# Patient Record
Sex: FEMALE | Race: WHITE | Employment: OTHER | URBAN - METROPOLITAN AREA
[De-identification: names, ages, dates, MRNs, and addresses within clinical notes are randomized per-mention and may not be internally consistent; named-entity substitution may affect disease eponyms.]

---

## 2017-01-10 ENCOUNTER — ANTICOAGULATION THERAPY VISIT (OUTPATIENT)
Dept: ANTICOAGULATION | Facility: CLINIC | Age: 77
End: 2017-01-10
Payer: MEDICARE

## 2017-01-10 DIAGNOSIS — Z79.01 LONG-TERM (CURRENT) USE OF ANTICOAGULANTS: Primary | ICD-10-CM

## 2017-01-10 LAB — INR POINT OF CARE: 1.4 (ref 0.86–1.14)

## 2017-01-10 PROCEDURE — 36416 COLLJ CAPILLARY BLOOD SPEC: CPT

## 2017-01-10 PROCEDURE — 99207 ZZC NO CHARGE NURSE ONLY: CPT

## 2017-01-10 PROCEDURE — 85610 PROTHROMBIN TIME: CPT | Mod: QW

## 2017-01-10 NOTE — MR AVS SNAPSHOT
Cielo Aguillon   1/10/2017 2:45 PM   Anticoagulation Therapy Visit    Description:  76 year old female   Provider[facility]  [facility]   Department:  [facility]           INR as of 1/10/2017     Selected INR 1.4! (1/10/2017)      Anticoagulation Summary as of 1/10/2017     INR goal 2.0-3.0   Selected INR 1.4! (1/10/2017)   Full instructions 1/10: 10 mg; 1/11: 10 mg; 1/12: 10 mg; Otherwise 7.5 mg on Tue, Thu, Sat; 5 mg all other days   Next INR check 1/23/2017    Indications   Long-term (current) use of anticoagulants [Z79.01] [Z79.01]  Atrial fibrillation (H) (Resolved) [I48.91]         Your next Anticoagulation Clinic appointment(s)     Jan 23, 2017  2:30 PM   [facility] Visit with [facility]   [facility] ([facility]    [address] [facility] [address]  [facility] [address] [phone]              Contact Numbers     [facility] Phone Numbers:  Anticoagulation Clinic Appointments : [phone]  Anticoagulation Nurse: [phone]         January 2017 Details    Sun Mon Tue Wed Thu Fri Sat     1               2               3               4               5               6               7                 8               9               10      10 mg   See details      11      10 mg         12      10 mg         13      5 mg         14      7.5 mg           15      5 mg         16      5 mg         17      7.5 mg         18      5 mg         19      7.5 mg         20      5 mg         21      7.5 mg           22      5 mg         23            24               25               26               27               28                 29               30               31                    Date Details   01/10 This INR check       Date of next INR:  1/23/2017         How to take your warfarin dose     To take:  5 mg Take 1 of the 5 mg tablets.    To take:  7.5 mg Take 1.5 of the 5 mg tablets.    To take:  10 mg Take 2 of the 5 mg tablets.

## 2017-01-10 NOTE — PROGRESS NOTES
ANTICOAGULATION FOLLOW-UP CLINIC VISIT    Patient Name:  Cielo Aguillon  Date:  1/10/2017  Contact Type:  Face to Face    SUBJECTIVE:     Patient Findings     Positives Intentional hold of therapy (Pt had been holding for eye surgery on 1/3/17)           OBJECTIVE    INR PROTIME   Date Value Ref Range Status   01/10/2017 1.4* 0.86 - 1.14 Final       ASSESSMENT / PLAN  INR assessment SUB    Recheck INR In: 2 WEEKS    INR Location Clinic      Anticoagulation Summary as of 1/10/2017     INR goal 2.0-3.0   Selected INR 1.4! (1/10/2017)   Maintenance plan 7.5 mg (5 mg x 1.5) on Tue, Thu, Sat; 5 mg (5 mg x 1) all other days   Full instructions 1/10: 10 mg; 1/11: 10 mg; 1/12: 10 mg; Otherwise 7.5 mg on Tue, Thu, Sat; 5 mg all other days   Weekly total 42.5 mg   Plan last modified Lia Lomas RN (5/19/2016)   Next INR check 1/23/2017   Priority INR   Target end date     Indications   Long-term (current) use of anticoagulants [Z79.01] [Z79.01]  Atrial fibrillation (H) (Resolved) [I48.91]         Anticoagulation Episode Summary     INR check location     Preferred lab     Send INR reminders to [facility]    Comments       Anticoagulation Care Providers     Provider Role Specialty Phone number    Simin Lopez MD [facility] Internal Medicine [phone]            See the Encounter Report to view Anticoagulation Flowsheet and Dosing Calendar (Go to Encounters tab in chart review, and find the Anticoagulation Therapy Visit)    Dosage adjustment made based on physician directed care plan.    Cindi Preston RN

## 2017-01-11 DIAGNOSIS — E78.5 HYPERLIPIDEMIA LDL GOAL <70: Primary | ICD-10-CM

## 2017-01-12 NOTE — TELEPHONE ENCOUNTER
Lipitor - PHARMACY IS REQUESTING 90 DAY REFILLS     Last Written Prescription Date: 12/19/16  Last Fill Quantity: 30, # refills: 11  Last Office Visit with [facility] or [facility] prescribing provider: 12/09/16       CHOL      133   2/19/2016  HDL       47   2/19/2016  LDL       54   2/19/2016  TRIG      158   2/19/2016  CHOLHDLRATIO      2.6   1/2/2015    Labs showing if normal/abnormal  Lab Results   Component Value Date    CHOL 133 02/19/2016    TRIG 158* 02/19/2016    HDL 47* 02/19/2016    LDL 54 02/19/2016    VLDL 29 01/02/2015    CHOLHDLRATIO 2.6 01/02/2015

## 2017-01-17 ENCOUNTER — TELEPHONE (OUTPATIENT)
Dept: INTERNAL MEDICINE | Facility: CLINIC | Age: 77
End: 2017-01-17

## 2017-01-17 RX ORDER — ATORVASTATIN CALCIUM 20 MG/1
20 TABLET, FILM COATED ORAL DAILY
Qty: 90 TABLET | Refills: 3 | Status: SHIPPED | OUTPATIENT
Start: 2017-01-17 | End: 2017-07-10

## 2017-01-18 NOTE — TELEPHONE ENCOUNTER
Pt left message that she wanted to speak to someone  It could have been about her refill for Atorvastatin.    Please call her  Michael

## 2017-01-27 ENCOUNTER — ANTICOAGULATION THERAPY VISIT (OUTPATIENT)
Dept: ANTICOAGULATION | Facility: CLINIC | Age: 77
End: 2017-01-27
Payer: MEDICARE

## 2017-01-27 DIAGNOSIS — Z79.01 LONG-TERM (CURRENT) USE OF ANTICOAGULANTS: Primary | ICD-10-CM

## 2017-01-27 LAB — INR POINT OF CARE: 3.4 (ref 0.86–1.14)

## 2017-01-27 PROCEDURE — 99207 ZZC NO CHARGE NURSE ONLY: CPT

## 2017-01-27 PROCEDURE — 85610 PROTHROMBIN TIME: CPT | Mod: QW

## 2017-01-27 PROCEDURE — 36416 COLLJ CAPILLARY BLOOD SPEC: CPT

## 2017-01-27 NOTE — MR AVS SNAPSHOT
Cielo Aguillon   1/27/2017 1:00 PM   Anticoagulation Therapy Visit    Description:  76 year old female   Provider[facility]  [facility]   Department:  [facility]           INR as of 1/27/2017     Selected INR 3.4! (1/27/2017)      Anticoagulation Summary as of 1/27/2017     INR goal 2.0-3.0   Selected INR 3.4! (1/27/2017)   Full instructions 1/27: Hold; 1/28: 5 mg; 2/4: 5 mg; Otherwise 7.5 mg on Tue, Thu, Sat; 5 mg all other days   Next INR check 2/6/2017    Indications   Long-term (current) use of anticoagulants [Z79.01] [Z79.01]  Atrial fibrillation (H) (Resolved) [I48.91]         Your next Anticoagulation Clinic appointment(s)     Feb 06, 2017  1:15 PM   [facility] with [facility]   [facility]    [address] [facility] [address]  [facility] [address] [phone]              Contact Numbers     [facility] Phone Numbers:  Anticoagulation Clinic Appointments : [phone]  Anticoagulation Nurse: [phone]         January 2017 Details    Sun Mon Tue Wed Thu Fri Sat     1               2               3               4               5               6               7                 8               9               10               11               12               13               14                 15               16               17               18               19               20               21                 22               23               24               25               26               27      Hold   See details      28      5 mg           29      5 mg         30      5 mg         31      7.5 mg              Date Details   01/27 This INR check               How to take your warfarin dose     To take:  5 mg Take 1 of the 5 mg tablets.    To take:  7.5 mg Take 1.5 of the 5 mg tablets.    Hold Do not take your warfarin dose. See the Details table to the right for additional instructions.                 February 2017 Details    Sun Mon Tue Wed Thu Fri Sat        1      5 mg         2      7.5 mg         3      5 mg         4      5 mg           5      5 mg         6            7               8               9               10               11                 12               13               14               15               16               17               18                 19               20               21               22               23               24               25                 26               27               28                    Date Details   No additional details    Date of next INR:  2/6/2017         How to take your warfarin dose     To take:  5 mg Take 1 of the 5 mg tablets.    To take:  7.5 mg Take 1.5 of the 5 mg tablets.

## 2017-01-27 NOTE — PROGRESS NOTES
ANTICOAGULATION FOLLOW-UP CLINIC VISIT    Patient Name:  Cielo Aguillon  Date:  1/27/2017  Contact Type:  Face to Face    SUBJECTIVE:     Patient Findings     Positives Other Complaints (bleeding on left eye recently had cataract surgery.  Also mattery.  Pt will  start eye drops today as directed by eye surgeon)           OBJECTIVE    INR PROTIME   Date Value Ref Range Status   01/27/2017 3.4* 0.86 - 1.14 Final       ASSESSMENT / PLAN  INR assessment SUPRA    Recheck INR In: 10 DAYS    INR Location Clinic      Anticoagulation Summary as of 1/27/2017     INR goal 2.0-3.0   Selected INR 3.4! (1/27/2017)   Maintenance plan 7.5 mg (5 mg x 1.5) on Tue, Thu, Sat; 5 mg (5 mg x 1) all other days   Full instructions 1/27: Hold; 1/28: 5 mg; 2/4: 5 mg; Otherwise 7.5 mg on Tue, Thu, Sat; 5 mg all other days   Weekly total 42.5 mg   Plan last modified Lia Lomas RN (5/19/2016)   Next INR check 2/6/2017   Priority INR   Target end date     Indications   Long-term (current) use of anticoagulants [Z79.01] [Z79.01]  Atrial fibrillation (H) (Resolved) [I48.91]         Anticoagulation Episode Summary     INR check location     Preferred lab     Send INR reminders to [facility]    Comments       Anticoagulation Care Providers     Provider Role Specialty Phone number    Simin Lopez MD Responsible Internal Medicine [phone]            See the Encounter Report to view Anticoagulation Flowsheet and Dosing Calendar (Go to Encounters tab in chart review, and find the Anticoagulation Therapy Visit)    Dosage adjustment made based on physician directed care plan.    Mckenna Robison RN

## 2017-02-03 DIAGNOSIS — I25.10 ASCVD (ARTERIOSCLEROTIC CARDIOVASCULAR DISEASE): ICD-10-CM

## 2017-02-03 DIAGNOSIS — I10 ESSENTIAL HYPERTENSION, BENIGN: ICD-10-CM

## 2017-02-03 DIAGNOSIS — F33.1 MAJOR DEPRESSIVE DISORDER, RECURRENT EPISODE, MODERATE (H): Primary | ICD-10-CM

## 2017-02-03 DIAGNOSIS — F41.1 GAD (GENERALIZED ANXIETY DISORDER): ICD-10-CM

## 2017-02-03 DIAGNOSIS — R94.6 THYROID FUNCTION TEST ABNORMAL: ICD-10-CM

## 2017-02-03 NOTE — TELEPHONE ENCOUNTER
EFFEXOR-XR    Last Written Prescription Date: 10/28/16  Last Fill Quantity: 30, # refills: 3  Last [facility] Visit with [facility] or  Health prescribing provider: 12/09/16        BP Readings from Last 3 Encounters:   12/09/16 104/80   08/15/16 136/74   07/06/16 120/72     Pulse: (for Fetzima)  CREATININE   Date Value Ref Range Status   02/24/2016 1.08 0.70 - 1.30 mg/dL Final   ]    Last PHQ-9 score on record=   PHQ-9 SCORE 9/27/2016   Total Score -   Total Score 4         Labs showing if normal/abnormal  Data Unavailable  Lab Results   Component Value Date    AST 51* 11/18/2015    ALT 38 02/19/2016      Lab Results   Component Value Date    CHOL 133 02/19/2016    TRIG 158* 02/19/2016    HDL 47* 02/19/2016    LDL 54 02/19/2016    VLDL 29 01/02/2015    CHOLHDLRATIO 2.6 01/02/2015     LEVOTHYROXINE     Last Written Prescription Date: 08/16/16  Last Quantity: 90, # refills: 1  Last Office Visit with [facility] or [facility] prescribing provider: 12/09/16        TSH   Date Value Ref Range Status   10/11/2016 3.40 0.40 - 4.00 mU/L Final   METOPROLOL      Last Written Prescription Date: 08/18/16  Last Fill Quantity: 180, # refills: 1  Last Office Visit with [facility] or [facility] prescribing provider: 12/09/16       POTASSIUM   Date Value Ref Range Status   02/24/2016 4.3 3.5 - 5.1 mmol/L Final     CREATININE   Date Value Ref Range Status   02/24/2016 1.08 0.70 - 1.30 mg/dL Final     BP Readings from Last 3 Encounters:   12/09/16 104/80   08/15/16 136/74   07/06/16 120/72

## 2017-02-06 ENCOUNTER — ANTICOAGULATION THERAPY VISIT (OUTPATIENT)
Dept: ANTICOAGULATION | Facility: CLINIC | Age: 77
End: 2017-02-06
Payer: MEDICARE

## 2017-02-06 DIAGNOSIS — Z79.01 LONG TERM (CURRENT) USE OF ANTICOAGULANTS: ICD-10-CM

## 2017-02-06 DIAGNOSIS — Z79.01 LONG-TERM (CURRENT) USE OF ANTICOAGULANTS: Primary | ICD-10-CM

## 2017-02-06 DIAGNOSIS — I48.20 CHRONIC ATRIAL FIBRILLATION (H): Primary | ICD-10-CM

## 2017-02-06 LAB — INR POINT OF CARE: 2.4 (ref 0.86–1.14)

## 2017-02-06 PROCEDURE — 99207 ZZC NO CHARGE NURSE ONLY: CPT

## 2017-02-06 PROCEDURE — 36416 COLLJ CAPILLARY BLOOD SPEC: CPT

## 2017-02-06 PROCEDURE — 85610 PROTHROMBIN TIME: CPT | Mod: QW

## 2017-02-06 RX ORDER — WARFARIN SODIUM 5 MG/1
TABLET ORAL
Qty: 108 TABLET | Refills: 1 | Status: SHIPPED | OUTPATIENT
Start: 2017-02-06 | End: 2017-08-04

## 2017-02-06 NOTE — MR AVS SNAPSHOT
Cielo Aguillon   2/6/2017 1:15 PM   Anticoagulation Therapy Visit    Description:  76 year old female   Provider[facility]  [facility]   Department:  [facility]           INR as of 2/6/2017     Selected INR 2.4 (2/6/2017)      Anticoagulation Summary as of 2/6/2017     INR goal 2.0-3.0   Selected INR 2.4 (2/6/2017)   Full instructions 7.5 mg on Tue, Thu, Sat; 5 mg all other days   Next INR check 2/27/2017    Indications   Long-term (current) use of anticoagulants [Z79.01] [Z79.01]  Atrial fibrillation (H) (Resolved) [I48.91]         Your next Anticoagulation Clinic appointment(s)     Feb 27, 2017  1:00 PM   [facility]ticoagulation [facility] with [facility]   [facility]    [address] [facility] [address]  [facility] [address] [phone]              Contact Numbers     [facility] Phone Numbers:  Anticoagulation Clinic Appointments : [phone]  Anticoagulation Nurse: [phone]         February 2017 Details    Sun Mon Tue Wed Thu Fri Sat        1               2               3               4                 5               6      5 mg   See details      7      7.5 mg         8      5 mg         9      7.5 mg         10      5 mg         11      7.5 mg           12      5 mg         13      5 mg         14      7.5 mg         15      5 mg         16      7.5 mg         17      5 mg         18      7.5 mg           19      5 mg         20      5 mg         21      7.5 mg         22      5 mg         23      7.5 mg         24      5 mg         25      7.5 mg           26      5 mg         27            28                    Date Details   02/06 This INR check       Date of next INR:  2/27/2017         How to take your warfarin dose     To take:  5 mg Take 1 of the 5 mg tablets.    To take:  7.5 mg Take 1.5 of the 5 mg tablets.

## 2017-02-06 NOTE — TELEPHONE ENCOUNTER
Warfarin. Prescription approved per [facility] Refill [facility].       Last Written Prescription Date: 11/1/16  Last Fill Qty: 108, # refills: 1    Last Office Visit with [facility] or [facility] prescribing provider: 12/9/16       Lab Results   Component Value Date    INR 3.4* 01/27/2017    INR 1.4* 01/10/2017    INR 3.0* 12/09/2016    INR 2.3* 11/22/2016    INR 2.5* 11/08/2016

## 2017-02-06 NOTE — PROGRESS NOTES
ANTICOAGULATION FOLLOW-UP CLINIC VISIT    Patient Name:  Cielo Aguillon  Date:  2/6/2017  Contact Type:  Face to Face    SUBJECTIVE:     Patient Findings     Positives Antibiotic use or infection (Pt is taking doxycycline for URI, has 5 days left, )           OBJECTIVE    INR PROTIME   Date Value Ref Range Status   02/06/2017 2.4* 0.86 - 1.14 Final       ASSESSMENT / PLAN  INR assessment THER    Recheck INR In: 3 WEEKS    INR Location Clinic      Anticoagulation Summary as of 2/6/2017     INR goal 2.0-3.0   Selected INR 2.4 (2/6/2017)   Maintenance plan 7.5 mg (5 mg x 1.5) on Tue, Thu, Sat; 5 mg (5 mg x 1) all other days   Full instructions 7.5 mg on Tue, Thu, Sat; 5 mg all other days   Weekly total 42.5 mg   No change documented Cindi Preston, RN   Plan last modified Lia Lomas RN (5/19/2016)   Next INR check 2/27/2017   Priority INR   Target end date     Indications   Long-term (current) use of anticoagulants [Z79.01] [Z79.01]  Atrial fibrillation (H) (Resolved) [I48.91]         Anticoagulation Episode Summary     INR check location     Preferred lab     Send INR reminders to [facility]    Comments       Anticoagulation Care Providers     Provider Role Specialty Phone number    Simin Lopez MD [facility] Internal Medicine [phone]            See the Encounter Report to view Anticoagulation Flowsheet and Dosing Calendar (Go to Encounters tab in chart review, and find the Anticoagulation Therapy Visit)    Dosage adjustment made based on physician directed care plan.    Cindi Preston, RN

## 2017-02-07 RX ORDER — VENLAFAXINE HYDROCHLORIDE 150 MG/1
150 CAPSULE, EXTENDED RELEASE ORAL DAILY
Qty: 30 CAPSULE | Refills: 1 | Status: SHIPPED | OUTPATIENT
Start: 2017-02-07 | End: 2017-05-03

## 2017-02-07 RX ORDER — METOPROLOL TARTRATE 100 MG
100 TABLET ORAL 2 TIMES DAILY
Qty: 180 TABLET | Refills: 1 | Status: SHIPPED | OUTPATIENT
Start: 2017-02-07 | End: 2017-08-01

## 2017-02-07 RX ORDER — LEVOTHYROXINE SODIUM 25 UG/1
25 TABLET ORAL DAILY
Qty: 90 TABLET | Refills: 2 | Status: SHIPPED | OUTPATIENT
Start: 2017-02-07 | End: 2017-07-10 | Stop reason: DRUGHIGH

## 2017-02-07 NOTE — TELEPHONE ENCOUNTER
Will be due for Creatinine this month.   Prescription approved per [facility] Refill Protocol.

## 2017-02-16 ENCOUNTER — OFFICE VISIT (OUTPATIENT)
Dept: INTERNAL MEDICINE | Facility: CLINIC | Age: 77
End: 2017-02-16
Payer: MEDICARE

## 2017-02-16 VITALS
HEART RATE: 82 BPM | WEIGHT: 173 LBS | HEIGHT: 65 IN | OXYGEN SATURATION: 96 % | SYSTOLIC BLOOD PRESSURE: 108 MMHG | BODY MASS INDEX: 28.82 KG/M2 | DIASTOLIC BLOOD PRESSURE: 82 MMHG

## 2017-02-16 DIAGNOSIS — R09.81 NASAL CONGESTION: ICD-10-CM

## 2017-02-16 DIAGNOSIS — I10 ESSENTIAL HYPERTENSION, BENIGN: ICD-10-CM

## 2017-02-16 DIAGNOSIS — M54.50 CHRONIC LEFT-SIDED LOW BACK PAIN WITHOUT SCIATICA: Primary | ICD-10-CM

## 2017-02-16 DIAGNOSIS — R53.83 FATIGUE, UNSPECIFIED TYPE: ICD-10-CM

## 2017-02-16 DIAGNOSIS — G89.29 CHRONIC LEFT-SIDED LOW BACK PAIN WITHOUT SCIATICA: Primary | ICD-10-CM

## 2017-02-16 DIAGNOSIS — R04.0 EPISTAXIS: ICD-10-CM

## 2017-02-16 DIAGNOSIS — M47.816 FACET ARTHROPATHY, LUMBAR: ICD-10-CM

## 2017-02-16 DIAGNOSIS — E03.4 HYPOTHYROIDISM DUE TO ACQUIRED ATROPHY OF THYROID: ICD-10-CM

## 2017-02-16 DIAGNOSIS — Z79.01 LONG-TERM (CURRENT) USE OF ANTICOAGULANTS: ICD-10-CM

## 2017-02-16 LAB
CREAT UR-MCNC: 202 MG/DL
HGB BLD-MCNC: 13.7 G/DL (ref 11.7–15.7)
MICROALBUMIN UR-MCNC: 69 MG/L
MICROALBUMIN/CREAT UR: 34.06 MG/G CR (ref 0–25)

## 2017-02-16 PROCEDURE — 82043 UR ALBUMIN QUANTITATIVE: CPT | Performed by: INTERNAL MEDICINE

## 2017-02-16 PROCEDURE — 99215 OFFICE O/P EST HI 40 MIN: CPT | Performed by: INTERNAL MEDICINE

## 2017-02-16 PROCEDURE — 85018 HEMOGLOBIN: CPT | Performed by: INTERNAL MEDICINE

## 2017-02-16 PROCEDURE — 84443 ASSAY THYROID STIM HORMONE: CPT | Performed by: INTERNAL MEDICINE

## 2017-02-16 PROCEDURE — 84439 ASSAY OF FREE THYROXINE: CPT | Performed by: INTERNAL MEDICINE

## 2017-02-16 PROCEDURE — 36415 COLL VENOUS BLD VENIPUNCTURE: CPT | Performed by: INTERNAL MEDICINE

## 2017-02-16 NOTE — MR AVS SNAPSHOT
After Visit Summary   2/16/2017    Cielo Aguillon    MRN: [id number]           Patient Information     Date Of Birth          1940        Visit Information        Provider Department      2/16/2017 2:20 PM Simin Lopez MD [facility]        Today's Diagnoses     Hypothyroidism due to acquired atrophy of thyroid    -  1    Essential hypertension, benign        Long-term (current) use of anticoagulants [Z79.01]        Epistaxis          Care Instructions    Use plain Mucinex and nasacort.         Follow-ups after your visit        Additional Services     OTOLARYNGOLOGY REFERRAL       Your provider has referred you to: N: [facility] [phone]   [email or web address]    Please be aware that coverage of these services is subject to the terms and limitations of your health insurance plan.  Call member services at your health plan with any benefit or coverage questions.      Please bring the following with you to your appointment:    (1) Any X-Rays, CTs or MRIs which have been performed.  Contact the facility where they were done to arrange for  prior to your scheduled appointment.   (2) List of current medications  (3) This referral request   (4) Any documents/labs given to you for this referral                  Your next 10 appointments already scheduled     Feb 27, 2017  1:00 PM CST   Anticoagulation Visit with [facility]   [facility]    [address] [facility] [address]  [facility] [address] [phone]              Who to contact     If you have questions or need follow up information about today's clinic visit or your schedule please contact [facility] directly at [phone].  Normal or non-critical lab and imaging results will be communicated to you by MyChart, letter or phone within 4 business days after  "the clinic has received the results. If you do not hear from us within 7 days, please contact the clinic through [employer] or phone. If you have a critical or abnormal lab result, we will notify you by phone as soon as possible.  Submit refill requests through [employer] or call your pharmacy and they will forward the refill request to us. Please allow 3 business days for your refill to be completed.          Additional Information About Your Visit        [employer] Information     [employer] lets you send messages to your doctor, view your test results, renew your prescriptions, schedule appointments and more. To sign up, go to [email or web address] . Click on \"Log in\" on the left side of the screen, which will take you to the Welcome page. Then click on \"Sign up Now\" on the right side of the page.     You will be asked to enter the access code listed below, as well as some personal information. Please follow the directions to create your username and password.     Your access code is: [email or web address]  Expires: 3/9/2017  5:04 PM     Your access code will  in 90 days. If you need help or a new code, please call your [place] clinic or [phone].        Care EveryWhere ID     This is your Care EveryWhere ID. This could be used by other organizations to access your [place] medical records  [id number]        Your Vitals Were     Pulse Height Pulse Oximetry BMI (Body Mass Index)          82 5' 5\" (1.651 m) 96% 28.79 kg/m2         Blood Pressure from Last 3 Encounters:   17 108/82   16 104/80   08/15/16 136/74    Weight from Last 3 Encounters:   17 173 lb (78.5 kg)   16 167 lb 12.8 oz (76.1 kg)   16 167 lb (75.8 kg)              We Performed the Following     EKG 12-lead complete w/read - Clinics     Hemoglobin     Microalbumin quantitative random urine     OTOLARYNGOLOGY REFERRAL     TSH with free T4 reflex        Primary Care Provider Office Phone # Fax #    Simin Lopez [phone] " [phone]       [facility] [address]  [facility] [address]        Thank you!     Thank you for choosing [facility]  for your care. Our goal is always to provide you with excellent care. Hearing back from our patients is one way we can continue to improve our services. Please take a few minutes to complete the written survey that you may receive in the mail after your visit with us. Thank you!             Your Updated Medication List - Protect others around you: Learn how to safely use, store and throw away your medicines at www.disposemymeds.org.          This list is accurate as of: 2/16/17  3:03 PM.  Always use your most recent med list.                   Brand Name Dispense Instructions for use    amLODIPine 5 MG tablet    NORVASC    90 tablet    Take 1 tablet (5 mg) by mouth daily       atorvastatin 20 MG tablet    LIPITOR    90 tablet    Take 1 tablet (20 mg) by mouth daily       diazepam 5 MG tablet    VALIUM    40 tablet    1/2 to 1 po q 8 hours prn       levothyroxine 25 MCG tablet    SYNTHROID/LEVOTHROID    90 tablet    Take 1 tablet (25 mcg) by mouth daily       metoprolol 100 MG tablet    LOPRESSOR    180 tablet    Take 1 tablet (100 mg) by mouth 2 times daily       order for DME     1 Device    Equipment being ordered: CPAP mask, tubing and supplies. Fax #[phone]. Attn: Joseph       traMADol 50 MG tablet    ULTRAM    50 tablet    Take 1 tablet (50 mg) by mouth every 8 hours as needed for moderate pain       venlafaxine 150 MG 24 hr capsule    EFFEXOR-XR    30 capsule    Take 1 capsule (150 mg) by mouth daily       VITAMIN D (CHOLECALCIFEROL) PO      Take 1,000 Units by mouth daily       warfarin 5 MG tablet    COUMADIN    108 tablet    Take 1 tablet (5 mg) daily, except take 1 and 1/2 tablet (7.5 mg) on Tuesday, Thursday and Saturday  or as instructed.       zolpidem 5 MG tablet    AMBIEN    30 tablet    Take 1 tablet (5 mg) by mouth nightly as needed for  sleep

## 2017-02-16 NOTE — PROGRESS NOTES
SUBJECTIVE:                                                    Cielo Aguillon is a 76 year old female who presents to clinic today for the following health issues:    1. Back pain: She has had chronic back pain for 7 years, previously managed at [facility]. She has facet arthropathy, degenerative disc disease, spinal stenosis. She's had PT, a number of injections with Dr. Dominguez with facet injections, has had rhizotomies without significant improvement. She does not want to have surgery. She continues to have constant pain.     2. Nasal symptoms: She Had onset of symptoms about a month ago It did not start with an acute viral syndrome but with sinus pressure, nasal congestion, postnasal drainage. She was seen at an urgent care on 1/31/17 and put on Augmentin. She developed a rash to that medication and was changed to doxycycline and  treated for 10 days. She had improvement in her symptoms but they have not resolved.  She continues to have significant nasal congestion and some post nasal drainage. She does not have sinus pain, no fever, no cough.    3. She complains of recurrent nose bleeds of the left nostril. They usually resolve with pinching, but had to have cauterization at the ED one time.     4. Fatigue: she has chronic fatigue, tired during the day, lack of energy. She is not using her CPAP though. It has been hard to wear, takes off during the night.     5. Her blood pressure was elevated this morning. At home it was 119/112. She does not have any symptoms with this.     Patient Active Problem List   Diagnosis     Essential hypertension, benign     Peptic ulcer     Chronic rhinitis     Other ventral hernia without mention of obstruction or gangrene     Lichenification and lichen simplex chronicus     Fatty liver     Advanced directives, counseling/discussion     Hyperlipidemia LDL goal <70     Back pain     Coronary artery disease     Cystocele, midline     Rectocele     BETHANY (obstructive sleep  apnea)     Long-term (current) use of anticoagulants [Z79.01]     MARY (generalized anxiety disorder)     Major depressive disorder, recurrent episode, moderate (H)     Controlled substance agreement signed     Chronic atrial fibrillation (H)     Hypothyroidism due to acquired atrophy of thyroid     Current Outpatient Prescriptions   Medication Sig Dispense Refill     venlafaxine (EFFEXOR-XR) 150 MG 24 hr capsule Take 1 capsule (150 mg) by mouth daily 30 capsule 1     metoprolol (LOPRESSOR) 100 MG tablet Take 1 tablet (100 mg) by mouth 2 times daily 180 tablet 1     levothyroxine (SYNTHROID/LEVOTHROID) 25 MCG tablet Take 1 tablet (25 mcg) by mouth daily 90 tablet 2     warfarin (COUMADIN) 5 MG tablet Take 1 tablet (5 mg) daily, except take 1 and 1/2 tablet (7.5 mg) on Tuesday, Thursday and Saturday  or as instructed. 108 tablet 1     atorvastatin (LIPITOR) 20 MG tablet Take 1 tablet (20 mg) by mouth daily 90 tablet 3     amLODIPine (NORVASC) 5 MG tablet Take 1 tablet (5 mg) by mouth daily 90 tablet 1     traMADol (ULTRAM) 50 MG tablet Take 1 tablet (50 mg) by mouth every 8 hours as needed for moderate pain 50 tablet 2     VITAMIN D, CHOLECALCIFEROL, PO Take 1,000 Units by mouth daily        diazepam (VALIUM) 5 MG tablet 1/2 to 1 po q 8 hours prn (Patient not taking: Reported on 2/16/2017) 40 tablet 0     zolpidem (AMBIEN) 5 MG tablet Take 1 tablet (5 mg) by mouth nightly as needed for sleep (Patient not taking: Reported on 2/16/2017) 30 tablet 5     order for DME Equipment being ordered: CPAP mask, tubing and supplies. Fax #[phone]. Attn: Joseph (Patient not taking: Reported on 2/16/2017) 1 Device 0      Social History   Substance Use Topics     Smoking status: Never Smoker     Smokeless tobacco: Never Used     Alcohol use 0.0 oz/week     0 Standard drinks or equivalent per week      Comment: occ - wine          ROS:  No fever, chills, ear pain, sore throat, cough, dyspnea, new numbness, tingling, weakness, loss  "of control of bowel or bladder.     OBJECTIVE:                                                    /82  Pulse 82  Ht 5' 5\" (1.651 m)  Wt 173 lb (78.5 kg)  SpO2 96%  BMI 28.79 kg/m2  Body mass index is 28.79 kg/(m^2).    Nasal mucosa with moderate edema, some superficial abrasion left septum. No purulent mucus  No sinus pain,   Pharynx clear.   No nodes  Lungs: clear  CV: normal S1, S2 without murmur, S3 or S4.        ASSESSMENT/PLAN:                                                            1. Chronic left-sided low back pain without sciatica, facet arthropathy  Discussed tylenol schedule, can try otc lidoderm patches to the back, refer pain management center to assess for options other than chronic narcotics  - [facility] REFERRAL    2. Fatigue, unspecified type  Advised likely mostly due to her BETHANY but pain may also be a big factor. Try to wear CPAP more, check labs    3. Nasal congestion  Advised not likely infection still, start mucinex    4. Epistaxis  Recommend afrin to left nostril for 3 days, then ocean. If not resolved, see ENT  - OTOLARYNGOLOGY REFERRAL    5. Essential hypertension, benign  Reassured BP today is good, likely was a false reading  - Microalbumin quantitative random urine    6. Hypothyroidism due to acquired atrophy of thyroid  recheck  - TSH with free T4 reflex  - T4 free    7. Long-term (current) use of anticoagulants [Z79.01]  - Hemoglobin        Simin Lopez MD  [facility]      50 minutes spent with the patient, >50% of time spent counseling about back pain, fatgue management.  "

## 2017-02-16 NOTE — NURSING NOTE
"Chief Complaint   Patient presents with     Other     Multiple concers, nose bleed, bop increase, fatigue       Initial /82  Pulse 82  Ht 5' 5\" (1.651 m)  Wt 173 lb (78.5 kg)  SpO2 96%  BMI 28.79 kg/m2 Estimated body mass index is 28.79 kg/(m^2) as calculated from the following:    Height as of this encounter: 5' 5\" (1.651 m).    Weight as of this encounter: 173 lb (78.5 kg).  Medication Reconciliation: complete      Jd Stiles, HALIMA    \  "

## 2017-02-17 LAB
T4 FREE SERPL-MCNC: 0.94 NG/DL (ref 0.76–1.46)
TSH SERPL DL<=0.005 MIU/L-ACNC: 4.54 MU/L (ref 0.4–4)

## 2017-02-17 NOTE — PROGRESS NOTES
Order(s) created erroneously. Erroneous order ID: [id number]   Order canceled by: BREN MONK   Order cancel date/time: 02/17/2017 2:32 PM

## 2017-02-21 ENCOUNTER — TELEPHONE (OUTPATIENT)
Dept: INTERNAL MEDICINE | Facility: CLINIC | Age: 77
End: 2017-02-21

## 2017-02-21 NOTE — TELEPHONE ENCOUNTER
Patient called because there was a comment on her after visit summary that an EKG was done and she states it was not done and wanted to make certain her insurance company was ot billed for it.  Advise patient I do not show an EKG being done wither in her chart.    Patient states she got the referral for the ENT , but not for Pain Management as discussed.  Gave patient the telephone number for [facility] in [place].  Please enter referral and mail copy to patient.

## 2017-02-22 NOTE — TELEPHONE ENCOUNTER
Left voicemail for patient to schedule new evaluation.     Cherise PEARCE    [facility] [place]

## 2017-02-25 PROBLEM — M47.816 FACET ARTHROPATHY, LUMBAR: Status: ACTIVE | Noted: 2017-02-25

## 2017-02-27 ENCOUNTER — TELEPHONE (OUTPATIENT)
Dept: ANTICOAGULATION | Facility: CLINIC | Age: 77
End: 2017-02-27

## 2017-02-27 ENCOUNTER — ANTICOAGULATION THERAPY VISIT (OUTPATIENT)
Dept: ANTICOAGULATION | Facility: CLINIC | Age: 77
End: 2017-02-27
Payer: MEDICARE

## 2017-02-27 DIAGNOSIS — E03.9 ACQUIRED HYPOTHYROIDISM: Primary | ICD-10-CM

## 2017-02-27 DIAGNOSIS — Z79.01 LONG-TERM (CURRENT) USE OF ANTICOAGULANTS: ICD-10-CM

## 2017-02-27 LAB — INR POINT OF CARE: 2.4 (ref 0.86–1.14)

## 2017-02-27 PROCEDURE — 36416 COLLJ CAPILLARY BLOOD SPEC: CPT

## 2017-02-27 PROCEDURE — 99207 ZZC NO CHARGE NURSE ONLY: CPT

## 2017-02-27 PROCEDURE — 85610 PROTHROMBIN TIME: CPT | Mod: QW

## 2017-02-27 RX ORDER — LEVOTHYROXINE SODIUM 50 UG/1
50 TABLET ORAL DAILY
Qty: 90 TABLET | Refills: 3 | Status: SHIPPED | OUTPATIENT
Start: 2017-02-27 | End: 2018-01-26

## 2017-02-27 NOTE — MR AVS SNAPSHOT
Cielo Aguillon   2/27/2017 1:00 PM   Anticoagulation Therapy Visit    Description:  76 year old female   Provider:  [facility]   Department:  [facility]           INR as of 2/27/2017     Today's INR 2.4      Anticoagulation Summary as of 2/27/2017     INR goal 2.0-3.0   Today's INR 2.4   Full instructions 7.5 mg on Tue, Thu, Sat; 5 mg all other days   Next INR check 3/27/2017    Indications   Long-term (current) use of anticoagulants [Z79.01] [Z79.01]  Atrial fibrillation (H) (Resolved) [I48.91]         Your next Anticoagulation Clinic appointment(s)     Mar 27, 2017  1:30 PM CDT   [facility]    [address] [facility] [address]  [facility] [address] [phone]              Contact Numbers     [facility] Phone Numbers:  Anticoagulation Clinic Appointments : [phone]  Anticoagulation Nurse: [phone]         February 2017 Details    Sun Mon Tue Wed Thu Fri Sat        1               2               3               4                 5               6               7               8               9               10               11                 12               13               14               15               16               17               18                 19               20               21               22               23               24               25                 26               27      5 mg   See details      28      7.5 mg              Date Details   02/27 This INR check               How to take your warfarin dose     To take:  5 mg Take 1 of the 5 mg tablets.    To take:  7.5 mg Take 1.5 of the 5 mg tablets.           March 2017 Details    Sun Mon Tue Wed Thu Fri Sat        1      5 mg         2      7.5 mg         3      5 mg         4      7.5 mg           5      5 mg         6      5 mg         7      7.5 mg         8      5 mg         9      7.5  mg         10      5 mg         11      7.5 mg           12      5 mg         13      5 mg         14      7.5 mg         15      5 mg         16      7.5 mg         17      5 mg         18      7.5 mg           19      5 mg         20      5 mg         21      7.5 mg         22      5 mg         23      7.5 mg         24      5 mg         25      7.5 mg           26      5 mg         27            28               29               30               31                 Date Details   No additional details    Date of next INR:  3/27/2017         How to take your warfarin dose     To take:  5 mg Take 1 of the 5 mg tablets.    To take:  7.5 mg Take 1.5 of the 5 mg tablets.

## 2017-02-27 NOTE — PROGRESS NOTES
ANTICOAGULATION FOLLOW-UP CLINIC VISIT    Patient Name:  Cielo Aguillon  Date:  2/27/2017  Contact Type:  Face to Face    SUBJECTIVE:     Patient Findings     Positives No Problem Findings           OBJECTIVE    INR Protime   Date Value Ref Range Status   02/27/2017 2.4 (A) 0.86 - 1.14 Final       ASSESSMENT / PLAN  INR assessment THER    Recheck INR In: 4 WEEKS    INR Location Clinic      Anticoagulation Summary as of 2/27/2017     INR goal 2.0-3.0   Today's INR 2.4   Maintenance plan 7.5 mg (5 mg x 1.5) on Tue, Thu, Sat; 5 mg (5 mg x 1) all other days   Full instructions 7.5 mg on Tue, Thu, Sat; 5 mg all other days   Weekly total 42.5 mg   No change documented Cindi Preston, RN   Plan last modified Lia Lomas RN (5/19/2016)   Next INR check 3/27/2017   Priority INR   Target end date     Indications   Long-term (current) use of anticoagulants [Z79.01] [Z79.01]  Atrial fibrillation (H) (Resolved) [I48.91]         Anticoagulation Episode Summary     INR check location     Preferred lab     Send INR reminders to [facility]    Comments       Anticoagulation Care Providers     Provider Role Specialty Phone number    Simin Lopez MD [facility] Internal Medicine [phone]            See the Encounter Report to view Anticoagulation Flowsheet and Dosing Calendar (Go to Encounters tab in chart review, and find the Anticoagulation Therapy Visit)        Cindi Preston, RN

## 2017-02-28 NOTE — TELEPHONE ENCOUNTER
Advise her thyroid test shows she is a little undertreated. It is not clear this is enough to cause symptoms but it may improve. She should increase her dose to 50 mcg daily. I have done a prescription. She should have her level rechecked in 6-8 weeks. The hemoglobin is normal, the urine protein is improved.

## 2017-03-03 ENCOUNTER — TELEPHONE (OUTPATIENT)
Dept: PALLIATIVE MEDICINE | Facility: CLINIC | Age: 77
End: 2017-03-03

## 2017-03-03 NOTE — TELEPHONE ENCOUNTER
[facility] Center Referral      1. Confirmed address with patient? Yes  2. Confirmed phone number with patient? Yes  3. Confirmed referring provider? Yes  4. Is the PCP the same as the referring provider? Yes  5. Has the patient been to any previous pain clinics? Yes [facility]  (If yes, send SHAZIA with welcome letter)  6. Which insurance are we to bill for this appointment?  [employer]    7. Informed pt of cancellation (48 hour) policy? Yes    REGARDING OPIOID MEDICATIONS: We will always address appropriateness of opioid pain medications, but we generally will not automatically take on a prescribing role. When we do take on prescribing of opioids for chronic pain, it is in collaboration with the referring physician for an intermediate period of time (months), with an expectation that the primary physician or provider will assume the prescribing role if medications are effective at stable doses with demonstrated compliance. Therefore, please do not assume that your prescribing responsibilities end on the day of pain clinic consultation.  7. Informed pt of prescribing policy? Yes      8. Referring Provider: Simin Lopez

## 2017-03-08 ENCOUNTER — OFFICE VISIT (OUTPATIENT)
Dept: PALLIATIVE MEDICINE | Facility: CLINIC | Age: 77
End: 2017-03-08
Payer: MEDICARE

## 2017-03-08 VITALS
DIASTOLIC BLOOD PRESSURE: 76 MMHG | SYSTOLIC BLOOD PRESSURE: 120 MMHG | OXYGEN SATURATION: 95 % | WEIGHT: 173 LBS | BODY MASS INDEX: 28.79 KG/M2 | HEART RATE: 78 BPM

## 2017-03-08 DIAGNOSIS — F33.1 MAJOR DEPRESSIVE DISORDER, RECURRENT EPISODE, MODERATE (H): ICD-10-CM

## 2017-03-08 DIAGNOSIS — R26.9 ABNORMAL GAIT: ICD-10-CM

## 2017-03-08 DIAGNOSIS — G89.29 CHRONIC RIGHT-SIDED LOW BACK PAIN WITHOUT SCIATICA: Primary | ICD-10-CM

## 2017-03-08 DIAGNOSIS — G47.33 OSA (OBSTRUCTIVE SLEEP APNEA): ICD-10-CM

## 2017-03-08 DIAGNOSIS — M54.50 CHRONIC RIGHT-SIDED LOW BACK PAIN WITHOUT SCIATICA: Primary | ICD-10-CM

## 2017-03-08 DIAGNOSIS — F41.1 GAD (GENERALIZED ANXIETY DISORDER): ICD-10-CM

## 2017-03-08 DIAGNOSIS — M47.816 FACET ARTHROPATHY, LUMBAR: ICD-10-CM

## 2017-03-08 PROCEDURE — 99205 OFFICE O/P NEW HI 60 MIN: CPT | Performed by: NURSE PRACTITIONER

## 2017-03-08 ASSESSMENT — PAIN SCALES - GENERAL: PAINLEVEL: MODERATE PAIN (5)

## 2017-03-08 NOTE — PROGRESS NOTES
[facility]    INITIAL EVALUATION    REASON FOR PAIN CONSULTATION:  Cielo Aguillon is a 76 year old female I am seeing in consultation at the request of  Simin DAVISON  for evaluation and recommendations for her chronic low back pain and chronic opioid use.    TODAY'S DATE: 03/08/17    REFERRING PROVIDER:  Simin Lopez MD    PRIMARY CARE PROVIDER:Simin Lopez MD        MEDICAL DECISION MAKING: Cielo Aguillon is a 76 year old female who present with right sided low back pain likely facetogenic. She would benefit from chronic pain therapies particularly medication adjustments, Physical Therapy and possibly injection therapy.   As her pain is not radicular facet joint steriod  Injections or Transforaminal  Epidural steroid injection would be preferred.   Her social situation and mood, seems of long standing, but is a limiting treatment factor in regards to opioid therapy as well her pain rehabilitation potential.  Her older age and presence of BETHANY  should also be considered in her risk profile for for opioid analgesia.      ASSESSMENT:  (M54.5,  G89.29) Chronic right-sided low back pain without sciatica  (primary encounter diagnosis)    (M12.88) Facet arthropathy, lumbar (H) , Multi level     (G47.33) BETHANY (obstructive sleep apnea)  Comment:  Limiting treatment factor with opioid analgesia     (F41.1) MARY (generalized anxiety disorder)    (F33.1) Major depressive disorder, recurrent episode, moderate (H)    (R26.9) Abnormal gait      TREATMENT RECOMMENDATIONS/PLAN:   Medications:  Provider reluctantece to start Gabapentin or Lyrica due to sedation and increasing the likelihood of fall.  Increase Effexor 225 mg, clear with primary care. In basket sent to confirm dose change.   Lidocaine 4 % patch this an over the counter   Tylenol 1000 mg every 8 hrs   Recommend increasing Tramadol 50 mg to 3 x per day as needed for pain.  In basket sent with this recommendtion to primary care who is currently  "prescribing.    Other:   icy hot or aleve TENS unit     Referrals:  Physical Therapy for chronic pain   Consider chiropractor later   Encouraged patient to consider a referral to mental health provider for counseling.     Future appointments:  Follow up with Ro Cuello CNP  4--6 weeks    Imaging:   Consider Updating imaging of low back, will need to be off Warfarin for injections.        PAIN HISTORY: Cielo Aguillon is 76 year old female who reports 3 year history of focal right  low back pain after a fall from a step months ago. The patient falls frequently with last fall about 3 weeks ago.  The patient states she is unsteady on her feet,has associated dizziness light headedness.  She has a past history of obstructive sleep apnea, shortness of breath, Atrial Fibrillation, chronic anticoagulation, depression with anxiety and insomnia. The patient reports increase depressive symptoms as the anniversary of her first husbands, and sons suicides are approaching.  Each suicide was on separate occassions.  She also reports that she is worried about another son currently also is showing warning signs of suicide.  She also live with her second  who is she states is an alcoholic.  He is \"snarky\" when drinking to her. She feels trap and would consider leaving, but all her money is tied up with her spouse.    Cielo reports a gradual onset of left low back pain since this fall. She was seen by Cali Mullen  about 2 years ago.The patient had lumbar epidural and  RFA through [facility] which helped some.   The patient s uncertain if her falls are associated with her increased in low back pain. She has urinary frequency, but no urgency or incontinence. She denies bowel problems, numbness or tingling in her perimeum or legs or leg weakness.  The patient is here at the request of her primary care provider to explore alternative options in managing her pain.        Pain Description: Aching and Sharp  Pain ranges in " "intensity from 3 to 7, and averages 6 on the zero to ten numerical rating scale  Quality of the pain is worse in am  Aggravating factors include  Sitting or standing  too long,.  Relieving factors include  heat, stretching, lumbar support    CURRENT PAIN MEDICATIONS: tramadol 50 mg 1-2 per day, Tylenol  MEDICATION SIDE EFFECTS: none    SCREENING TOOLS:  IMPACT OF PAIN: Oswestry Functional Disability Scale for back pain moderate impact.  Pain Disability Questionnaire 00/150 points  moderate impact on function  MENTAL HEALTH :    Significant for depression/ anxiety: Yes   DO YOU FEEL SAFE: She feels safe in her home and not suicidal.     CHEMICAL HEALTH: Significant for abuse No                                 PAST MEDICATION TRAILS:  (TR= THERAPUTIC RESULT, NH =NOT HELPFUL, SE=SIDE EFFECTS, AR=ALLERGIC REACTION)  OPIOIDS: on tramadol   ANALGESIC: Tylenol,  NSAID'S:  Unable due to anticoagulant  MUSCLE RELAXER'S:  ANTIMIGRAINE:  ANTIDEPRESSANTS:  HYPNOTICS:   ANTICONVULSANTS\"  TOPICALS:     PAST TREATMENT TRIALS:   HELPFUL  NOT HELPFUL   WORSEN  PAIN CLINIC YES  ( [facility]) for injections   PHYSICAL THERAPY Yes  Not in a long time   TENS:No  EXERCISE: No  COUNSELING: Yes  RELAXATION THERAPY No  ACUPUNCTURE: No  CHIROPRACTOR: No  SPINE INJECTIONS: YES   X APURVA, RFA 2015  SPINAL CORDS STIM: No  SPINAL CORD PUMP: No  OPIOIDS: Yes  OTHER: N/A    DIAGNOSTIC TESTS:  CT Cervical Spine 2/16/15 ( S I )  Multi level Degenerative Disc Disease mild- moderate, greatest at C6-7  Mild to moderate stenosis  C 4-5, C 5-6 and C6-7. End plate changes and osteophyte at C 4-5.    CT Thoracic 2/16/15 (SI)  Scoliosis convex to right  Old compression fracture and Large Schmorls node at L1 mild compression deformity     MRI Lumbar 2005 (SI)  Multi level facet disease greatest at L4-5,posterio-lateral disc protrusion.    X-rays  2-3 view  6/25/12:hypertrophic degenerative changes through out the lumbar spine   ALLERGIES:   Allergies   Allergen " Reactions     Ace Inhibitors      Cough (Zestril)     Aspirin      hx bleeding ulcers     Hydrochlorothiazide [Hctz] Rash     Ibuprofen      hx bleeding ulcers     Losartan Rash     Penicillins Rash       Current Outpatient Prescriptions   Medication     levothyroxine (SYNTHROID/LEVOTHROID) 50 MCG tablet     venlafaxine (EFFEXOR-XR) 150 MG 24 hr capsule     metoprolol (LOPRESSOR) 100 MG tablet     warfarin (COUMADIN) 5 MG tablet     atorvastatin (LIPITOR) 20 MG tablet     amLODIPine (NORVASC) 5 MG tablet     diazepam (VALIUM) 5 MG tablet     zolpidem (AMBIEN) 5 MG tablet     traMADol (ULTRAM) 50 MG tablet     VITAMIN D, CHOLECALCIFEROL, PO     levothyroxine (SYNTHROID/LEVOTHROID) 25 MCG tablet     order for DME     No current facility-administered medications for this visit.          PAST MEDICAL AND PSYCHIATRIC HISTORY:  Past Medical History   Diagnosis Date     Anxiety      related to travel     Arthritis      ASCVD (arteriosclerotic cardiovascular disease) 2/12     60-70% stenoses of OM2, D1, medical management     Atrial fibrillation (H)      Atrial flutter (H)      Coronary artery disease      2/2012- 50% mid Cfx, 60-70% prox OM2, 50-70% D1     Essential hypertension, benign      Fatty liver 5/10     mild fibrosis on biopsy     Hypertriglyceridemia      Major depression      BETHANY (obstructive sleep apnea)      Other ventral hernia without mention of obstruction or gangrene      Peptic ulcer, unspecified site, unspecified as acute or chronic, without mention of hemorrhage, perforation, or obstruction 1983     Shortness of breath      Thyroid disease      Tubular adenoma 6/08       PAST SURGICAL HISTORY:  Past Surgical History   Procedure Laterality Date     C nonspecific procedure       Appendectomy     C nonspecific procedure       Tonsillectomy     C nonspecific procedure       Tubal ligation     C nonspecific procedure  11/01     Shave bone spurs from left foot     Coronary angiography adult order  2/13/2012      50% mid Cfx, 60-70% prox OM2, 50-70% D1     Heart cath right and left heart cath  04/06/2015     Mid LAD 50-60%, 1st Diagonal 70%, 1st OM 30%, 2nd OM 40-50%, review report for right heart cath results.      Appendectomy       Gyn surgery       Colonoscopy  9/1/2016     Dr. Camejo [facility]     Esophagoscopy, gastroscopy, duodenoscopy (egd), combined  9/1/2016     Dr. Camejo [facility]     Colonoscopy N/A 9/1/2016     Procedure: COMBINED COLONOSCOPY, SINGLE OR MULTIPLE BIOPSY/POLYPECTOMY BY BIOPSY;  Surgeon: Pillo Camejo MD;  Location:  GI     Esophagoscopy, gastroscopy, duodenoscopy (egd), combined N/A 9/1/2016     Procedure: COMBINED ESOPHAGOSCOPY, GASTROSCOPY, DUODENOSCOPY (EGD), BIOPSY SINGLE OR MULTIPLE;  Surgeon: Pillo Camejo MD;  Location:  GI       FAMILY HISTORY:   Family History   Problem Relation Age of Onset     Respiratory Father      emphysema--secon. to smoking     Alzheimer Disease Mother      Congenital Anomalies Mother      OSTEOPOROSIS Mother      DIABETES Maternal Aunt      DIABETES Other      cousins     DIABETES Other      cousin on mothers side of family     Depression Son      Depression Son      Colon Cancer No family hx of        HEALTH & LIFESTYLE PRACTICES:       Smoking:    No  AMOUNT  none      Caffeine:    No   AMOUNT        Alcohol:   Yes  AMOUNT mild socially       Illicit drugs:   No      Gum chewing:  No      Jaw clenching:  No      Nail biting:   No      Restorative: No, snoring: Yes BETHANY, SOB      SOCIAL HISTORY:    Social History     Social History     Marital status:      Spouse name:  Lokesh      Number of children: 6     Years of education: 12     Occupational History      [employer]     Social History Main Topics     Smoking status: Never Smoker     Smokeless tobacco: Never Used     Alcohol use 0.0 oz/week     0 Standard drinks or equivalent per week      Comment: occ - wine     Drug use: No     Sexual activity: Yes     Partners: Male     Birth control/  protection: Surgical      Comment: tubal     Other Topics Concern      Service No     Blood Transfusions Yes     1983     Caffeine Concern No     none     Occupational Exposure no          Hobby Hazards No     Sleep Concern Yes     Stress Concern Yes     Weight Concern No     Special Diet Yes     pt on warfarin     Back Care No     Exercise No     some exercise at night, walking      Bike Helmet No     Seat Belt Yes     Self-Exams No     Social History Narrative   Was a pharmacy Tech for 34 years    History of physical abuse No, emotional abuse Yes,  sexual abuse No    REVIEW OF SYSTEMS: Please refer to pages 8 of the patient's health questionnaire which I reviewed in detail with  this patient.  This review covered 13 bodily systems. The pertinate positives are included in this report.     PHYSICAL EXAMINATION: Well nourished of normohabitus,  in NAD.  VITAL SIGNS:  Blood pressure 120/76, pulse 78, weight 78.5 kg (173 lb), SpO2 95 %, not currently breastfeeding. Body mass index is 28.79 kg/(m^2).  CARDIOVASCULAR: No edema, pulses intact throughout.  RESPIRATORY: Normal chest rise, normal respiratory effort, NAD  BEHAVIORAL/OBSERVATIONS: Eye contact good. Initiates conversation with ease. Appears non anxious, well groomed, pleasant and cooperative.  Affect is  Depressed, dysthymic. .   Pain behaviors: Yes, cautious,slow movement  GAIT: Slow to rise, narrow  stance, reduced arm swing and turning radius, short, shuffling. Velocity slow.  HEENT: Normocephalic,atraumatic, without tenderness to palpation   ABDOMEN:  Soft non tender  MUSCULOSKELETAL EXAM: Moves all extremities equally. Has normal bulk and tone, point tenderness .  Range of motion :    Cervical extension@ 60 degrees, flexion@ 50 degrees , lateral left @ 45 degrees, right @45 degrees.  Hip flexion @ 45 degrees, abduction: 50 degrees, adduction:@ 10 degrees, referred rotational pain No degrees. Reproducible right low back pain near the paraspinal with  right more than left hip flexion.  Lumbar flexion @ 70 degrees, lumbar extension@ 15 degrees, SLR are @ 70 degrees,  facet loading: Yes    NEURO EXAM:    Higher Cortical function: Alert and oriented. Speech clear fluent and appropriate.  Cranial Nerve Function :  Pupils are equal and reactive to light and accomodation at  3 mm, EOMI 's are intact without nystagmus. There is no blurred vision or diplopia. Sternocleidomastoid muscles are equal in strength.    Motor: strength is  5/5 in all muscle group 5-/5 right leg greater proximally. There are no drifts in the upper or lower extremities. Interossei strength is equal bilaterally.   Fine motor coordination is intact bilaterally.  Heel and toe walking is intact.  DTR:  +2 through out the biceps, triceps brachioradialis, patella and achilles. More brisk right patella.   Tinel's sign is negative.   Coordination and Balance: Romberg deferred.  Tandem walking deferred.   Sensory: intact to light touch.   SKIN/VASCULAR/AUTONOMIC EXAM:   color and turgor normal without localized discoloration or temperature changes.     OTHER:   OPIOID RISK ASSESSMENT:   DIRE Score for ongoing opioid management is calculated as follows:    Diagnosis = 2    Intractability = 2    Risk: Psych = 2  Chem Hlth = 3  Reliability = 2  Social = 2    Efficacy = 2    Total DIRE Score = 15 (14 or higher predicts good candidate for ongoing opioid management; 13 or lower predicts poor candidate for opioid management)   Marcelo Sales 2006,The Journal of  Pain.,The DIRE Score: Predicting Outcomes of Opioid Prescribing for  Chronic Pain Vol.7,No.9, pp 671-301.  [place] Prescription Monitoring Report: reviewed and consistent with self report with no concerns for abuse or misuse. Yes  OPIOID TOLERANCE: low Morphine  EQ 4-8 mg per day  OPIOID COMPLIANCE:   URINE DRUG SCREEN: NA  OPIOID AGREEMENT: NA  Opioid therapy will be prescribed by Simin Lopez MD    TIME SPENT: 60  total minutes including 25 minutes  history and physical exam, and 35 minutes counseling her about her diagnosis and treatment options for the above identified medical problems.     Thank you for referring this patient to the [facility] and allowing participatation in this aspect of their care    Signed: SKYE Devi., SARAHI MCCLAIN.  [facility]      .

## 2017-03-08 NOTE — NURSING NOTE
"Chief Complaint   Patient presents with     Pain     new patient       Initial /76  Pulse 78  Wt 78.5 kg (173 lb)  SpO2 95%  BMI 28.79 kg/m2 Estimated body mass index is 28.79 kg/(m^2) as calculated from the following:    Height as of 2/16/17: 1.651 m (5' 5\").    Weight as of this encounter: 78.5 kg (173 lb).  Medication Reconciliation: michael Arroyo [facility]        "

## 2017-03-08 NOTE — MR AVS SNAPSHOT
After Visit Summary   3/8/2017    Cielo Aguillon    MRN: [id number]           Patient Information     Date Of Birth          1940        Visit Information        Provider Department      3/8/2017 2:00 PM Ro Cuello APRN CNP [facility]        Today's Diagnoses     Chronic right-sided low back pain without sciatica    -  1    Facet arthropathy, lumbar (H)        BETHANY (obstructive sleep apnea)        MARY (generalized anxiety disorder)        Major depressive disorder, recurrent episode, moderate (H)        Abnormal gait          Care Instructions    I would not start Gabapentin or Lyrica due to sedation and increasing the likelihood of fall in the setting of unsteady gait.  Increase Effexor 225 mg, clear with primary care   Lidocaine 4 % patch this an over the counter   Tylenol 1000 mg every 8 hrs   Increase Tramadol 50 mg to 3 x per day as needed for pain   Can try icy hot or aleve TENS unit   Physical Therapy for chronic pain   Consider chiropractor later   encouraged referral to mental health provider for counseling   Follow up with Ro Cuello CNP  4--6 weeks   Consider Updating imaging of low back will need to be off Warfarin for injections    ----------------------------------------------------------------  Nurse Triage line:  [phone]   Call this number with any questions or concerns. You may leave a detailed message anytime. Calls are typically returned Monday through Friday between 8 AM and 4:30 PM. We usually get back to you within 2 business days depending on the issue/request.       Medication refills:    For non-narcotic medications, call your pharmacy directly to request a refill. The pharmacy will contact the [facility] for authorization. Please allow 3-4 days for these refills to be processed.     For narcotic refills, call the nurse triage line or send a [employer] message. Please contact us 7-10 days before your refill  is due. The message MUST include the name of the specific medication(s) requested and how you would like to receive the prescription(s). The options are as follows:    Pain Clinic staff can mail the prescription to your pharmacy. Please tell us the name of the pharmacy.    You may pick the prescription up at the Pain Clinic (tell us the location) or during a clinic visit with your pain provider    Pain Clinic staff can deliver the prescription to the [facility] pharmacy in the clinic building. Please tell us the location.      Scheduling number: [phone].  Call this number to schedule or change appointments.    We believe regular attendance is key to your success in our program.    Any time you are unable to keep your appointment we ask that you call us at least 24 hours in advance to let us know. This will allow us to offer the appointment time to another patient.             Follow-ups after your visit        Additional Services     PAIN PT EVAL AND TREAT                 Your next 10 appointments already scheduled     Mar 27, 2017  1:30 PM CDT   [facility]tico[facility] with [facility]   [facility]    [address] [facility] [address]  [facility] [address] [phone]              Who to contact     If you have questions or need follow up information about today's clinic visit or your schedule please contact [facility] directly at [phone].  Normal or non-critical lab and imaging results will be communicated to you by MyChart, letter or phone within 4 business days after the clinic has received the results. If you do not hear from us within 7 days, please contact the clinic through MyChart or phone. If you have a critical or abnormal lab result, we will notify you by phone as soon as possible.  Submit refill requests through [employer] or call your pharmacy and they will forward the refill request to us. Please allow 3 business days for  "your refill to be completed.          Additional Information About Your Visit        [employer] Information     To[employer] lets you send messages to your doctor, view your test results, renew your prescriptions, schedule appointments and more. To sign up, go to [email or web address] . Click on \"Log in\" on the left side of the screen, which will take you to the Welcome page. Then click on \"Sign up Now\" on the right side of the page.     You will be asked to enter the access code listed below, as well as some personal information. Please follow the directions to create your username and password.     Your access code is: [email or web address]  Expires: 3/9/2017  5:04 PM     Your access code will  in 90 days. If you need help or a new code, please call your [facility] clinic or [phone].        Care EveryWhere ID     This is your Care EveryWhere ID. This could be used by other organizations to access your [facility] medical records  [id number]        Your Vitals Were     Pulse Pulse Oximetry BMI (Body Mass Index)             78 95% 28.79 kg/m2          Blood Pressure from Last 3 Encounters:   17 120/76   17 108/82   16 104/80    Weight from Last 3 Encounters:   17 78.5 kg (173 lb)   17 78.5 kg (173 lb)   16 76.1 kg (167 lb 12.8 oz)              We Performed the Following     PAIN PT EVAL AND TREAT        Primary Care Provider Office Phone # Fax #    Simin Lopez [phone]       [facility] [address] [facility] [address]  [facility] [address]        Thank you!     Thank you for choosing [facility]  for your care. Our goal is always to provide you with excellent care. Hearing back from our patients is one way we can continue to improve our services. Please take a few minutes to complete the written survey that you may receive in the mail after your visit with us. Thank you!             Your Updated Medication List - Protect others around you: Learn how " to safely use, store and throw away your medicines at www.disposemymeds.org.          This list is accurate as of: 3/8/17  3:26 PM.  Always use your most recent med list.                   Brand Name Dispense Instructions for use    amLODIPine 5 MG tablet    NORVASC    90 tablet    Take 1 tablet (5 mg) by mouth daily       atorvastatin 20 MG tablet    LIPITOR    90 tablet    Take 1 tablet (20 mg) by mouth daily       diazepam 5 MG tablet    VALIUM    40 tablet    1/2 to 1 po q 8 hours prn       * levothyroxine 25 MCG tablet    SYNTHROID/LEVOTHROID    90 tablet    Take 1 tablet (25 mcg) by mouth daily       * levothyroxine 50 MCG tablet    SYNTHROID/LEVOTHROID    90 tablet    Take 1 tablet (50 mcg) by mouth daily       metoprolol 100 MG tablet    LOPRESSOR    180 tablet    Take 1 tablet (100 mg) by mouth 2 times daily       order for DME     1 Device    Equipment being ordered: CPAP mask, tubing and supplies. Fax #[phone]. Attn: Joseph       traMADol 50 MG tablet    ULTRAM    50 tablet    Take 1 tablet (50 mg) by mouth every 8 hours as needed for moderate pain       venlafaxine 150 MG 24 hr capsule    EFFEXOR-XR    30 capsule    Take 1 capsule (150 mg) by mouth daily       VITAMIN D (CHOLECALCIFEROL) PO      Take 1,000 Units by mouth daily       warfarin 5 MG tablet    COUMADIN    108 tablet    Take 1 tablet (5 mg) daily, except take 1 and 1/2 tablet (7.5 mg) on Tuesday, Thursday and Saturday  or as instructed.       zolpidem 5 MG tablet    AMBIEN    30 tablet    Take 1 tablet (5 mg) by mouth nightly as needed for sleep       * Notice:  This list has 2 medication(s) that are the same as other medications prescribed for you. Read the directions carefully, and ask your doctor or other care provider to review them with you.

## 2017-03-08 NOTE — PATIENT INSTRUCTIONS
I would not start Gabapentin or Lyrica due to sedation and increasing the likelihood of fall in the setting of unsteady gait.  Increase Effexor 225 mg, clear with primary care   Lidocaine 4 % patch this an over the counter   Tylenol 1000 mg every 8 hrs   Increase Tramadol 50 mg to 3 x per day as needed for pain   Can try icy hot or aleve TENS unit   Physical Therapy for chronic pain   Consider chiropractor later   encouraged referral to mental health provider for counseling   Follow up with Ro Cuello CNP  4--6 weeks   Consider Updating imaging of low back will need to be off Warfarin for injections    ----------------------------------------------------------------  Nurse Triage line:  [phone]   Call this number with any questions or concerns. You may leave a detailed message anytime. Calls are typically returned Monday through Friday between 8 AM and 4:30 PM. We usually get back to you within 2 business days depending on the issue/request.       Medication refills:    For non-narcotic medications, call your pharmacy directly to request a refill. The pharmacy will contact the [facility] for authorization. Please allow 3-4 days for these refills to be processed.     For narcotic refills, call the nurse triage line or send a Black coin message. Please contact us 7-10 days before your refill is due. The message MUST include the name of the specific medication(s) requested and how you would like to receive the prescription(s). The options are as follows:    Pain Clinic staff can mail the prescription to your pharmacy. Please tell us the name of the pharmacy.    You may pick the prescription up at the Pain Clinic (tell us the location) or during a clinic visit with your pain provider    Pain Clinic staff can deliver the prescription to the [facility] in the clinic building. Please tell us the location.      Scheduling number: [phone].  Call this number to schedule or change  appointments.    We believe regular attendance is key to your success in our program.    Any time you are unable to keep your appointment we ask that you call us at least 24 hours in advance to let us know. This will allow us to offer the appointment time to another patient.

## 2017-03-08 NOTE — Clinical Note
Thank you for referring this patient to the [facility] and allowing participatation in this aspect of their care.  Please review my treatment recommendations.  If you have questions or concerns do not hesitate to contact me. Ro Cuello, CNP

## 2017-03-17 ASSESSMENT — PATIENT HEALTH QUESTIONNAIRE - PHQ9: SUM OF ALL RESPONSES TO PHQ QUESTIONS 1-9: 7

## 2017-03-27 ENCOUNTER — ANTICOAGULATION THERAPY VISIT (OUTPATIENT)
Dept: ANTICOAGULATION | Facility: CLINIC | Age: 77
End: 2017-03-27
Payer: MEDICARE

## 2017-03-27 DIAGNOSIS — Z79.01 LONG-TERM (CURRENT) USE OF ANTICOAGULANTS: ICD-10-CM

## 2017-03-27 LAB — INR PPP: 3.4

## 2017-03-27 PROCEDURE — 99207 ZZC NO CHARGE NURSE ONLY: CPT

## 2017-03-27 NOTE — PROGRESS NOTES
ANTICOAGULATION FOLLOW-UP CLINIC VISIT    Patient Name:  Cielo Aguillon  Date:  3/27/2017  Contact Type:  Face to Face    SUBJECTIVE:     Patient Findings     Positives No Problem Findings    Comments Increase thyroid medication            OBJECTIVE    INR   Date Value Ref Range Status   03/27/2017 3.4  Final       ASSESSMENT / PLAN  INR assessment SUPRA    Recheck INR In: 8 DAYS    INR Location Clinic      Anticoagulation Summary as of 3/27/2017     INR goal 2.0-3.0   Today's INR 3.4!   Maintenance plan 7.5 mg (5 mg x 1.5) on Tue, Thu, Sat; 5 mg (5 mg x 1) all other days   Full instructions 3/28: 5 mg; 4/4: 5 mg; Otherwise 7.5 mg on Tue, Thu, Sat; 5 mg all other days   Weekly total 42.5 mg   Plan last modified Lia Lomas, RN (5/19/2016)   Next INR check 4/4/2017   Priority INR   Target end date     Indications   Long-term (current) use of anticoagulants [Z79.01] [Z79.01]  Atrial fibrillation (H) (Resolved) [I48.91]         Anticoagulation Episode Summary     INR check location     Preferred lab     Send INR reminders to [facility]    Comments       Anticoagulation Care Providers     Provider Role Specialty Phone number    Simin Lopez MD Responsible Internal Medicine [phone]            See the Encounter Report to view Anticoagulation Flowsheet and Dosing Calendar (Go to Encounters tab in chart review, and find the Anticoagulation Therapy Visit)    Dosage adjustment made based on physician directed care plan.    HILARIA Galindo CNP

## 2017-03-27 NOTE — MR AVS SNAPSHOT
"              After Visit Summary   3/27/2017    Cielo Aguillon    MRN: [id number]           Patient Information     Date Of Birth          1940        Visit Information        Provider Department      3/27/2017 1:30 PM [facility]        Today's Diagnoses     Long-term (current) use of anticoagulants           Follow-ups after your visit        Your next 10 appointments already scheduled     Apr 04, 2017  2:30 PM CDT   Anticoagulation Visit with [facility]   [facility] ([facility]    [address]  [facility] [address] [phone]              Who to contact     If you have questions or need follow up information about today's clinic visit or your schedule please contact [facility] directly at [phone].  Normal or non-critical lab and imaging results will be communicated to you by MyChart, letter or phone within 4 business days after the clinic has received the results. If you do not hear from us within 7 days, please contact the clinic through MyChart or phone. If you have a critical or abnormal lab result, we will notify you by phone as soon as possible.  Submit refill requests through [employer] or call your pharmacy and they will forward the refill request to us. Please allow 3 business days for your refill to be completed.          Additional Information About Your Visit        MyChart Information     [employer] lets you send messages to your doctor, view your test results, renew your prescriptions, schedule appointments and more. To sign up, go to [email or web address] . Click on \"Log in\" on the left side of the screen, which will take you to the Welcome page. Then click on \"Sign up Now\" on the right side of the page.     You will be asked to enter the access code listed below, as well as some personal information. Please follow the directions to create your username and " password.     Your access code is: [email or web address]  Expires: 2017  1:37 PM     Your access code will  in 90 days. If you need help or a new code, please call your [facility] or [phone].        Care EveryWhere ID     This is your Care EveryWhere ID. This could be used by other organizations to access your [facility] medical records  [id number]         Blood Pressure from Last 3 Encounters:   17 120/76   17 108/82   16 104/80    Weight from Last 3 Encounters:   17 173 lb (78.5 kg)   17 173 lb (78.5 kg)   16 167 lb 12.8 oz (76.1 kg)              We Performed the Following     INR        Primary Care Provider Office Phone # Fax #    Simin Lopez [phone]       [facility] [address]  [facility] [address]        Thank you!     Thank you for choosing [facility]  for your care. Our goal is always to provide you with excellent care. Hearing back from our patients is one way we can continue to improve our services. Please take a few minutes to complete the written survey that you may receive in the mail after your visit with us. Thank you!             Your Updated Medication List - Protect others around you: Learn how to safely use, store and throw away your medicines at www.disposemymeds.org.          This list is accurate as of: 3/27/17  1:37 PM.  Always use your most recent med list.                   Brand Name Dispense Instructions for use    amLODIPine 5 MG tablet    NORVASC    90 tablet    Take 1 tablet (5 mg) by mouth daily       atorvastatin 20 MG tablet    LIPITOR    90 tablet    Take 1 tablet (20 mg) by mouth daily       diazepam 5 MG tablet    VALIUM    40 tablet    1/2 to 1 po q 8 hours prn       * levothyroxine 25 MCG tablet    SYNTHROID/LEVOTHROID    90 tablet    Take 1 tablet (25 mcg) by mouth daily       * levothyroxine 50 MCG tablet    SYNTHROID/LEVOTHROID    90 tablet    Take 1 tablet (50 mcg)  by mouth daily       metoprolol 100 MG tablet    LOPRESSOR    180 tablet    Take 1 tablet (100 mg) by mouth 2 times daily       order for DME     1 Device    Equipment being ordered: CPAP mask, tubing and supplies. Fax #[phone]. Attn: Joseph       traMADol 50 MG tablet    ULTRAM    50 tablet    Take 1 tablet (50 mg) by mouth every 8 hours as needed for moderate pain       venlafaxine 150 MG 24 hr capsule    EFFEXOR-XR    30 capsule    Take 1 capsule (150 mg) by mouth daily       VITAMIN D (CHOLECALCIFEROL) PO      Take 1,000 Units by mouth daily       warfarin 5 MG tablet    COUMADIN    108 tablet    Take 1 tablet (5 mg) daily, except take 1 and 1/2 tablet (7.5 mg) on Tuesday, Thursday and Saturday  or as instructed.       zolpidem 5 MG tablet    AMBIEN    30 tablet    Take 1 tablet (5 mg) by mouth nightly as needed for sleep       * Notice:  This list has 2 medication(s) that are the same as other medications prescribed for you. Read the directions carefully, and ask your doctor or other care provider to review them with you.

## 2017-04-04 ENCOUNTER — ANTICOAGULATION THERAPY VISIT (OUTPATIENT)
Dept: ANTICOAGULATION | Facility: CLINIC | Age: 77
End: 2017-04-04
Payer: MEDICARE

## 2017-04-04 ENCOUNTER — OFFICE VISIT (OUTPATIENT)
Dept: PODIATRY | Facility: CLINIC | Age: 77
End: 2017-04-04
Payer: MEDICARE

## 2017-04-04 VITALS — BODY MASS INDEX: 28.82 KG/M2 | HEART RATE: 80 BPM | WEIGHT: 173 LBS | HEIGHT: 65 IN

## 2017-04-04 DIAGNOSIS — Z79.01 LONG-TERM (CURRENT) USE OF ANTICOAGULANTS: ICD-10-CM

## 2017-04-04 DIAGNOSIS — L60.0 INGROWING NAIL: Primary | ICD-10-CM

## 2017-04-04 LAB — INR POINT OF CARE: 2.5 (ref 0.86–1.14)

## 2017-04-04 PROCEDURE — 36416 COLLJ CAPILLARY BLOOD SPEC: CPT

## 2017-04-04 PROCEDURE — 85610 PROTHROMBIN TIME: CPT | Mod: QW

## 2017-04-04 PROCEDURE — 11750 EXCISION NAIL&NAIL MATRIX: CPT | Mod: TA | Performed by: PODIATRIST

## 2017-04-04 PROCEDURE — 99207 ZZC NO CHARGE NURSE ONLY: CPT

## 2017-04-04 PROCEDURE — 99203 OFFICE O/P NEW LOW 30 MIN: CPT | Mod: 25 | Performed by: PODIATRIST

## 2017-04-04 RX ORDER — SILVER SULFADIAZINE 10 MG/G
CREAM TOPICAL
Qty: 85 G | Refills: 1 | Status: SHIPPED | OUTPATIENT
Start: 2017-04-04 | End: 2017-07-10

## 2017-04-04 NOTE — PROGRESS NOTES
"Foot & Ankle Surgery  April 4, 2017    CC: multiple bilateral toe issues    I was asked to see Cielo Aguillon regarding the chief complaint by:  Dr. GERARD Lopez    HPI:  Pt is a 77 year old female who presents with above complaint.  Bilateral total permanent hallux nail avulsions, developed small recurrent portion at the medial L hallux. Can be tender.  She also has pain at the distal R hallux, where she has callusing and thinned fat pad.  She has bilateral bunions, and notices the toes get pushed together and can be painful.      ROS:   Pos for CC.  The patient denies current nausea, vomiting, chills, fevers, belly pain, calf pain, chest pain or SOB.  Complete remainder of ROS is otherwise neg.    VITALS:    Vitals:    04/04/17 1535   Pulse: 80   Weight: 78.5 kg (173 lb)   Height: 1.651 m (5' 5\")       PMH:    Past Medical History:   Diagnosis Date     Anxiety     related to travel     Arthritis      ASCVD (arteriosclerotic cardiovascular disease) 2/12    60-70% stenoses of OM2, D1, medical management     Atrial fibrillation (H)      Atrial flutter (H)      Coronary artery disease     2/2012- 50% mid Cfx, 60-70% prox OM2, 50-70% D1     Essential hypertension, benign      Fatty liver 5/10    mild fibrosis on biopsy     Hypertriglyceridemia      Major depression      BETHANY (obstructive sleep apnea)      Other ventral hernia without mention of obstruction or gangrene      Peptic ulcer, unspecified site, unspecified as acute or chronic, without mention of hemorrhage, perforation, or obstruction 1983     Shortness of breath      Thyroid disease      Tubular adenoma 6/08       SXHX:    Past Surgical History:   Procedure Laterality Date     APPENDECTOMY       C NONSPECIFIC PROCEDURE      Appendectomy     C NONSPECIFIC PROCEDURE      Tonsillectomy     C NONSPECIFIC PROCEDURE      Tubal ligation     C NONSPECIFIC PROCEDURE  11/01    Shave bone spurs from left foot     COLONOSCOPY  9/1/2016    Dr. Camejo [facility]     COLONOSCOPY " N/A 9/1/2016    Procedure: COMBINED COLONOSCOPY, SINGLE OR MULTIPLE BIOPSY/POLYPECTOMY BY BIOPSY;  Surgeon: Pillo Camejo MD;  Location:  GI     CORONARY ANGIOGRAPHY ADULT ORDER  2/13/2012    50% mid Cfx, 60-70% prox OM2, 50-70% D1     ESOPHAGOSCOPY, GASTROSCOPY, DUODENOSCOPY (EGD), COMBINED  9/1/2016    Dr. Camejo [facility]     ESOPHAGOSCOPY, GASTROSCOPY, DUODENOSCOPY (EGD), COMBINED N/A 9/1/2016    Procedure: COMBINED ESOPHAGOSCOPY, GASTROSCOPY, DUODENOSCOPY (EGD), BIOPSY SINGLE OR MULTIPLE;  Surgeon: Pillo Camejo MD;  Location:  GI     GYN SURGERY       HEART CATH RIGHT AND LEFT HEART CATH  04/06/2015    Mid LAD 50-60%, 1st Diagonal 70%, 1st OM 30%, 2nd OM 40-50%, review report for right heart cath results.         MEDS:    Current Outpatient Prescriptions   Medication     silver sulfADIAZINE (SILVADENE) 1 % cream     levothyroxine (SYNTHROID/LEVOTHROID) 50 MCG tablet     venlafaxine (EFFEXOR-XR) 150 MG 24 hr capsule     metoprolol (LOPRESSOR) 100 MG tablet     levothyroxine (SYNTHROID/LEVOTHROID) 25 MCG tablet     warfarin (COUMADIN) 5 MG tablet     atorvastatin (LIPITOR) 20 MG tablet     amLODIPine (NORVASC) 5 MG tablet     diazepam (VALIUM) 5 MG tablet     zolpidem (AMBIEN) 5 MG tablet     order for DME     traMADol (ULTRAM) 50 MG tablet     VITAMIN D, CHOLECALCIFEROL, PO     No current facility-administered medications for this visit.        ALL:     Allergies   Allergen Reactions     Ace Inhibitors      Cough (Zestril)     Aspirin      hx bleeding ulcers     Hydrochlorothiazide [Hctz] Rash     Ibuprofen      hx bleeding ulcers     Losartan Rash     Penicillins Rash       FMH:    Family History   Problem Relation Age of Onset     Respiratory Father      emphysema--secon. to smoking     Alzheimer Disease Mother      Congenital Anomalies Mother      OSTEOPOROSIS Mother      DIABETES Maternal Aunt      DIABETES Other      cousins     DIABETES Other      cousin on mothers side of family     Depression  Son      Depression Son      Colon Cancer No family hx of        SocHx:    Social History     Social History     Marital status:      Spouse name: N/A     Number of children: 6     Years of education: N/A     Occupational History      [employer]     Social History Main Topics     Smoking status: Never Smoker     Smokeless tobacco: Never Used     Alcohol use 0.0 oz/week     0 Standard drinks or equivalent per week      Comment: occ - wine     Drug use: No     Sexual activity: Yes     Partners: Male     Birth control/ protection: Surgical      Comment: tubal     Other Topics Concern      Service No     Blood Transfusions Yes     1983     Caffeine Concern No     none     Occupational 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     working in a pharmacy     Hobby Hazards No     Sleep Concern Yes     Stress Concern Yes     Weight Concern No     Special Diet Yes     pt on warfarin     Back Care No     Exercise No     some exercise at night, walking      Bike Helmet No     Seat Belt Yes     Self-Exams No     Social History Narrative           EXAMINATION:  Gen:   No apparent distress  Neuro:   A&Ox3, no deficits  Psych:    Answering questions appropriately for age and situation with normal affect  Head:    NCAT  Eye:    Visual scanning without deficit  Ear:    Response to auditory stimuli wnl  Lung:    Non-labored breathing on RA noted  Abd:    NTND per patient report  Lymph:    Neg for pitting/non-pitting edema BLE  Vasc:    Pulses palpable, CFT minimally delayed  Neuro:    Light touch sensation intact to all sensory nerve distributions without paresthesias  Derm:    Medial 1/4 of the L hallux nail plate is present, tender to palpation without SOI.  Distal R hallux shows thinned skin/fat pad with slight callusing, although no wound is present..  Mild irritation between L 1st and 2nd toes.  No wounds noted, however  MSK:    Bilateral bunions, mild hammering of lesser toes.  Toes are compressed against each other, with slight  superficial skin irritation noted, alicia between hallux and 2nd toe L foot.  Calf:    Neg for redness, swelling or tenderness    Assessment:  77 year old female with recurrent L hallux nail; callusing/thinning fat pad distal R hallux; bunion/hammertoes with compression of toes      Plan:  Discussed etiologies and options  1.  Recurrent L hallux nail  -Regarding the nail, procedure options were discussed.  They elected to go with Partial permanent avulsion.  See procedure note for details.  Risks that were discussed include but are not limited to infection, wound healing complications, nerve irritation, recurrence of the ingrown nail and the need for further procedures.  Follow up 2 weeks for re-evaluation or sooner with acute issues.  Antibiotic:  None needed    After discussing the procedure, as well as risks, complications and post-procedure instructions, informed consent was obtained.    Anesthesia:  3 cc's of  1% lidocaine plain    Procedure:  After adequate prep, and with anesthesia achieved, a tourni-cot was applied to the involved toe(and removed after bandage application) and  attention was directed to the medial border of the L hallux where the nail plate was freed from surrounding soft tissue.  The offending border was  using an English Anvil and then removed in total.  The base of the wound was explored and showed no necrotic tissue, purulence or debris.  Phenol was then applied to the base of the wound for 30s x 3, and sufficient isopropyl alcohol was used to irrigate the wound.  A clean dressing was applied loosely to prevent vascular insult.  The patient tolerated the procedure well without complications.    Post-procedural instructions were dispensed and discussed with the patient.  All questions were answered.         2.  Thinned fat pad/callusing distal R hallux  -acommodative shoe gear  -toe pad/sleeve handout dispensed  -callus handout dispensed    3.  Bunion/hammertoes  -accommodative shoe  gear  -toe pad/spacer handout dispensed    Follow up:  2 weeks      Patient's medical history was reviewed today    Body mass index is 28.79 kg/(m^2).  Weight management plan: Patient was referred to their PCP to discuss a diet and exercise plan.        Shaheed Mercado DPM   Podiatric Foot & Ankle Surgeon  [facility]  [phone]

## 2017-04-04 NOTE — NURSING NOTE
"Chief Complaint   Patient presents with     Ingrown Toenail     BL hallux nails L>R, perm removal 8+ years ago, growing back and can cause pain       Initial Pulse 80  Ht 1.651 m (5' 5\")  Wt 78.5 kg (173 lb)  BMI 28.79 kg/m2 Estimated body mass index is 28.79 kg/(m^2) as calculated from the following:    Height as of this encounter: 1.651 m (5' 5\").    Weight as of this encounter: 78.5 kg (173 lb).  Medication Reconciliation: complete  "

## 2017-04-04 NOTE — MR AVS SNAPSHOT
Cielo Aguillon   4/4/2017 2:30 PM   Anticoagulation Therapy Visit    Description:  77 year old female   Provider:  [facility]   Department:  [facility] Anti Coagulation           INR as of 4/4/2017     Today's INR 2.5      Anticoagulation Summary as of 4/4/2017     INR goal 2.0-3.0   Today's INR 2.5   Full instructions 4/4: 5 mg; 4/11: 5 mg; Otherwise 7.5 mg on Tue, Thu, Sat; 5 mg all other days   Next INR check 4/18/2017    Indications   Long-term (current) use of anticoagulants [Z79.01] [Z79.01]  Atrial fibrillation (H) (Resolved) [I48.91]         Your next Anticoagulation Clinic appointment(s)     Apr 18, 2017  2:00 PM CDT   Anticoagulation Visit with [facility]   [facility]    [address] [facility] [address]  [facility] [address] [phone]              Contact Numbers     [facility] Phone Numbers:  Anticoagulation Clinic Appointments : [phone]  Anticoagulation Nurse: [phone]         April 2017 Details    Sun Mon Tue Wed Thu Fri Sat           1                 2               3               4      5 mg   See details      5      5 mg         6      7.5 mg         7      5 mg         8      7.5 mg           9      5 mg         10      5 mg         11      5 mg         12      5 mg         13      7.5 mg         14      5 mg         15      7.5 mg           16      5 mg         17      5 mg         18            19               20               21               22                 23               24               25               26               27               28               29                 30                      Date Details   04/04 This INR check       Date of next INR:  4/18/2017         How to take your warfarin dose     To take:  5 mg Take 1 of the 5 mg tablets.    To take:  7.5 mg Take 1.5 of the 5 mg tablets.

## 2017-04-04 NOTE — Clinical Note
Good morning  I saw Cielo on Tuesday for multiple issues, including recurrent L hallux nail growth, painful callusing distal R hallux, and bunion/hammertoe deformities.  She underwent a total temporary nail avulsion, and we reviewed conservative treatment options for remaining issues.  She'll follow up in 2 weeks for a recheck.  Thanks    Shaheed

## 2017-04-04 NOTE — PROGRESS NOTES
ANTICOAGULATION FOLLOW-UP CLINIC VISIT    Patient Name:  Cielo Aguillon  Date:  4/4/2017  Contact Type:  Face to Face    SUBJECTIVE:     Patient Findings     Positives No Problem Findings (Increase in Levothyroxine end of February )           OBJECTIVE    INR Protime   Date Value Ref Range Status   04/04/2017 2.5 (A) 0.86 - 1.14 Final       ASSESSMENT / PLAN  INR assessment THER    Recheck INR In: 2 WEEKS    INR Location Clinic      Anticoagulation Summary as of 4/4/2017     INR goal 2.0-3.0   Today's INR 2.5   Maintenance plan 7.5 mg (5 mg x 1.5) on Tue, Thu, Sat; 5 mg (5 mg x 1) all other days   Full instructions 4/4: 5 mg; 4/11: 5 mg; Otherwise 7.5 mg on Tue, Thu, Sat; 5 mg all other days   Weekly total 42.5 mg   Plan last modified Lia Lomas RN (5/19/2016)   Next INR check 4/18/2017   Priority INR   Target end date     Indications   Long-term (current) use of anticoagulants [Z79.01] [Z79.01]  Atrial fibrillation (H) (Resolved) [I48.91]         Anticoagulation Episode Summary     INR check location     Preferred lab     Send INR reminders to [facility]    Comments       Anticoagulation Care Providers     Provider Role Specialty Phone number    Simin Lopez MD Responsible Internal Medicine [phone]            See the Encounter Report to view Anticoagulation Flowsheet and Dosing Calendar (Go to Encounters tab in chart review, and find the Anticoagulation Therapy Visit)    Dosage adjustment made based on physician directed care plan.    Lai Lomas RN

## 2017-04-04 NOTE — PATIENT INSTRUCTIONS
DR. MALIN'S CLINIC LOCATIONS:       Monday Tuesday   [facility]   [address] [address] [address], [address]   [place] [address] [place] [address]   [phone]       Wednesday:  Surgery Day    Surgery Scheduling Line - [phone]       Thursday Morning Thursday Afternoon   [facility]   [address]   [place] [address] [place] [address]   [phone]       Friday Morning To Schedule an Appointment    [facility] Call: [phone] [address] [place] [address] [phone] PLEASE FAX ALL FORMS TO: [phone]     Follow up: 2 weeks or as needed    INGROWN TOENAIL PROCEDURE INSTRUCTIONS  - After the procedure, go home and elevate the involved foot for the remainder of the day/evening as able.  This is to minimize swelling, control pain, and limit post-procedural complications.  The pre-procedural injection may cause your toe to be numb anywhere from1-2 hours.    - You can take Tylenol, Ibuprofen, Advil, etc as needed for pain if tolerated.  Follow label instructions      - If you have been given a prescription for antibiotics, take them as instructed and complete the prescription.    - Keep dressing intact until the following morning.  At that point, you may remove the bandage (you may need to soak it in warm soapy water as the bandage will likely adhere to your skin).  Soaking in warm soapy water for 5-10 minutes will also facilitate healing.  Wash the toe thoroughly, dry the toe thoroughly.  Apply antibiotic wound ointment to base of wound and cover with gauze and Coban dressing (not too tightly) until it stops draining.  This may take a few days to weeks, but at that point, you may continue with antibiotic ointment and a band-aid, or you may stop applying a dressing all together.  Dressing changes should  be done twice daily if you had the permanent/chemical procedure done.    - You may do activities to tolerance the following day.  Find a shoe that is comfortable and minimizes the amount of rubbing on your toe, as this may increase pain, swelling, etc.    - Monitor for signs of infection.  With this procedure, it is common to have mild surrounding redness and drainage.  If the redness involves the entire great toe or if you notice red streaks on top of your foot, or if you experience any nausea, vomiting, chills, fevers > 101 degrees, call clinic for a quick appointment.    CALLUS / CORN / IPK    When there is excessive friction or pressure on the skin, the body responds by making the skin thicker to protect the deeper structures from becoming exposed. While this works well to protect the deeper structures, the thickened skin can cause increased pressure and pain.    Callus: flat, diffuse thickened areas are simple calluses and they are usually caused by friction. Often these are the result of rubbing on a shoe or from going barefoot.    Corns: calluses with a central core on or between the toes are called corns. These result from prominent joints on toes rubbing together. Theses are a symptom of bone malalignment or illfitting shoes and will always recur unless the underlying bones are addressed surgically.    IPK: calluses with a central core on the ball of the foot are usually IPKs (intractable plantar keratosis). These are caused by excessive pressure from the metatarsals, the bones that make up the ball of the foot. Often one of these bones is too long or too prominent. Again, these will always recur unless the underlying bone issue is addressed. There is no cure for these. They will either go away by themselves, recur, or more could develop.    Home Treatment  - File: Trim them down with a pumice stone or callus file a couple times a week to remove callus tissue that builds up. An electric callus removing  device. Amope Pedi Perfect Electronic Pedicure Foot File and Callus Remover can be a good option.   - Moisturize: Lotion can be applied to soften the callus. A lactic acid or urea based cream such as Carmol, Kersal or Vanicream or thicker cream with shea butter are good options.   - Foot Gear: Good supportive shoes and minimizing barefoot walking can slow down callus formation and can decrease pain levels. Gel inserts can also provide padding to the bottom of the foot to prevent pain and slow recurrence. Toe spacers, toe covers, can custom orthotic inserts can be beneficial as well.  - Surgery: If there is a surgical pathology noted, such as a prominent bone, often this needs to be addressed surgically to minimize recurrence. However, sometimes the lesion simply migrates to another spot after surgery, so it is not a guaranteed cure.     If you cannot treat them yourself at home, call the home foot care nurses below:  [facility]  [phone] [facility]   [phone] [facility]   [phone]           TOE COVERS/CAPS        TOE SPACERS        Body Mass Index (BMI)    Many things can cause foot and ankle problems. Foot structure, activity level, foot mechanics and injuries are common causes of pain.    One very important issue that often goes unmentioned, is body weight.  Extra weight can cause increased stress on muscles, ligaments, bones and tendons. Sometimes just a few extra pounds is all it takes to put one over her/his threshold. Without reducing that stress, it can be difficult to alleviate pain.      Some people are uncomfortable addressing this issue, but we feel it is important for you to think about it. As Foot & Ankle specialists, our job is addressing the lower extremity problem and possible causes.     Regarding extra body weight, we encourage patients to discuss diet and weight management plans with their primary care doctors. It is this team approach that gives you the best opportunity for pain  relief and getting you back on your feet.

## 2017-04-04 NOTE — MR AVS SNAPSHOT
After Visit Summary   4/4/2017    Cielo Aguillon    MRN: [id number]           Patient Information     Date Of Birth          1940        Visit Information        Provider Department      4/4/2017 3:15 PM Shaheed Mercado DPM [facility]        Today's Diagnoses     Ingrowing nail    -  1      Care Instructions      DR. MERCADO'S CLINIC LOCATIONS:       Monday Tuesday   [facility]   [address] [address] [address], [address]   [place] [address] [place] [address]   [phone]       Wednesday:  Surgery Day    Surgery Scheduling Line - [phone]       Thursday Morning Thursday Afternoon   [facility]   [address]   [place] [address] [place], [place] [address] [phone]       Friday Morning To Schedule an Appointment    [facility] Call: [phone] [address] [place] [address]  [phone] PLEASE FAX ALL FORMS TO: [phone]     Follow up: 2 weeks or as needed    INGROWN TOENAIL PROCEDURE INSTRUCTIONS  - After the procedure, go home and elevate the involved foot for the remainder of the day/evening as able.  This is to minimize swelling, control pain, and limit post-procedural complications.  The pre-procedural injection may cause your toe to be numb anywhere from1-2 hours.    - You can take Tylenol, Ibuprofen, Advil, etc as needed for pain if tolerated.  Follow label instructions      - If you have been given a prescription for antibiotics, take them as instructed and complete the prescription.    - Keep dressing intact until the following morning.  At that point, you may remove the bandage (you may need to soak it in warm soapy water as the bandage will likely adhere to your skin).  Soaking in warm soapy water for 5-10 minutes will also facilitate healing.  Wash the toe  thoroughly, dry the toe thoroughly.  Apply antibiotic wound ointment to base of wound and cover with gauze and Coban dressing (not too tightly) until it stops draining.  This may take a few days to weeks, but at that point, you may continue with antibiotic ointment and a band-aid, or you may stop applying a dressing all together.  Dressing changes should be done twice daily if you had the permanent/chemical procedure done.    - You may do activities to tolerance the following day.  Find a shoe that is comfortable and minimizes the amount of rubbing on your toe, as this may increase pain, swelling, etc.    - Monitor for signs of infection.  With this procedure, it is common to have mild surrounding redness and drainage.  If the redness involves the entire great toe or if you notice red streaks on top of your foot, or if you experience any nausea, vomiting, chills, fevers > 101 degrees, call clinic for a quick appointment.    CALLUS / CORN / IPK    When there is excessive friction or pressure on the skin, the body responds by making the skin thicker to protect the deeper structures from becoming exposed. While this works well to protect the deeper structures, the thickened skin can cause increased pressure and pain.    Callus: flat, diffuse thickened areas are simple calluses and they are usually caused by friction. Often these are the result of rubbing on a shoe or from going barefoot.    Corns: calluses with a central core on or between the toes are called corns. These result from prominent joints on toes rubbing together. Theses are a symptom of bone malalignment or illfitting shoes and will always recur unless the underlying bones are addressed surgically.    IPK: calluses with a central core on the ball of the foot are usually IPKs (intractable plantar keratosis). These are caused by excessive pressure from the metatarsals, the bones that make up the ball of the foot. Often one of these bones is too long or too  prominent. Again, these will always recur unless the underlying bone issue is addressed. There is no cure for these. They will either go away by themselves, recur, or more could develop.    Home Treatment  - File: Trim them down with a pumice stone or callus file a couple times a week to remove callus tissue that builds up. An electric callus removing device. Amope Pedi Perfect Electronic Pedicure Foot File and Callus Remover can be a good option.   - Moisturize: Lotion can be applied to soften the callus. A lactic acid or urea based cream such as Carmol, Kersal or Vanicream or thicker cream with shea butter are good options.   - Foot Gear: Good supportive shoes and minimizing barefoot walking can slow down callus formation and can decrease pain levels. Gel inserts can also provide padding to the bottom of the foot to prevent pain and slow recurrence. Toe spacers, toe covers, can custom orthotic inserts can be beneficial as well.  - Surgery: If there is a surgical pathology noted, such as a prominent bone, often this needs to be addressed surgically to minimize recurrence. However, sometimes the lesion simply migrates to another spot after surgery, so it is not a guaranteed cure.     If you cannot treat them yourself at home, call the home foot care nurses below:  [facility]  [phone] [facility]   [phone] [facility]   [phone]           TOE COVERS/CAPS        TOE SPACERS        Body Mass Index (BMI)    Many things can cause foot and ankle problems. Foot structure, activity level, foot mechanics and injuries are common causes of pain.    One very important issue that often goes unmentioned, is body weight.  Extra weight can cause increased stress on muscles, ligaments, bones and tendons. Sometimes just a few extra pounds is all it takes to put one over her/his threshold. Without reducing that stress, it can be difficult to alleviate pain.      Some people are uncomfortable addressing this issue, but we  "feel it is important for you to think about it. As Foot & Ankle specialists, our job is addressing the lower extremity problem and possible causes.     Regarding extra body weight, we encourage patients to discuss diet and weight management plans with their primary care doctors. It is this team approach that gives you the best opportunity for pain relief and getting you back on your feet.                  Follow-ups after your visit        Your next 10 appointments already scheduled     Apr 18, 2017  2:00 PM CDT   Anticoagulation Visit with [facility]   [facility]    [address]  [facility] [id number] [phone]              Who to contact     If you have questions or need follow up information about today's clinic visit or your schedule please contact [facility] directly at [phone].  Normal or non-critical lab and imaging results will be communicated to you by [employer], letter or phone within 4 business days after the clinic has received the results. If you do not hear from us within 7 days, please contact the clinic through [employer] or phone. If you have a critical or abnormal lab result, we will notify you by phone as soon as possible.  Submit refill requests through AboutMyStar or call your pharmacy and they will forward the refill request to us. Please allow 3 business days for your refill to be completed.          Additional Information About Your Visit        [employer] Information     About[employer]Star lets you send messages to your doctor, view your test results, renew your prescriptions, schedule appointments and more. To sign up, go to [email or web address] . Click on \"Log in\" on the left side of the screen, which will take you to the Welcome page. Then click on \"Sign up Now\" on the right side of the page.     You will be asked to enter the access code listed below, as well as some personal information. Please follow " the directions to create your username and password.     Your access code is: [email or web address]  Expires: 2017  1:37 PM     Your access code will  in 90 days. If you need help or a new code, please call your [facility] clinic or [phone].        Care EveryWhere ID     This is your Care EveryWhere ID. This could be used by other organizations to access your [facility] medical records  [id number]         Blood Pressure from Last 3 Encounters:   17 120/76   17 108/82   16 104/80    Weight from Last 3 Encounters:   17 78.5 kg (173 lb)   17 78.5 kg (173 lb)   16 76.1 kg (167 lb 12.8 oz)              Today, you had the following     No orders found for display         Today's Medication Changes          These changes are accurate as of: 17  3:35 PM.  If you have any questions, ask your nurse or doctor.               Start taking these medicines.        Dose/Directions    silver sulfADIAZINE 1 % cream   Commonly known as:  SILVADENE   Used for:  Ingrowing nail   Started by:  Shaheed Mercado DPM        Apply to procedure site twice daily until healed.   Quantity:  85 g   Refills:  1            Where to get your medicines      These medications were sent to [id number] - [place], [place] - [address]  [address], [place] [address]     Phone:  [phone]     silver sulfADIAZINE 1 % cream                Primary Care Provider Office Phone # Fax #    Simin Lopez [phone]       [facility] [address]  [facility] [address]        Thank you!     Thank you for choosing [facility]  for your care. Our goal is always to provide you with excellent care. Hearing back from our patients is one way we can continue to improve our services. Please take a few minutes to complete the written survey that you may receive in the mail after your visit with us. Thank you!             Your Updated Medication  List - Protect others around you: Learn how to safely use, store and throw away your medicines at www.disposemymeds.org.          This list is accurate as of: 4/4/17  3:35 PM.  Always use your most recent med list.                   Brand Name Dispense Instructions for use    amLODIPine 5 MG tablet    NORVASC    90 tablet    Take 1 tablet (5 mg) by mouth daily       atorvastatin 20 MG tablet    LIPITOR    90 tablet    Take 1 tablet (20 mg) by mouth daily       diazepam 5 MG tablet    VALIUM    40 tablet    1/2 to 1 po q 8 hours prn       * levothyroxine 25 MCG tablet    SYNTHROID/LEVOTHROID    90 tablet    Take 1 tablet (25 mcg) by mouth daily       * levothyroxine 50 MCG tablet    SYNTHROID/LEVOTHROID    90 tablet    Take 1 tablet (50 mcg) by mouth daily       metoprolol 100 MG tablet    LOPRESSOR    180 tablet    Take 1 tablet (100 mg) by mouth 2 times daily       order for DME     1 Device    Equipment being ordered: CPAP mask, tubing and supplies. Fax #[phone]. Attn: Joseph       silver sulfADIAZINE 1 % cream    SILVADENE    85 g    Apply to procedure site twice daily until healed.       traMADol 50 MG tablet    ULTRAM    50 tablet    Take 1 tablet (50 mg) by mouth every 8 hours as needed for moderate pain       venlafaxine 150 MG 24 hr capsule    EFFEXOR-XR    30 capsule    Take 1 capsule (150 mg) by mouth daily       VITAMIN D (CHOLECALCIFEROL) PO      Take 1,000 Units by mouth daily       warfarin 5 MG tablet    COUMADIN    108 tablet    Take 1 tablet (5 mg) daily, except take 1 and 1/2 tablet (7.5 mg) on Tuesday, Thursday and Saturday  or as instructed.       zolpidem 5 MG tablet    AMBIEN    30 tablet    Take 1 tablet (5 mg) by mouth nightly as needed for sleep       * Notice:  This list has 2 medication(s) that are the same as other medications prescribed for you. Read the directions carefully, and ask your doctor or other care provider to review them with you.

## 2017-04-18 ENCOUNTER — ANTICOAGULATION THERAPY VISIT (OUTPATIENT)
Dept: ANTICOAGULATION | Facility: CLINIC | Age: 77
End: 2017-04-18
Payer: MEDICARE

## 2017-04-18 DIAGNOSIS — Z79.01 LONG-TERM (CURRENT) USE OF ANTICOAGULANTS: ICD-10-CM

## 2017-04-18 LAB — INR POINT OF CARE: 2.7 (ref 0.86–1.14)

## 2017-04-18 PROCEDURE — 99207 ZZC NO CHARGE NURSE ONLY: CPT

## 2017-04-18 PROCEDURE — 36416 COLLJ CAPILLARY BLOOD SPEC: CPT

## 2017-04-18 PROCEDURE — 85610 PROTHROMBIN TIME: CPT | Mod: QW

## 2017-04-18 NOTE — MR AVS SNAPSHOT
Cielo Aguillon   4/18/2017 2:00 PM   Anticoagulation Therapy Visit    Description:  77 year old female   Provider:  [facility]   Department:  [facility] Anti Coagulation           INR as of 4/18/2017     Today's INR 2.7      Anticoagulation Summary as of 4/18/2017     INR goal 2.0-3.0   Today's INR 2.7   Full instructions 4/18: 5 mg; 4/25: 5 mg; 5/2: 5 mg; Otherwise 7.5 mg on Tue, Thu, Sat; 5 mg all other days   Next INR check 5/9/2017    Indications   Long-term (current) use of anticoagulants [Z79.01] [Z79.01]  Atrial fibrillation (H) (Resolved) [I48.91]         Your next Anticoagulation Clinic appointment(s)     May 09, 2017  1:15 PM CDT   Anticoagulation Visit with [facility]   [facility]    [address] [address]  [facility] [phone]              Contact Numbers     [facility] Phone Numbers:  Anticoagulation Clinic Appointments : [phone]  Anticoagulation Nurse: [phone]         April 2017 Details    Sun Mon Tue Wed Thu Fri Sat           1                 2               3               4               5               6               7               8                 9               10               11               12               13               14               15                 16               17               18      5 mg   See details      19      5 mg         20      7.5 mg         21      5 mg         22      7.5 mg           23      5 mg         24      5 mg         25      5 mg         26      5 mg         27      7.5 mg         28      5 mg         29      7.5 mg           30      5 mg                Date Details   04/18 This INR check               How to take your warfarin dose     To take:  5 mg Take 1 of the 5 mg tablets.    To take:  7.5 mg Take 1.5 of the 5 mg tablets.           May 2017 Details    Sun Mon Tue Wed Thu Fri Sat      1      5 mg         2      5 mg         3      5 mg          4      7.5 mg         5      5 mg         6      7.5 mg           7      5 mg         8      5 mg         9            10               11               12               13                 14               15               16               17               18               19               20                 21               22               23               24               25               26               27                 28               29               30               31                   Date Details   No additional details    Date of next INR:  5/9/2017         How to take your warfarin dose     To take:  5 mg Take 1 of the 5 mg tablets.    To take:  7.5 mg Take 1.5 of the 5 mg tablets.

## 2017-04-18 NOTE — PROGRESS NOTES
ANTICOAGULATION FOLLOW-UP CLINIC VISIT    Patient Name:  Cielo Aguillon  Date:  4/18/2017  Contact Type:  Face to Face    SUBJECTIVE:     Patient Findings     Positives No Problem Findings           OBJECTIVE    INR Protime   Date Value Ref Range Status   04/18/2017 2.7 (A) 0.86 - 1.14 Final       ASSESSMENT / PLAN  INR assessment THER    Recheck INR In: 3 WEEKS    INR Location Clinic      Anticoagulation Summary as of 4/18/2017     INR goal 2.0-3.0   Today's INR 2.7   Maintenance plan 7.5 mg (5 mg x 1.5) on Tue, Thu, Sat; 5 mg (5 mg x 1) all other days   Full instructions 4/18: 5 mg; 4/25: 5 mg; 5/2: 5 mg; Otherwise 7.5 mg on Tue, Thu, Sat; 5 mg all other days   Weekly total 42.5 mg   Plan last modified Lia Lomas RN (5/19/2016)   Next INR check 5/9/2017   Priority INR   Target end date     Indications   Long-term (current) use of anticoagulants [Z79.01] [Z79.01]  Atrial fibrillation (H) (Resolved) [I48.91]         Anticoagulation Episode Summary     INR check location     Preferred lab     Send INR reminders to [facility]    Comments       Anticoagulation Care Providers     Provider Role Specialty Phone number    Simin Lopez MD Responsible Internal Medicine [phone]            See the Encounter Report to view Anticoagulation Flowsheet and Dosing Calendar (Go to Encounters tab in chart review, and find the Anticoagulation Therapy Visit)    Dosage adjustment made based on physician directed care plan.    Cindi Preston RN

## 2017-04-19 DIAGNOSIS — G89.29 CHRONIC LEFT-SIDED LOW BACK PAIN WITH LEFT-SIDED SCIATICA: ICD-10-CM

## 2017-04-19 DIAGNOSIS — F41.1 GAD (GENERALIZED ANXIETY DISORDER): ICD-10-CM

## 2017-04-19 DIAGNOSIS — M54.42 CHRONIC LEFT-SIDED LOW BACK PAIN WITH LEFT-SIDED SCIATICA: ICD-10-CM

## 2017-04-19 RX ORDER — TRAMADOL HYDROCHLORIDE 50 MG/1
50 TABLET ORAL EVERY 8 HOURS PRN
Qty: 50 TABLET | Refills: 0 | Status: SHIPPED | OUTPATIENT
Start: 2017-04-19 | End: 2017-07-06

## 2017-04-19 RX ORDER — DIAZEPAM 5 MG
TABLET ORAL
Qty: 40 TABLET | Refills: 0 | Status: SHIPPED | OUTPATIENT
Start: 2017-04-19 | End: 2017-07-06

## 2017-04-19 NOTE — TELEPHONE ENCOUNTER
Tramadol      Last Written Prescription Date:  07/15/16  Last Fill Quantity: 50,   # refills: 2  Last Office Visit with [facility], [facility] or [facility] prescribing provider: 02/16/17  Future Office visit:       Routing refill request to provider for review/approval because:  Drug not on the [facility], [facility] or [facility] refill protocol or controlled substance    Valium      Last Written Prescription Date:  11/18/16  Last Fill Quantity: 40,   # refills: 0  Last Office Visit with [facility], [facility] or [facility] prescribing provider: 02/16/17  Future Office visit:       Routing refill request to provider for review/approval because:  Drug not on the [facility], [facility] or [facility] refill protocol or controlled substance

## 2017-05-03 DIAGNOSIS — F41.1 GAD (GENERALIZED ANXIETY DISORDER): ICD-10-CM

## 2017-05-03 DIAGNOSIS — I10 ESSENTIAL HYPERTENSION, BENIGN: ICD-10-CM

## 2017-05-03 DIAGNOSIS — F33.1 MAJOR DEPRESSIVE DISORDER, RECURRENT EPISODE, MODERATE (H): ICD-10-CM

## 2017-05-03 RX ORDER — AMLODIPINE BESYLATE 5 MG/1
5 TABLET ORAL DAILY
Qty: 90 TABLET | Refills: 1 | Status: SHIPPED | OUTPATIENT
Start: 2017-05-03 | End: 2017-10-31

## 2017-05-03 RX ORDER — VENLAFAXINE HYDROCHLORIDE 150 MG/1
150 CAPSULE, EXTENDED RELEASE ORAL DAILY
Qty: 30 CAPSULE | Refills: 2 | Status: SHIPPED | OUTPATIENT
Start: 2017-05-03 | End: 2017-11-14

## 2017-05-03 NOTE — TELEPHONE ENCOUNTER
EFFEXOR-XR    Last Written Prescription Date: 02/07/17  Last Fill Quantity: 30, # refills: 1  Last Office Visit with [facility], [facility] or  Health prescribing provider: 02/16/17        BP Readings from Last 3 Encounters:   03/08/17 120/76   02/16/17 108/82   12/09/16 104/80     Pulse: (for [place])  Creatinine   Date Value Ref Range Status   02/24/2016 1.08 0.70 - 1.30 mg/dL Final   ]    Last PHQ-9 score on record=   PHQ-9 SCORE 3/16/2017   Total Score -   Total Score 7         Labs showing if normal/abnormal  Data Unavailable  Lab Results   Component Value Date    AST 51 (H) 11/18/2015    ALT 38 02/19/2016      Lab Results   Component Value Date    CHOL 133 02/19/2016    TRIG 158 (H) 02/19/2016    HDL 47 (L) 02/19/2016    LDL 54 02/19/2016    VLDL 29 01/02/2015    CHOLHDLRATIO 2.6 01/02/2015     AMLODIPINE      Last Written Prescription Date: 11/26/16  Last Fill Quantity: 90, # refills: 1    Last Office Visit with [facility], [facility] or [facility] prescribing provider:  02/16/17   Future Office Visit:        BP Readings from Last 3 Encounters:   03/08/17 120/76   02/16/17 108/82   12/09/16 104/80

## 2017-05-04 DIAGNOSIS — E78.5 HYPERLIPIDEMIA LDL GOAL <70: ICD-10-CM

## 2017-05-09 ENCOUNTER — ANTICOAGULATION THERAPY VISIT (OUTPATIENT)
Dept: ANTICOAGULATION | Facility: CLINIC | Age: 77
End: 2017-05-09
Payer: MEDICARE

## 2017-05-09 DIAGNOSIS — Z79.01 LONG-TERM (CURRENT) USE OF ANTICOAGULANTS: ICD-10-CM

## 2017-05-09 LAB — INR POINT OF CARE: 2.8 (ref 0.86–1.14)

## 2017-05-09 PROCEDURE — 99207 ZZC NO CHARGE NURSE ONLY: CPT

## 2017-05-09 PROCEDURE — 85610 PROTHROMBIN TIME: CPT | Mod: QW

## 2017-05-09 PROCEDURE — 36416 COLLJ CAPILLARY BLOOD SPEC: CPT

## 2017-05-09 NOTE — PROGRESS NOTES
ANTICOAGULATION FOLLOW-UP CLINIC VISIT    Patient Name:  Cielo Aguillon  Date:  5/9/2017  Contact Type:  Face to Face    SUBJECTIVE:     Patient Findings     Positives No Problem Findings           OBJECTIVE    INR Protime   Date Value Ref Range Status   05/09/2017 2.8 (A) 0.86 - 1.14 Final       ASSESSMENT / PLAN  INR assessment THER    Recheck INR In: 4 WEEKS    INR Location Clinic      Anticoagulation Summary as of 5/9/2017     INR goal 2.0-3.0   Today's INR 2.8   Maintenance plan 7.5 mg (5 mg x 1.5) on Tue, Sat; 5 mg (5 mg x 1) all other days   Full instructions 7.5 mg on Tue, Sat; 5 mg all other days   Weekly total 40 mg   Plan last modified Cindi Preston RN (5/9/2017)   Next INR check 6/5/2017   Priority INR   Target end date     Indications   Long-term (current) use of anticoagulants [Z79.01] [Z79.01]  Atrial fibrillation (H) (Resolved) [I48.91]         Anticoagulation Episode Summary     INR check location     Preferred lab     Send INR reminders to [facility]    Comments       Anticoagulation Care Providers     Provider Role Specialty Phone number    Simin Lopez MD Responsible Internal Medicine [phone]            See the Encounter Report to view Anticoagulation Flowsheet and Dosing Calendar (Go to Encounters tab in chart review, and find the Anticoagulation Therapy Visit)    Dosage adjustment made based on physician directed care plan.    Cindi Preston, RN

## 2017-05-09 NOTE — MR AVS SNAPSHOT
Cielo Aguillon   5/9/2017 1:15 PM   Anticoagulation Therapy Visit    Description:  77 year old female   Provider[facility]  [facility]   Department:  [facility]           INR as of 5/9/2017     Today's INR 2.8      Anticoagulation Summary as of 5/9/2017     INR goal 2.0-3.0   Today's INR 2.8   Full instructions 7.5 mg on Tue, Sat; 5 mg all other days   Next INR check 6/5/2017    Indications   Long-term (current) use of anticoagulants [Z79.01] [Z79.01]  Atrial fibrillation (H) (Resolved) [I48.91]         Your next Anticoagulation Clinic appointment(s)     Jun 05, 2017  9:30 AM CDT   [facility] Visit with [facility]   [facility]    [address] [facility] [address]  [facility] [address] [phone]              Contact Numbers     [facility] Phone Numbers:  Anticoagulation Clinic Appointments : [phone]  Anticoagulation Nurse: [phone]         May 2017 Details    Sun Mon Tue Wed Thu Fri Sat      1               2               3               4               5               6                 7               8               9      7.5 mg   See details      10      5 mg         11      5 mg         12      5 mg         13      7.5 mg           14      5 mg         15      5 mg         16      7.5 mg         17      5 mg         18      5 mg         19      5 mg         20      7.5 mg           21      5 mg         22      5 mg         23      7.5 mg         24      5 mg         25      5 mg         26      5 mg         27      7.5 mg           28      5 mg         29      5 mg         30      7.5 mg         31      5 mg             Date Details   05/09 This INR check               How to take your warfarin dose     To take:  5 mg Take 1 of the 5 mg tablets.    To take:  7.5 mg Take 1.5 of the 5 mg tablets.           June 2017 Details    Sun Mon Tue Wed Thu Fri Sat         1      5 mg         2      5 mg         3       7.5 mg           4      5 mg         5            6               7               8               9               10                 11               12               13               14               15               16               17                 18               19               20               21               22               23               24                 25               26               27               28               29               30                 Date Details   No additional details    Date of next INR:  6/5/2017         How to take your warfarin dose     To take:  5 mg Take 1 of the 5 mg tablets.    To take:  7.5 mg Take 1.5 of the 5 mg tablets.

## 2017-06-05 ENCOUNTER — ANTICOAGULATION THERAPY VISIT (OUTPATIENT)
Dept: ANTICOAGULATION | Facility: CLINIC | Age: 77
End: 2017-06-05
Payer: MEDICARE

## 2017-06-05 DIAGNOSIS — Z79.01 LONG-TERM (CURRENT) USE OF ANTICOAGULANTS: ICD-10-CM

## 2017-06-05 LAB — INR POINT OF CARE: 3.9 (ref 0.86–1.14)

## 2017-06-05 PROCEDURE — 99207 ZZC NO CHARGE NURSE ONLY: CPT

## 2017-06-05 PROCEDURE — 85610 PROTHROMBIN TIME: CPT | Mod: QW

## 2017-06-05 PROCEDURE — 36416 COLLJ CAPILLARY BLOOD SPEC: CPT

## 2017-06-05 NOTE — MR AVS SNAPSHOT
Cielo Aguillon   6/5/2017 9:30 AM   Anticoagulation Therapy Visit    Description:  77 year old female   Provider[facility]  [facility]   Department:  [facility]           INR as of 6/5/2017     Today's INR 3.9!      Anticoagulation Summary as of 6/5/2017     INR goal 2.0-3.0   Today's INR 3.9!   Full instructions 6/5: Hold; Otherwise 7.5 mg on Tue, Sat; 5 mg all other days   Next INR check 6/19/2017    Indications   Long-term (current) use of anticoagulants [Z79.01] [Z79.01]  Atrial fibrillation (H) (Resolved) [I48.91]         Your next Anticoagulation Clinic appointment(s)     Jun 19, 2017  1:15 PM CDT   [facility] Visit with [facility]   [facility] ([facility])    [address] [facility] [address]  [facility] [id number] [phone]              Contact Numbers     [facility] Phone Numbers:  Anticoagulation Clinic Appointments : [phone]  Anticoagulation Nurse: [phone]         June 2017 Details    Sun Mon Tue Wed Thu Fri Sat         1               2               3                 4               5      Hold   See details      6      7.5 mg         7      5 mg         8      5 mg         9      5 mg         10      7.5 mg           11      5 mg         12      5 mg         13      7.5 mg         14      5 mg         15      5 mg         16      5 mg         17      7.5 mg           18      5 mg         19            20               21               22               23               24                 25               26               27               28               29               30                 Date Details   06/05 This INR check       Date of next INR:  6/19/2017         How to take your warfarin dose     To take:  5 mg Take 1 of the 5 mg tablets.    To take:  7.5 mg Take 1.5 of the 5 mg tablets.    Hold Do not take your warfarin dose. See the Details table to the right for additional instructions.

## 2017-06-05 NOTE — PROGRESS NOTES
ANTICOAGULATION FOLLOW-UP CLINIC VISIT    Patient Name:  Cielo Aguillon  Date:  6/5/2017  Contact Type:  Face to Face    SUBJECTIVE:     Patient Findings     Positives Change in diet/appetite (Pt reports fewer greens than usual), Antibiotic use or infection (Pt feels like she has a sinus infection, has drainage and cough, )           OBJECTIVE    INR Protime   Date Value Ref Range Status   06/05/2017 3.9 (A) 0.86 - 1.14 Final       ASSESSMENT / PLAN  INR assessment SUPRA    Recheck INR In: 2 WEEKS    INR Location Clinic      Anticoagulation Summary as of 6/5/2017     INR goal 2.0-3.0   Today's INR 3.9!   Maintenance plan 7.5 mg (5 mg x 1.5) on Tue, Sat; 5 mg (5 mg x 1) all other days   Full instructions 6/5: Hold; Otherwise 7.5 mg on Tue, Sat; 5 mg all other days   Weekly total 40 mg   Plan last modified Cindi Preston RN (5/9/2017)   Next INR check 6/19/2017   Priority INR   Target end date     Indications   Long-term (current) use of anticoagulants [Z79.01] [Z79.01]  Atrial fibrillation (H) (Resolved) [I48.91]         Anticoagulation Episode Summary     INR check location     Preferred lab     Send INR reminders to [facility]    Comments       Anticoagulation Care Providers     Provider Role Specialty Phone number    Simin Lopez MD [facility] Internal Medicine [phone]            See the Encounter Report to view Anticoagulation Flowsheet and Dosing Calendar (Go to Encounters tab in chart review, and find the Anticoagulation Therapy Visit)    Dosage adjustment made based on physician directed care plan.    Cindi Preston, RN

## 2017-06-19 ENCOUNTER — ANTICOAGULATION THERAPY VISIT (OUTPATIENT)
Dept: ANTICOAGULATION | Facility: CLINIC | Age: 77
End: 2017-06-19
Payer: MEDICARE

## 2017-06-19 DIAGNOSIS — Z79.01 LONG-TERM (CURRENT) USE OF ANTICOAGULANTS: ICD-10-CM

## 2017-06-19 LAB — INR POINT OF CARE: 2.4 (ref 0.86–1.14)

## 2017-06-19 PROCEDURE — 36416 COLLJ CAPILLARY BLOOD SPEC: CPT

## 2017-06-19 PROCEDURE — 99207 ZZC NO CHARGE NURSE ONLY: CPT

## 2017-06-19 PROCEDURE — 85610 PROTHROMBIN TIME: CPT | Mod: QW

## 2017-06-19 NOTE — MR AVS SNAPSHOT
Cielo Aguillon   6/19/2017 1:15 PM   Anticoagulation Therapy Visit    Description:  77 year old female   Provider[facility]   Department:  [facility]           INR as of 6/19/2017     Today's INR 2.4      Anticoagulation Summary as of 6/19/2017     INR goal 2.0-3.0   Today's INR 2.4   Full instructions 7.5 mg on Tue, Sat; 5 mg all other days   Next INR check 7/10/2017    Indications   Long-term (current) use of anticoagulants [Z79.01] [Z79.01]  Atrial fibrillation (H) (Resolved) [I48.91]         Your next Anticoagulation Clinic appointment(s)     Jul 10, 2017  2:30 PM CDT   [facility]ticoagulation Visit with [facility]   [facility] ([facility])    [address] [facility] [address]  [facility] [address] [phone]              Contact Numbers     [facility] Phone Numbers:  Anticoagulation Clinic Appointments : [phone]  Anticoagulation Nurse: [phone]         June 2017 Details    Sun Mon Tue Wed Thu Fri Sat         1               2               3                 4               5               6               7               8               9               10                 11               12               13               14               15               16               17                 18               19      5 mg   See details      20      7.5 mg         21      5 mg         22      5 mg         23      5 mg         24      7.5 mg           25      5 mg         26      5 mg         27      7.5 mg         28      5 mg         29      5 mg         30      5 mg           Date Details   06/19 This INR check               How to take your warfarin dose     To take:  5 mg Take 1 of the 5 mg tablets.    To take:  7.5 mg Take 1.5 of the 5 mg tablets.           July 2017 Details    Sun Mon Tue Wed Thu Fri Sat           1      7.5 mg           2      5 mg         3      5 mg         4      7.5 mg         5      5 mg         6       5 mg         7      5 mg         8      7.5 mg           9      5 mg         10            11               12               13               14               15                 16               17               18               19               20               21               22                 23               24               25               26               27               28               29                 30               31                     Date Details   No additional details    Date of next INR:  7/10/2017         How to take your warfarin dose     To take:  5 mg Take 1 of the 5 mg tablets.    To take:  7.5 mg Take 1.5 of the 5 mg tablets.

## 2017-06-19 NOTE — PROGRESS NOTES
ANTICOAGULATION FOLLOW-UP CLINIC VISIT    Patient Name:  Cielo Aguillon  Date:  6/19/2017  Contact Type:  Face to Face    SUBJECTIVE:     Patient Findings     Positives No Problem Findings           OBJECTIVE    INR Protime   Date Value Ref Range Status   06/19/2017 2.4 (A) 0.86 - 1.14 Final       ASSESSMENT / PLAN  INR assessment THER    Recheck INR In: 3 WEEKS    INR Location Clinic      Anticoagulation Summary as of 6/19/2017     INR goal 2.0-3.0   Today's INR 2.4   Maintenance plan 7.5 mg (5 mg x 1.5) on Tue, Sat; 5 mg (5 mg x 1) all other days   Full instructions 7.5 mg on Tue, Sat; 5 mg all other days   Weekly total 40 mg   No change documented Cindi Preston RN   Plan last modified Cindi Preston RN (5/9/2017)   Next INR check 7/10/2017   Priority INR   Target end date     Indications   Long-term (current) use of anticoagulants [Z79.01] [Z79.01]  Atrial fibrillation (H) (Resolved) [I48.91]         Anticoagulation Episode Summary     INR check location     Preferred lab     Send INR reminders to [facility]    Comments       Anticoagulation Care Providers     Provider Role Specialty Phone number    Simin Lopez MD Responsible Internal Medicine [phone]            See the Encounter Report to view Anticoagulation Flowsheet and Dosing Calendar (Go to Encounters tab in chart review, and find the Anticoagulation Therapy Visit)    Dosage adjustment made based on physician directed care plan.    Cindi Preston RN

## 2017-07-06 DIAGNOSIS — G89.29 CHRONIC LEFT-SIDED LOW BACK PAIN WITH LEFT-SIDED SCIATICA: ICD-10-CM

## 2017-07-06 DIAGNOSIS — M54.42 CHRONIC LEFT-SIDED LOW BACK PAIN WITH LEFT-SIDED SCIATICA: ICD-10-CM

## 2017-07-06 DIAGNOSIS — F41.1 GAD (GENERALIZED ANXIETY DISORDER): ICD-10-CM

## 2017-07-06 RX ORDER — TRAMADOL HYDROCHLORIDE 50 MG/1
50 TABLET ORAL EVERY 8 HOURS PRN
Qty: 50 TABLET | Refills: 0 | Status: SHIPPED | OUTPATIENT
Start: 2017-07-06 | End: 2017-11-14

## 2017-07-06 RX ORDER — DIAZEPAM 5 MG
TABLET ORAL
Qty: 40 TABLET | Refills: 0 | Status: SHIPPED | OUTPATIENT
Start: 2017-07-06 | End: 2017-12-06

## 2017-07-06 NOTE — TELEPHONE ENCOUNTER
TRAMADOL      Last Written Prescription Date:  04/19/17  Last Fill Quantity: 50,   # refills: 0  Last Office Visit with [facility] or [facility] prescribing provider: 02/16/17  Future Office visit:    Next 5 appointments (look out 90 days)     Jul 10, 2017  3:50 PM CDT   Return Visit with HILARIA Lazo CNP   [facility] ([facility])    [address]  [place] [facility] [address] [phone]                   Routing refill request to provider for review/approval because:  Drug not on the [facility], [facility] or [facility] refill protocol or controlled substance      VALIUM      Last Written Prescription Date:  04/19/17  Last Fill Quantity: 40,   # refills: 0  Last Office Visit with [facility], [facility] or [facility] prescribing provider: 02/16/17  Future Office visit:    Next 5 appointments (look out 90 days)     Jul 10, 2017  3:50 PM CDT   Return Visit with HILARIA Lazo CNP   [facility] ([facility])    [address]  [place] [facility] [address] [phone]                   Routing refill request to provider for review/approval because:  Drug not on the [facility], [facility] or [facility] refill protocol or controlled substance

## 2017-07-10 ENCOUNTER — ANTICOAGULATION THERAPY VISIT (OUTPATIENT)
Dept: ANTICOAGULATION | Facility: CLINIC | Age: 77
End: 2017-07-10
Payer: MEDICARE

## 2017-07-10 ENCOUNTER — HOSPITAL ENCOUNTER (OUTPATIENT)
Dept: CARDIOLOGY | Facility: CLINIC | Age: 77
Discharge: HOME | End: 2017-07-10
Attending: INTERNAL MEDICINE | Admitting: INTERNAL MEDICINE
Payer: MEDICARE

## 2017-07-10 ENCOUNTER — OFFICE VISIT (OUTPATIENT)
Dept: CARDIOLOGY | Facility: CLINIC | Age: 77
End: 2017-07-10
Attending: INTERNAL MEDICINE
Payer: MEDICARE

## 2017-07-10 VITALS
HEIGHT: 65 IN | BODY MASS INDEX: 29.16 KG/M2 | WEIGHT: 175 LBS | HEART RATE: 66 BPM | DIASTOLIC BLOOD PRESSURE: 70 MMHG | SYSTOLIC BLOOD PRESSURE: 118 MMHG

## 2017-07-10 DIAGNOSIS — G47.33 OSA (OBSTRUCTIVE SLEEP APNEA): ICD-10-CM

## 2017-07-10 DIAGNOSIS — Z79.01 LONG-TERM (CURRENT) USE OF ANTICOAGULANTS: ICD-10-CM

## 2017-07-10 DIAGNOSIS — I48.19 PERSISTENT ATRIAL FIBRILLATION (H): ICD-10-CM

## 2017-07-10 DIAGNOSIS — I25.10 CORONARY ARTERY DISEASE INVOLVING NATIVE CORONARY ARTERY OF NATIVE HEART WITHOUT ANGINA PECTORIS: Primary | ICD-10-CM

## 2017-07-10 DIAGNOSIS — E78.5 HYPERLIPIDEMIA LDL GOAL <70: ICD-10-CM

## 2017-07-10 DIAGNOSIS — R06.09 DOE (DYSPNEA ON EXERTION): ICD-10-CM

## 2017-07-10 LAB — INR POINT OF CARE: 1.9 (ref 0.86–1.14)

## 2017-07-10 PROCEDURE — 25500064 ZZH RX 255 OP 636: Performed by: INTERNAL MEDICINE

## 2017-07-10 PROCEDURE — 99207 ZZC NO CHARGE NURSE ONLY: CPT

## 2017-07-10 PROCEDURE — 40000264 ECHO COMPLETE WITH OPTISON

## 2017-07-10 PROCEDURE — 99214 OFFICE O/P EST MOD 30 MIN: CPT | Performed by: NURSE PRACTITIONER

## 2017-07-10 PROCEDURE — 36416 COLLJ CAPILLARY BLOOD SPEC: CPT

## 2017-07-10 PROCEDURE — 93306 TTE W/DOPPLER COMPLETE: CPT | Mod: 26 | Performed by: INTERNAL MEDICINE

## 2017-07-10 PROCEDURE — 85610 PROTHROMBIN TIME: CPT | Mod: QW

## 2017-07-10 RX ADMIN — HUMAN ALBUMIN MICROSPHERES AND PERFLUTREN 3 ML: 10; .22 INJECTION, SOLUTION INTRAVENOUS at 14:00

## 2017-07-10 NOTE — LETTER
7/10/2017    Simin Lopez MD  [facility]   [address]  [facility] [address]    RE: Cielo TY Pal       Dear Colleague,    I had the pleasure of seeing Cielo Aguillon in the [facility].    HPI and Plan:   See dictation  4:15 PM    Mrs. Aguillon is a pleasant 77-year-old patient who follows normally with Dr. is close. She has a history of permanent atrial fibrillation, nonobstructive coronary artery disease, left ventricular diastolic dysfunction, and mild to moderate pulmonary hypertension. Has a history of obstructive sleep apnea currently not being treated. She has chronic dyspnea which has been going on for quite some time. She's had symptoms even prior to her development of permanent atrial fibrillation. She's been seen by cardiology as well as by pulmonology. It's been documented that she is even been seen by neurology. Ultimately it's been felt that her symptoms are multifactorial in nature. There's been previous documentation indicating it could been felt it could partly be related to her heart currently her lungs partly deconditioning and perhaps even some anxiety. At one point she was placed on a trial of furosemide. While it did increase her diuresis it did not change her breathing at all.    Mrs. Aguillon was last seen by Dr. is close last year. She returns to clinic today in follow-up. She had an echocardiogram done earlier today which showed an ejection fraction of 55-60%. There was moderate concentric left ventricular hypertrophy. The right ventricle was normal in structure, function and size. There was moderate severe biatrial enlargement. There is mild mitral regurgitation. Since 2014 there was no significant change other than the fact that she is in atrial fibrillation.    It should be noted that in 2015 she underwent a right and left heart cath. She was found have 50-60% mid left anterior descending artery stenosis. The first diagonal branch  was tiny and had a 70% stenosis. The first obtuse marginal had a 30% stenosis. The second obtuse marginal had a 40-50% stenosis. Right coronary artery was a tiny nondominant vessel without focal stenosis. Ejection fraction was 60-65% with left ventricular end-diastolic pressure of 17. Pulmonary artery pressure was 46/21 with a mean of 32. Wedge pressure was 21 mmHg. RV pressure of 52 with an end-diastolic of 10. Right atrial mean pressure of 10. Tiago cardiac output of 3 L/m with an index of 1.62 L/min/m .    In talking with Mrs. Aguillon today she tells me she continues to have the same dyspnea that she's had previously. This occurs typically with exertion or when she is trying to do some shopping. She does not complain of any chest pain symptoms. She has occasional positional lightheadedness. Denies falls or syncope or near-syncope. She denies palpitations denies orthopnea or paroxysmal nocturnal dyspnea. She has obstructive sleep apnea currently not being treated with CPAP machine. She needs new parts which apparently will not be prescribed to her until she repeats sleep study. She's not been willing to undergo this. It's been quite some time since she last saw Dr. Bermeo.    In regards to her history of atrial fibrillation she has heart rate controlled and anticoagulated. In regards to her coronary disease she had been on statin therapy but today tells me that previously her  saw Dr. Ponce in our clinic who suggested he discontinue his statin for 2 weeks given history of myalgia. She tells me she also has lower extremity myalgias and is wondering if she can take a two-week statin holiday. From what I can see the last her cholesterol was checked within 2016 at which time her LDL was 54.    On exam this is a well-developed well-nourished female who appears her stated age she is cooperative and appropriate. Vital signs are stable. Neurologically she is intact.  HEENT: Normocephalic atraumatic without tenderness  or involuntary movements. Face appears symmetric. Visual fields are full. EOMs intact. Conjunctiva clear. Oral mucosa pink and moist.  Neck: Supple trachea appears midline. No JVD no carotid bruit.  Cardiac exam: S1-S2 irregular irregular heart tones are distant.  Lungs: Chest expansion appears symmetric. Breath sounds are clear to auscultation bilaterally. No wheezing or accessory muscle use.  Abdomen: Soft nontender bowel sounds present.  Extremities: Warm and dry without edema cyanosis or clubbing    Impression and plan  #1 permanent atrial fibrillation  Heart rate controlled and anticoagulated on warfarin. As she continue her current medication management.    #2 chronic dyspnea on exertion.  Exact etiology is not entirely clear. She is euvolemic on today's exam. Echocardiography identifies normal LV systolic function. She may have some diastolic dysfunction present but again on exam she is euvolemic. Historically furosemide has not helped her symptoms. No additional recommendations at this time.    #3 obstructive sleep apnea  Untreated. I suggest to her that she follow through with a repeat sleep study so that she can get her machine adjusted for regular use. I did talk to her about the potential sequela of untreated obstructive sleep apnea in regards to pulmonary hypertension, right-sided heart failure and atrial fibrillation. She tells me that she'll think about it.    #4 myalgia  Whether or not this is truly statin related I am unsure. Her  apparently was told he could take a statin holiday when he last saw Dr. Ponce and now the patient inquires about this for herself. At this time I told her to take 2 weeks off of her atorvastatin and then call our office and let us know. If there is no difference in her symptoms then I would recommend she resume her atorvastatin. If the myalgias seem to be better we can talk about an alternative statin therapy.    #5 nonobstructive coronary disease  No complaints of  angina. Again were going to try a statin holiday. Ultimately I would however recommend statin therapy long-term. She is on beta blocker. She is not on aspirin likely due to the fact that she is on warfarin.    I asked Ms. Aguillon to get in touch with us in a couple weeks to let us know how she is doing off of her statin. Beyond that she'll follow-up again with Dr. Bateman in a year. She is aware she has questions or concerns she can contact the office. Thank you for allowing me to participate in the care of this patient.    No orders of the defined types were placed in this encounter.      No orders of the defined types were placed in this encounter.      Medications Discontinued During This Encounter   Medication Reason     levothyroxine (SYNTHROID/LEVOTHROID) 25 MCG tablet Dose adjustment     order for DME Stopped by Patient     silver sulfADIAZINE (SILVADENE) 1 % cream Therapy completed     zolpidem (AMBIEN) 5 MG tablet Stopped by Patient     atorvastatin (LIPITOR) 20 MG tablet          Encounter Diagnoses   Name Primary?     Persistent atrial fibrillation (H)      Hyperlipidemia LDL goal <70        CURRENT MEDICATIONS:  Current Outpatient Prescriptions   Medication Sig Dispense Refill     diazepam (VALIUM) 5 MG tablet 1/2 to 1 po q 8 hours prn 40 tablet 0     traMADol (ULTRAM) 50 MG tablet Take 1 tablet (50 mg) by mouth every 8 hours as needed for moderate pain 50 tablet 0     venlafaxine (EFFEXOR-XR) 150 MG 24 hr capsule Take 1 capsule (150 mg) by mouth daily 30 capsule 2     amLODIPine (NORVASC) 5 MG tablet Take 1 tablet (5 mg) by mouth daily 90 tablet 1     levothyroxine (SYNTHROID/LEVOTHROID) 50 MCG tablet Take 1 tablet (50 mcg) by mouth daily 90 tablet 3     metoprolol (LOPRESSOR) 100 MG tablet Take 1 tablet (100 mg) by mouth 2 times daily 180 tablet 1     warfarin (COUMADIN) 5 MG tablet Take 1 tablet (5 mg) daily, except take 1 and 1/2 tablet (7.5 mg) on Tuesday, Thursday and Saturday  or as instructed. 108  tablet 1     VITAMIN D, CHOLECALCIFEROL, PO Take 1,000 Units by mouth daily        [DISCONTINUED] levothyroxine (SYNTHROID/LEVOTHROID) 25 MCG tablet Take 1 tablet (25 mcg) by mouth daily 90 tablet 2       ALLERGIES     Allergies   Allergen Reactions     Ace Inhibitors      Cough (Zestril)     Aspirin      hx bleeding ulcers     Hydrochlorothiazide [Hctz] Rash     Ibuprofen      hx bleeding ulcers     Amoxicillin Rash     Losartan Rash     Penicillins Rash       PAST MEDICAL HISTORY:  Past Medical History:   Diagnosis Date     Anxiety     related to travel     Arthritis      ASCVD (arteriosclerotic cardiovascular disease) 2/12    60-70% stenoses of OM2, D1, medical management     Atrial fibrillation (H)      Atrial flutter (H)      Coronary artery disease     2/2012- 50% mid Cfx, 60-70% prox OM2, 50-70% D1     Essential hypertension, benign      Fatty liver 5/10    mild fibrosis on biopsy     Hypertriglyceridemia      Major depression      BETHANY (obstructive sleep apnea)      Other ventral hernia without mention of obstruction or gangrene      Peptic ulcer, unspecified site, unspecified as acute or chronic, without mention of hemorrhage, perforation, or obstruction 1983     Shortness of breath      Thyroid disease      Tubular adenoma 6/08       PAST SURGICAL HISTORY:  Past Surgical History:   Procedure Laterality Date     APPENDECTOMY       C NONSPECIFIC PROCEDURE      Appendectomy     C NONSPECIFIC PROCEDURE      Tonsillectomy     C NONSPECIFIC PROCEDURE      Tubal ligation     C NONSPECIFIC PROCEDURE  11/01    Shave bone spurs from left foot     COLONOSCOPY  9/1/2016    Dr. Camejo [facility]     COLONOSCOPY N/A 9/1/2016    Procedure: COMBINED COLONOSCOPY, SINGLE OR MULTIPLE BIOPSY/POLYPECTOMY BY BIOPSY;  Surgeon: Pillo Camejo MD;  Location:  GI     CORONARY ANGIOGRAPHY ADULT ORDER  2/13/2012    50% mid Cfx, 60-70% prox OM2, 50-70% D1     ESOPHAGOSCOPY, GASTROSCOPY, DUODENOSCOPY (EGD), COMBINED  9/1/2016      [facility]     ESOPHAGOSCOPY, GASTROSCOPY, DUODENOSCOPY (EGD), COMBINED N/A 9/1/2016    Procedure: COMBINED ESOPHAGOSCOPY, GASTROSCOPY, DUODENOSCOPY (EGD), BIOPSY SINGLE OR MULTIPLE;  Surgeon: Pillo Camejo MD;  Location:  GI     GYN SURGERY       HEART CATH RIGHT AND LEFT HEART CATH  04/06/2015    Mid LAD 50-60%, 1st Diagonal 70%, 1st OM 30%, 2nd OM 40-50%, review report for right heart cath results.      FAMILY HISTORY:  Family History   Problem Relation Age of Onset     Respiratory Father      emphysema--secon. to smoking     Alzheimer Disease Mother      Congenital Anomalies Mother      OSTEOPOROSIS Mother      DIABETES Maternal Aunt      DIABETES Other      cousins     DIABETES Other      cousin on mothers side of family     Depression Son      Depression Son      Colon Cancer No family hx of      SOCIAL HISTORY:  Social History     Social History     Marital status:      Spouse name: N/A     Number of children: 6     Years of education: N/A     Occupational History      [employer]     Social History Main Topics     Smoking status: Never Smoker     Smokeless tobacco: Never Used     Alcohol use 0.0 oz/week     0 Standard drinks or equivalent per week      Comment: occ - wine     Drug use: No     Sexual activity: Yes     Partners: Male     Birth control/ protection: Surgical      Comment: tubal     Other Topics Concern      Service No     Blood Transfusions Yes     1983     Caffeine Concern No     none     Occupational 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     working in a pharmacy     Hobby Hazards No     Sleep Concern Yes     Stress Concern Yes     Weight Concern No     Special Diet Yes     pt on warfarin     Back Care No     Exercise No     some exercise at night, walking      Bike Helmet No     Seat Belt Yes     Self-Exams No     Social History Narrative       Review of Systems:  Skin:  Positive for bruising bruising on  left leg    Eyes:  Positive for glasses lump under right eye ,  hx of cataract surgery  "1/17 and 2/17   ENT:  Positive for hearing loss;sinus trouble;hoarseness hearing aids in both ears ,  frequent sinus issues   Respiratory:    sleep apnea;cough;dyspnea on exertion some productive coughing in am's    Cardiovascular:    Positive for;edema;fatigue;lightheadedness;dizziness;heaviness;chest pain;palpitations chest heaviness/discomfort -  occas palpitations   Gastroenterology: Positive for stomach or duodenal ulcer;poor appetite hx of ulcers  Genitourinary:  Negative      Musculoskeletal:  Positive for back pain;muscular weakness;nocturnal cramping    Neurologic:  Positive for headaches    Psychiatric:  Positive for sleep disturbances;anxiety occas sleep issues ,   Heme/Lymph/Imm:  Positive for allergies RX allergies , seasonal allergies   Endocrine:  Positive for thyroid disorder      Physical Exam:  Vitals: /70 (BP Location: Left arm, Patient Position: Sitting, Cuff Size: Adult Regular)  Pulse 66  Ht 1.651 m (5' 5\")  Wt 79.4 kg (175 lb)  BMI 29.12 kg/m2    Constitutional:  cooperative, alert and oriented, well developed, well nourished, in no acute distress        Skin:  warm and dry to the touch, no apparent skin lesions or masses noted        Head:  normocephalic, no masses or lesions        Eyes:  pupils equal and round, conjunctivae and lids unremarkable, sclera white, no xanthalasma, EOMS intact, no nystagmus        ENT:  no pallor or cyanosis, dentition good        Neck:  carotid pulses are full and equal bilaterally;JVP normal;no carotid bruit        Chest:  clear to auscultation          Cardiac: normal S1 and S2;no S3 or S4 irregularly irregular rhythm distant heart sounds no presence of murmur            Abdomen:  abdomen soft;BS normoactive        Vascular: not assessed this visit                                        Extremities and Back:  no deformities, clubbing, cyanosis, erythema observed;no edema              Neurological:  affect appropriate, oriented to time, person and " place   walks with a walker    ABILIO Bateman MD   [facility]  [address]  [place] [address]    Thank you for allowing me to participate in the care of your patient.    Sincerely,     HILARIA Lazo [facility]

## 2017-07-10 NOTE — MR AVS SNAPSHOT
"              After Visit Summary   7/10/2017    Cielo Aguillon    MRN: [id number]           Patient Information     Date Of Birth          1940        Visit Information        Provider Department      7/10/2017 3:50 PM Lucía Lilly APRN CNP [facility] [place]        Today's Diagnoses     Coronary artery disease involving native coronary artery of native heart without angina pectoris    -  1    Persistent atrial fibrillation (H)        Hyperlipidemia LDL goal <70        GREEN (dyspnea on exertion)        BETHANY (obstructive sleep apnea)           Follow-ups after your visit        Additional Services     Follow-Up with Cardiologist                 Your next 10 appointments already scheduled     Jul 31, 2017  1:30 PM CDT   [facility]    [address]  [facility] [id number] [phone]            Jul 31, 2017  1:50 PM CDT   MA SCREENING BILATERAL W/ TIARA with [id number]   [facility] ([facility])    [address]  [facility] [address] [phone]           Do not use any powder, lotion or deodorant under your arms or on your breast. If you do, we will ask you to remove it before your exam.  Wear comfortable, two-piece clothing.  If you have any allergies, tell your care team.  Bring any previous mammograms from other facilities or have them mailed to the breast center.  Three-dimensional (3D) mammograms are available at [address] locations in [facility] and [place]. Benefits of 3D mammograms include: - Improved rate of cancer detection - Decreases your chance of having to go back for more tests, which means fewer: - \"False-positive\" results (This means that there is an abnormal area but it isn't cancer.) - Invasive testing procedures, such as a biopsy or surgery - Can provide clearer " "images of the breast if you have dense breast tissue. 3D mammography is an optional exam that anyone can have with a 2D mammogram. It doesn't replace or take the place of a 2D mammogram. 2D mammograms remain an effective screening test for all women.  Not all insurance companies cover the cost of a 3D mammogram. Check with your insurance.              Future tests that were ordered for you today     Open Future Orders        Priority Expected Expires Ordered    Follow-Up with Cardiologist Routine 7/10/2018 11/22/2018 7/10/2017    MA Screen Bilateral w/Fidel Routine  7/10/2018 7/10/2017    ECHO COMPLETE WITH OPTISON Routine 7/6/2017 8/10/2017 7/6/2016            Who to contact     If you have questions or need follow up information about today's clinic visit or your schedule please contact [facility] [place] directly at [phone].  Normal or non-critical lab and imaging results will be communicated to you by Shweebhart, letter or phone within 4 business days after the clinic has received the results. If you do not hear from us within 7 days, please contact the clinic through Shweebhart or phone. If you have a critical or abnormal lab result, we will notify you by phone as soon as possible.  Submit refill requests through OurHistree or call your pharmacy and they will forward the refill request to us. Please allow 3 business days for your refill to be completed.          Additional Information About Your Visit        ShweebharTaggs Information     OurHistree lets you send messages to your doctor, view your test results, renew your prescriptions, schedule appointments and more. To sign up, go to [email or web address] . Click on \"Log in\" on the left side of the screen, which will take you to the Welcome page. Then click on \"Sign up Now\" on the right side of the page.     You will be asked to enter the access code listed below, as well as some personal information. Please follow the directions to " "create your username and password.     Your access code is: [email or web address]  Expires: 10/8/2017  4:30 PM     Your access code will  in 90 days. If you need help or a new code, please call your [facility] or [phone].        Care EveryWhere ID     This is your Care EveryWhere ID. This could be used by other organizations to access your [place] medical records  [id number]        Your Vitals Were     Pulse Height BMI (Body Mass Index)             66 1.651 m (5' 5\") 29.12 kg/m2          Blood Pressure from Last 3 Encounters:   07/10/17 118/70   17 120/76   17 108/82    Weight from Last 3 Encounters:   07/10/17 79.4 kg (175 lb)   17 78.5 kg (173 lb)   17 78.5 kg (173 lb)              We Performed the Following     Follow-Up with Cardiac Advanced Practice Provider          Today's Medication Changes          These changes are accurate as of: 7/10/17  4:30 PM.  If you have any questions, ask your nurse or doctor.               These medicines have changed or have updated prescriptions.        Dose/Directions    levothyroxine 50 MCG tablet   Commonly known as:  SYNTHROID/LEVOTHROID   This may have changed:  Another medication with the same name was removed. Continue taking this medication, and follow the directions you see here.   Used for:  Acquired hypothyroidism   Changed by:  Simin Lopez MD        Dose:  50 mcg   Take 1 tablet (50 mcg) by mouth daily   Quantity:  90 tablet   Refills:  3         Stop taking these medicines if you haven't already. Please contact your care team if you have questions.     atorvastatin 20 MG tablet   Commonly known as:  LIPITOR   Stopped by:  Lucía Lilly APRN CNP                    Primary Care Provider Office Phone # Fax #    Simin Lopez [phone]       [facility] [address]  [facility] [address]        Equal Access to Services     ANNELISE CHAVEZ AH: javed Cobb, " louis reina ellieyaneth yee ah. So [facility] [phone].    ATENCIÓN: Si alexis erazo, tiene a duran disposición servicios gratuitos de asistencia lingüística. Gurwinder al [phone].    We comply with applicable federal civil rights laws and [place] laws. We do not discriminate on the basis of race, color, national origin, age, disability sex, sexual orientation or gender identity.            Thank you!     Thank you for choosing [facility] [place]  for your care. Our goal is always to provide you with excellent care. Hearing back from our patients is one way we can continue to improve our services. Please take a few minutes to complete the written survey that you may receive in the mail after your visit with us. Thank you!             Your Updated Medication List - Protect others around you: Learn how to safely use, store and throw away your medicines at www.disposemymeds.org.          This list is accurate as of: 7/10/17  4:30 PM.  Always use your most recent med list.                   Brand Name Dispense Instructions for use Diagnosis    amLODIPine 5 MG tablet    NORVASC    90 tablet    Take 1 tablet (5 mg) by mouth daily    Essential hypertension, benign       diazepam 5 MG tablet    VALIUM    40 tablet    1/2 to 1 po q 8 hours prn    MARY (generalized anxiety disorder)       levothyroxine 50 MCG tablet    SYNTHROID/LEVOTHROID    90 tablet    Take 1 tablet (50 mcg) by mouth daily    Acquired hypothyroidism       metoprolol 100 MG tablet    LOPRESSOR    180 tablet    Take 1 tablet (100 mg) by mouth 2 times daily    Essential hypertension, benign, ASCVD (arteriosclerotic cardiovascular disease)       traMADol 50 MG tablet    ULTRAM    50 tablet    Take 1 tablet (50 mg) by mouth every 8 hours as needed for moderate pain    Chronic left-sided low back pain with left-sided sciatica       venlafaxine 150 MG 24 hr capsule    EFFEXOR-XR    30 capsule     Take 1 capsule (150 mg) by mouth daily    Major depressive disorder, recurrent episode, moderate (H), MARY (generalized anxiety disorder)       VITAMIN D (CHOLECALCIFEROL) PO      Take 1,000 Units by mouth daily        warfarin 5 MG tablet    COUMADIN    108 tablet    Take 1 tablet (5 mg) daily, except take 1 and 1/2 tablet (7.5 mg) on Tuesday, Thursday and Saturday  or as instructed.    Chronic atrial fibrillation (H), Long term (current) use of anticoagulants

## 2017-07-10 NOTE — PROGRESS NOTES
HPI and Plan:   See dictation  4:15 PM    Mrs. Aguillon is a pleasant 77-year-old patient who follows normally with Dr. is close. She has a history of permanent atrial fibrillation, nonobstructive coronary artery disease, left ventricular diastolic dysfunction, and mild to moderate pulmonary hypertension. Has a history of obstructive sleep apnea currently not being treated. She has chronic dyspnea which has been going on for quite some time. She's had symptoms even prior to her development of permanent atrial fibrillation. She's been seen by cardiology as well as by pulmonology. It's been documented that she is even been seen by neurology. Ultimately it's been felt that her symptoms are multifactorial in nature. There's been previous documentation indicating it could been felt it could partly be related to her heart currently her lungs partly deconditioning and perhaps even some anxiety. At one point she was placed on a trial of furosemide. While it did increase her diuresis it did not change her breathing at all.    Mrs. Aguillon was last seen by Dr. is close last year. She returns to clinic today in follow-up. She had an echocardiogram done earlier today which showed an ejection fraction of 55-60%. There was moderate concentric left ventricular hypertrophy. The right ventricle was normal in structure, function and size. There was moderate severe biatrial enlargement. There is mild mitral regurgitation. Since 2014 there was no significant change other than the fact that she is in atrial fibrillation.    It should be noted that in 2015 she underwent a right and left heart cath. She was found have 50-60% mid left anterior descending artery stenosis. The first diagonal branch was tiny and had a 70% stenosis. The first obtuse marginal had a 30% stenosis. The second obtuse marginal had a 40-50% stenosis. Right coronary artery was a tiny nondominant vessel without focal stenosis. Ejection fraction was 60-65% with left  ventricular end-diastolic pressure of 17. Pulmonary artery pressure was 46/21 with a mean of 32. Wedge pressure was 21 mmHg. RV pressure of 52 with an end-diastolic of 10. Right atrial mean pressure of 10. Tiago cardiac output of 3 L/m with an index of 1.62 L/min/m .    In talking with Mrs. Aguillon today she tells me she continues to have the same dyspnea that she's had previously. This occurs typically with exertion or when she is trying to do some shopping. She does not complain of any chest pain symptoms. She has occasional positional lightheadedness. Denies falls or syncope or near-syncope. She denies palpitations denies orthopnea or paroxysmal nocturnal dyspnea. She has obstructive sleep apnea currently not being treated with CPAP machine. She needs new parts which apparently will not be prescribed to her until she repeats sleep study. She's not been willing to undergo this. It's been quite some time since she last saw Dr. Bermeo.    In regards to her history of atrial fibrillation she has heart rate controlled and anticoagulated. In regards to her coronary disease she had been on statin therapy but today tells me that previously her  saw Dr. Ponce in our clinic who suggested he discontinue his statin for 2 weeks given history of myalgia. She tells me she also has lower extremity myalgias and is wondering if she can take a two-week statin holiday. From what I can see the last her cholesterol was checked within 2016 at which time her LDL was 54.    On exam this is a well-developed well-nourished female who appears her stated age she is cooperative and appropriate. Vital signs are stable. Neurologically she is intact.  HEENT: Normocephalic atraumatic without tenderness or involuntary movements. Face appears symmetric. Visual fields are full. EOMs intact. Conjunctiva clear. Oral mucosa pink and moist.  Neck: Supple trachea appears midline. No JVD no carotid bruit.  Cardiac exam: S1-S2 irregular irregular  heart tones are distant.  Lungs: Chest expansion appears symmetric. Breath sounds are clear to auscultation bilaterally. No wheezing or accessory muscle use.  Abdomen: Soft nontender bowel sounds present.  Extremities: Warm and dry without edema cyanosis or clubbing    Impression and plan  #1 permanent atrial fibrillation  Heart rate controlled and anticoagulated on warfarin. As she continue her current medication management.    #2 chronic dyspnea on exertion.  Exact etiology is not entirely clear. She is euvolemic on today's exam. Echocardiography identifies normal LV systolic function. She may have some diastolic dysfunction present but again on exam she is euvolemic. Historically furosemide has not helped her symptoms. No additional recommendations at this time.    #3 obstructive sleep apnea  Untreated. I suggest to her that she follow through with a repeat sleep study so that she can get her machine adjusted for regular use. I did talk to her about the potential sequela of untreated obstructive sleep apnea in regards to pulmonary hypertension, right-sided heart failure and atrial fibrillation. She tells me that she'll think about it.    #4 myalgia  Whether or not this is truly statin related I am unsure. Her  apparently was told he could take a statin holiday when he last saw Dr. Ponce and now the patient inquires about this for herself. At this time I told her to take 2 weeks off of her atorvastatin and then call our office and let us know. If there is no difference in her symptoms then I would recommend she resume her atorvastatin. If the myalgias seem to be better we can talk about an alternative statin therapy.    #5 nonobstructive coronary disease  No complaints of angina. Again were going to try a statin holiday. Ultimately I would however recommend statin therapy long-term. She is on beta blocker. She is not on aspirin likely due to the fact that she is on warfarin.    I asked MsMiroslava Pal to get in  touch with us in a couple weeks to let us know how she is doing off of her statin. Beyond that she'll follow-up again with Dr. Bateman in a year. She is aware she has questions or concerns she can contact the office. Thank you for allowing me to participate in the care of this patient.    No orders of the defined types were placed in this encounter.      No orders of the defined types were placed in this encounter.      Medications Discontinued During This Encounter   Medication Reason     levothyroxine (SYNTHROID/LEVOTHROID) 25 MCG tablet Dose adjustment     order for DME Stopped by Patient     silver sulfADIAZINE (SILVADENE) 1 % cream Therapy completed     zolpidem (AMBIEN) 5 MG tablet Stopped by Patient     atorvastatin (LIPITOR) 20 MG tablet          Encounter Diagnoses   Name Primary?     Persistent atrial fibrillation (H)      Hyperlipidemia LDL goal <70        CURRENT MEDICATIONS:  Current Outpatient Prescriptions   Medication Sig Dispense Refill     diazepam (VALIUM) 5 MG tablet 1/2 to 1 po q 8 hours prn 40 tablet 0     traMADol (ULTRAM) 50 MG tablet Take 1 tablet (50 mg) by mouth every 8 hours as needed for moderate pain 50 tablet 0     venlafaxine (EFFEXOR-XR) 150 MG 24 hr capsule Take 1 capsule (150 mg) by mouth daily 30 capsule 2     amLODIPine (NORVASC) 5 MG tablet Take 1 tablet (5 mg) by mouth daily 90 tablet 1     levothyroxine (SYNTHROID/LEVOTHROID) 50 MCG tablet Take 1 tablet (50 mcg) by mouth daily 90 tablet 3     metoprolol (LOPRESSOR) 100 MG tablet Take 1 tablet (100 mg) by mouth 2 times daily 180 tablet 1     warfarin (COUMADIN) 5 MG tablet Take 1 tablet (5 mg) daily, except take 1 and 1/2 tablet (7.5 mg) on Tuesday, Thursday and Saturday  or as instructed. 108 tablet 1     VITAMIN D, CHOLECALCIFEROL, PO Take 1,000 Units by mouth daily        [DISCONTINUED] levothyroxine (SYNTHROID/LEVOTHROID) 25 MCG tablet Take 1 tablet (25 mcg) by mouth daily 90 tablet 2       ALLERGIES     Allergies   Allergen  Reactions     Ace Inhibitors      Cough (Zestril)     Aspirin      hx bleeding ulcers     Hydrochlorothiazide [Hctz] Rash     Ibuprofen      hx bleeding ulcers     Amoxicillin Rash     Losartan Rash     Penicillins Rash       PAST MEDICAL HISTORY:  Past Medical History:   Diagnosis Date     Anxiety     related to travel     Arthritis      ASCVD (arteriosclerotic cardiovascular disease) 2/12    60-70% stenoses of OM2, D1, medical management     Atrial fibrillation (H)      Atrial flutter (H)      Coronary artery disease     2/2012- 50% mid Cfx, 60-70% prox OM2, 50-70% D1     Essential hypertension, benign      Fatty liver 5/10    mild fibrosis on biopsy     Hypertriglyceridemia      Major depression      BETHANY (obstructive sleep apnea)      Other ventral hernia without mention of obstruction or gangrene      Peptic ulcer, unspecified site, unspecified as acute or chronic, without mention of hemorrhage, perforation, or obstruction 1983     Shortness of breath      Thyroid disease      Tubular adenoma 6/08       PAST SURGICAL HISTORY:  Past Surgical History:   Procedure Laterality Date     APPENDECTOMY       C NONSPECIFIC PROCEDURE      Appendectomy     C NONSPECIFIC PROCEDURE      Tonsillectomy     C NONSPECIFIC PROCEDURE      Tubal ligation     C NONSPECIFIC PROCEDURE  11/01    Shave bone spurs from left foot     COLONOSCOPY  9/1/2016    Dr. Camejo [facility]     COLONOSCOPY N/A 9/1/2016    Procedure: COMBINED COLONOSCOPY, SINGLE OR MULTIPLE BIOPSY/POLYPECTOMY BY BIOPSY;  Surgeon: Pillo Camejo MD;  Location:  GI     CORONARY ANGIOGRAPHY ADULT ORDER  2/13/2012    50% mid Cfx, 60-70% prox OM2, 50-70% D1     ESOPHAGOSCOPY, GASTROSCOPY, DUODENOSCOPY (EGD), COMBINED  9/1/2016    Dr. Camejo [facility]     ESOPHAGOSCOPY, GASTROSCOPY, DUODENOSCOPY (EGD), COMBINED N/A 9/1/2016    Procedure: COMBINED ESOPHAGOSCOPY, GASTROSCOPY, DUODENOSCOPY (EGD), BIOPSY SINGLE OR MULTIPLE;  Surgeon: Pillo Camejo MD;  Location:  GI     GYN  SURGERY       HEART CATH RIGHT AND LEFT HEART CATH  04/06/2015    Mid LAD 50-60%, 1st Diagonal 70%, 1st OM 30%, 2nd OM 40-50%, review report for right heart cath results.        FAMILY HISTORY:  Family History   Problem Relation Age of Onset     Respiratory Father      emphysema--secon. to smoking     Alzheimer Disease Mother      Congenital Anomalies Mother      OSTEOPOROSIS Mother      DIABETES Maternal Aunt      DIABETES Other      cousins     DIABETES Other      cousin on mothers side of family     Depression Son      Depression Son      Colon Cancer No family hx of        SOCIAL HISTORY:  Social History     Social History     Marital status:      Spouse name: N/A     Number of children: 6     Years of education: N/A     Occupational History      [employer]     Social History Main Topics     Smoking status: Never Smoker     Smokeless tobacco: Never Used     Alcohol use 0.0 oz/week     0 Standard drinks or equivalent per week      Comment: occ - wine     Drug use: No     Sexual activity: Yes     Partners: Male     Birth control/ protection: Surgical      Comment: tubal     Other Topics Concern      Service No     Blood Transfusions Yes     1983     Caffeine Concern No     none     Occupational 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     working in a pharmacy     Hobby Hazards No     Sleep Concern Yes     Stress Concern Yes     Weight Concern No     Special Diet Yes     pt on warfarin     Back Care No     Exercise No     some exercise at night, walking      Bike Helmet No     Seat Belt Yes     Self-Exams No     Social History Narrative       Review of Systems:  Skin:  Positive for bruising bruising on  left leg    Eyes:  Positive for glasses lump under right eye ,  hx of cataract surgery 1/17 and 2/17   ENT:  Positive for hearing loss;sinus trouble;hoarseness hearing aids in both ears ,  frequent sinus issues   Respiratory:    sleep apnea;cough;dyspnea on exertion some productive coughing in am's    Cardiovascular:     "Positive for;edema;fatigue;lightheadedness;dizziness;heaviness;chest pain;palpitations chest heaviness/discomfort -  occas palpitations   Gastroenterology: Positive for stomach or duodenal ulcer;poor appetite hx of ulcers  Genitourinary:  Negative      Musculoskeletal:  Positive for back pain;muscular weakness;nocturnal cramping    Neurologic:  Positive for headaches    Psychiatric:  Positive for sleep disturbances;anxiety occas sleep issues ,   Heme/Lymph/Imm:  Positive for allergies RX allergies , seasonal allergies   Endocrine:  Positive for thyroid disorder      Physical Exam:  Vitals: /70 (BP Location: Left arm, Patient Position: Sitting, Cuff Size: Adult Regular)  Pulse 66  Ht 1.651 m (5' 5\")  Wt 79.4 kg (175 lb)  BMI 29.12 kg/m2    Constitutional:  cooperative, alert and oriented, well developed, well nourished, in no acute distress        Skin:  warm and dry to the touch, no apparent skin lesions or masses noted        Head:  normocephalic, no masses or lesions        Eyes:  pupils equal and round, conjunctivae and lids unremarkable, sclera white, no xanthalasma, EOMS intact, no nystagmus        ENT:  no pallor or cyanosis, dentition good        Neck:  carotid pulses are full and equal bilaterally;JVP normal;no carotid bruit        Chest:  clear to auscultation          Cardiac: normal S1 and S2;no S3 or S4 irregularly irregular rhythm distant heart sounds no presence of murmur            Abdomen:  abdomen soft;BS normoactive        Vascular: not assessed this visit                                        Extremities and Back:  no deformities, clubbing, cyanosis, erythema observed;no edema              Neurological:  affect appropriate, oriented to time, person and place   walks with a walker          ABILIO Bateman MD   [facility]  [address]  [place] [id number]              "

## 2017-07-10 NOTE — MR AVS SNAPSHOT
Cielo Aguillon   7/10/2017 2:30 PM   Anticoagulation Therapy Visit    Description:  77 year old female   Provider:  [facility]   Department:  [facility]           INR as of 7/10/2017     Today's INR 1.9!      Anticoagulation Summary as of 7/10/2017     INR goal 2.0-3.0   Today's INR 1.9!   Full instructions 7.5 mg on Tue, Sat; 5 mg all other days   Next INR check 7/31/2017    Indications   Long-term (current) use of anticoagulants [Z79.01] [Z79.01]  Atrial fibrillation (H) (Resolved) [I48.91]         Your next Anticoagulation Clinic appointment(s)     Jul 31, 2017  1:30 PM CDT   [facility] with [facility]   [facility]    [address] [facility] [address]  [facility] [address] [phone]              Contact Numbers     [facility] Phone Numbers:  Anticoagulation Clinic Appointments : [phone]  Anticoagulation Nurse: [phone]         July 2017 Details    Sun Mon Tue Wed Thu Fri Sat           1                 2               3               4               5               6               7               8                 9               10      5 mg   See details      11      7.5 mg         12      5 mg         13      5 mg         14      5 mg         15      7.5 mg           16      5 mg         17      5 mg         18      7.5 mg         19      5 mg         20      5 mg         21      5 mg         22      7.5 mg           23      5 mg         24      5 mg         25      7.5 mg         26      5 mg         27      5 mg         28      5 mg         29      7.5 mg           30      5 mg         31                  Date Details   07/10 This INR check       Date of next INR:  7/31/2017         How to take your warfarin dose     To take:  5 mg Take 1 of the 5 mg tablets.    To take:  7.5 mg Take 1.5 of the 5 mg tablets.

## 2017-07-10 NOTE — PROGRESS NOTES
ANTICOAGULATION FOLLOW-UP CLINIC VISIT    Patient Name:  Cielo Aguillon  Date:  7/10/2017  Contact Type:  Face to Face    SUBJECTIVE:     Patient Findings     Positives No Problem Findings           OBJECTIVE    INR Protime   Date Value Ref Range Status   07/10/2017 1.9 (A) 0.86 - 1.14 Final       ASSESSMENT / PLAN  INR assessment THER    Recheck INR In: 3 WEEKS    INR Location Clinic      Anticoagulation Summary as of 7/10/2017     INR goal 2.0-3.0   Today's INR 1.9!   Maintenance plan 7.5 mg (5 mg x 1.5) on Tue, Sat; 5 mg (5 mg x 1) all other days   Full instructions 7.5 mg on Tue, Sat; 5 mg all other days   Weekly total 40 mg   No change documented Cindi Preston RN   Plan last modified Cindi Preston RN (5/9/2017)   Next INR check 7/31/2017   Priority INR   Target end date     Indications   Long-term (current) use of anticoagulants [Z79.01] [Z79.01]  Atrial fibrillation (H) (Resolved) [I48.91]         Anticoagulation Episode Summary     INR check location     Preferred lab     Send INR reminders to [facility]    Comments       Anticoagulation Care Providers     Provider Role Specialty Phone number    Simin Lopez MD Responsible Internal Medicine [phone]            See the Encounter Report to view Anticoagulation Flowsheet and Dosing Calendar (Go to Encounters tab in chart review, and find the Anticoagulation Therapy Visit)    Dosage adjustment made based on physician directed care plan.    Cindi Preston RN

## 2017-07-18 DIAGNOSIS — M54.42 CHRONIC LEFT-SIDED LOW BACK PAIN WITH LEFT-SIDED SCIATICA: ICD-10-CM

## 2017-07-18 DIAGNOSIS — F41.1 GAD (GENERALIZED ANXIETY DISORDER): ICD-10-CM

## 2017-07-18 DIAGNOSIS — G89.29 CHRONIC LEFT-SIDED LOW BACK PAIN WITH LEFT-SIDED SCIATICA: ICD-10-CM

## 2017-07-18 RX ORDER — TRAMADOL HYDROCHLORIDE 50 MG/1
50 TABLET ORAL EVERY 8 HOURS PRN
Qty: 50 TABLET | Refills: 0 | Status: CANCELLED | OUTPATIENT
Start: 2017-07-18

## 2017-07-18 RX ORDER — DIAZEPAM 5 MG
TABLET ORAL
Qty: 40 TABLET | Refills: 0 | Status: CANCELLED | OUTPATIENT
Start: 2017-07-18

## 2017-07-18 NOTE — TELEPHONE ENCOUNTER
Diazepam      Last Written Prescription Date:  07/06/17  Last Fill Quantity: 40,   # refills: 0  Last Office Visit with [facility], [facility] or [facility] prescribing provider: 02/16/07  Future Office visit:       Routing refill request to provider for review/approval because:  Drug not on the [facility], [facility] or [facility] refill protocol or controlled substance      Tramadol      Last Written Prescription Date:  07/06/17  Last Fill Quantity: 50,   # refills: 0  Last Office Visit with [facility], [facility] or [facility] prescribing provider: 02/16/17  Future Office visit:       Routing refill request to provider for review/approval because:  Drug not on the [facility], [facility] or [facility] refill protocol or controlled substance

## 2017-07-20 NOTE — TELEPHONE ENCOUNTER
These medications are not due until 8/5. Why is she ordering them early? She should call 3 days before due.

## 2017-07-31 ENCOUNTER — HOSPITAL ENCOUNTER (OUTPATIENT)
Dept: MAMMOGRAPHY | Facility: CLINIC | Age: 77
Discharge: HOME | End: 2017-07-31
Attending: INTERNAL MEDICINE | Admitting: INTERNAL MEDICINE
Payer: MEDICARE

## 2017-07-31 ENCOUNTER — ANTICOAGULATION THERAPY VISIT (OUTPATIENT)
Dept: ANTICOAGULATION | Facility: CLINIC | Age: 77
End: 2017-07-31
Payer: MEDICARE

## 2017-07-31 DIAGNOSIS — Z12.31 VISIT FOR SCREENING MAMMOGRAM: ICD-10-CM

## 2017-07-31 DIAGNOSIS — Z79.01 LONG-TERM (CURRENT) USE OF ANTICOAGULANTS: ICD-10-CM

## 2017-07-31 LAB — INR POINT OF CARE: 2.7 (ref 0.86–1.14)

## 2017-07-31 PROCEDURE — 36416 COLLJ CAPILLARY BLOOD SPEC: CPT

## 2017-07-31 PROCEDURE — G0202 SCR MAMMO BI INCL CAD: HCPCS

## 2017-07-31 PROCEDURE — 99207 ZZC NO CHARGE NURSE ONLY: CPT

## 2017-07-31 PROCEDURE — 85610 PROTHROMBIN TIME: CPT | Mod: QW

## 2017-07-31 NOTE — PROGRESS NOTES
ANTICOAGULATION FOLLOW-UP CLINIC VISIT    Patient Name:  Cielo Aguillon  Date:  7/31/2017  Contact Type:  Face to Face    SUBJECTIVE:     Patient Findings     Positives No Problem Findings           OBJECTIVE    INR Protime   Date Value Ref Range Status   07/31/2017 2.7 (A) 0.86 - 1.14 Final       ASSESSMENT / PLAN  INR assessment THER    Recheck INR In: 4 WEEKS    INR Location Clinic      Anticoagulation Summary as of 7/31/2017     INR goal 2.0-3.0   Today's INR 2.7   Maintenance plan 7.5 mg (5 mg x 1.5) on Tue, Sat; 5 mg (5 mg x 1) all other days   Full instructions 7.5 mg on Tue, Sat; 5 mg all other days   Weekly total 40 mg   No change documented Cindi Preston RN   Plan last modified Cindi Preston RN (5/9/2017)   Next INR check 8/28/2017   Priority INR   Target end date     Indications   Long-term (current) use of anticoagulants [Z79.01] [Z79.01]  Atrial fibrillation (H) (Resolved) [I48.91]         Anticoagulation Episode Summary     INR check location     Preferred lab     Send INR reminders to [facility]    Comments       Anticoagulation Care Providers     Provider Role Specialty Phone number    Simin Lopez MD Responsible Internal Medicine [phone]            See the Encounter Report to view Anticoagulation Flowsheet and Dosing Calendar (Go to Encounters tab in chart review, and find the Anticoagulation Therapy Visit)    Dosage adjustment made based on physician directed care plan.    Cindi Preston RN

## 2017-07-31 NOTE — MR AVS SNAPSHOT
Cielo Aguillon   7/31/2017 1:30 PM   Anticoagulation Therapy Visit    Description:  77 year old female   Provider[facility]  [facility]   Department:  [facility]           INR as of 7/31/2017     Today's INR 2.7      Anticoagulation Summary as of 7/31/2017     INR goal 2.0-3.0   Today's INR 2.7   Full instructions 7.5 mg on Tue, Sat; 5 mg all other days   Next INR check 8/28/2017    Indications   Long-term (current) use of anticoagulants [Z79.01] [Z79.01]  Atrial fibrillation (H) (Resolved) [I48.91]         Your next Anticoagulation Clinic appointment(s)     Aug 28, 2017  1:00 PM CDT   [facility] with [facility]   [facility]    [address] [facility] [address]  [facility] [address] [phone]              Contact Numbers     [facility] Phone Numbers:  Anticoagulation Clinic Appointments : [phone]  Anticoagulation Nurse: [phone]         July 2017 Details    Sun Mon Tue Wed Thu Fri Sat           1                 2               3               4               5               6               7               8                 9               10               11               12               13               14               15                 16               17               18               19               20               21               22                 23               24               25               26               27               28               29                 30               31      5 mg   See details            Date Details   07/31 This INR check               How to take your warfarin dose     To take:  5 mg Take 1 of the 5 mg tablets.           August 2017 Details    Sun Mon Tue Wed Thu Fri Sat       1      7.5 mg         2      5 mg         3      5 mg         4      5 mg         5      7.5 mg           6      5 mg         7      5 mg         8      7.5 mg         9      5 mg          10      5 mg         11      5 mg         12      7.5 mg           13      5 mg         14      5 mg         15      7.5 mg         16      5 mg         17      5 mg         18      5 mg         19      7.5 mg           20      5 mg         21      5 mg         22      7.5 mg         23      5 mg         24      5 mg         25      5 mg         26      7.5 mg           27      5 mg         28            29               30               31                  Date Details   No additional details    Date of next INR:  8/28/2017         How to take your warfarin dose     To take:  5 mg Take 1 of the 5 mg tablets.    To take:  7.5 mg Take 1.5 of the 5 mg tablets.

## 2017-08-01 DIAGNOSIS — I10 ESSENTIAL HYPERTENSION, BENIGN: ICD-10-CM

## 2017-08-01 DIAGNOSIS — I25.10 ASCVD (ARTERIOSCLEROTIC CARDIOVASCULAR DISEASE): ICD-10-CM

## 2017-08-01 NOTE — TELEPHONE ENCOUNTER
Metoprolol      Last Written Prescription Date: 2/7/17  Last Fill Quantity: 180, # refills: 1    Last Office Visit with [facility], [facility] or [facility] prescribing provider:  2/16/17   Future Office Visit: none      BP Readings from Last 3 Encounters:   07/10/17 118/70   03/08/17 120/76   02/16/17 108/82

## 2017-08-02 RX ORDER — METOPROLOL TARTRATE 100 MG
100 TABLET ORAL 2 TIMES DAILY
Qty: 180 TABLET | Refills: 0 | Status: SHIPPED | OUTPATIENT
Start: 2017-08-02 | End: 2017-10-31

## 2017-08-04 DIAGNOSIS — I48.20 CHRONIC ATRIAL FIBRILLATION (H): ICD-10-CM

## 2017-08-04 DIAGNOSIS — Z79.01 LONG TERM (CURRENT) USE OF ANTICOAGULANTS: ICD-10-CM

## 2017-08-04 RX ORDER — WARFARIN SODIUM 5 MG/1
TABLET ORAL
Qty: 108 TABLET | Refills: 1 | Status: SHIPPED | OUTPATIENT
Start: 2017-08-04 | End: 2018-01-26

## 2017-08-04 NOTE — TELEPHONE ENCOUNTER
WARFARIN    Last Written Prescription Date: 02/06/17  Last Fill Qty: 108, # refills: 1  Last Office Visit with [facility], P or [facility] prescribing provider: 07/31/17       Date and Result of Last PT/INR:   Lab Results   Component Value Date    INR 2.7 07/31/2017    INR 1.9 07/10/2017    INR 3.4 03/27/2017    INR 1.08 09/01/2016              Lab Results   Component Value Date    INR 2.7 (A) 07/31/2017    INR 1.9 (A) 07/10/2017    INR 2.4 (A) 06/19/2017    INR 3.9 (A) 06/05/2017    INR 2.8 (A) 05/09/2017

## 2017-08-28 ENCOUNTER — ANTICOAGULATION THERAPY VISIT (OUTPATIENT)
Dept: ANTICOAGULATION | Facility: CLINIC | Age: 77
End: 2017-08-28
Payer: MEDICARE

## 2017-08-28 ENCOUNTER — TELEPHONE (OUTPATIENT)
Dept: INTERNAL MEDICINE | Facility: CLINIC | Age: 77
End: 2017-08-28

## 2017-08-28 DIAGNOSIS — Z79.01 LONG-TERM (CURRENT) USE OF ANTICOAGULANTS: ICD-10-CM

## 2017-08-28 LAB — INR POINT OF CARE: 3.8 (ref 0.86–1.14)

## 2017-08-28 PROCEDURE — 85610 PROTHROMBIN TIME: CPT | Mod: QW

## 2017-08-28 PROCEDURE — 99207 ZZC NO CHARGE NURSE ONLY: CPT

## 2017-08-28 PROCEDURE — 36416 COLLJ CAPILLARY BLOOD SPEC: CPT

## 2017-08-28 NOTE — MR AVS SNAPSHOT
Cielo Aguillon   8/28/2017 1:00 PM   Anticoagulation Therapy Visit    Description:  77 year old female   Provider[facility]  [facility]   Department:  [facility]           INR as of 8/28/2017     Today's INR 3.8!      Anticoagulation Summary as of 8/28/2017     INR goal 2.0-3.0   Today's INR 3.8!   Full instructions 8/28: 2.5 mg; Otherwise 7.5 mg on Tue, Sat; 5 mg all other days   Next INR check 9/8/2017    Indications   Long-term (current) use of anticoagulants [Z79.01] [Z79.01]  Atrial fibrillation (H) (Resolved) [I48.91]         Your next Anticoagulation Clinic appointment(s)     Sep 08, 2017  1:15 PM CDT   [facility]agulation Visit with [facility]   [facility] ([facility])    [address] [facility] [address]  [facility] [address] [phone]              Contact Numbers     [facility] Phone Numbers:  Anticoagulation Clinic Appointments : [phone]  Anticoagulation Nurse: [phone]         August 2017 Details    Sun Mon Tue Wed Thu Fri Sat       1               2               3               4               5                 6               7               8               9               10               11               12                 13               14               15               16               17               18               19                 20               21               22               23               24               25               26                 27               28      2.5 mg   See details      29      7.5 mg         30      5 mg         31      5 mg            Date Details   08/28 This INR check               How to take your warfarin dose     To take:  2.5 mg Take 0.5 of a 5 mg tablet.    To take:  5 mg Take 1 of the 5 mg tablets.    To take:  7.5 mg Take 1.5 of the 5 mg tablets.           September 2017 Details    Sun Mon Tue Wed Thu Fri Sat          1      5 mg         2      7.5 mg           3       5 mg         4      5 mg         5      7.5 mg         6      5 mg         7      5 mg         8            9                 10               11               12               13               14               15               16                 17               18               19               20               21               22               23                 24               25               26               27               28               29               30                Date Details   No additional details    Date of next INR:  9/8/2017         How to take your warfarin dose     To take:  5 mg Take 1 of the 5 mg tablets.    To take:  7.5 mg Take 1.5 of the 5 mg tablets.

## 2017-08-28 NOTE — TELEPHONE ENCOUNTER
Fax received from [facility] for review and signature.  Put in Dr. Lopez's in basket.

## 2017-08-28 NOTE — PROGRESS NOTES
ANTICOAGULATION FOLLOW-UP CLINIC VISIT    Patient Name:  Cielo Aguillon  Date:  8/28/2017  Contact Type:  Face to Face    SUBJECTIVE:     Patient Findings     Positives Change in medications (Pt reports restarting Lipitor. Also finished Doxycycline. ), Other complaints (Pt reports under a lot of stress with family, Brother and sister in law having illnesses and she and  were driving them to dr brannon. )           OBJECTIVE    INR Protime   Date Value Ref Range Status   08/28/2017 3.8 (A) 0.86 - 1.14 Final       ASSESSMENT / PLAN  INR assessment SUPRA    Recheck INR In: 10 DAYS    INR Location Clinic      Anticoagulation Summary as of 8/28/2017     INR goal 2.0-3.0   Today's INR 3.8!   Maintenance plan 7.5 mg (5 mg x 1.5) on Tue, Sat; 5 mg (5 mg x 1) all other days   Full instructions 8/28: 2.5 mg; Otherwise 7.5 mg on Tue, Sat; 5 mg all other days   Weekly total 40 mg   Plan last modified Cindi Preston RN (5/9/2017)   Next INR check 9/8/2017   Priority INR   Target end date     Indications   Long-term (current) use of anticoagulants [Z79.01] [Z79.01]  Atrial fibrillation (H) (Resolved) [I48.91]         Anticoagulation Episode Summary     INR check location     Preferred lab     Send INR reminders to [facility]    Comments       Anticoagulation Care Providers     Provider Role Specialty Phone number    Simin Lopez MD Responsible Internal Medicine [phone]            See the Encounter Report to view Anticoagulation Flowsheet and Dosing Calendar (Go to Encounters tab in chart review, and find the Anticoagulation Therapy Visit)    Dosage adjustment made based on physician directed care plan.    Lia Lomas RN

## 2017-08-30 ENCOUNTER — OFFICE VISIT (OUTPATIENT)
Dept: INTERNAL MEDICINE | Facility: CLINIC | Age: 77
End: 2017-08-30
Payer: MEDICARE

## 2017-08-30 VITALS
DIASTOLIC BLOOD PRESSURE: 80 MMHG | SYSTOLIC BLOOD PRESSURE: 124 MMHG | OXYGEN SATURATION: 95 % | HEART RATE: 59 BPM | WEIGHT: 171 LBS | BODY MASS INDEX: 28.49 KG/M2 | TEMPERATURE: 97.9 F | HEIGHT: 65 IN

## 2017-08-30 DIAGNOSIS — I10 ESSENTIAL HYPERTENSION, BENIGN: ICD-10-CM

## 2017-08-30 DIAGNOSIS — Z79.899 CONTROLLED SUBSTANCE AGREEMENT SIGNED: ICD-10-CM

## 2017-08-30 DIAGNOSIS — E78.5 HYPERLIPIDEMIA LDL GOAL <100: ICD-10-CM

## 2017-08-30 DIAGNOSIS — E03.4 HYPOTHYROIDISM DUE TO ACQUIRED ATROPHY OF THYROID: ICD-10-CM

## 2017-08-30 DIAGNOSIS — F33.1 MAJOR DEPRESSIVE DISORDER, RECURRENT EPISODE, MODERATE (H): Primary | ICD-10-CM

## 2017-08-30 DIAGNOSIS — F41.1 GAD (GENERALIZED ANXIETY DISORDER): ICD-10-CM

## 2017-08-30 LAB
BASOPHILS # BLD AUTO: 0 10E9/L (ref 0–0.2)
BASOPHILS NFR BLD AUTO: 0.1 %
DIFFERENTIAL METHOD BLD: ABNORMAL
EOSINOPHIL # BLD AUTO: 0.1 10E9/L (ref 0–0.7)
EOSINOPHIL NFR BLD AUTO: 1.1 %
ERYTHROCYTE [DISTWIDTH] IN BLOOD BY AUTOMATED COUNT: 13.3 % (ref 10–15)
HCT VFR BLD AUTO: 41.3 % (ref 35–47)
HGB BLD-MCNC: 13.8 G/DL (ref 11.7–15.7)
LYMPHOCYTES # BLD AUTO: 1.7 10E9/L (ref 0.8–5.3)
LYMPHOCYTES NFR BLD AUTO: 22.6 %
MCH RBC QN AUTO: 34.4 PG (ref 26.5–33)
MCHC RBC AUTO-ENTMCNC: 33.4 G/DL (ref 31.5–36.5)
MCV RBC AUTO: 103 FL (ref 78–100)
MONOCYTES # BLD AUTO: 0.7 10E9/L (ref 0–1.3)
MONOCYTES NFR BLD AUTO: 8.8 %
NEUTROPHILS # BLD AUTO: 5 10E9/L (ref 1.6–8.3)
NEUTROPHILS NFR BLD AUTO: 67.4 %
PLATELET # BLD AUTO: 161 10E9/L (ref 150–450)
RBC # BLD AUTO: 4.01 10E12/L (ref 3.8–5.2)
WBC # BLD AUTO: 7.4 10E9/L (ref 4–11)

## 2017-08-30 PROCEDURE — 36415 COLL VENOUS BLD VENIPUNCTURE: CPT | Performed by: INTERNAL MEDICINE

## 2017-08-30 PROCEDURE — 84439 ASSAY OF FREE THYROXINE: CPT | Performed by: INTERNAL MEDICINE

## 2017-08-30 PROCEDURE — 82043 UR ALBUMIN QUANTITATIVE: CPT | Performed by: INTERNAL MEDICINE

## 2017-08-30 PROCEDURE — 80050 GENERAL HEALTH PANEL: CPT | Performed by: INTERNAL MEDICINE

## 2017-08-30 PROCEDURE — 99214 OFFICE O/P EST MOD 30 MIN: CPT | Performed by: INTERNAL MEDICINE

## 2017-08-30 RX ORDER — VENLAFAXINE HYDROCHLORIDE 37.5 MG/1
CAPSULE, EXTENDED RELEASE ORAL
Qty: 30 CAPSULE | Refills: 0 | Status: SHIPPED | OUTPATIENT
Start: 2017-08-30 | End: 2017-11-14

## 2017-08-30 RX ORDER — CITALOPRAM HYDROBROMIDE 20 MG/1
TABLET ORAL
Qty: 30 TABLET | Refills: 0 | Status: SHIPPED | OUTPATIENT
Start: 2017-08-30 | End: 2017-10-10

## 2017-08-30 ASSESSMENT — PATIENT HEALTH QUESTIONNAIRE - PHQ9
5. POOR APPETITE OR OVEREATING: MORE THAN HALF THE DAYS
SUM OF ALL RESPONSES TO PHQ QUESTIONS 1-9: 12

## 2017-08-30 ASSESSMENT — ANXIETY QUESTIONNAIRES
5. BEING SO RESTLESS THAT IT IS HARD TO SIT STILL: NOT AT ALL
2. NOT BEING ABLE TO STOP OR CONTROL WORRYING: MORE THAN HALF THE DAYS
IF YOU CHECKED OFF ANY PROBLEMS ON THIS QUESTIONNAIRE, HOW DIFFICULT HAVE THESE PROBLEMS MADE IT FOR YOU TO DO YOUR WORK, TAKE CARE OF THINGS AT HOME, OR GET ALONG WITH OTHER PEOPLE: SOMEWHAT DIFFICULT
7. FEELING AFRAID AS IF SOMETHING AWFUL MIGHT HAPPEN: MORE THAN HALF THE DAYS
GAD7 TOTAL SCORE: 10
6. BECOMING EASILY ANNOYED OR IRRITABLE: SEVERAL DAYS
3. WORRYING TOO MUCH ABOUT DIFFERENT THINGS: NOT AT ALL
1. FEELING NERVOUS, ANXIOUS, OR ON EDGE: NEARLY EVERY DAY

## 2017-08-30 NOTE — NURSING NOTE
"Chief Complaint   Patient presents with     Chronic Disease Management     htn, cad, thyroid, depression-would like to stop effexor due to stomach aches, anxiety,lipid-restarted lipitor about 1 month ago-having muscle aches again       Initial /80 (BP Location: Left arm, Cuff Size: Adult Large)  Pulse 59  Temp 97.9  F (36.6  C) (Oral)  Ht 5' 5\" (1.651 m)  Wt 171 lb (77.6 kg)  SpO2 95%  Breastfeeding? No  BMI 28.46 kg/m2 Estimated body mass index is 28.46 kg/(m^2) as calculated from the following:    Height as of this encounter: 5' 5\" (1.651 m).    Weight as of this encounter: 171 lb (77.6 kg).  Medication Reconciliation: complete   Airam Horner CMA      "

## 2017-08-30 NOTE — MR AVS SNAPSHOT
After Visit Summary   8/30/2017    Cielo Aguillon    MRN: [id number]           Patient Information     Date Of Birth          1940        Visit Information        Provider Department      8/30/2017 11:40 AM Gladys Corbin MD [facility]        Today's Diagnoses     Major depressive disorder, recurrent episode, moderate (H)    -  1    MARY (generalized anxiety disorder)        Controlled substance agreement signed        Essential hypertension, benign        Hypothyroidism due to acquired atrophy of thyroid        Hyperlipidemia LDL goal <100          Care Instructions    Decrease effexor- sent to pharmacy for taper    Start celexa after weaning off of effexor          Follow-ups after your visit        Your next 10 appointments already scheduled     Sep 08, 2017  1:15 PM CDT   Anticoagulation Visit with [facility]   [facility] ([facility])    [address] [facility] [address]  [facility] [address] [phone]              Who to contact     If you have questions or need follow up information about today's clinic visit or your schedule please contact [facility] directly at [phone].  Normal or non-critical lab and imaging results will be communicated to you by [employer], letter or phone within 4 business days after the clinic has received the results. If you do not hear from us within 7 days, please contact the clinic through [employer] or phone. If you have a critical or abnormal lab result, we will notify you by phone as soon as possible.  Submit refill requests through [employer] or call your pharmacy and they will forward the refill request to us. Please allow 3 business days for your refill to be completed.          Additional Information About Your Visit        [employer] Information     [employer] lets you send messages to your doctor, view your test results, renew your prescriptions, schedule appointments and  "more. To sign up, go to [email or web address] . Click on \"Log in\" on the left side of the screen, which will take you to the Welcome page. Then click on \"Sign up Now\" on the right side of the page.     You will be asked to enter the access code listed below, as well as some personal information. Please follow the directions to create your username and password.     Your access code is: [email or web address]  Expires: 10/8/2017  4:30 PM     Your access code will  in 90 days. If you need help or a new code, please call your [facility] clinic or [phone].        Care EveryWhere ID     This is your Care EveryWhere ID. This could be used by other organizations to access your [facility] medical records  [id number]        Your Vitals Were     Pulse Temperature Height Pulse Oximetry Breastfeeding? BMI (Body Mass Index)    59 97.9  F (36.6  C) (Oral) 5' 5\" (1.651 m) 95% No 28.46 kg/m2       Blood Pressure from Last 3 Encounters:   17 124/80   07/10/17 118/70   17 120/76    Weight from Last 3 Encounters:   17 171 lb (77.6 kg)   07/10/17 175 lb (79.4 kg)   17 173 lb (78.5 kg)              We Performed the Following     Albumin Random Urine Quantitative with Creat Ratio     CBC with platelets differential     Comprehensive metabolic panel     Lipid panel reflex to direct LDL     TSH with free T4 reflex          Today's Medication Changes          These changes are accurate as of: 17 12:25 PM.  If you have any questions, ask your nurse or doctor.               Start taking these medicines.        Dose/Directions    citalopram 20 MG tablet   Commonly known as:  celeXA   Used for:  MARY (generalized anxiety disorder), Major depressive disorder, recurrent episode, moderate (H)   Started by:  Gladys Corbin MD        Take 1/2 tablet (10 mg) for 1-2 weeks, then increase to 1 tablet orally daily   Quantity:  30 tablet   Refills:  0         These medicines have changed or have updated prescriptions.  "       Dose/Directions    * venlafaxine 150 MG 24 hr capsule   Commonly known as:  EFFEXOR-XR   This may have changed:  Another medication with the same name was added. Make sure you understand how and when to take each.   Used for:  Major depressive disorder, recurrent episode, moderate (H), MARY (generalized anxiety disorder)   Changed by:  Simin Lopez MD        Dose:  150 mg   Take 1 capsule (150 mg) by mouth daily   Quantity:  30 capsule   Refills:  2       * venlafaxine 37.5 MG 24 hr capsule   Commonly known as:  EFFEXOR-XR   This may have changed:  You were already taking a medication with the same name, and this prescription was added. Make sure you understand how and when to take each.   Used for:  MARY (generalized anxiety disorder), Major depressive disorder, recurrent episode, moderate (H)   Changed by:  Gladys Corbin MD        Take 2 tablets for 1 week, then take 1 tablet for 1 week. Then start celexa.   Quantity:  30 capsule   Refills:  0       * Notice:  This list has 2 medication(s) that are the same as other medications prescribed for you. Read the directions carefully, and ask your doctor or other care provider to review them with you.         Where to get your medicines      These medications were sent to [id number] - [place], [place] - [address]  [address], [place] [address]     Phone:  [phone]     citalopram 20 MG tablet    venlafaxine 37.5 MG 24 hr capsule                Primary Care Provider Office Phone # Fax #    Simin Lopez [phone]       [address] [address]        Equal Access to Services     [facility]: Hadii aad ku hadasho Soomaali, waaxda luqadaha, qaybta kaalmada adeegyagricel, yaneth herndon . So [facility] [phone].    ATENCIÓN: Si habla español, tiene a duran disposición servicios gratuitos de asistencia lingüística. Llame al [phone].    We comply with applicable federal  civil rights laws and [place] laws. We do not discriminate on the basis of race, color, national origin, age, disability sex, sexual orientation or gender identity.            Thank you!     Thank you for choosing [facility]  for your care. Our goal is always to provide you with excellent care. Hearing back from our patients is one way we can continue to improve our services. Please take a few minutes to complete the written survey that you may receive in the mail after your visit with us. Thank you!             Your Updated Medication List - Protect others around you: Learn how to safely use, store and throw away your medicines at www.disposemymeds.org.          This list is accurate as of: 8/30/17 12:25 PM.  Always use your most recent med list.                   Brand Name Dispense Instructions for use Diagnosis    amLODIPine 5 MG tablet    NORVASC    90 tablet    Take 1 tablet (5 mg) by mouth daily    Essential hypertension, benign       citalopram 20 MG tablet    celeXA    30 tablet    Take 1/2 tablet (10 mg) for 1-2 weeks, then increase to 1 tablet orally daily    MARY (generalized anxiety disorder), Major depressive disorder, recurrent episode, moderate (H)       diazepam 5 MG tablet    VALIUM    40 tablet    1/2 to 1 po q 8 hours prn    MARY (generalized anxiety disorder)       levothyroxine 50 MCG tablet    SYNTHROID/LEVOTHROID    90 tablet    Take 1 tablet (50 mcg) by mouth daily    Acquired hypothyroidism       metoprolol 100 MG tablet    LOPRESSOR    180 tablet    Take 1 tablet (100 mg) by mouth 2 times daily    Essential hypertension, benign, ASCVD (arteriosclerotic cardiovascular disease)       traMADol 50 MG tablet    ULTRAM    50 tablet    Take 1 tablet (50 mg) by mouth every 8 hours as needed for moderate pain    Chronic left-sided low back pain with left-sided sciatica       * venlafaxine 150 MG 24 hr capsule    EFFEXOR-XR    30 capsule    Take 1 capsule (150 mg) by mouth daily     Major depressive disorder, recurrent episode, moderate (H), MARY (generalized anxiety disorder)       * venlafaxine 37.5 MG 24 hr capsule    EFFEXOR-XR    30 capsule    Take 2 tablets for 1 week, then take 1 tablet for 1 week. Then start celexa.    MARY (generalized anxiety disorder), Major depressive disorder, recurrent episode, moderate (H)       VITAMIN D (CHOLECALCIFEROL) PO      Take 1,000 Units by mouth daily        warfarin 5 MG tablet    COUMADIN    108 tablet    Take 1 tablet (5 mg) daily, except take 1 and 1/2 tablet (7.5 mg) on Tuesday, and Saturday  or as instructed.    Chronic atrial fibrillation (H), Long term (current) use of anticoagulants       * Notice:  This list has 2 medication(s) that are the same as other medications prescribed for you. Read the directions carefully, and ask your doctor or other care provider to review them with you.

## 2017-08-30 NOTE — LETTER
[facility]  [address], [address]  [place] [address]  [phone]        September 18, 2017    Cielo Aguillon  [address] [place] [id number]        Dear Ms. Cielo Aguillon:      Enclosed are the results of the recent labs.     The labs are all within acceptable  limits.   Please call the clinic at [phone] if you have any questions.     Sincerely,    Gladys Corbin M.D.

## 2017-08-30 NOTE — PROGRESS NOTES
SUBJECTIVE:   Cielo Aguillon is a 77 year old female who presents to clinic today for the following health issues:      Hyperlipidemia Follow-Up      Rate your low fat/cholesterol diet?: fair    Taking statin?  Yes, possible muscle aches from statin    Other lipid medications/supplements?:  None    3 weeks of being on the cholesterol medication    Hypertension Follow-up      Outpatient blood pressures: not recently checking    Low Salt Diet: low salt    Vascular Disease Follow-up:  Coronary Artery Disease (CAD)      Chest pain or pressure, left side neck or arm pain: Yes heavy chested feeling randomly    Shortness of breath/increased sweats/nausea with exertion: Yes some days    Pain in calves walking 1-2 blocks: No, but yes at night time/in bed    Worsened or new symptoms since last visit: No    Nitroglycerin use: no    Daily aspirin use: No    Depression and Anxiety Follow-Up    Status since last visit: Worsened     Other associated symptoms:None    Complicating factors:     Significant life event: No     Current substance abuse: Alcohol occasionally    She had death of son in 2006 and one in 1993 suicide.   She has 4 sons left.     PHQ-9 SCORE 8/16/2016 9/27/2016 3/16/2017   Total Score - - -   Total Score 10 4 7     MARY-7 SCORE 2/3/2012 6/25/2012 3/31/2016   Total Score 11 1 -   Total Score - - 10       PHQ-9  English  PHQ-9   Any Language  GAD7  Hypothyroidism Follow-up      Since last visit, patient describes the following symptoms: weight loss of 4 lbs        Amount of exercise or physical activity: walks as tolerated    Problems taking medications regularly: No      Medication side effects: muscle aches       Diet: regular (no restrictions) and follows coumadin style diet      Pain.  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 She is going to her chiropractor. Tramadol used if she is sitting too long on the travel.  She travels twice per year.     Xanax.  She has a son in [place], and [place]       Problem list and histories reviewed & adjusted, as indicated.      [facility]  [facility]       Reviewed and updated as needed this visit by Provider         ROS:  C: NEGATIVE for fever, chills, change in weight  R: NEGATIVE for significant cough or SOB  CV: NEGATIVE for chest pain, palpitations or peripheral edema  Psych: depression    OBJECTIVE:     /80 (BP Location: Left arm, Cuff Size: Adult Large)  Pulse 59  Temp 97.9  F (36.6  C) (Oral)  Ht 5' 5\" (1.651 m)  Wt 171 lb (77.6 kg)  SpO2 95%  Breastfeeding? No  BMI 28.46 kg/m2  Body mass index is 28.46 kg/(m^2).  GENERAL: healthy, alert and no distress  RESP: lungs clear to auscultation - no rales, rhonchi or wheezes  CV: regular rate and rhythm, normal S1 S2, no S3 or S4, no murmur, click or rub, no peripheral edema and peripheral pulses strong  Psych: depressed affect    ASSESSMENT/PLAN:       (F33.1) Major depressive disorder, recurrent episode, moderate (H)  (primary encounter diagnosis)  Comment:   Plan: citalopram (CELEXA) 20 MG tablet, venlafaxine         (EFFEXOR-XR) 37.5 MG 24 hr capsule, T4 free        Start celexa after weaning off of effexor    (F41.1) MARY (generalized anxiety disorder)  Comment:   Plan: citalopram (CELEXA) 20 MG tablet, venlafaxine         (EFFEXOR-XR) 37.5 MG 24 hr capsule            (Z79.899) Controlled substance agreement signed  Comment:   Plan:     (I10) Essential hypertension, benign  Comment: at goal  Plan: CBC with platelets differential, Albumin Random        Urine Quantitative with Creat Ratio            (E03.4) Hypothyroidism due to acquired atrophy of thyroid  Comment:   Plan: TSH with free T4 reflex            (E78.5) Hyperlipidemia LDL goal <100  Comment:   Plan: Comprehensive metabolic panel, CANCELED: Lipid         panel reflex to direct LDL              Gladys Corbin MD  [facility]    "

## 2017-08-31 LAB
ALBUMIN SERPL-MCNC: 3.6 G/DL (ref 3.4–5)
ALP SERPL-CCNC: 68 U/L (ref 40–150)
ALT SERPL W P-5'-P-CCNC: 32 U/L (ref 0–50)
ANION GAP SERPL CALCULATED.3IONS-SCNC: 8 MMOL/L (ref 3–14)
AST SERPL W P-5'-P-CCNC: 44 U/L (ref 0–45)
BILIRUB SERPL-MCNC: 0.6 MG/DL (ref 0.2–1.3)
BUN SERPL-MCNC: 20 MG/DL (ref 7–30)
CALCIUM SERPL-MCNC: 9.3 MG/DL (ref 8.5–10.1)
CHLORIDE SERPL-SCNC: 106 MMOL/L (ref 94–109)
CO2 SERPL-SCNC: 26 MMOL/L (ref 20–32)
CREAT SERPL-MCNC: 1.03 MG/DL (ref 0.52–1.04)
CREAT UR-MCNC: 204 MG/DL
GFR SERPL CREATININE-BSD FRML MDRD: 52 ML/MIN/1.7M2
GLUCOSE SERPL-MCNC: 96 MG/DL (ref 70–99)
MICROALBUMIN UR-MCNC: 142 MG/L
MICROALBUMIN/CREAT UR: 69.61 MG/G CR (ref 0–25)
POTASSIUM SERPL-SCNC: 4.3 MMOL/L (ref 3.4–5.3)
PROT SERPL-MCNC: 7.6 G/DL (ref 6.8–8.8)
SODIUM SERPL-SCNC: 140 MMOL/L (ref 133–144)
T4 FREE SERPL-MCNC: 1.1 NG/DL (ref 0.76–1.46)
TSH SERPL DL<=0.005 MIU/L-ACNC: 4.42 MU/L (ref 0.4–4)

## 2017-08-31 ASSESSMENT — ANXIETY QUESTIONNAIRES: GAD7 TOTAL SCORE: 10

## 2017-09-06 ENCOUNTER — TELEPHONE (OUTPATIENT)
Dept: CARDIOLOGY | Facility: CLINIC | Age: 77
End: 2017-09-06

## 2017-09-06 ENCOUNTER — CARE COORDINATION (OUTPATIENT)
Dept: CARDIOLOGY | Facility: CLINIC | Age: 77
End: 2017-09-06

## 2017-09-06 NOTE — TELEPHONE ENCOUNTER
Fax received from pt's [facility], requesting coumadin hold orders and or ABX prophylaxis in presence of ISRRAEL Dacosta, for scheduled dental work. Writer called pt and she denies TIA/CVA, PE/DVT, AHV, Diabetes. CHADs VASc score is 5 for age, gender, HTN, CAD. Per our Periprocedural Management of OAC in AF Patients protocol, pt can hold Coumadin x 3 days, no bridging is required, restart the day after procedure. Fax resent to [facility]. VIMAL Gibbs RN.

## 2017-09-06 NOTE — PROGRESS NOTES
I received a fax from [facility]. Patient will be undergoing a procedure, that will cause bleeding and has a high potential for bacteremia. Patient has Afib. We do not manage patient's INR's, this form is usually fill out by the managing provider.  Patient has no history for cardiac valve replacements, no history of endocarditis, and no history of congential heart disease. She does not need antibiotics from a cardiac standpoint. I believe patient's cardiac status is stable. I will ask Dr. Bateman if he has any recommendations regarding holding or if this should be addressed by PMD since they monitoring patient's INRs.      Ruth STOREY  [facility]

## 2017-09-07 NOTE — PROGRESS NOTES
Dr. Bateman is out of the office until next week. I called [facility] ([phone]). I reviewed recommendations with Jovita. Jovita stated she did receive a signed form from our [place] office yesterday.        Ruth STOREY  [facility]

## 2017-09-07 NOTE — PROGRESS NOTES
Reviewed recommendations.   We will complete the dental form.   Form will be faxed to [facility].     TGarbers RN  [facility]

## 2017-09-18 ENCOUNTER — TRANSFERRED RECORDS (OUTPATIENT)
Dept: HEALTH INFORMATION MANAGEMENT | Facility: CLINIC | Age: 77
End: 2017-09-18

## 2017-09-18 ENCOUNTER — ANTICOAGULATION THERAPY VISIT (OUTPATIENT)
Dept: ANTICOAGULATION | Facility: CLINIC | Age: 77
End: 2017-09-18
Payer: MEDICARE

## 2017-09-18 DIAGNOSIS — Z79.01 LONG-TERM (CURRENT) USE OF ANTICOAGULANTS: ICD-10-CM

## 2017-09-18 LAB — INR POINT OF CARE: 2.1 (ref 0.86–1.14)

## 2017-09-18 PROCEDURE — 36416 COLLJ CAPILLARY BLOOD SPEC: CPT

## 2017-09-18 PROCEDURE — 99207 ZZC NO CHARGE NURSE ONLY: CPT

## 2017-09-18 PROCEDURE — 85610 PROTHROMBIN TIME: CPT | Mod: QW

## 2017-09-18 NOTE — MR AVS SNAPSHOT
Cielo Aguillon   9/18/2017 3:15 PM   Anticoagulation Therapy Visit    Description:  77 year old female   Provider[facility]   Department:  [facility]           INR as of 9/18/2017     Today's INR 2.1      Anticoagulation Summary as of 9/18/2017     INR goal 2.0-3.0   Today's INR 2.1   Full instructions 7.5 mg on Tue, Sat; 5 mg all other days   Next INR check 10/2/2017    Indications   Long-term (current) use of anticoagulants [Z79.01] [Z79.01]  Atrial fibrillation (H) (Resolved) [I48.91]         Your next Anticoagulation Clinic appointment(s)     Sep 18, 2017  3:15 PM CDT   [facility] with [facility]   [facility]    [address] [facility] [address]  [facility] [id number] [phone]            Oct 02, 2017  1:15 PM CDT   Anticoagulation Visit with [facility]   [facility] ([facility])    [address] [facility] [address]  [facility] [address] [phone]              Contact Numbers     [facility] Phone Numbers:  Anticoagulation Clinic Appointments : [phone]  Anticoagulation Nurse: [phone]         September 2017 Details    Sun Mon Tue Wed Thu Fri Sat          1               2                 3               4               5               6               7               8               9                 10               11               12               13               14               15               16                 17               18      5 mg   See details      19      7.5 mg         20      5 mg         21      5 mg         22      5 mg         23      7.5 mg           24      5 mg         25      5 mg         26      7.5 mg         27      5 mg         28      5 mg         29      5 mg         30      7.5 mg          Date Details   09/18 This INR check               How to take your warfarin dose     To take:  5 mg Take 1 of the 5 mg  tablets.    To take:  7.5 mg Take 1.5 of the 5 mg tablets.           October 2017 Details    Sun Mon Tue Wed Thu Fri Sat     1      5 mg         2            3               4               5               6               7                 8               9               10               11               12               13               14                 15               16               17               18               19               20               21                 22               23               24               25               26               27               28                 29               30               31                    Date Details   No additional details    Date of next INR:  10/2/2017         How to take your warfarin dose     To take:  5 mg Take 1 of the 5 mg tablets.

## 2017-09-18 NOTE — PROGRESS NOTES
ANTICOAGULATION FOLLOW-UP CLINIC VISIT    Patient Name:  Cielo Aguillon  Date:  9/18/2017  Contact Type:  Face to Face    SUBJECTIVE:     Patient Findings     Positives Change in medications (Pt changing from Effexor to Celexa. ), No Problem Findings           OBJECTIVE    INR Protime   Date Value Ref Range Status   09/18/2017 2.1 (A) 0.86 - 1.14 Final       ASSESSMENT / PLAN  INR assessment THER    Recheck INR In: 2 WEEKS    INR Location Clinic      Anticoagulation Summary as of 9/18/2017     INR goal 2.0-3.0   Today's INR 2.1   Maintenance plan 7.5 mg (5 mg x 1.5) on Tue, Sat; 5 mg (5 mg x 1) all other days   Full instructions 7.5 mg on Tue, Sat; 5 mg all other days   Weekly total 40 mg   No change documented Cindi Preston RN   Plan last modified Cindi Preston RN (5/9/2017)   Next INR check 10/2/2017   Priority INR   Target end date     Indications   Long-term (current) use of anticoagulants [Z79.01] [Z79.01]  Atrial fibrillation (H) (Resolved) [I48.91]         Anticoagulation Episode Summary     INR check location     Preferred lab     Send INR reminders to [facility]    Comments       Anticoagulation Care Providers     Provider Role Specialty Phone number    Simin Lopez MD Responsible Internal Medicine [phone]            See the Encounter Report to view Anticoagulation Flowsheet and Dosing Calendar (Go to Encounters tab in chart review, and find the Anticoagulation Therapy Visit)    Dosage adjustment made based on physician directed care plan.    Cindi Preston RN

## 2017-09-25 ENCOUNTER — TRANSFERRED RECORDS (OUTPATIENT)
Dept: HEALTH INFORMATION MANAGEMENT | Facility: CLINIC | Age: 77
End: 2017-09-25

## 2017-09-28 ENCOUNTER — TELEPHONE (OUTPATIENT)
Dept: ANTICOAGULATION | Facility: CLINIC | Age: 77
End: 2017-09-28

## 2017-09-28 NOTE — TELEPHONE ENCOUNTER
Dorothy with [facility] called stating pt will hae 2 teeth extracted on 10/12/17 and will need INR tested the day before or the morning of the extraction. Dorothy also stated pt was given Cephalexin for 10 days. Called pt, appt's scheduled. Pt states will begin abx tomorrow. Advised pt to take 5 mg QD until appt on 10/9/17, pt agrees with plan.  Cindi Preston RN

## 2017-09-29 ENCOUNTER — TRANSFERRED RECORDS (OUTPATIENT)
Dept: HEALTH INFORMATION MANAGEMENT | Facility: CLINIC | Age: 77
End: 2017-09-29

## 2017-10-09 ENCOUNTER — TELEPHONE (OUTPATIENT)
Dept: ANTICOAGULATION | Facility: CLINIC | Age: 77
End: 2017-10-09

## 2017-10-09 ENCOUNTER — ANTICOAGULATION THERAPY VISIT (OUTPATIENT)
Dept: ANTICOAGULATION | Facility: CLINIC | Age: 77
End: 2017-10-09
Payer: MEDICARE

## 2017-10-09 DIAGNOSIS — Z79.01 LONG-TERM (CURRENT) USE OF ANTICOAGULANTS: ICD-10-CM

## 2017-10-09 DIAGNOSIS — I48.20 CHRONIC ATRIAL FIBRILLATION (H): Primary | ICD-10-CM

## 2017-10-09 LAB — INR POINT OF CARE: 1.3 (ref 0.86–1.14)

## 2017-10-09 PROCEDURE — 99207 ZZC NO CHARGE NURSE ONLY: CPT

## 2017-10-09 PROCEDURE — 85610 PROTHROMBIN TIME: CPT | Mod: QW

## 2017-10-09 PROCEDURE — 36416 COLLJ CAPILLARY BLOOD SPEC: CPT

## 2017-10-09 NOTE — PROGRESS NOTES
ANTICOAGULATION FOLLOW-UP CLINIC VISIT    Patient Name:  Cielo Aguillon  Date:  10/9/2017  Contact Type:  Face to Face    SUBJECTIVE:     Patient Findings     Positives Change in medications (Pt is now taking 20 mg Celexa), Antibiotic use or infection (Pt was given cepalexin by dentist, comepleted 2 days ago, this med can increase INR.), Intentional hold of therapy (Pt will begin holding warfarin today, will have 2 teeth extracted 10/12/17), Med error (Pt reports taking 5 mg QD)           OBJECTIVE    INR Protime   Date Value Ref Range Status   10/09/2017 1.3 (A) 0.86 - 1.14 Final       ASSESSMENT / PLAN  INR assessment SUB    Recheck INR In: 2 WEEKS    INR Location Clinic      Anticoagulation Summary as of 10/9/2017     INR goal 2.0-3.0   Today's INR 1.3!   Maintenance plan 7.5 mg (5 mg x 1.5) on Tue, Sat; 5 mg (5 mg x 1) all other days   Full instructions 10/9: Hold; 10/10: Hold; 10/11: Hold; Otherwise 7.5 mg on Tue, Sat; 5 mg all other days   Weekly total 40 mg   Plan last modified Cindi Preston, RN (5/9/2017)   Next INR check    Priority INR   Target end date     Indications   Long-term (current) use of anticoagulants [Z79.01] [Z79.01]  Atrial fibrillation (H) (Resolved) [I48.91]         Anticoagulation Episode Summary     INR check location     Preferred lab     Send INR reminders to [facility]    Comments       Anticoagulation Care Providers     Provider Role Specialty Phone number    Simin Lopez MD [facility] Internal Medicine [phone]            See the Encounter Report to view Anticoagulation Flowsheet and Dosing Calendar (Go to Encounters tab in chart review, and find the Anticoagulation Therapy Visit)    Dosage adjustment made based on physician directed care plan.    Cindi Preston, RN

## 2017-10-09 NOTE — TELEPHONE ENCOUNTER
For insurance purposes, an annual INR referral is required. Please sign the order and route back to the INR Clinic. Cindi Preston RN

## 2017-10-09 NOTE — MR AVS SNAPSHOT
Cielo Aguillon   10/9/2017 1:00 PM   Anticoagulation Therapy Visit    Description:  77 year old female   Provider[facility]  [facility]   Department:  [facility]           INR as of 10/9/2017     Today's INR 1.3!      Anticoagulation Summary as of 10/9/2017     INR goal 2.0-3.0   Today's INR 1.3!   Full instructions 10/9: Hold; 10/10: Hold; 10/11: Hold; Otherwise 7.5 mg on Tue, Sat; 5 mg all other days   Next INR check     Indications   Long-term (current) use of anticoagulants [Z79.01] [Z79.01]  Atrial fibrillation (H) (Resolved) [I48.91]         Your next Anticoagulation Clinic appointment(s)     Oct 12, 2017  4:15 PM CDT   [facility] Visit with [facility]   [facility] ([facility])    [address] [facility] [address]  [facility] [id number] [phone]            Oct 23, 2017  2:45 PM CDT   Anticoagulation Visit with [facility]   [facility] ([facility])    [address] [address]  [facility] [address] [phone]              Contact Numbers     [facility] Phone Numbers:  Anticoagulation Clinic Appointments : [phone]  Anticoagulation Nurse: [phone]         October 2017 Details    Sun Mon Tue Wed Thu Fri Sat     1               2               3               4               5               6               7                 8               9      Hold   See details      10      Hold         11      Hold         12      5 mg         13      5 mg         14      7.5 mg           15      5 mg         16      5 mg         17      7.5 mg         18      5 mg         19      5 mg         20      5 mg         21      7.5 mg           22      5 mg         23      5 mg         24      7.5 mg         25      5 mg         26      5 mg         27      5 mg         28      7.5 mg           29      5 mg         30      5 mg         31      7.5 mg              Date Details   10/09  This INR check      Date of next INR: No date specified         How to take your warfarin dose     To take:  5 mg Take 1 of the 5 mg tablets.    To take:  7.5 mg Take 1.5 of the 5 mg tablets.    Hold Do not take your warfarin dose. See the Details table to the right for additional instructions.

## 2017-10-10 DIAGNOSIS — F41.1 GAD (GENERALIZED ANXIETY DISORDER): ICD-10-CM

## 2017-10-10 DIAGNOSIS — F33.1 MAJOR DEPRESSIVE DISORDER, RECURRENT EPISODE, MODERATE (H): ICD-10-CM

## 2017-10-10 RX ORDER — CITALOPRAM HYDROBROMIDE 20 MG/1
20 TABLET ORAL DAILY
Qty: 30 TABLET | Refills: 0 | Status: SHIPPED | OUTPATIENT
Start: 2017-10-10 | End: 2017-10-31

## 2017-10-10 NOTE — TELEPHONE ENCOUNTER
Citalopram     Last Written Prescription Date: 08/30/17  Last Fill Quantity: 30, # refills: 0  Last Office Visit with G primary care provider:  08/30/17        Last PHQ-9 score on record=   PHQ-9 SCORE 8/30/2017   Total Score -   Total Score 12

## 2017-10-10 NOTE — TELEPHONE ENCOUNTER
Dr Lopez-when did you want to see back?        Per 8/30 dict-  Decrease effexor- sent to pharmacy for taper     Start celexa after weaning off of effexor

## 2017-10-19 NOTE — TELEPHONE ENCOUNTER
Pt informed of PCP's message below.  She wishes to con't seeing Dr. Lopez.  Will call back to schedule appt.

## 2017-10-23 ENCOUNTER — ANTICOAGULATION THERAPY VISIT (OUTPATIENT)
Dept: ANTICOAGULATION | Facility: CLINIC | Age: 77
End: 2017-10-23
Payer: MEDICARE

## 2017-10-23 DIAGNOSIS — Z79.01 LONG-TERM (CURRENT) USE OF ANTICOAGULANTS: ICD-10-CM

## 2017-10-23 LAB — INR POINT OF CARE: 3.1 (ref 0.86–1.14)

## 2017-10-23 PROCEDURE — 99207 ZZC NO CHARGE NURSE ONLY: CPT

## 2017-10-23 PROCEDURE — 36416 COLLJ CAPILLARY BLOOD SPEC: CPT

## 2017-10-23 PROCEDURE — 85610 PROTHROMBIN TIME: CPT | Mod: QW

## 2017-10-23 NOTE — PROGRESS NOTES
ANTICOAGULATION FOLLOW-UP CLINIC VISIT    Patient Name:  Cielo Aguillon  Date:  10/23/2017  Contact Type:  Face to Face    SUBJECTIVE:     Patient Findings     Positives Change in diet/appetite (Pt reports diet back to usual after have teeth extracted. ), No Problem Findings           OBJECTIVE    INR Protime   Date Value Ref Range Status   10/23/2017 3.1 (A) 0.86 - 1.14 Final       ASSESSMENT / PLAN  INR assessment THER    Recheck INR In: 3 WEEKS    INR Location Clinic      Anticoagulation Summary as of 10/23/2017     INR goal 2.0-3.0   Today's INR 3.1!   Maintenance plan 7.5 mg (5 mg x 1.5) on Tue, Sat; 5 mg (5 mg x 1) all other days   Full instructions 7.5 mg on Tue, Sat; 5 mg all other days   Weekly total 40 mg   No change documented Cindi Preston RN   Plan last modified Cindi Preston RN (5/9/2017)   Next INR check 11/13/2017   Priority INR   Target end date     Indications   Long-term (current) use of anticoagulants [Z79.01] [Z79.01]  Atrial fibrillation (H) (Resolved) [I48.91]         Anticoagulation Episode Summary     INR check location     Preferred lab     Send INR reminders to [facility]    Comments       Anticoagulation Care Providers     Provider Role Specialty Phone number    Simin Lopez MD Responsible Internal Medicine [phone]            See the Encounter Report to view Anticoagulation Flowsheet and Dosing Calendar (Go to Encounters tab in chart review, and find the Anticoagulation Therapy Visit)    Dosage adjustment made based on physician directed care plan.    Cindi Preston RN

## 2017-10-23 NOTE — MR AVS SNAPSHOT
Cielo Aguillon   10/23/2017 2:45 PM   Anticoagulation Therapy Visit    Description:  77 year old female   Provider[facility]   Department:  [facility]           INR as of 10/23/2017     Today's INR 3.1!      Anticoagulation Summary as of 10/23/2017     INR goal 2.0-3.0   Today's INR 3.1!   Full instructions 7.5 mg on Tue, Sat; 5 mg all other days   Next INR check 11/13/2017    Indications   Long-term (current) use of anticoagulants [Z79.01] [Z79.01]  Atrial fibrillation (H) (Resolved) [I48.91]         Your next Anticoagulation Clinic appointment(s)     Nov 13, 2017  1:30 PM CST   [facility]ticoagulation Visit with [facility]   [facility] ([facility])    [address] [facility] [address]  [facility] [address] [phone]              Contact Numbers     [facility] Phone Numbers:  Anticoagulation Clinic Appointments : [phone]  Anticoagulation Nurse: [phone]         October 2017 Details    Sun Mon Tue Wed Thu Fri Sat     1               2               3               4               5               6               7                 8               9               10               11               12               13               14                 15               16               17               18               19               20               21                 22               23      5 mg   See details      24      7.5 mg         25      5 mg         26      5 mg         27      5 mg         28      7.5 mg           29      5 mg         30      5 mg         31      7.5 mg              Date Details   10/23 This INR check               How to take your warfarin dose     To take:  5 mg Take 1 of the 5 mg tablets.    To take:  7.5 mg Take 1.5 of the 5 mg tablets.           November 2017 Details    Sun Mon Tue Wed Thu Fri Sat        1      5 mg         2      5 mg         3      5 mg         4      7.5 mg           5      5 mg          6      5 mg         7      7.5 mg         8      5 mg         9      5 mg         10      5 mg         11      7.5 mg           12      5 mg         13            14               15               16               17               18                 19               20               21               22               23               24               25                 26               27               28               29               30                  Date Details   No additional details    Date of next INR:  11/13/2017         How to take your warfarin dose     To take:  5 mg Take 1 of the 5 mg tablets.    To take:  7.5 mg Take 1.5 of the 5 mg tablets.

## 2017-10-31 DIAGNOSIS — F41.1 GAD (GENERALIZED ANXIETY DISORDER): ICD-10-CM

## 2017-10-31 DIAGNOSIS — I25.10 ASCVD (ARTERIOSCLEROTIC CARDIOVASCULAR DISEASE): ICD-10-CM

## 2017-10-31 DIAGNOSIS — I10 ESSENTIAL HYPERTENSION, BENIGN: ICD-10-CM

## 2017-10-31 DIAGNOSIS — F33.1 MAJOR DEPRESSIVE DISORDER, RECURRENT EPISODE, MODERATE (H): ICD-10-CM

## 2017-10-31 RX ORDER — CITALOPRAM HYDROBROMIDE 20 MG/1
10 TABLET ORAL DAILY
Qty: 15 TABLET | Refills: 0 | Status: SHIPPED | OUTPATIENT
Start: 2017-10-31 | End: 2017-11-19 | Stop reason: DRUGHIGH

## 2017-10-31 RX ORDER — CITALOPRAM HYDROBROMIDE 20 MG/1
20 TABLET ORAL DAILY
Qty: 30 TABLET | Refills: 0 | Status: CANCELLED | OUTPATIENT
Start: 2017-10-31

## 2017-10-31 RX ORDER — AMLODIPINE BESYLATE 5 MG/1
5 TABLET ORAL DAILY
Qty: 90 TABLET | Refills: 1 | Status: ON HOLD | OUTPATIENT
Start: 2017-10-31 | End: 2018-03-16

## 2017-10-31 RX ORDER — METOPROLOL TARTRATE 100 MG
100 TABLET ORAL 2 TIMES DAILY
Qty: 180 TABLET | Refills: 0 | Status: SHIPPED | OUTPATIENT
Start: 2017-10-31 | End: 2018-01-26

## 2017-10-31 NOTE — TELEPHONE ENCOUNTER
Call back from pt. Celexa rx previously had the following instructions:    Take 1/2 tablet (10 mg) for 1-2 weeks, then increase to 1 tablet orally daily    Pt states she tried increasing to 1 tablet daily but was more comfortable taking a half tablet daily. States she felt more shaky and nervous on a whole tablet. States she was also having headaches on a full tablet, but is unsure if that was related. Pt started taking a half tablet daily again and has remained on that dose.    Requested Prescriptions   Pending Prescriptions Disp Refills     amLODIPine (NORVASC) 5 MG tablet 90 tablet 1     Sig: Take 1 tablet (5 mg) by mouth daily    Calcium Channel Blockers Protocol  Passed    10/31/2017  2:34 PM       Passed - Blood pressure under 140/90    BP Readings from Last 3 Encounters:   08/30/17 124/80   07/10/17 118/70   03/08/17 120/76                Passed - Recent or future visit with authorizing provider    Patient had office visit in the last year or has a visit in the next 30 days with authorizing provider.  See chart review.              Passed - Patient is age 18 or older       Passed - No active pregnancy on record       Passed - Normal serum creatinine on file in past 12 months    Recent Labs   Lab Test  08/30/17   1239   CR  1.03            Passed - No positive pregnancy test in past 12 months        metoprolol (LOPRESSOR) 100 MG tablet 180 tablet 0     Sig: Take 1 tablet (100 mg) by mouth 2 times daily    Beta-Blockers Protocol Passed    10/31/2017  2:34 PM       Passed - Blood pressure under 140/90    BP Readings from Last 3 Encounters:   08/30/17 124/80   07/10/17 118/70   03/08/17 120/76                Passed - Patient is age 6 or older       Passed - Recent or future visit with authorizing provider's specialty    Patient had office visit in the last year or has a visit in the next 30 days with authorizing provider.  See chart review.               citalopram (CELEXA) 20 MG tablet 30 tablet 0     Sig: Take  0.5 tablets (10 mg) by mouth daily    There is no refill protocol information for this order        Amlodipine and Metoprolol - Prescription approved per [facility] Refill Protocol.    Celexa - Routing refill request to provider for review/approval because:  Dose change

## 2017-10-31 NOTE — TELEPHONE ENCOUNTER
Called pt-left message       Verify Citalopram dose, and remind pt she needs appt with Jessica

## 2017-10-31 NOTE — TELEPHONE ENCOUNTER
She needs to schedule follow up with me soon. She was told last month needed appt but has not scheduled yet.

## 2017-11-14 ENCOUNTER — OFFICE VISIT (OUTPATIENT)
Dept: SLEEP MEDICINE | Facility: CLINIC | Age: 77
End: 2017-11-14
Payer: MEDICARE

## 2017-11-14 ENCOUNTER — ANTICOAGULATION THERAPY VISIT (OUTPATIENT)
Dept: ANTICOAGULATION | Facility: CLINIC | Age: 77
End: 2017-11-14
Payer: MEDICARE

## 2017-11-14 ENCOUNTER — OFFICE VISIT (OUTPATIENT)
Dept: INTERNAL MEDICINE | Facility: CLINIC | Age: 77
End: 2017-11-14
Payer: MEDICARE

## 2017-11-14 VITALS
BODY MASS INDEX: 29.34 KG/M2 | WEIGHT: 176.1 LBS | HEIGHT: 65 IN | HEART RATE: 60 BPM | SYSTOLIC BLOOD PRESSURE: 122 MMHG | RESPIRATION RATE: 16 BRPM | TEMPERATURE: 96.8 F | DIASTOLIC BLOOD PRESSURE: 78 MMHG | OXYGEN SATURATION: 93 %

## 2017-11-14 VITALS
OXYGEN SATURATION: 95 % | RESPIRATION RATE: 12 BRPM | DIASTOLIC BLOOD PRESSURE: 82 MMHG | HEART RATE: 58 BPM | BODY MASS INDEX: 29.09 KG/M2 | WEIGHT: 174.6 LBS | SYSTOLIC BLOOD PRESSURE: 135 MMHG | HEIGHT: 65 IN

## 2017-11-14 DIAGNOSIS — G89.29 CHRONIC LEFT-SIDED LOW BACK PAIN WITH LEFT-SIDED SCIATICA: ICD-10-CM

## 2017-11-14 DIAGNOSIS — I48.20 CHRONIC ATRIAL FIBRILLATION (H): ICD-10-CM

## 2017-11-14 DIAGNOSIS — G47.33 OSA (OBSTRUCTIVE SLEEP APNEA): ICD-10-CM

## 2017-11-14 DIAGNOSIS — I25.10 CORONARY ARTERY DISEASE INVOLVING NATIVE CORONARY ARTERY OF NATIVE HEART WITHOUT ANGINA PECTORIS: ICD-10-CM

## 2017-11-14 DIAGNOSIS — I10 ESSENTIAL HYPERTENSION, BENIGN: Primary | ICD-10-CM

## 2017-11-14 DIAGNOSIS — F33.1 MAJOR DEPRESSIVE DISORDER, RECURRENT EPISODE, MODERATE (H): ICD-10-CM

## 2017-11-14 DIAGNOSIS — I48.21 PERMANENT ATRIAL FIBRILLATION (H): Primary | ICD-10-CM

## 2017-11-14 DIAGNOSIS — M54.42 CHRONIC LEFT-SIDED LOW BACK PAIN WITH LEFT-SIDED SCIATICA: ICD-10-CM

## 2017-11-14 DIAGNOSIS — F41.1 GAD (GENERALIZED ANXIETY DISORDER): ICD-10-CM

## 2017-11-14 DIAGNOSIS — I27.20 PULMONARY HTN (H): ICD-10-CM

## 2017-11-14 DIAGNOSIS — I25.10 CORONARY ARTERY DISEASE, ANGINA PRESENCE UNSPECIFIED, UNSPECIFIED VESSEL OR LESION TYPE, UNSPECIFIED WHETHER NATIVE OR TRANSPLANTED HEART: ICD-10-CM

## 2017-11-14 DIAGNOSIS — Z79.01 LONG-TERM (CURRENT) USE OF ANTICOAGULANTS: ICD-10-CM

## 2017-11-14 DIAGNOSIS — E03.9 ACQUIRED HYPOTHYROIDISM: ICD-10-CM

## 2017-11-14 DIAGNOSIS — E78.5 HYPERLIPIDEMIA LDL GOAL <70: ICD-10-CM

## 2017-11-14 LAB — INR POINT OF CARE: 3.2 (ref 0.86–1.14)

## 2017-11-14 PROCEDURE — 84443 ASSAY THYROID STIM HORMONE: CPT | Performed by: INTERNAL MEDICINE

## 2017-11-14 PROCEDURE — 36416 COLLJ CAPILLARY BLOOD SPEC: CPT

## 2017-11-14 PROCEDURE — 85610 PROTHROMBIN TIME: CPT | Mod: QW

## 2017-11-14 PROCEDURE — 99205 OFFICE O/P NEW HI 60 MIN: CPT | Performed by: FAMILY MEDICINE

## 2017-11-14 PROCEDURE — 99207 ZZC NO CHARGE NURSE ONLY: CPT

## 2017-11-14 PROCEDURE — 99214 OFFICE O/P EST MOD 30 MIN: CPT | Performed by: INTERNAL MEDICINE

## 2017-11-14 RX ORDER — ROSUVASTATIN CALCIUM 10 MG/1
10 TABLET, COATED ORAL DAILY
Qty: 30 TABLET | Refills: 3 | Status: CANCELLED | OUTPATIENT
Start: 2017-11-14

## 2017-11-14 RX ORDER — CITALOPRAM HYDROBROMIDE 10 MG/1
10 TABLET ORAL DAILY
Qty: 90 TABLET | Refills: 3 | Status: CANCELLED | OUTPATIENT
Start: 2017-11-14

## 2017-11-14 RX ORDER — TRAMADOL HYDROCHLORIDE 50 MG/1
50 TABLET ORAL EVERY 8 HOURS PRN
Qty: 50 TABLET | Refills: 0 | Status: SHIPPED | OUTPATIENT
Start: 2017-11-14 | End: 2018-02-26

## 2017-11-14 NOTE — PATIENT INSTRUCTIONS
1. CPAP-  WHAT DOES IT DO AND HOW CAN I LEARN TO WEAR IT?                               BEFORE I START, CAN I WATCH A MOVIE TO GET A PLAN ON HOW TO USE CPAP?  https://www.youZauber.com/watch?t=n0D56tc067O      Continuous positive airway pressure, or CPAP, is the most effective treatment for obstructive sleep apnea. It works by blowing room air, through a mask, to hold your throat open. A decision to use CPAP is a major step forward in the pursuit of a healthier life. The successful use of CPAP will help you breathe easier, sleep better and live healthier. You can choose CPAP equipment from any durable medical equipment provider that meets your needs.  Using CPAP can be a positive experience if you keep these [place] points in mind:  1. Commitment  CPAP is not a quick fix for your problem. It involves a long-term commitment to improve your sleep and your health.    2. Communication  Stay in close communication with both your sleep doctor and your CPAP supplier. Ask lots of questions and seek help when you need it.    3. Consistency  Use CPAP all night, every night and for every nap. You will receive the maximum health benefits from CPAP when you use it every time that you sleep. This will also make it easier for your body to adjust to the treatment.    4. Correction  The first machine and mask that you try may not be the best ones for you. Work with your sleep doctor and your CPAP supplier to make corrections to your equipment selection. Ask about trying a different type of machine or mask if you have ongoing problems. Make sure that your mask is a good fit and learn to use your equipment properly.    5. Challenge  Tell a family member or close friend to ask you each morning if you used your CPAP the previous night. Have someone to challenge you to give it your best effort.    6. Connection   Your adjustment to CPAP will be easier if you are able to connect with others who use the same treatment. Ask your sleep doctor if  "there is a support group in your area for people who have sleep apnea, or look for one on the Internet.  7. Comfort   Increase your level of comfort by using a saline spray, decongestant or heated humidifier if CPAP irritates your nose, mouth or throat. Use your unit's \"ramp\" setting to slowly get used to the air pressure level. There may be soft pads you can buy that will fit over your mask straps. Look on www.CPAP.com for accessories that can help make CPAP use more comfortable.  8. Cleaning   Clean your mask, tubing and headgear on a regular basis. Put this time in your schedule so that you don't forget to do it. Check and replace the filters for your CPAP unit and humidifier.    9. Completion   Although you are never finished with CPAP therapy, you should reward yourself by celebrating the completion of your first month of treatment. Expect this first month to be your hardest period of adjustment. It will involve some trial and error as you find the machine, mask and pressure settings that are right for you.    10. Continuation  After your first month of treatment, continue to make a daily commitment to use your CPAP all night, every night and for every nap.    CPAP-Tips to starting with success:  Begin using your CPAP for short periods of time during the day while you watch TV or read.    Use CPAP every night and for every nap. Using it less often reduces the health benefits and makes it harder for your body to get used to it.    Make small adjustments to your mask, tubing, straps and headgear until you get the right fit. Tightening the mask may actually worsen the leak.  If it leaves significant marks on your face or irritates the bridge of your nose, it may not be the best mask for you.  Speak with the person who supplied the mask and consider trying other masks. Insurances will allow you to try different masks during the first month of starting CPAP.  Insurance also covers a new mask, hose and filter about " every 6 months.    Use a saline nasal spray to ease mild nasal congestion. Neti-Pot or saline nasal rinses may also help. Nasal gel sprays can help reduce nasal dryness.  Biotene mouthwash can be helpful to protect your teeth if you experience frequent dry mouth.  Dry mouth may be a sign of air escaping out of your mouth or out of the mask in the case of a full face mask.  Speak with your provider if you expect that is the case.     Take a nasal decongestant to relieve more severe nasal or sinus congestion.  Do not use Afrin (oxymetazoline) nasal spray more than 3 days in a row.  Speak with your sleep doctor if your nasal congestion is chronic.    Use a heated humidifier that fits your CPAP model to enhance your breathing comfort. Adjust the heat setting up if you get a dry nose or throat, down if you get condensation in the hose or mask.  Position the CPAP lower than you so that any condensation in the hose drains back into the machine rather than towards the mask.    Try a system that uses nasal pillows if traditional masks give you problems.    Clean your mask, tubing and headgear once a week. Make sure the equipment dries fully.    Regularly check and replace the filters for your CPAP unit and humidifier.    Work closely with your sleep provider and your CPAP supplier to make sure that you have the machine, mask and air pressure setting that works best for you. It is better to stop using it and call your provider to solve problems than to lay awake all night frustrated with the device.    Daytime naps are not advised, but use the PAP device if taking naps. Many insurances require that we prove you are using the PAP device at least 4 hours on at least 70% of nights over a 30 day period. We have 90 days to meet those criteria.    You can get new supplies (mask, hose and filter) for your device every 3-6 months (covered by insurance). You do not need to get supplies that often, but they are available if you would  like them. You may exchange the mask once within the first month if you feel the initial mask does not fit well. Please, contact your medical equipment provider for equipment issues.    Please let me know if you have any snoring, daytime sleepiness, or poor sleep quality. We will want to make sure your PAP device is adequately treating your condition.    There is a website called CPAP.com that has accessories that may make CPAP use easier. Please visit it at your convenience.    Please, schedule sleep study and follow up visit with the nurse (she will coming into the room and meet with you).     Thank you!    Dennys Johnson MD, MPH  Clinical Sleep and Occupational / Environmental Medicine      Thank you!    Dennys Johnson MD, MPH  Clinical Sleep and Occupational / Environmental Medicine

## 2017-11-14 NOTE — MR AVS SNAPSHOT
Cielo Aguillon   11/14/2017 1:30 PM   Anticoagulation Therapy Visit    Description:  77 year old female   Provider[facility]  [facility]   Department:  [facility]           INR as of 11/14/2017     Today's INR 3.2!      Anticoagulation Summary as of 11/14/2017     INR goal 2.0-3.0   Today's INR 3.2!   Full instructions 11/14: 5 mg; Otherwise 7.5 mg on Tue, Sat; 5 mg all other days   Next INR check 12/5/2017    Indications   Long-term (current) use of anticoagulants [Z79.01] [Z79.01]  Atrial fibrillation (H) (Resolved) [I48.91]         Your next Anticoagulation Clinic appointment(s)     Dec 05, 2017  1:30 PM CST   Anticoagulation Visit with [facility]   [facility] ([facility])    [address] [facility] [address]  [facility] [id number] [phone]              Contact Numbers     [facility] Phone Numbers:  Anticoagulation Clinic Appointments : [phone]  Anticoagulation Nurse: [phone]         November 2017 Details    Sun Mon Tue Wed Thu Fri Sat        1               2               3               4                 5               6               7               8               9               10               11                 12               13               14      5 mg   See details      15      5 mg         16      5 mg         17      5 mg         18      7.5 mg           19      5 mg         20      5 mg         21      7.5 mg         22      5 mg         23      5 mg         24      5 mg         25      7.5 mg           26      5 mg         27      5 mg         28      7.5 mg         29      5 mg         30      5 mg            Date Details   11/14 This INR check               How to take your warfarin dose     To take:  5 mg Take 1 of the 5 mg tablets.    To take:  7.5 mg Take 1.5 of the 5 mg tablets.           December 2017 Details    Sun Mon Tue Wed Thu Fri Sat          1      5 mg         2      7.5 mg           3      5  mg         4      5 mg         5            6               7               8               9                 10               11               12               13               14               15               16                 17               18               19               20               21               22               23                 24               25               26               27               28               29               30                 31                      Date Details   No additional details    Date of next INR:  12/5/2017         How to take your warfarin dose     To take:  5 mg Take 1 of the 5 mg tablets.    To take:  7.5 mg Take 1.5 of the 5 mg tablets.

## 2017-11-14 NOTE — PROGRESS NOTES
"Sleep [facility]  Outpatient Sleep Medicine Consultation  November 14, 2017    Name: Cielo Aguillon MRN# [id number]   Age: 77 year old YOB: 1940     Date of Consultation: November 14, 2017  Consultation is requested by: Simin Lopez MD  [facility] [address]  [place] [address]  Primary care provider: Simin Lopez    Patient is accompanied by: Patient presents with , Brayden, today.       Reason for Sleep Consult:     Cielo Aguillon is a 77 year old female patient that presents here for an initial evaluation due to \"needing to go back on CPAP machine.\"         Assessment and Plan:     Summary Sleep Diagnoses:    (1) Permanent Atrial Fibrillation  (2) Severe BETHANY  (3) Pulmonary Hypertension  (4) CAD    Summary Recommendations / Discussion:    This patient was diagnosed with severe BETHANY with an AHI of 39 events per hour. The patient was successfully treated with a CPAP at fixed pressure of 10 cmH2O. Today's download shows that the device was effective when used with a residual AHI of only 3.6 events per hour. At this point, no PSG needs to be performed. The patient's health is stable and recent echocardiogram showed preserved LVEF. As such, this patient can remained in CPAP treatment at fixed pressure of 10 cmH2O. PAP compliance was discussed, explained, and encouraged. Today, the nature and pathophysiology of BETHANY were discussed. The different treatment options for BETHANY were also reviewed and explained today. Lifestyle recommendations including healthy dietary and exercising habits were discussed. An overnight oximetry test with the PAP device in place will be obtained to assess for any possible hypoxemia. Pt will follow up with me after having completed 6 to 7 weeks of PAP therapy.    Coding:  (I48.2) Permanent atrial fibrillation (H)  (primary encounter diagnosis)  (G47.33) BETHANY (obstructive sleep apnea)  (I27.20) Pulmonary HTN  (I25.10) Coronary artery disease, angina presence " unspecified, unspecified vessel or lesion type, unspecified whether native or transplanted heart    Counseling included a comprehensive review of diagnostic and therapeutic strategies as well as risks of inadequate therapy.    Educational materials provided in instructions. The patient was instructed to avoid driving or operating any heavy machinery when experiencing drowsiness.    All questions and concerns were addressed today. Pt agrees and understands the assessment and plan.         History of Present Illness:   Cielo Aguillon is a 77 year old  RHD female pt with PMH of cystocele, rectocele, hypertension, peptic ulcer disease, chornic rhinitis, Lichenification and Lichen Simplex Chronicus, fatty liver, hyperlipidemia, chronic back pain, non-obstructive coronary artery disease, mild to moderate pulmonary hypertension, generalized anxiety disorder, major depressive disorder, chronic atrial fibrillation (daignosed 5 yrs ago), hypothyroidism, and previously diagnosed BETHANY presents for an initial evaluation today due to atrial fibrillation and BETHANY. This patient has been reporting GREEN for several years now. Pt was evaluated by cardiology, pulmonology, and even neurology. The dyspnea is due to a multifactorial nature.     This medically complex patient underwent a PSG sleep stud in 2015 at [facility]. The study showed severe BETHANY with an AHI of 54 events per hour with a RDI of 58 events per hour scored at 4% for Medicare criteria, the AHI was 39 events per hour). Most events were obstructive in nature. The PAP download shows that the patient was using a CPAP at fixed pressure of 10 cmH2O. This was effective at treating the condition with a residual AHI of 3.6 events per hour. No significant leakage noted.    Mrs Aguillon was initially treated with CPAP, but she is not currently treated for BETHANY. She explains to me that she needs new CPAP supplies in order to start therapy once again. The patient explains that she was  not able to use the PAP device because the mask needed to be replaced and she was not able to get new supplies without repeating the PSG sleep study. She was using a CPAP with a nasal mask. The  explains that with the PAP device the patient would not snore, have witnessed apneas, or gasping / choking for air. Without the PAP device, the patient reports snoring and witnessed apneas. Some non-restorative sleep and sleep inertia reported. Pt denies EDS, but admits having physical fatigue. No inadvertent naps reported. Pt is not currently driving due to vision problems.    PREVIOUS IN- LAB or HOME SLEEP STUDIES: The patient reports the study was conducted in [facility]   Date: 2015   TYPE: PSG   AHI: 58 events per hour   BMI: 28.7 kg/m2   Intervention: CPAP    SLEEP-WAKE SCHEDULE:     Cielo KARSON Aguillon      -Describes herself as a night person; prefers to go to sleep at 10:30 PM and wakes up at 9:00 AM.      -The patient takes no naps during the week; takes no inadvertent naps.      -ON WEEKDAYS, goes to sleep at 10:30 PM during the week; awakens 9:00 AM with an alarm; falls asleep in 10 minutes; denies difficulty falling asleep.      -ON WEEKENDS, goes to sleep at 10:30 PM and wakes up at 9:00 AM with an alarm; falls asleep in 10 minutes.        -Awakens 1-2 times a night for 5 minutes before falling back to sleep; awakens to go to the bathroom and external stimuli.      BEDTIME ACTIVITIES AND SHIFT WORK:    Cielo Aguillon     -Bedtime Activities and Other Sleeping Information: Pt lives . Pt sleeps alone on a twin size bed ( sleeps in a different room due to snoring). Pt sleeps on her sides. Pt does not use any electronics in bed and uses bedroom only for sleeping.      -Occupation: Retired (Pharmacy Tech)     SCALES        -SLEEPINESS: [place] sleepiness scale (ESS):  2 / 24    Drowsy driving / near accidents: Denies any near accidents    Consequences: Non-restorative sleep    SLEEP  COMPLAINTS:  Cardio-respiratory     -Snoring: Significant snoring reported    -Dyspnea: Pt admits having witnessed apneas   -Morning headaches or confusion: Denies any morning cephalgia   -Coexisting Lung disease: Pulmonary HTN     -Coexisting Heart disease: Atria fibrillation with non-obstructive CAD     -Does patient have a bed partner: Patient sleeps alone   -Has bed partner been sleeping separately because of snoring:  No            RLS Screen:    -When you try to relax in the evening or sleep at night, do you ever have unpleasant, restless feelings in your legs that can be relieved by walking or movement? None Reported     -Periodic limb movement: None Reported    Narcolepsy:     - Denies sudden urges of sleep attacks   - Denies cataplexy   - Denies sleep paralysis    - Admits hallucinations. Some hypnagogic hallucinations a few times a month consistent of little kids. This is not scary to her.     - Denies feeling refreshed after a nap.    Sleep Behaviors:   - Denies leg symptoms/movements   - Denies motor restlessness   - Denies night terrors   - Denies bruxism   - Denies automatic behaviors    Other Subjective Complaints:   - Denies anxiety or rumination    - Denies pain and discomfort at night   - Denies waking up with heart pounding or racing   - Denies GERD or aspiration         Parasomnia:    -NREM - Denies recurrent persistent confusional arousal, night eating, sleep walking or sleep terrors.      -REM - Denies dream enactment or injuries.     -Driving Accident or Near Accidents: Pt does not drive         Medications:     Current Outpatient Prescriptions   Medication Sig     amLODIPine (NORVASC) 5 MG tablet Take 1 tablet (5 mg) by mouth daily     metoprolol (LOPRESSOR) 100 MG tablet Take 1 tablet (100 mg) by mouth 2 times daily     citalopram (CELEXA) 20 MG tablet Take 0.5 tablets (10 mg) by mouth daily     venlafaxine (EFFEXOR-XR) 37.5 MG 24 hr capsule Take 2 tablets for 1 week, then take 1 tablet for  1 week. Then start celexa.     warfarin (COUMADIN) 5 MG tablet Take 1 tablet (5 mg) daily, except take 1 and 1/2 tablet (7.5 mg) on Tuesday, and Saturday  or as instructed.     diazepam (VALIUM) 5 MG tablet 1/2 to 1 po q 8 hours prn     traMADol (ULTRAM) 50 MG tablet Take 1 tablet (50 mg) by mouth every 8 hours as needed for moderate pain     venlafaxine (EFFEXOR-XR) 150 MG 24 hr capsule Take 1 capsule (150 mg) by mouth daily     levothyroxine (SYNTHROID/LEVOTHROID) 50 MCG tablet Take 1 tablet (50 mcg) by mouth daily     VITAMIN D, CHOLECALCIFEROL, PO Take 1,000 Units by mouth daily      No current facility-administered medications for this visit.       Allergies   Allergen Reactions     Ace Inhibitors      Cough (Zestril)     Aspirin      hx bleeding ulcers     Hydrochlorothiazide [Hctz] Rash     Ibuprofen      hx bleeding ulcers     Amoxicillin Rash     Losartan Rash     Penicillins Rash          Past Medical History:     Denies needing any 02 supplement at night.    Past Medical History:   Diagnosis Date     Anxiety     related to travel     Arthritis      ASCVD (arteriosclerotic cardiovascular disease) 2/12    60-70% stenoses of OM2, D1, medical management     Atrial fibrillation (H)      Atrial flutter (H)      Coronary artery disease     2/2012- 50% mid Cfx, 60-70% prox OM2, 50-70% D1     Essential hypertension, benign      Fatty liver 5/10    mild fibrosis on biopsy     Hypertriglyceridemia      Major depression      BETHANY (obstructive sleep apnea)      Other ventral hernia without mention of obstruction or gangrene      Peptic ulcer, unspecified site, unspecified as acute or chronic, without mention of hemorrhage, perforation, or obstruction 1983     Shortness of breath      Thyroid disease      Tubular adenoma 6/08             Past Surgical History:    Admits previous upper airway surgery.     Past Surgical History:   Procedure Laterality Date     APPENDECTOMY       C NONSPECIFIC PROCEDURE      Appendectomy      C NONSPECIFIC PROCEDURE      Tonsillectomy     C NONSPECIFIC PROCEDURE      Tubal ligation     C NONSPECIFIC PROCEDURE  11/01    Shave bone spurs from left foot     COLONOSCOPY  9/1/2016    Dr. Camejo [facility]     COLONOSCOPY N/A 9/1/2016    Procedure: COMBINED COLONOSCOPY, SINGLE OR MULTIPLE BIOPSY/POLYPECTOMY BY BIOPSY;  Surgeon: Pillo Camejo MD;  Location:  GI     CORONARY ANGIOGRAPHY ADULT ORDER  2/13/2012    50% mid Cfx, 60-70% prox OM2, 50-70% D1     ESOPHAGOSCOPY, GASTROSCOPY, DUODENOSCOPY (EGD), COMBINED  9/1/2016    Dr. Camejo [facility]     ESOPHAGOSCOPY, GASTROSCOPY, DUODENOSCOPY (EGD), COMBINED N/A 9/1/2016    Procedure: COMBINED ESOPHAGOSCOPY, GASTROSCOPY, DUODENOSCOPY (EGD), BIOPSY SINGLE OR MULTIPLE;  Surgeon: Pillo Camejo MD;  Location:  GI     GYN SURGERY       HEART CATH RIGHT AND LEFT HEART CATH  04/06/2015    Mid LAD 50-60%, 1st Diagonal 70%, 1st OM 30%, 2nd OM 40-50%, review report for right heart cath results.           Social History:     Social History   Substance Use Topics     Smoking status: Never Smoker     Smokeless tobacco: Never Used     Alcohol use 0.0 oz/week     0 Standard drinks or equivalent per week      Comment: occ - wine     Chemical History:     Tobacco: Never smoked     Uses no caffeine.   Supplements for wakefulness: Patient does not use any supplements to stay awake    EtOH: 1 to 2 drinks a week  Recreational Drugs: Patient denies using any recreational drugs     Psych Hx:   Current dangers to self or others: No. Pt denies any SI / HI, hallucinations, or delusions         Family History:     Family History   Problem Relation Age of Onset     Respiratory Father      emphysema--secon. to smoking     Alzheimer Disease Mother      Congenital Anomalies Mother      OSTEOPOROSIS Mother      DIABETES Maternal Aunt      DIABETES Other      cousins     DIABETES Other      cousin on mothers side of family     Depression Son      Depression Son      Colon Cancer No family  "hx of       Sleep Family Hx:       RLS - None reported   BETHANY - None reported  Insomnia - None reported  Parasomnia - None reported         Review of Systems:     A complete 10 point review of systems was negative other than HPI or as commented below:     CONSTITUTIONAL: NEGATIVE for weight gain/loss. Pt admits some chills.  EYES: NEGATIVE for changes in vision, blind spots, double vision.  ENT: NEGATIVE for ear pain. Pt admits having some sore throat, sinus pain, post-nasal drip, runny nose, and bloody nose.  CARDIAC: Positive for fast heartbeats or fluttering in chest, chest pain or pressure, breathlessness when lying flat, swollen legs / swollen feet.  NEUROLOGIC: Pt admits having headaches and leg weakness.  DERMATOLOGIC: NEGATIVE for rashes, new moles or change in mole(s)  PULMONARY: NEGATIVE coughing up blood, wheezing or whistling when breathing. Pt reports SOB at rest, SOB with activity, dry cough, and productive cough.     GASTROINTESTINAL: NEGATIVE for nausea or vomitting, loose or watery stools, fat or grease in stools, constipation, abdominal pain, bowel movements black in color or blood noted.  GENITOURINARY: NEGATIVE for pain during urination, blood in urine, urinating more frequently than usual, irregular menstrual periods.  MUSCULOSKELETAL: Positive for muscle pain, bone or joint pain, and swollen joints.  ENDOCRINE: NEGATIVE for increased thirst or urination, diabetes.  LYMPHATIC: NEGATIVE for swollen lymph nodes, lumps or bumps in the breasts or nipple discharge.         Physical Examination:   /82  Pulse 58  Resp 12  Ht 1.651 m (5' 5\")  Wt 79.2 kg (174 lb 9.6 oz)  SpO2 95%  BMI 29.05 kg/m2     VS: Reviewed and normal. 6 minute walk showed a deconditioned patient with SpO2 ranging at 95% to 96%.   General: Alert, oriented, not in distress. Dressed casually; Good eye contact; Comfortably sitting in a chair; in no apparent distress  HEENT: Normocephalic and atraumatic; NL TM x 2; pupils " are isocoric and equally responsive to the light. PERRLA. EOMI. Normal fundoscopic examination; Nasal turbinates are normal with a normal septal alignment;  Mallampati score: Grade IV; Tonsillar hypertrophy: 0  surgically removed; Pharynx with no erythema or exudates. Small and crowded oropharynx with low-lying soft palate.  NECK: Neck supple; symmetrical; no lymphadenopathy; no thyromegaly, bruit, JVD noted. Neck circumference of 15.75 inches (40 cm).  Lungs: Both hemithoraces are symmetrical, normal to palpation, no dullness to percussion, auscultation of lungs revealed normal breath sounds with no expirium prolongation, wheezing, rhonci and crackles.  CVS: Normal S1 and S2 heart sounds with no extra heart sounds. No murmur, rubs, or clicks. Normal peripheral pulses throughout with no obvious peripheral edema. Irregularly irregular rhythm noted.  Abdomen: Bowel sounds present. Abdomen is soft, non-tender, and non-distended. No organomegaly, ascitis, or obvious masses noted. Negative CVA tenderness.  Extremities/musculoskeletal: No deformity, cyanosis, or clubbing noted.  Neurology: Awake, alert, and oriented x 3. No obvious gross motor / sensorial deficits with normal strength in all extremities at 5/5 and normal sensation throughout. Cranial nerves are grossly intact with normal II to XII CN functions. Negative Romberg's test with normal gait. DTR are symmetric and normal at 2+/4.  Integumentary: No obvious skin rash.  Psychiatry: Mood and affect are appropriate. Euthymic with affect congruent with full range and intensity. No SI/HI with adequate insight and judgement.          Data: All pertinent previous laboratory data reviewed     Lab Results   Component Value Date    PH 7.34 (L) 04/06/2015    PO2 62 (L) 04/06/2015    PCO2 40 04/06/2015    HCO3 22 04/06/2015     Lab Results   Component Value Date    TSH 4.42 (H) 08/30/2017    TSH 4.54 (H) 02/16/2017     Lab Results   Component Value Date    GLC 96 08/30/2017      (H) 02/24/2016     Lab Results   Component Value Date    HGB 13.8 08/30/2017    HGB 13.7 02/16/2017     Lab Results   Component Value Date    BUN 20 08/30/2017    BUN 16 02/24/2016    CR 1.03 08/30/2017    CR 1.08 02/24/2016     Echocardiology:    -Most recent echocardiogram obtained on 07/10/2017 showed LV or normal size with moderate concentric left ventricular hypertrophy. LV systolic function was normal with LVEF estimated at 55 - 60%. The RV was normal in side, structure, and function. There was mod-severe biatrial enlargement noted. No obvious atrial shunt observed. Mild to moderate mitral annular calcification was noted. There was also mild mitral regurgitation present. Mild pulmonic valve regurgitation noted. The rhythm was atrial fibrillation during the study.    Chest x-ray: None Recent Studies Available    PFT: None Recent Studies Available    Laboratory Studies:   Component Value Flag Ref Range Units Status Collected Lab   Sodium 140  133 - 144 mmol/L Final 08/30/2017 12:39 PM 65   Potassium 4.3  3.4 - 5.3 mmol/L Final 08/30/2017 12:39 PM 65   Chloride 106  94 - 109 mmol/L Final 08/30/2017 12:39 PM 65   Carbon Dioxide 26  20 - 32 mmol/L Final 08/30/2017 12:39 PM 65   Anion Gap 8  3 - 14 mmol/L Final 08/30/2017 12:39 PM 65   Glucose 96  70 - 99 mg/dL Final 08/30/2017 12:39 PM 65   Urea Nitrogen 20  7 - 30 mg/dL Final 08/30/2017 12:39 PM 65   Creatinine 1.03  0.52 - 1.04 mg/dL Final 08/30/2017 12:39 PM 65   GFR Estimate 52 (L) >60 mL/min/1.7m2 Final 08/30/2017 12:39 PM 65   Comment:   Non  GFR Calc   GFR Estimate If Black 63  >60 mL/min/1.7m2 Final 08/30/2017 12:39 PM 65   Comment:   African American GFR Calc   Calcium 9.3  8.5 - 10.1 mg/dL Final 08/30/2017 12:39 PM 65   Bilirubin Total 0.6  0.2 - 1.3 mg/dL Final 08/30/2017 12:39 PM 65   Albumin 3.6  3.4 - 5.0 g/dL Final 08/30/2017 12:39 PM 65   Protein Total 7.6  6.8 - 8.8 g/dL Final 08/30/2017 12:39 PM 65   Alkaline Phosphatase  68  40 - 150 U/L Final 08/30/2017 12:39 PM 65   ALT 32  0 - 50 U/L Final 08/30/2017 12:39 PM 65   AST 44  0 - 45 U/L Final 08/30/2017 12:39 PM 65     Component Value Flag Ref Range Units Status Collected Lab   WBC 7.4  4.0 - 11.0 10e9/L Final 08/30/2017 12:39 PM 79   RBC Count 4.01  3.8 - 5.2 10e12/L Final 08/30/2017 12:39 PM 79   Hemoglobin 13.8  11.7 - 15.7 g/dL Final 08/30/2017 12:39 PM 79   Hematocrit 41.3  35.0 - 47.0 % Final 08/30/2017 12:39 PM 79    (H) 78 - 100 fl Final 08/30/2017 12:39 PM 79   MCH 34.4 (H) 26.5 - 33.0 pg Final 08/30/2017 12:39 PM 79   Comment:   Reviewed: OK with previous   MCHC 33.4  31.5 - 36.5 g/dL Final 08/30/2017 12:39 PM 79   RDW 13.3  10.0 - 15.0 % Final 08/30/2017 12:39 PM 79   Platelet Count 161  150 - 450 10e9/L Final 08/30/2017 12:39 PM 79   Diff Method     Final 08/30/2017 12:39 PM 79   Automated Method   % Neutrophils 67.4   % Final 08/30/2017 12:39 PM 79   % Lymphocytes 22.6   % Final 08/30/2017 12:39 PM 79   % Monocytes 8.8   % Final 08/30/2017 12:39 PM 79   % Eosinophils 1.1   % Final 08/30/2017 12:39 PM 79   % Basophils 0.1   % Final 08/30/2017 12:39 PM 79   Absolute Neutrophil 5.0  1.6 - 8.3 10e9/L Final 08/30/2017 12:39 PM 79   Absolute Lymphocytes 1.7  0.8 - 5.3 10e9/L Final 08/30/2017 12:39 PM 79   Absolute Monocytes 0.7  0.0 - 1.3 10e9/L Final 08/30/2017 12:39 PM 79   Absolute Eosinophils 0.1  0.0 - 0.7 10e9/L Final 08/30/2017 12:39 PM 79   Absolute Basophils 0.0  0.0 - 0.2 10e9/L Final 08/30/2017 12:39 PM 79     Component Value Flag Ref Range Units Status Collected Lab   TSH 4.42 (H) 0.40 - 4.00 mU/L Final 08/30/2017 12:39 PM 65     Component Value Flag Ref Range Units Status Collected Lab   T4 Free 1.10  0.76 - 1.46 ng/dL Final 08/30/2017 12:39 PM 65     Dennys Johnson MD, MPH  Clinical Sleep Medicine    Total time spent with patient: 80 min. Over >50% of the time was spent for face to face counseling, education, and evaluation.

## 2017-11-14 NOTE — PROGRESS NOTES
ANTICOAGULATION FOLLOW-UP CLINIC VISIT    Patient Name:  Cielo Aguillon  Date:  11/14/2017  Contact Type:  Face to Face    SUBJECTIVE:     Patient Findings     Positives Change in diet/appetite (Pt reports will resume usual diet of greens, has been eating more fruit. )           OBJECTIVE    INR Protime   Date Value Ref Range Status   11/14/2017 3.2 (A) 0.86 - 1.14 Final       ASSESSMENT / PLAN  INR assessment SUPRA    Recheck INR In: 3 WEEKS    INR Location Clinic      Anticoagulation Summary as of 11/14/2017     INR goal 2.0-3.0   Today's INR 3.2!   Maintenance plan 7.5 mg (5 mg x 1.5) on Tue, Sat; 5 mg (5 mg x 1) all other days   Full instructions 11/14: 5 mg; Otherwise 7.5 mg on Tue, Sat; 5 mg all other days   Weekly total 40 mg   Plan last modified Cindi Preston RN (5/9/2017)   Next INR check 12/5/2017   Priority INR   Target end date     Indications   Long-term (current) use of anticoagulants [Z79.01] [Z79.01]  Atrial fibrillation (H) (Resolved) [I48.91]         Anticoagulation Episode Summary     INR check location     Preferred lab     Send INR reminders to [facility]    Comments       Anticoagulation Care Providers     Provider Role Specialty Phone number    Simin Lopez MD Responsible Internal Medicine [phone]            See the Encounter Report to view Anticoagulation Flowsheet and Dosing Calendar (Go to Encounters tab in chart review, and find the Anticoagulation Therapy Visit)    Dosage adjustment made based on physician directed care plan.    Cindi Preston, RN

## 2017-11-14 NOTE — NURSING NOTE
"Chief Complaint   Patient presents with     Consult     wants new equipment, cannot breathe day or night, afib, daytime fatigue, snoring, has not used machine in about 2 years, cannot be comfortable, [facility]       Initial /82  Pulse 58  Resp 12  Ht 1.651 m (5' 5\")  Wt 79.2 kg (174 lb 9.6 oz)  SpO2 95%  BMI 29.05 kg/m2 Estimated body mass index is 29.05 kg/(m^2) as calculated from the following:    Height as of this encounter: 1.651 m (5' 5\").    Weight as of this encounter: 79.2 kg (174 lb 9.6 oz).  Medication Reconciliation: complete     ESS 2  Neck circumference 40 cm 15.75 inches  Kelly Crawford CNA, Sleep Clinic Specialist  "

## 2017-11-14 NOTE — NURSING NOTE
"Chief Complaint   Patient presents with     RECHECK     HTN and Hypothyroidism        Initial /78  Pulse 60  Temp 96.8  F (36  C) (Oral)  Resp 16  Ht 5' 5\" (1.651 m)  Wt 176 lb 1.6 oz (79.9 kg)  SpO2 93%  BMI 29.3 kg/m2 Estimated body mass index is 29.3 kg/(m^2) as calculated from the following:    Height as of this encounter: 5' 5\" (1.651 m).    Weight as of this encounter: 176 lb 1.6 oz (79.9 kg).  Medication Reconciliation: complete      Jd Stiles, HALIMA      "

## 2017-11-14 NOTE — PROGRESS NOTES
SUBJECTIVE:   Cielo Aguillon is a 77 year old female who presents to clinic today for the following health issues:    Hypertension Follow-up      Outpatient blood pressures are being checked at home. normal    Low Salt Diet: low salt    Depression and Anxiety Follow-Up    Status since last visit: worsened,     Other associated symptoms:    Complicating factors:     Significant life event: son has substance abuse issues       Current substance abuse: None    She had tried taking 20 mg of citalopram but could not tolerate it    PHQ-9 Score and MyChart F/U Questions 3/16/2017 8/30/2017 11/19/2017   Total Score 7 12 6   Q9: Suicide Ideation Not at all Not at all Not at all     MARY-7 SCORE 3/31/2016 8/30/2017 11/19/2017   Total Score - - -   Total Score 10 10 9       PHQ-9  English  PHQ-9   Any Language  GAD7  Suicide Assessment Five-step Evaluation and Treatment (SAFE-T)  Hypothyroidism Follow-up      Since last visit, patient describes the following symptoms: Weight stable, no hair loss, no skin changes, no constipation, no loose stools        Amount of exercise or physical activity: limited due to back- stretches    Problems taking medications regularly: No    Medication side effects: none    Diet: low salt    Other problems:   1. CAD: stable  2. AFib: stable  3. Chronic pain: stable on medication  4. Hyperlipidemia: she had statin stopped because of side effects of muscle pains, symptoms resolved.     Current Concerns:   none    Patient Active Problem List   Diagnosis     Essential hypertension, benign     Chronic rhinitis     Other ventral hernia without mention of obstruction or gangrene     Lichenification and lichen simplex chronicus     Fatty liver     Advanced directives, counseling/discussion     Hyperlipidemia LDL goal <70     Chronic right-sided back pain     Coronary artery disease     Cystocele, midline     Rectocele     BETHANY (obstructive sleep apnea)     Long-term (current) use of anticoagulants  "[Z79.01]     MARY (generalized anxiety disorder)     Major depressive disorder, recurrent episode, moderate (H)     Controlled substance agreement signed     Chronic atrial fibrillation (H)     Hypothyroidism due to acquired atrophy of thyroid     Facet arthropathy, lumbar     Acquired hypothyroidism       Current Outpatient Prescriptions   Medication Sig Dispense Refill     amLODIPine (NORVASC) 5 MG tablet Take 1 tablet (5 mg) by mouth daily 90 tablet 1     metoprolol (LOPRESSOR) 100 MG tablet Take 1 tablet (100 mg) by mouth 2 times daily 180 tablet 0     citalopram (CELEXA) 20 MG tablet Take 0.5 tablets (10 mg) by mouth daily 15 tablet 0     warfarin (COUMADIN) 5 MG tablet Take 1 tablet (5 mg) daily, except take 1 and 1/2 tablet (7.5 mg) on Tuesday, and Saturday  or as instructed. 108 tablet 1     diazepam (VALIUM) 5 MG tablet 1/2 to 1 po q 8 hours prn 40 tablet 0     traMADol (ULTRAM) 50 MG tablet Take 1 tablet (50 mg) by mouth every 8 hours as needed for moderate pain 50 tablet 0     levothyroxine (SYNTHROID/LEVOTHROID) 50 MCG tablet Take 1 tablet (50 mcg) by mouth daily 90 tablet 3     VITAMIN D, CHOLECALCIFEROL, PO Take 1,000 Units by mouth daily            Social History   Substance Use Topics     Smoking status: Never Smoker     Smokeless tobacco: Never Used     Alcohol use 0.0 oz/week     0 Standard drinks or equivalent per week      Comment: occ - wine        Reviewed and updated as needed this visit by clinical staff     Reviewed and updated as needed this visit by Provider         ROS:  No fever, chills, no dyspnea on exertion, no chest pain, palpitations, PND, orthopnea, edema, syncope, headache, abdominal pain     OBJECTIVE:     /78  Pulse 60  Temp 96.8  F (36  C) (Oral)  Resp 16  Ht 5' 5\" (1.651 m)  Wt 176 lb 1.6 oz (79.9 kg)  SpO2 93%  BMI 29.3 kg/m2  Body mass index is 29.3 kg/(m^2).    Lungs: clear  CV: normal S1, S2 without murmur, S3 or S4.   Abdomen: Bowel sounds normal, soft, " nontender. No hepatosplenomegaly. No masses.   No edema  Pulses full, no carotid bruits      PHQ-9 SCORE 3/16/2017 8/30/2017 11/19/2017   Total Score - - -   Total Score 7 12 6      MARY-7 SCORE 3/31/2016 8/30/2017 11/19/2017   Total Score - - -   Total Score 10 10 9     ASSESSMENT:     1. Essential hypertension, benign  Well controlled, continue med    2. Major depressive disorder, recurrent episode, moderate (H)  Some increased symptoms, she does not think she wants to change her dose at this time.   - citalopram (CELEXA) 10 MG tablet; Take 1 tablet (10 mg) by mouth daily  Dispense: 90 tablet; Refill: 3    3. Chronic atrial fibrillation (H)  Stable, continue meds    4. Coronary artery disease involving native coronary artery of native heart without angina pectoris  stable    5. Hyperlipidemia LDL goal <70  Recommend trial crestor    6. Chronic left-sided low back pain with left-sided sciatica  Stable, continue med  - traMADol (ULTRAM) 50 MG tablet; Take 1 tablet (50 mg) by mouth every 8 hours as needed for moderate pain  Dispense: 50 tablet; Refill: 0    7. MARY (generalized anxiety disorder)  Continue med  - citalopram (CELEXA) 10 MG tablet; Take 1 tablet (10 mg) by mouth daily  Dispense: 90 tablet; Refill: 3    8. Acquired hypothyroidism  Recheck labs  - TSH with free T4 reflex        Simin Lopez MD  [facility]

## 2017-11-14 NOTE — MR AVS SNAPSHOT
After Visit Summary   11/14/2017    Cielo Aguillon    MRN: [id number]           Patient Information     Date Of Birth          1940        Visit Information        Provider Department      11/14/2017 11:00 AM Dennys Johnson MD [facility]        Today's Diagnoses     Permanent atrial fibrillation (H)    -  1    BETHANY (obstructive sleep apnea)        Pulmonary HTN        Coronary artery disease, angina presence unspecified, unspecified vessel or lesion type, unspecified whether native or transplanted heart          Care Instructions    1. CPAP-  WHAT DOES IT DO AND HOW CAN I LEARN TO WEAR IT?                               BEFORE I START, CAN I WATCH A MOVIE TO GET A PLAN ON HOW TO USE CPAP?  https://www.PhotoBox.com/watch?g=y2G05tf436F      Continuous positive airway pressure, or CPAP, is the most effective treatment for obstructive sleep apnea. It works by blowing room air, through a mask, to hold your throat open. A decision to use CPAP is a major step forward in the pursuit of a healthier life. The successful use of CPAP will help you breathe easier, sleep better and live healthier. You can choose CPAP equipment from any durable medical equipment provider that meets your needs.  Using CPAP can be a positive experience if you keep these [place] points in mind:  1. Commitment  CPAP is not a quick fix for your problem. It involves a long-term commitment to improve your sleep and your health.    2. Communication  Stay in close communication with both your sleep doctor and your CPAP supplier. Ask lots of questions and seek help when you need it.    3. Consistency  Use CPAP all night, every night and for every nap. You will receive the maximum health benefits from CPAP when you use it every time that you sleep. This will also make it easier for your body to adjust to the treatment.    4. Correction  The first machine and mask that you try may not be the best ones for you. Work  "with your sleep doctor and your CPAP supplier to make corrections to your equipment selection. Ask about trying a different type of machine or mask if you have ongoing problems. Make sure that your mask is a good fit and learn to use your equipment properly.    5. Challenge  Tell a family member or close friend to ask you each morning if you used your CPAP the previous night. Have someone to challenge you to give it your best effort.    6. Connection   Your adjustment to CPAP will be easier if you are able to connect with others who use the same treatment. Ask your sleep doctor if there is a support group in your area for people who have sleep apnea, or look for one on the Internet.  7. Comfort   Increase your level of comfort by using a saline spray, decongestant or heated humidifier if CPAP irritates your nose, mouth or throat. Use your unit's \"ramp\" setting to slowly get used to the air pressure level. There may be soft pads you can buy that will fit over your mask straps. Look on www.CPAP.com for accessories that can help make CPAP use more comfortable.  8. Cleaning   Clean your mask, tubing and headgear on a regular basis. Put this time in your schedule so that you don't forget to do it. Check and replace the filters for your CPAP unit and humidifier.    9. Completion   Although you are never finished with CPAP therapy, you should reward yourself by celebrating the completion of your first month of treatment. Expect this first month to be your hardest period of adjustment. It will involve some trial and error as you find the machine, mask and pressure settings that are right for you.    10. Continuation  After your first month of treatment, continue to make a daily commitment to use your CPAP all night, every night and for every nap.    CPAP-Tips to starting with success:  Begin using your CPAP for short periods of time during the day while you watch TV or read.    Use CPAP every night and for every nap. Using " it less often reduces the health benefits and makes it harder for your body to get used to it.    Make small adjustments to your mask, tubing, straps and headgear until you get the right fit. Tightening the mask may actually worsen the leak.  If it leaves significant marks on your face or irritates the bridge of your nose, it may not be the best mask for you.  Speak with the person who supplied the mask and consider trying other masks. Insurances will allow you to try different masks during the first month of starting CPAP.  Insurance also covers a new mask, hose and filter about every 6 months.    Use a saline nasal spray to ease mild nasal congestion. Neti-Pot or saline nasal rinses may also help. Nasal gel sprays can help reduce nasal dryness.  Biotene mouthwash can be helpful to protect your teeth if you experience frequent dry mouth.  Dry mouth may be a sign of air escaping out of your mouth or out of the mask in the case of a full face mask.  Speak with your provider if you expect that is the case.     Take a nasal decongestant to relieve more severe nasal or sinus congestion.  Do not use Afrin (oxymetazoline) nasal spray more than 3 days in a row.  Speak with your sleep doctor if your nasal congestion is chronic.    Use a heated humidifier that fits your CPAP model to enhance your breathing comfort. Adjust the heat setting up if you get a dry nose or throat, down if you get condensation in the hose or mask.  Position the CPAP lower than you so that any condensation in the hose drains back into the machine rather than towards the mask.    Try a system that uses nasal pillows if traditional masks give you problems.    Clean your mask, tubing and headgear once a week. Make sure the equipment dries fully.    Regularly check and replace the filters for your CPAP unit and humidifier.    Work closely with your sleep provider and your CPAP supplier to make sure that you have the machine, mask and air pressure setting  that works best for you. It is better to stop using it and call your provider to solve problems than to lay awake all night frustrated with the device.    Daytime naps are not advised, but use the PAP device if taking naps. Many insurances require that we prove you are using the PAP device at least 4 hours on at least 70% of nights over a 30 day period. We have 90 days to meet those criteria.    You can get new supplies (mask, hose and filter) for your device every 3-6 months (covered by insurance). You do not need to get supplies that often, but they are available if you would like them. You may exchange the mask once within the first month if you feel the initial mask does not fit well. Please, contact your medical equipment provider for equipment issues.    Please let me know if you have any snoring, daytime sleepiness, or poor sleep quality. We will want to make sure your PAP device is adequately treating your condition.    There is a website called CPAP.com that has accessories that may make CPAP use easier. Please visit it at your convenience.    Please, schedule sleep study and follow up visit with the nurse (she will coming into the room and meet with you).     Thank you!    Dennys Johnson MD, MPH  Clinical Sleep and Occupational / Environmental Medicine      Thank you!    Dennys Johnson MD, MPH  Clinical Sleep and Occupational / Environmental Medicine                Follow-ups after your visit        Your next 10 appointments already scheduled     Nov 14, 2017  1:30 PM CST   Anticoagulation Visit with [facility]   [facility] ([facility]    [address]  [facility] [id number] [phone]            Nov 14, 2017  2:00 PM CST   SHORT with Simin Lopez MD   [facility] ([facility])    [address]  [facility] [id number] [phone]              Who to contact     If you have questions or  "need follow up information about today's clinic visit or your schedule please contact [facility] directly at [phone].  Normal or non-critical lab and imaging results will be communicated to you by MyChart, letter or phone within 4 business days after the clinic has received the results. If you do not hear from us within 7 days, please contact the clinic through [employer] or phone. If you have a critical or abnormal lab result, we will notify you by phone as soon as possible.  Submit refill requests through [employer] or call your pharmacy and they will forward the refill request to us. Please allow 3 business days for your refill to be completed.          Additional Information About Your Visit        [employer] Information     [employer] lets you send messages to your doctor, view your test results, renew your prescriptions, schedule appointments and more. To sign up, go to [email or web address] . Click on \"Log in\" on the left side of the screen, which will take you to the Welcome page. Then click on \"Sign up Now\" on the right side of the page.     You will be asked to enter the access code listed below, as well as some personal information. Please follow the directions to create your username and password.     Your access code is: [email or web address]  Expires: 2018 12:07 PM     Your access code will  in 90 days. If you need help or a new code, please call your [place] clinic or [phone].        Care EveryWhere ID     This is your Care EveryWhere ID. This could be used by other organizations to access your [place] medical records  [id number]        Your Vitals Were     Pulse Respirations Height Pulse Oximetry BMI (Body Mass Index)       58 12 1.651 m (5' 5\") 95% 29.05 kg/m2        Blood Pressure from Last 3 Encounters:   17 135/82   17 124/80   07/10/17 118/70    Weight from Last 3 Encounters:   17 79.2 kg (174 lb 9.6 oz)   17 77.6 kg (171 lb)   07/10/17 79.4 " kg (175 lb)              We Performed the Following     Comprehensive DME        Primary Care Provider Office Phone # Fax #    Simin Lopez [phone]       [address] [place] [address]        Equal Access to Services     ANNELISE CHAVEZ : Hadii aad ku haddougieo Soomaali, waaxda luqadaha, qaybta kaalmada adeegyada, waxviviana idiin haylukaszn adeangelic zelaya yanick martin. So [facility] [phone].    ATENCIÓN: Si habla español, tiene a duran disposición servicios gratuitos de asistencia lingüística. Llame al [phone].    We comply with applicable federal civil rights laws and [place] laws. We do not discriminate on the basis of race, color, national origin, age, disability, sex, sexual orientation, or gender identity.            Thank you!     Thank you for choosing [facility]  for your care. Our goal is always to provide you with excellent care. Hearing back from our patients is one way we can continue to improve our services. Please take a few minutes to complete the written survey that you may receive in the mail after your visit with us. Thank you!             Your Updated Medication List - Protect others around you: Learn how to safely use, store and throw away your medicines at www.disposemymeds.org.          This list is accurate as of: 11/14/17 12:08 PM.  Always use your most recent med list.                   Brand Name Dispense Instructions for use Diagnosis    amLODIPine 5 MG tablet    NORVASC    90 tablet    Take 1 tablet (5 mg) by mouth daily    Essential hypertension, benign       citalopram 20 MG tablet    celeXA    15 tablet    Take 0.5 tablets (10 mg) by mouth daily    MARY (generalized anxiety disorder), Major depressive disorder, recurrent episode, moderate (H)       diazepam 5 MG tablet    VALIUM    40 tablet    1/2 to 1 po q 8 hours prn    MARY (generalized anxiety disorder)       levothyroxine 50 MCG tablet    SYNTHROID/LEVOTHROID    90 tablet    Take 1  tablet (50 mcg) by mouth daily    Acquired hypothyroidism       metoprolol 100 MG tablet    LOPRESSOR    180 tablet    Take 1 tablet (100 mg) by mouth 2 times daily    Essential hypertension, benign, ASCVD (arteriosclerotic cardiovascular disease)       traMADol 50 MG tablet    ULTRAM    50 tablet    Take 1 tablet (50 mg) by mouth every 8 hours as needed for moderate pain    Chronic left-sided low back pain with left-sided sciatica       * venlafaxine 150 MG 24 hr capsule    EFFEXOR-XR    30 capsule    Take 1 capsule (150 mg) by mouth daily    Major depressive disorder, recurrent episode, moderate (H), MARY (generalized anxiety disorder)       * venlafaxine 37.5 MG 24 hr capsule    EFFEXOR-XR    30 capsule    Take 2 tablets for 1 week, then take 1 tablet for 1 week. Then start celexa.    MARY (generalized anxiety disorder), Major depressive disorder, recurrent episode, moderate (H)       VITAMIN D (CHOLECALCIFEROL) PO      Take 1,000 Units by mouth daily        warfarin 5 MG tablet    COUMADIN    108 tablet    Take 1 tablet (5 mg) daily, except take 1 and 1/2 tablet (7.5 mg) on Tuesday, and Saturday  or as instructed.    Chronic atrial fibrillation (H), Long term (current) use of anticoagulants       * Notice:  This list has 2 medication(s) that are the same as other medications prescribed for you. Read the directions carefully, and ask your doctor or other care provider to review them with you.

## 2017-11-15 ENCOUNTER — TELEPHONE (OUTPATIENT)
Dept: INTERNAL MEDICINE | Facility: CLINIC | Age: 77
End: 2017-11-15

## 2017-11-15 DIAGNOSIS — I25.10 CORONARY ARTERY DISEASE INVOLVING NATIVE CORONARY ARTERY OF NATIVE HEART WITHOUT ANGINA PECTORIS: ICD-10-CM

## 2017-11-15 DIAGNOSIS — E78.5 HYPERLIPIDEMIA LDL GOAL <70: Primary | ICD-10-CM

## 2017-11-15 LAB — TSH SERPL DL<=0.005 MIU/L-ACNC: 3.32 MU/L (ref 0.4–4)

## 2017-11-15 NOTE — TELEPHONE ENCOUNTER
[facility] pharmacy calls. Pt told them that Dr Lopez was going to start pt on a new cholesterol medication, Crestor. The Atorvastatin was stopped by Cardiology because of Myalgias. Informed Pharmacy that Dr Lopez isn't done charting yet. They agree to wait until tomorrow.   She also states Cielo mentioned that there might be a dose change for Levothyroxine. Informed Pharmacist that pt had TSH done yesterday and that her TSH was wnl so this will remain the same. She agrees.     Please advise for the statin.   She is overdue for fasting labs.         Recent Labs   Lab Test  02/19/16   1146  01/02/15   1110  08/12/13   1139   CHOL  133  116  166   HDL  47*  44*  47*   LDL  54  43  69   TRIG  158*  147  247*   CHOLHDLRATIO   --   2.6  3.5

## 2017-11-16 RX ORDER — ROSUVASTATIN CALCIUM 10 MG/1
10 TABLET, COATED ORAL DAILY
Qty: 30 TABLET | Refills: 5 | Status: SHIPPED | OUTPATIENT
Start: 2017-11-16 | End: 2018-04-27

## 2017-11-19 RX ORDER — CITALOPRAM HYDROBROMIDE 10 MG/1
10 TABLET ORAL DAILY
Qty: 90 TABLET | Refills: 3 | Status: SHIPPED | OUTPATIENT
Start: 2017-11-19 | End: 2018-01-20

## 2017-11-19 ASSESSMENT — ANXIETY QUESTIONNAIRES
7. FEELING AFRAID AS IF SOMETHING AWFUL MIGHT HAPPEN: MORE THAN HALF THE DAYS
2. NOT BEING ABLE TO STOP OR CONTROL WORRYING: MORE THAN HALF THE DAYS
3. WORRYING TOO MUCH ABOUT DIFFERENT THINGS: NOT AT ALL
GAD7 TOTAL SCORE: 9
6. BECOMING EASILY ANNOYED OR IRRITABLE: MORE THAN HALF THE DAYS
5. BEING SO RESTLESS THAT IT IS HARD TO SIT STILL: NOT AT ALL
1. FEELING NERVOUS, ANXIOUS, OR ON EDGE: MORE THAN HALF THE DAYS

## 2017-11-19 ASSESSMENT — PATIENT HEALTH QUESTIONNAIRE - PHQ9
5. POOR APPETITE OR OVEREATING: SEVERAL DAYS
SUM OF ALL RESPONSES TO PHQ QUESTIONS 1-9: 6

## 2017-11-20 ASSESSMENT — ANXIETY QUESTIONNAIRES: GAD7 TOTAL SCORE: 9

## 2017-12-06 DIAGNOSIS — F41.1 GAD (GENERALIZED ANXIETY DISORDER): ICD-10-CM

## 2017-12-06 NOTE — TELEPHONE ENCOUNTER
Valium      Last Written Prescription Date:  07/06/17  Last Fill Quantity: 40,   # refills: 0  Last Office Visit: 11/14/17  Future Office visit:       Routing refill request to provider for review/approval because:  Drug not on the [facility], [facility] or [facility] refill protocol or controlled substance

## 2017-12-07 ENCOUNTER — ANTICOAGULATION THERAPY VISIT (OUTPATIENT)
Dept: ANTICOAGULATION | Facility: CLINIC | Age: 77
End: 2017-12-07
Payer: MEDICARE

## 2017-12-07 DIAGNOSIS — Z79.01 LONG-TERM (CURRENT) USE OF ANTICOAGULANTS: ICD-10-CM

## 2017-12-07 LAB — INR POINT OF CARE: 8 (ref 0.86–1.14)

## 2017-12-07 PROCEDURE — 99207 ZZC NO CHARGE NURSE ONLY: CPT

## 2017-12-07 PROCEDURE — 36416 COLLJ CAPILLARY BLOOD SPEC: CPT

## 2017-12-07 PROCEDURE — 85610 PROTHROMBIN TIME: CPT | Mod: QW

## 2017-12-07 RX ORDER — DIAZEPAM 5 MG
TABLET ORAL
Qty: 40 TABLET | Refills: 1 | Status: SHIPPED | OUTPATIENT
Start: 2017-12-07 | End: 2018-04-24

## 2017-12-07 NOTE — PROGRESS NOTES
ANTICOAGULATION FOLLOW-UP CLINIC VISIT    Patient Name:  Cielo Aguillon  Date:  12/7/2017  Contact Type:  Face to Face    SUBJECTIVE:     Patient Findings     Positives Change in diet/appetite (2 alcoholic drinks last night.), Nose bleeds (1 nose bleed recently.  This improved when she started a humidifier in her home.), OTC meds (stomach has not been feeling well and she has been taking Gax X for a couple weeks.)    Comments INR was checked twice and both were elevated.           OBJECTIVE    INR Protime   Date Value Ref Range Status   12/07/2017 8.0 (A) 0.86 - 1.14 Final       ASSESSMENT / PLAN  INR assessment SUPRA    Recheck INR In: 4 DAYS    INR Location Clinic      Anticoagulation Summary as of 12/7/2017     INR goal 2.0-3.0   Today's INR 8.0!   Maintenance plan 7.5 mg (5 mg x 1.5) on Tue, Sat; 5 mg (5 mg x 1) all other days   Full instructions 12/7: Hold; 12/8: Hold; 12/9: Hold; 12/10: 2.5 mg; Otherwise 7.5 mg on Tue, Sat; 5 mg all other days   Weekly total 40 mg   Plan last modified Cindi Preston RN (5/9/2017)   Next INR check 12/11/2017   Priority INR   Target end date     Indications   Long-term (current) use of anticoagulants [Z79.01] [Z79.01]  Atrial fibrillation (H) (Resolved) [I48.91]         Anticoagulation Episode Summary     INR check location     Preferred lab     Send INR reminders to [facility]    Comments       Anticoagulation Care Providers     Provider Role Specialty Phone number    Simin Lopez MD [facility] Internal Medicine [phone]            See the Encounter Report to view Anticoagulation Flowsheet and Dosing Calendar (Go to Encounters tab in chart review, and find the Anticoagulation Therapy Visit)    Dosage adjustment made based on physician directed care plan.    Mckenna Robison, RN

## 2017-12-07 NOTE — MR AVS SNAPSHOT
Cielo Aguillon   12/7/2017 1:45 PM   Anticoagulation Therapy Visit    Description:  77 year old female   Provider:  [facility]   Department:  [facility]           INR as of 12/7/2017     Today's INR 8.0!      Anticoagulation Summary as of 12/7/2017     INR goal 2.0-3.0   Today's INR 8.0!   Full instructions 12/7: Hold; 12/8: Hold; 12/9: Hold; 12/10: 2.5 mg; Otherwise 7.5 mg on Tue, Sat; 5 mg all other days   Next INR check 12/11/2017    Indications   Long-term (current) use of anticoagulants [Z79.01] [Z79.01]  Atrial fibrillation (H) (Resolved) [I48.91]         Your next Anticoagulation Clinic appointment(s)     Dec 11, 2017  2:15 PM CST   Anticoagulation Visit with [facility]   [facility] ([facility])    [address]  [facility] [id number] [phone]              Contact Numbers     [facility] Phone Numbers:  Anticoagulation Clinic Appointments : [phone]  Anticoagulation Nurse: [phone]         December 2017 Details    Sun Mon Tue Wed Thu Fri Sat          1               2                 3               4               5               6               7      Hold   See details      8      Hold         9      Hold           10      2.5 mg         11            12               13               14               15               16                 17               18               19               20               21               22               23                 24               25               26               27               28               29               30                 31                      Date Details   12/07 This INR check       Date of next INR:  12/11/2017         How to take your warfarin dose     To take:  2.5 mg Take 0.5 of a 5 mg tablet.    To take:  5 mg Take 1 of the 5 mg tablets.    Hold Do not take your warfarin dose. See the Details table to the right for additional  instructions.

## 2017-12-11 ENCOUNTER — ANTICOAGULATION THERAPY VISIT (OUTPATIENT)
Dept: ANTICOAGULATION | Facility: CLINIC | Age: 77
End: 2017-12-11
Payer: MEDICARE

## 2017-12-11 DIAGNOSIS — Z79.01 LONG-TERM (CURRENT) USE OF ANTICOAGULANTS: ICD-10-CM

## 2017-12-11 LAB — INR POINT OF CARE: 1.4 (ref 0.86–1.14)

## 2017-12-11 PROCEDURE — 85610 PROTHROMBIN TIME: CPT | Mod: QW

## 2017-12-11 PROCEDURE — 36416 COLLJ CAPILLARY BLOOD SPEC: CPT

## 2017-12-11 PROCEDURE — 99207 ZZC NO CHARGE NURSE ONLY: CPT

## 2017-12-11 NOTE — PROGRESS NOTES
ANTICOAGULATION FOLLOW-UP CLINIC VISIT    Patient Name:  Cielo Aguillon  Date:  12/11/2017  Contact Type:  Face to Face    SUBJECTIVE:     Patient Findings     Positives OTC meds (stopped gas X.), Intentional hold of therapy (Intentionally held for 3 days due to elevated INR)           OBJECTIVE    INR Protime   Date Value Ref Range Status   12/11/2017 1.4 (A) 0.86 - 1.14 Final       ASSESSMENT / PLAN  INR assessment SUB    Recheck INR In: 1 WEEK    INR Location Clinic      Anticoagulation Summary as of 12/11/2017     INR goal 2.0-3.0   Today's INR 1.4!   Maintenance plan 7.5 mg (5 mg x 1.5) on Tue, Sat; 5 mg (5 mg x 1) all other days   Full instructions 12/12: 5 mg; 12/16: 5 mg; Otherwise 7.5 mg on Tue, Sat; 5 mg all other days   Weekly total 40 mg   Plan last modified Cindi Preston RN (5/9/2017)   Next INR check 12/19/2017   Priority INR   Target end date     Indications   Long-term (current) use of anticoagulants [Z79.01] [Z79.01]  Atrial fibrillation (H) (Resolved) [I48.91]         Anticoagulation Episode Summary     INR check location     Preferred lab     Send INR reminders to [facility]    Comments       Anticoagulation Care Providers     Provider Role Specialty Phone number    Simin Lopez MD [facility] Internal Medicine [phone]            See the Encounter Report to view Anticoagulation Flowsheet and Dosing Calendar (Go to Encounters tab in chart review, and find the Anticoagulation Therapy Visit)    Dosage adjustment made based on physician directed care plan.    Mckenna Robison RN

## 2017-12-11 NOTE — MR AVS SNAPSHOT
Cielo Aguillon   12/11/2017 2:00 PM   Anticoagulation Therapy Visit    Description:  77 year old female   Provider[facility]  [facility]   Department:  [facility]           INR as of 12/11/2017     Today's INR 1.4!      Anticoagulation Summary as of 12/11/2017     INR goal 2.0-3.0   Today's INR 1.4!   Full instructions 12/12: 5 mg; 12/16: 5 mg; Otherwise 7.5 mg on Tue, Sat; 5 mg all other days   Next INR check 12/19/2017    Indications   Long-term (current) use of anticoagulants [Z79.01] [Z79.01]  Atrial fibrillation (H) (Resolved) [I48.91]         Your next Anticoagulation Clinic appointment(s)     Dec 19, 2017  3:15 PM CST   [facility] Visit with [facility]   [facility]    [address] [facility] [address]  [facility] [id number] [phone]              Contact Numbers     [facility] Phone Numbers:  Anticoagulation Clinic Appointments : [phone]  Anticoagulation Nurse: [phone]         December 2017 Details    Sun Mon Tue Wed Thu Fri Sat          1               2                 3               4               5               6               7               8               9                 10               11      5 mg   See details      12      5 mg         13      5 mg         14      5 mg         15      5 mg         16      5 mg           17      5 mg         18      5 mg         19            20               21               22               23                 24               25               26               27               28               29               30                 31                      Date Details   12/11 This INR check       Date of next INR:  12/19/2017         How to take your warfarin dose     To take:  5 mg Take 1 of the 5 mg tablets.    To take:  7.5 mg Take 1.5 of the 5 mg tablets.

## 2017-12-15 ENCOUNTER — OFFICE VISIT (OUTPATIENT)
Dept: SLEEP MEDICINE | Facility: CLINIC | Age: 77
End: 2017-12-15
Payer: MEDICARE

## 2017-12-15 DIAGNOSIS — I25.10 CORONARY ARTERY DISEASE, ANGINA PRESENCE UNSPECIFIED, UNSPECIFIED VESSEL OR LESION TYPE, UNSPECIFIED WHETHER NATIVE OR TRANSPLANTED HEART: ICD-10-CM

## 2017-12-15 DIAGNOSIS — I27.20 PULMONARY HTN (H): ICD-10-CM

## 2017-12-15 DIAGNOSIS — I48.21 PERMANENT ATRIAL FIBRILLATION (H): ICD-10-CM

## 2017-12-15 DIAGNOSIS — G47.33 OBSTRUCTIVE SLEEP APNEA SYNDROME: Primary | ICD-10-CM

## 2017-12-15 DIAGNOSIS — G47.33 OSA (OBSTRUCTIVE SLEEP APNEA): ICD-10-CM

## 2017-12-15 NOTE — PROGRESS NOTES
Patient picked up OXimeter and was instructed on use. They showed understanding by demonstrating it back.

## 2017-12-15 NOTE — MR AVS SNAPSHOT
"              After Visit Summary   12/15/2017    Cielo Aguillon    MRN: [id number]           Patient Information     Date Of Birth          1940        Visit Information        Provider Department      12/15/2017 1:30 PM [facility]        Today's Diagnoses     Obstructive sleep apnea syndrome    -  1       Follow-ups after your visit        Your next 10 appointments already scheduled     Dec 19, 2017  1:00 PM CST   Return Sleep Patient with Dennys Johnson MD   [facility] ([facility])    [address]  [facility] [address] [phone]            Dec 19, 2017  3:15 PM CST   [facility]tico[facility] Visit with [facility]   [facility]    [address] [facility] [address]  [facility] [address] [phone]              Who to contact     If you have questions or need follow up information about today's clinic visit or your schedule please contact [facility] directly at [phone].  Normal or non-critical lab and imaging results will be communicated to you by MyChart, letter or phone within 4 business days after the clinic has received the results. If you do not hear from us within 7 days, please contact the clinic through [employer] or phone. If you have a critical or abnormal lab result, we will notify you by phone as soon as possible.  Submit refill requests through [employer] or call your pharmacy and they will forward the refill request to us. Please allow 3 business days for your refill to be completed.          Additional Information About Your Visit        MyChart Information     [employer] lets you send messages to your doctor, view your test results, renew your prescriptions, schedule appointments and more. To sign up, go to [email or web address] . Click on \"Log in\" on the left side of the screen, which will take you to " "the Welcome page. Then click on \"Sign up Now\" on the right side of the page.     You will be asked to enter the access code listed below, as well as some personal information. Please follow the directions to create your username and password.     Your access code is: [email or web address]  Expires: 2018 12:07 PM     Your access code will  in 90 days. If you need help or a new code, please call your [facility] clinic or [phone].        Care EveryWhere ID     This is your Care EveryWhere ID. This could be used by other organizations to access your [facility] medical records  [id number]         Blood Pressure from Last 3 Encounters:   17 122/78   17 135/82   17 124/80    Weight from Last 3 Encounters:   17 79.9 kg (176 lb 1.6 oz)   17 79.2 kg (174 lb 9.6 oz)   17 77.6 kg (171 lb)              Today, you had the following     No orders found for display       Primary Care Provider Office Phone # Fax #    Simin Lopez [phone]       [address] [place] [id number]        Equal Access to Services     ANNELISE CHAVEZ : Hadii jamari ku hadasho Soparveenali, waaxda luqadaha, qaybta kaalmada adeegyada, yaneth martin. So [facility] [phone].    ATENCIÓN: Si habla español, tiene a duran disposición servicios gratuitos de asistencia lingüística. Llame al [phone].    We comply with applicable federal civil rights laws and [place] laws. We do not discriminate on the basis of race, color, national origin, age, disability, sex, sexual orientation, or gender identity.            Thank you!     Thank you for choosing [facility]  for your care. Our goal is always to provide you with excellent care. Hearing back from our patients is one way we can continue to improve our services. Please take a few minutes to complete the written survey that you may receive in the mail after your visit with us. Thank you!           "   Your Updated Medication List - Protect others around you: Learn how to safely use, store and throw away your medicines at www.disposemymeds.org.          This list is accurate as of: 12/15/17  4:44 PM.  Always use your most recent med list.                   Brand Name Dispense Instructions for use Diagnosis    amLODIPine 5 MG tablet    NORVASC    90 tablet    Take 1 tablet (5 mg) by mouth daily    Essential hypertension, benign       citalopram 10 MG tablet    celeXA    90 tablet    Take 1 tablet (10 mg) by mouth daily    Major depressive disorder, recurrent episode, moderate (H), MARY (generalized anxiety disorder)       diazepam 5 MG tablet    VALIUM    40 tablet    1/2 to 1 po q 8 hours prn    MARY (generalized anxiety disorder)       levothyroxine 50 MCG tablet    SYNTHROID/LEVOTHROID    90 tablet    Take 1 tablet (50 mcg) by mouth daily    Acquired hypothyroidism       metoprolol 100 MG tablet    LOPRESSOR    180 tablet    Take 1 tablet (100 mg) by mouth 2 times daily    Essential hypertension, benign, ASCVD (arteriosclerotic cardiovascular disease)       rosuvastatin 10 MG tablet    CRESTOR    30 tablet    Take 1 tablet (10 mg) by mouth daily    Hyperlipidemia LDL goal <70, Coronary artery disease involving native coronary artery of native heart without angina pectoris       traMADol 50 MG tablet    ULTRAM    50 tablet    Take 1 tablet (50 mg) by mouth every 8 hours as needed for moderate pain    Chronic left-sided low back pain with left-sided sciatica       VITAMIN D (CHOLECALCIFEROL) PO      Take 1,000 Units by mouth daily        warfarin 5 MG tablet    COUMADIN    108 tablet    Take 1 tablet (5 mg) daily, except take 1 and 1/2 tablet (7.5 mg) on Tuesday, and Saturday  or as instructed.    Chronic atrial fibrillation (H), Long term (current) use of anticoagulants

## 2017-12-19 ENCOUNTER — OFFICE VISIT (OUTPATIENT)
Dept: SLEEP MEDICINE | Facility: CLINIC | Age: 77
End: 2017-12-19
Payer: MEDICARE

## 2017-12-19 ENCOUNTER — ANTICOAGULATION THERAPY VISIT (OUTPATIENT)
Dept: ANTICOAGULATION | Facility: CLINIC | Age: 77
End: 2017-12-19
Payer: MEDICARE

## 2017-12-19 VITALS
HEART RATE: 60 BPM | HEIGHT: 65 IN | RESPIRATION RATE: 15 BRPM | SYSTOLIC BLOOD PRESSURE: 119 MMHG | BODY MASS INDEX: 28.99 KG/M2 | OXYGEN SATURATION: 94 % | DIASTOLIC BLOOD PRESSURE: 64 MMHG | WEIGHT: 174 LBS

## 2017-12-19 DIAGNOSIS — I48.20 CHRONIC ATRIAL FIBRILLATION (H): ICD-10-CM

## 2017-12-19 DIAGNOSIS — G47.33 OSA (OBSTRUCTIVE SLEEP APNEA): Primary | ICD-10-CM

## 2017-12-19 DIAGNOSIS — I25.10 CORONARY ARTERY DISEASE WITHOUT ANGINA PECTORIS, UNSPECIFIED VESSEL OR LESION TYPE, UNSPECIFIED WHETHER NATIVE OR TRANSPLANTED HEART: ICD-10-CM

## 2017-12-19 DIAGNOSIS — Z79.01 LONG-TERM (CURRENT) USE OF ANTICOAGULANTS: ICD-10-CM

## 2017-12-19 DIAGNOSIS — I48.21 PERMANENT ATRIAL FIBRILLATION (H): ICD-10-CM

## 2017-12-19 DIAGNOSIS — I27.20 PULMONARY HYPERTENSION (H): ICD-10-CM

## 2017-12-19 LAB — INR POINT OF CARE: 2 (ref 0.86–1.14)

## 2017-12-19 PROCEDURE — 36416 COLLJ CAPILLARY BLOOD SPEC: CPT

## 2017-12-19 PROCEDURE — 99215 OFFICE O/P EST HI 40 MIN: CPT | Performed by: FAMILY MEDICINE

## 2017-12-19 PROCEDURE — 85610 PROTHROMBIN TIME: CPT | Mod: QW

## 2017-12-19 PROCEDURE — 99207 ZZC NO CHARGE NURSE ONLY: CPT

## 2017-12-19 RX ORDER — ZOLPIDEM TARTRATE 5 MG/1
TABLET ORAL
Qty: 1 TABLET | Refills: 0 | Status: SHIPPED | OUTPATIENT
Start: 2017-12-19 | End: 2018-03-14

## 2017-12-19 NOTE — PROGRESS NOTES
ANTICOAGULATION FOLLOW-UP CLINIC VISIT    Patient Name:  Cielo Aguillon  Date:  12/19/2017  Contact Type:  Face to Face    SUBJECTIVE:     Patient Findings     Positives No Problem Findings           OBJECTIVE    INR Protime   Date Value Ref Range Status   12/19/2017 2.0 (A) 0.86 - 1.14 Final       ASSESSMENT / PLAN  INR assessment THER    Recheck INR In: 2 WEEKS    INR Location Clinic      Anticoagulation Summary as of 12/19/2017     INR goal 2.0-3.0   Today's INR 2.0   Maintenance plan 7.5 mg (5 mg x 1.5) on Tue, Sat; 5 mg (5 mg x 1) all other days   Full instructions 7.5 mg on Tue, Sat; 5 mg all other days   Weekly total 40 mg   Plan last modified Cindi Preston RN (5/9/2017)   Next INR check 1/4/2018   Priority INR   Target end date     Indications   Chronic atrial fibrillation (H) [I48.2]  Long-term (current) use of anticoagulants [Z79.01] [Z79.01]         Anticoagulation Episode Summary     INR check location     Preferred lab     Send INR reminders to [facility]    Comments       Anticoagulation Care Providers     Provider Role Specialty Phone number    Simin Lopez MD Responsible Internal Medicine [phone]            See the Encounter Report to view Anticoagulation Flowsheet and Dosing Calendar (Go to Encounters tab in chart review, and find the Anticoagulation Therapy Visit)    Dosage adjustment made based on physician directed care plan.    Cindi Preston, RN

## 2017-12-19 NOTE — NURSING NOTE
"Chief Complaint   Patient presents with     RECHECK     f/u pap and BEBO on pap results will return oximeter       Initial /64  Pulse 60  Resp 15  Ht 1.651 m (5' 5\")  Wt 78.9 kg (174 lb)  SpO2 94%  BMI 28.96 kg/m2 Estimated body mass index is 28.96 kg/(m^2) as calculated from the following:    Height as of this encounter: 1.651 m (5' 5\").    Weight as of this encounter: 78.9 kg (174 lb).  Medication Reconciliation: complete           Damaris Guerra LPN/HALIMA        "

## 2017-12-19 NOTE — MR AVS SNAPSHOT
Cielo Aguillon   12/19/2017 3:15 PM   Anticoagulation Therapy Visit    Description:  77 year old female   Provider[facility]   Department:  [facility]           INR as of 12/19/2017     Today's INR 2.0      Anticoagulation Summary as of 12/19/2017     INR goal 2.0-3.0   Today's INR 2.0   Full instructions 7.5 mg on Tue, Sat; 5 mg all other days   Next INR check 1/2/2018    Indications   Chronic atrial fibrillation (H) [I48.2]  Long-term (current) use of anticoagulants [Z79.01] [Z79.01]         Your next Anticoagulation Clinic appointment(s)     Dec 19, 2017  3:15 PM CST   [facility]   [facility]    [address] [facility] [address]  [facility] [address] [phone]            Jan 04, 2018  1:15 PM CST   Anticoagulation Visit with [facility]   [facility]    [address] [facility] [address]  [facility] [address] [phone]              Contact Numbers     [facility] Phone Numbers:  Anticoagulation Clinic Appointments : [phone]  Anticoagulation Nurse: [phone]         December 2017 Details    Sun Mon Tue Wed Thu Fri Sat          1               2                 3               4               5               6               7               8               9                 10               11               12               13               14               15               16                 17               18               19      7.5 mg   See details      20      5 mg         21      5 mg         22      5 mg         23      7.5 mg           24      5 mg         25      5 mg         26      7.5 mg         27      5 mg         28      5 mg         29      5 mg         30      7.5 mg           31      5 mg                Date Details   12/19 This INR check               How to take your warfarin dose     To take:  5 mg  Take 1 of the 5 mg tablets.    To take:  7.5 mg Take 1.5 of the 5 mg tablets.           January 2018 Details    Sun Mon Tue Wed Thu Fri Sat      1      5 mg         2            3               4               5               6                 7               8               9               10               11               12               13                 14               15               16               17               18               19               20                 21               22               23               24               25               26               27                 28               29               30               31                   Date Details   No additional details    Date of next INR:  1/2/2018         How to take your warfarin dose     To take:  5 mg Take 1 of the 5 mg tablets.    To take:  7.5 mg Take 1.5 of the 5 mg tablets.

## 2017-12-19 NOTE — PROGRESS NOTES
[facility]  Outpatient Sleep Medicine Followed Up Visit  December 19, 2017    Name: Cielo Aguillon MRN# [id number]   Age: 77 year old YOB: 1940     Date of Follow Up Visit: December 19, 2017  Consultation is requested by: Simin Lopez MD  [address]  [place] [address]  Primary care provider: Simin Lopez    Patient is accompanied by: Patient presents with , Brayden, today.       Reason for Sleep Consult:     Cielo Aguillon is a 77 year old female patient that presents here for a follow up evaluation after completing an overnight oximetry with PAP device in place.         Assessment and Plan:     Summary Sleep Diagnoses:    (1) Permanent Atrial Fibrillation  (2) Severe BETHANY  (3) Pulmonary Hypertension  (4) CAD    Summary Recommendations / Discussion:    This patient was diagnosed with severe BETHANY with an AHI of 39 events per hour. The patient was successfully treated with a CPAP at fixed pressure of 10 cmH2O. During the last visit, the PAP's download showed that the device was effective when used with a residual AHI of only 3.6 events per hour. Since the patient's health was stable and recent echocardiogram showed preserved LVEF, a new PSG was not needed. Instead, the patient had an overnight oximetry with the PAP in place. This showed prolonged sleep associated hypoxemia with 230.7 minutes below the SpO2 of 88%. As such, this patient will undergo an in-lab PSG titration study with TCM and ABG. Given the preserved LVEF at 55 to 60%, bilevel PAP therapy or ASV may be considered. If hypoxemia is noted without any hypoventilation or central events, oxygen supplementation as per protocol may be considered. PAP compliance was discussed, explained, and encouraged. Today, the nature and pathophysiology of BETHANY were discussed. The different treatment options for BETHANY were also reviewed and explained today again. Lifestyle recommendations including healthy dietary and  exercising habits were discussed. The patient was prescribed zolpidem 5 mg to be used during the night of the study only if needed (potential side effects discussed). Pt will follow up with me after completing the in-lab PSG titration sleep study.    The patient was advised to not fly or go to high altitude places (this was carefully discussed and explained).    Coding:  (I48.2) Permanent atrial fibrillation (H)  (primary encounter diagnosis)  (G47.33) BETHANY (obstructive sleep apnea)  (I27.20) Pulmonary HTN  (I25.10) Coronary artery disease, angina presence unspecified, unspecified vessel or lesion type, unspecified whether native or transplanted heart    Counseling included a comprehensive review of diagnostic and therapeutic strategies as well as risks of inadequate therapy.    Educational materials provided in instructions. The patient was instructed to avoid driving or operating any heavy machinery when experiencing drowsiness.    All questions and concerns were addressed today. Pt agrees and understands the assessment and plan.         History of Present Illness:   Cielo Aguillon is a 77 year old  RHD female pt with PMH of cystocele, rectocele, hypertension, peptic ulcer disease, chornic rhinitis, Lichenification and Lichen Simplex Chronicus, fatty liver, hyperlipidemia, chronic back pain, non-obstructive coronary artery disease, mild to moderate pulmonary hypertension, generalized anxiety disorder, major depressive disorder, chronic atrial fibrillation (daignosed 5 yrs ago), hypothyroidism, and previously diagnosed BETHANY presents for a follow up evaluation today after completing an overnight oximetry with PAP therapy in place due to atrial fibrillation and BETHANY. This patient has been reporting GREEN for several years now. Pt was evaluated by cardiology, pulmonology, and even neurology. The dyspnea is due to a multifactorial nature.     This medically complex patient underwent a PSG sleep stud in 2015 at  [facility]. The study showed severe BETHANY with an AHI of 54 events per hour with a RDI of 58 events per hour scored at 4% for Medicare criteria, the AHI was 39 events per hour). Most events were obstructive in nature. The PAP download during the initial visit showed that the patient was using a CPAP at fixed pressure of 10 cmH2O. This was effective at treating the condition with a residual AHI of 3.6 events per hour. No significant leakage noted.    During the initial visit, the patient explained to me that she needed new CPAP supplies. The patient explained that she was not able to use the PAP device because the mask needed to be replaced and she was not able to get new supplies without repeating the PSG sleep study. She was using a CPAP with a nasal mask. The  explained that with the PAP device the patient would not snore, have witnessed apneas, or gasping / choking for air. Without the PAP device, the patient reports snoring and witnessed apneas. Some non-restorative sleep and sleep inertia reported. Pt denied EDS, but admits having physical fatigue. No inadvertent naps reported. Pt is not currently driving due to vision problems.    After the last visit, the patient was continue on CPAP at fixed pressure of 10 cmH2O. An overnight oximetry test was performed with the PAP therapy in place. This showed prolonged sleep associated hypoxemia with 230.7 minutes spent under the SpO2 of 88%. The study also demonstrated a sawtooth pattern suggestive of poorly controlled BETHANY.    The patient explains that she just recently learned how to use the PAP correctly. She was placing the interface incorrectly before. Pt denies any aerophagia, bloating, CP, or coughing spells.    PREVIOUS IN- LAB or HOME SLEEP STUDIES: The patient reports the study was conducted in [facility]   Date: 2015   TYPE: PSG   AHI: 58 events per hour   BMI: 28.7 kg/m2   Intervention: CPAP    SLEEP-WAKE SCHEDULE:     Cielo Aguillon      -Describes herself as a  night person; prefers to go to sleep at 10:30 PM and wakes up at 9:00 AM.      -The patient takes no naps during the week; takes no inadvertent naps.      -ON WEEKDAYS, goes to sleep at 10:30 PM during the week; awakens 9:00 AM with an alarm; falls asleep in 10 minutes; denies difficulty falling asleep.      -ON WEEKENDS, goes to sleep at 10:30 PM and wakes up at 9:00 AM with an alarm; falls asleep in 10 minutes.        -Awakens 1-2 times a night for 5 minutes before falling back to sleep; awakens to go to the bathroom and external stimuli.      BEDTIME ACTIVITIES AND SHIFT WORK:    Cielo Aguillon     -Bedtime Activities and Other Sleeping Information: Pt lives . Pt sleeps alone on a twin size bed ( sleeps in a different room due to snoring). Pt sleeps on her sides. Pt does not use any electronics in bed and uses bedroom only for sleeping.      -Occupation: Retired (Pharmacy Tech)     SCALES        -SLEEPINESS: Carter sleepiness scale (ESS):  2 / 24 (initial visit)    Drowsy driving / near accidents: Denies any near accidents    Consequences: Non-restorative sleep    SLEEP COMPLAINTS:  Cardio-respiratory     -Snoring: Significant snoring reported    -Dyspnea: Pt admits having witnessed apneas   -Morning headaches or confusion: Denies any morning cephalgia   -Coexisting Lung disease: Pulmonary HTN     -Coexisting Heart disease: Atria fibrillation with non-obstructive CAD     -Does patient have a bed partner: Patient sleeps alone   -Has bed partner been sleeping separately because of snoring:  No            RLS Screen:    -When you try to relax in the evening or sleep at night, do you ever have unpleasant, restless feelings in your legs that can be relieved by walking or movement? None Reported     -Periodic limb movement: None Reported    Narcolepsy:     - Denies sudden urges of sleep attacks   - Denies cataplexy   - Denies sleep paralysis    - Admits hallucinations. Some hypnagogic hallucinations a  few times a month consistent of little kids. This is not scary to her.     - Denies feeling refreshed after a nap.    Sleep Behaviors:   - Denies leg symptoms/movements   - Denies motor restlessness   - Denies night terrors   - Denies bruxism   - Denies automatic behaviors    Other Subjective Complaints:   - Denies anxiety or rumination    - Denies pain and discomfort at night   - Denies waking up with heart pounding or racing   - Denies GERD or aspiration         Parasomnia:    -NREM - Denies recurrent persistent confusional arousal, night eating, sleep walking or sleep terrors.      -REM - Denies dream enactment or injuries.     -Driving Accident or Near Accidents: Pt does not drive         Medications:     Current Outpatient Prescriptions   Medication Sig     diazepam (VALIUM) 5 MG tablet 1/2 to 1 po q 8 hours prn     citalopram (CELEXA) 10 MG tablet Take 1 tablet (10 mg) by mouth daily     rosuvastatin (CRESTOR) 10 MG tablet Take 1 tablet (10 mg) by mouth daily     traMADol (ULTRAM) 50 MG tablet Take 1 tablet (50 mg) by mouth every 8 hours as needed for moderate pain     amLODIPine (NORVASC) 5 MG tablet Take 1 tablet (5 mg) by mouth daily     metoprolol (LOPRESSOR) 100 MG tablet Take 1 tablet (100 mg) by mouth 2 times daily     warfarin (COUMADIN) 5 MG tablet Take 1 tablet (5 mg) daily, except take 1 and 1/2 tablet (7.5 mg) on Tuesday, and Saturday  or as instructed.     levothyroxine (SYNTHROID/LEVOTHROID) 50 MCG tablet Take 1 tablet (50 mcg) by mouth daily     VITAMIN D, CHOLECALCIFEROL, PO Take 1,000 Units by mouth daily      No current facility-administered medications for this visit.       Allergies   Allergen Reactions     Ace Inhibitors      Cough (Zestril)     Aspirin      hx bleeding ulcers     Hydrochlorothiazide [Hctz] Rash     Ibuprofen      hx bleeding ulcers     Amoxicillin Rash     Losartan Rash     Penicillins Rash          Past Medical History:     Denies needing any 02 supplement at  night.    Past Medical History:   Diagnosis Date     Anxiety     related to travel     Arthritis      ASCVD (arteriosclerotic cardiovascular disease) 2/12    60-70% stenoses of OM2, D1, medical management     Atrial fibrillation (H)      Atrial flutter (H)      Coronary artery disease     2/2012- 50% mid Cfx, 60-70% prox OM2, 50-70% D1     Essential hypertension, benign      Fatty liver 5/10    mild fibrosis on biopsy     Hypertriglyceridemia      Major depression      BETHANY (obstructive sleep apnea)      Other ventral hernia without mention of obstruction or gangrene      Peptic ulcer 2002     Peptic ulcer, unspecified site, unspecified as acute or chronic, without mention of hemorrhage, perforation, or obstruction 1983     Shortness of breath      Thyroid disease      Tubular adenoma 6/08             Past Surgical History:    Admits previous upper airway surgery.     Past Surgical History:   Procedure Laterality Date     APPENDECTOMY       C NONSPECIFIC PROCEDURE      Appendectomy     C NONSPECIFIC PROCEDURE      Tonsillectomy     C NONSPECIFIC PROCEDURE      Tubal ligation     C NONSPECIFIC PROCEDURE  11/01    Shave bone spurs from left foot     COLONOSCOPY  9/1/2016    Dr. Camejo [facility]     COLONOSCOPY N/A 9/1/2016    Procedure: COMBINED COLONOSCOPY, SINGLE OR MULTIPLE BIOPSY/POLYPECTOMY BY BIOPSY;  Surgeon: Pillo Camejo MD;  Location:  GI     CORONARY ANGIOGRAPHY ADULT ORDER  2/13/2012    50% mid Cfx, 60-70% prox OM2, 50-70% D1     ESOPHAGOSCOPY, GASTROSCOPY, DUODENOSCOPY (EGD), COMBINED  9/1/2016    Dr. Camejo [facility]     ESOPHAGOSCOPY, GASTROSCOPY, DUODENOSCOPY (EGD), COMBINED N/A 9/1/2016    Procedure: COMBINED ESOPHAGOSCOPY, GASTROSCOPY, DUODENOSCOPY (EGD), BIOPSY SINGLE OR MULTIPLE;  Surgeon: Pillo Camejo MD;  Location:  GI     GYN SURGERY       HEART CATH RIGHT AND LEFT HEART CATH  04/06/2015    Mid LAD 50-60%, 1st Diagonal 70%, 1st OM 30%, 2nd OM 40-50%, review report for right heart cath  results.           Social History:     Social History   Substance Use Topics     Smoking status: Never Smoker     Smokeless tobacco: Never Used     Alcohol use 0.0 oz/week     0 Standard drinks or equivalent per week      Comment: occ - wine     Chemical History:     Tobacco: Never smoked     Uses no caffeine.   Supplements for wakefulness: Patient does not use any supplements to stay awake    EtOH: 1 to 2 drinks a week  Recreational Drugs: Patient denies using any recreational drugs     Psych Hx:   Current dangers to self or others: No. Pt denies any SI / HI, hallucinations, or delusions         Family History:     Family History   Problem Relation Age of Onset     Respiratory Father      emphysema--secon. to smoking     Alzheimer Disease Mother      Congenital Anomalies Mother      OSTEOPOROSIS Mother      DIABETES Maternal Aunt      DIABETES Other      cousins     DIABETES Other      cousin on mothers side of family     Depression Son      Depression Son      Colon Cancer No family hx of       Sleep Family Hx:       RLS - None reported   BETHANY - None reported  Insomnia - None reported  Parasomnia - None reported         Review of Systems:     A complete 10 point review of systems was negative other than HPI or as commented below:     CONSTITUTIONAL: NEGATIVE for weight gain/loss. Pt admits some chills.  EYES: NEGATIVE for changes in vision, blind spots, double vision.  ENT: NEGATIVE for ear pain. Pt admits having some sore throat, sinus pain, post-nasal drip, runny nose, and bloody nose.  CARDIAC: Positive for fast heartbeats or fluttering in chest, chest pain or pressure, breathlessness when lying flat, swollen legs / swollen feet.  NEUROLOGIC: Pt admits having headaches and leg weakness.  DERMATOLOGIC: NEGATIVE for rashes, new moles or change in mole(s)  PULMONARY: NEGATIVE coughing up blood, wheezing or whistling when breathing. Pt reports SOB at rest, SOB with activity, dry cough, and productive cough.    "  GASTROINTESTINAL: NEGATIVE for nausea or vomitting, loose or watery stools, fat or grease in stools, constipation, abdominal pain, bowel movements black in color or blood noted.  GENITOURINARY: NEGATIVE for pain during urination, blood in urine, urinating more frequently than usual, irregular menstrual periods.  MUSCULOSKELETAL: Positive for muscle pain, bone or joint pain, and swollen joints.  ENDOCRINE: NEGATIVE for increased thirst or urination, diabetes.  LYMPHATIC: NEGATIVE for swollen lymph nodes, lumps or bumps in the breasts or nipple discharge.         Physical Examination:   /64  Pulse 60  Resp 15  Ht 1.651 m (5' 5\")  Wt 78.9 kg (174 lb)  SpO2 94%  BMI 28.96 kg/m2     VS: Reviewed and normal. 6 minute walk showed a deconditioned patient with SpO2 ranging at 95% to 96%.   General: Alert, oriented, not in distress. Dressed casually; Good eye contact; Comfortably sitting in a chair; in no apparent distress  HEENT: Normocephalic and atraumatic; NL TM x 2; pupils are isocoric and equally responsive to the light. PERRLA. EOMI. Normal fundoscopic examination; Nasal turbinates are normal with a normal septal alignment;  Mallampati score: Grade IV; Tonsillar hypertrophy: 0  surgically removed; Pharynx with no erythema or exudates. Small and crowded oropharynx with low-lying soft palate.  NECK: Neck supple; symmetrical; no lymphadenopathy; no thyromegaly, bruit, JVD noted. Neck circumference of 15.75 inches (40 cm).  Lungs: Both hemithoraces are symmetrical, normal to palpation, no dullness to percussion, auscultation of lungs revealed normal breath sounds with no expirium prolongation, wheezing, rhonci and crackles.  CVS: Normal S1 and S2 heart sounds with no extra heart sounds. No murmur, rubs, or clicks. Normal peripheral pulses throughout with no obvious peripheral edema. Irregularly irregular rhythm noted.  Psychiatry: Mood and affect are appropriate. Euthymic with affect congruent with full range " and intensity. No SI/HI with adequate insight and judgement.          Data: All pertinent previous laboratory data reviewed     Lab Results   Component Value Date    PH 7.34 (L) 04/06/2015    PO2 62 (L) 04/06/2015    PCO2 40 04/06/2015    HCO3 22 04/06/2015     Lab Results   Component Value Date    TSH 3.32 11/14/2017    TSH 4.42 (H) 08/30/2017     Lab Results   Component Value Date    GLC 96 08/30/2017     (H) 02/24/2016     Lab Results   Component Value Date    HGB 13.8 08/30/2017    HGB 13.7 02/16/2017     Lab Results   Component Value Date    BUN 20 08/30/2017    BUN 16 02/24/2016    CR 1.03 08/30/2017    CR 1.08 02/24/2016     Echocardiology:    -Most recent echocardiogram obtained on 07/10/2017 showed LV or normal size with moderate concentric left ventricular hypertrophy. LV systolic function was normal with LVEF estimated at 55 - 60%. The RV was normal in side, structure, and function. There was mod-severe biatrial enlargement noted. No obvious atrial shunt observed. Mild to moderate mitral annular calcification was noted. There was also mild mitral regurgitation present. Mild pulmonic valve regurgitation noted. The rhythm was atrial fibrillation during the study.    Chest x-ray: None Recent Studies Available    PFT: None Recent Studies Available    Laboratory Studies:   Component Value Flag Ref Range Units Status Collected Lab   Sodium 140  133 - 144 mmol/L Final 08/30/2017 12:39 PM 65   Potassium 4.3  3.4 - 5.3 mmol/L Final 08/30/2017 12:39 PM 65   Chloride 106  94 - 109 mmol/L Final 08/30/2017 12:39 PM 65   Carbon Dioxide 26  20 - 32 mmol/L Final 08/30/2017 12:39 PM 65   Anion Gap 8  3 - 14 mmol/L Final 08/30/2017 12:39 PM 65   Glucose 96  70 - 99 mg/dL Final 08/30/2017 12:39 PM 65   Urea Nitrogen 20  7 - 30 mg/dL Final 08/30/2017 12:39 PM 65   Creatinine 1.03  0.52 - 1.04 mg/dL Final 08/30/2017 12:39 PM 65   GFR Estimate 52 (L) >60 mL/min/1.7m2 Final 08/30/2017 12:39 PM 65   Comment:   Non   American GFR Calc   GFR Estimate If Black 63  >60 mL/min/1.7m2 Final 08/30/2017 12:39 PM 65   Comment:   African American GFR Calc   Calcium 9.3  8.5 - 10.1 mg/dL Final 08/30/2017 12:39 PM 65   Bilirubin Total 0.6  0.2 - 1.3 mg/dL Final 08/30/2017 12:39 PM 65   Albumin 3.6  3.4 - 5.0 g/dL Final 08/30/2017 12:39 PM 65   Protein Total 7.6  6.8 - 8.8 g/dL Final 08/30/2017 12:39 PM 65   Alkaline Phosphatase 68  40 - 150 U/L Final 08/30/2017 12:39 PM 65   ALT 32  0 - 50 U/L Final 08/30/2017 12:39 PM 65   AST 44  0 - 45 U/L Final 08/30/2017 12:39 PM 65     Component Value Flag Ref Range Units Status Collected Lab   WBC 7.4  4.0 - 11.0 10e9/L Final 08/30/2017 12:39 PM 79   RBC Count 4.01  3.8 - 5.2 10e12/L Final 08/30/2017 12:39 PM 79   Hemoglobin 13.8  11.7 - 15.7 g/dL Final 08/30/2017 12:39 PM 79   Hematocrit 41.3  35.0 - 47.0 % Final 08/30/2017 12:39 PM 79    (H) 78 - 100 fl Final 08/30/2017 12:39 PM 79   MCH 34.4 (H) 26.5 - 33.0 pg Final 08/30/2017 12:39 PM 79   Comment:   Reviewed: OK with previous   MCHC 33.4  31.5 - 36.5 g/dL Final 08/30/2017 12:39 PM 79   RDW 13.3  10.0 - 15.0 % Final 08/30/2017 12:39 PM 79   Platelet Count 161  150 - 450 10e9/L Final 08/30/2017 12:39 PM 79   Diff Method     Final 08/30/2017 12:39 PM 79   Automated Method   % Neutrophils 67.4   % Final 08/30/2017 12:39 PM 79   % Lymphocytes 22.6   % Final 08/30/2017 12:39 PM 79   % Monocytes 8.8   % Final 08/30/2017 12:39 PM 79   % Eosinophils 1.1   % Final 08/30/2017 12:39 PM 79   % Basophils 0.1   % Final 08/30/2017 12:39 PM 79   Absolute Neutrophil 5.0  1.6 - 8.3 10e9/L Final 08/30/2017 12:39 PM 79   Absolute Lymphocytes 1.7  0.8 - 5.3 10e9/L Final 08/30/2017 12:39 PM 79   Absolute Monocytes 0.7  0.0 - 1.3 10e9/L Final 08/30/2017 12:39 PM 79   Absolute Eosinophils 0.1  0.0 - 0.7 10e9/L Final 08/30/2017 12:39 PM 79   Absolute Basophils 0.0  0.0 - 0.2 10e9/L Final 08/30/2017 12:39 PM 79     Component Value Flag Ref Range Units Status  Collected Lab   TSH 4.42 (H) 0.40 - 4.00 mU/L Final 08/30/2017 12:39 PM 65     Component Value Flag Ref Range Units Status Collected Lab   T4 Free 1.10  0.76 - 1.46 ng/dL Final 08/30/2017 12:39 PM 65     Dennys Johnson MD, MPH  Clinical Sleep Medicine    Total time spent with patient: 40 min. Over >50% of the time was spent for face to face counseling, education, and evaluation.

## 2017-12-19 NOTE — MR AVS SNAPSHOT
After Visit Summary   12/19/2017    Cielo Aguillon    MRN: [id number]           Patient Information     Date Of Birth          1940        Visit Information        Provider Department      12/19/2017 1:00 PM Dennys Johnson MD [facility]        Today's Diagnoses     BETHANY (obstructive sleep apnea)    -  1    Permanent atrial fibrillation (H)        Pulmonary hypertension        Coronary artery disease without angina pectoris, unspecified vessel or lesion type, unspecified whether native or transplanted heart          Care Instructions            Your BMI is Body mass index is 28.96 kg/(m^2).  Weight management is a personal decision.  If you are interested in exploring weight loss strategies, the following discussion covers the approaches that may be successful. Body mass index (BMI) is one way to tell whether you are at a healthy weight, overweight, or obese. It measures your weight in relation to your height.  A BMI of 18.5 to 24.9 is in the healthy range. A person with a BMI of 25 to 29.9 is considered overweight, and someone with a BMI of 30 or greater is considered obese. More than two-thirds of American adults are considered overweight or obese.  Being overweight or obese increases the risk for further weight gain. Excess weight may lead to heart disease and diabetes.  Creating and following plans for healthy eating and physical activity may help you improve your health.  Weight control is part of healthy lifestyle and includes exercise, emotional health, and healthy eating habits. Careful eating habits lifelong are the mainstay of weight control. Though there are significant health benefits from weight loss, long-term weight loss with diet alone may be very difficult to achieve- studies show long-term success with dietary management in less than 10% of people. Attaining a healthy weight may be especially difficult to achieve in those with severe obesity. In some  cases, medications, devices and surgical management might be considered.  What can you do?  If you are overweight or obese and are interested in methods for weight loss, you should discuss this with your provider.     Consider reducing daily calorie intake by 500 calories.     Keep a food journal.     Avoiding skipping meals, consider cutting portions instead.    Diet combined with exercise helps maintain muscle while optimizing fat loss. Strength training is particularly important for building and maintaining muscle mass. Exercise helps reduce stress, increase energy, and improves fitness. Increasing exercise without diet control, however, may not burn enough calories to loose weight.       Start walking three days a week 10-20 minutes at a time    Work towards walking thirty minutes five days a week     Eventually, increase the speed of your walking for 1-2 minutes at time    In addition, we recommend that you review healthy lifestyles and methods for weight loss available through the National Institutes of Health patient information sites:  http://win.niddk.nih.gov/publications/index.htm    And look into health and wellness programs that may be available through your health insurance provider, employer, local community center, or omar club.    Weight management plan: Patient was referred to their PCP to discuss a diet and exercise plan.     Your blood pressure was checked while you were in clinic today.  Please read the guidelines below about what these numbers mean and what you should do about them.  Your systolic blood pressure is the top number.  This is the pressure when the heart is pumping.  Your diastolic blood pressure is the bottom number.  This is the pressure in between beats.  If your systolic blood pressure is less than 120 and your diastolic blood pressure is less than 80, then your blood pressure is normal. There is nothing more that you need to do about it  If your systolic blood pressure is  120-139 or your diastolic blood pressure is 80-89, your blood pressure may be higher than it should be.  You should have your blood pressure re-checked within a year by a primary care provider.  If your systolic blood pressure is 140 or greater or your diastolic blood pressure is 90 or greater, you may have high blood pressure.  High blood pressure is treatable, but if left untreated over time it can put you at risk for heart attack, stroke, or kidney failure.  You should have your blood pressure re-checked by a primary care provider within the next four weeks.    1. CPAP-  WHAT DOES IT DO AND HOW CAN I LEARN TO WEAR IT?                               BEFORE I START, CAN I WATCH A MOVIE TO GET A PLAN ON HOW TO USE CPAP?  https://www.yousellpoints.com/watch?h=a0W30ab906J      Continuous positive airway pressure, or CPAP, is the most effective treatment for obstructive sleep apnea. It works by blowing room air, through a mask, to hold your throat open. A decision to use CPAP is a major step forward in the pursuit of a healthier life. The successful use of CPAP will help you breathe easier, sleep better and live healthier. You can choose CPAP equipment from any durable medical equipment provider that meets your needs.  Using CPAP can be a positive experience if you keep these [place] points in mind:  1. Commitment  CPAP is not a quick fix for your problem. It involves a long-term commitment to improve your sleep and your health.    2. Communication  Stay in close communication with both your sleep doctor and your CPAP supplier. Ask lots of questions and seek help when you need it.    3. Consistency  Use CPAP all night, every night and for every nap. You will receive the maximum health benefits from CPAP when you use it every time that you sleep. This will also make it easier for your body to adjust to the treatment.    4. Correction  The first machine and mask that you try may not be the best ones for you. Work with your sleep  "doctor and your CPAP supplier to make corrections to your equipment selection. Ask about trying a different type of machine or mask if you have ongoing problems. Make sure that your mask is a good fit and learn to use your equipment properly.    5. Challenge  Tell a family member or close friend to ask you each morning if you used your CPAP the previous night. Have someone to challenge you to give it your best effort.    6. Connection   Your adjustment to CPAP will be easier if you are able to connect with others who use the same treatment. Ask your sleep doctor if there is a support group in your area for people who have sleep apnea, or look for one on the Internet.  7. Comfort   Increase your level of comfort by using a saline spray, decongestant or heated humidifier if CPAP irritates your nose, mouth or throat. Use your unit's \"ramp\" setting to slowly get used to the air pressure level. There may be soft pads you can buy that will fit over your mask straps. Look on www.CPAP.com for accessories that can help make CPAP use more comfortable.  8. Cleaning   Clean your mask, tubing and headgear on a regular basis. Put this time in your schedule so that you don't forget to do it. Check and replace the filters for your CPAP unit and humidifier.    9. Completion   Although you are never finished with CPAP therapy, you should reward yourself by celebrating the completion of your first month of treatment. Expect this first month to be your hardest period of adjustment. It will involve some trial and error as you find the machine, mask and pressure settings that are right for you.    10. Continuation  After your first month of treatment, continue to make a daily commitment to use your CPAP all night, every night and for every nap.    CPAP-Tips to starting with success:  Begin using your CPAP for short periods of time during the day while you watch TV or read.    Use CPAP every night and for every nap. Using it less often " reduces the health benefits and makes it harder for your body to get used to it.    Make small adjustments to your mask, tubing, straps and headgear until you get the right fit. Tightening the mask may actually worsen the leak.  If it leaves significant marks on your face or irritates the bridge of your nose, it may not be the best mask for you.  Speak with the person who supplied the mask and consider trying other masks. Insurances will allow you to try different masks during the first month of starting CPAP.  Insurance also covers a new mask, hose and filter about every 6 months.    Use a saline nasal spray to ease mild nasal congestion. Neti-Pot or saline nasal rinses may also help. Nasal gel sprays can help reduce nasal dryness.  Biotene mouthwash can be helpful to protect your teeth if you experience frequent dry mouth.  Dry mouth may be a sign of air escaping out of your mouth or out of the mask in the case of a full face mask.  Speak with your provider if you expect that is the case.     Take a nasal decongestant to relieve more severe nasal or sinus congestion.  Do not use Afrin (oxymetazoline) nasal spray more than 3 days in a row.  Speak with your sleep doctor if your nasal congestion is chronic.    Use a heated humidifier that fits your CPAP model to enhance your breathing comfort. Adjust the heat setting up if you get a dry nose or throat, down if you get condensation in the hose or mask.  Position the CPAP lower than you so that any condensation in the hose drains back into the machine rather than towards the mask.    Try a system that uses nasal pillows if traditional masks give you problems.    Clean your mask, tubing and headgear once a week. Make sure the equipment dries fully.    Regularly check and replace the filters for your CPAP unit and humidifier.    Work closely with your sleep provider and your CPAP supplier to make sure that you have the machine, mask and air pressure setting that works  best for you. It is better to stop using it and call your provider to solve problems than to lay awake all night frustrated with the device.    Daytime naps are not advised, but use the PAP device if taking naps. Many insurances require that we prove you are using the PAP device at least 4 hours on at least 70% of nights over a 30 day period. We have 90 days to meet those criteria.    You can get new supplies (mask, hose and filter) for your device every 3-6 months (covered by insurance). You do not need to get supplies that often, but they are available if you would like them. You may exchange the mask once within the first month if you feel the initial mask does not fit well. Please, contact your medical equipment provider for equipment issues.    Please let me know if you have any snoring, daytime sleepiness, or poor sleep quality. We will want to make sure your PAP device is adequately treating your condition.    There is a website called CPAP.com that has accessories that may make CPAP use easier. Please visit it at your convenience.    Please, schedule follow up visit with the nurse (she will coming into the room and meet with you).     Thank you!    Dennys Johnson MD, MPH  Clinical Sleep and Occupational / Environmental Medicine              Follow-ups after your visit        Your next 10 appointments already scheduled     Dec 19, 2017  3:15 PM CST   [facility]ticoagulation [facility] with [facility]   [facility]    [address] [facility] [address]  [facility] [id number] [phone]              Future tests that were ordered for you today     Open Future Orders        Priority Expected Expires Ordered    Comprehensive Sleep Study Routine  6/17/2018 12/19/2017    ABG-Blood Gas Arterial ([place] / [place]) Routine  2/17/2018 12/19/2017            Who to contact     If you have questions or need follow up information about today's clinic visit or your  "schedule please contact [facility] directly at [phone].  Normal or non-critical lab and imaging results will be communicated to you by MyChart, letter or phone within 4 business days after the clinic has received the results. If you do not hear from us within 7 days, please contact the clinic through MyChart or phone. If you have a critical or abnormal lab result, we will notify you by phone as soon as possible.  Submit refill requests through u.sit or call your pharmacy and they will forward the refill request to us. Please allow 3 business days for your refill to be completed.          Additional Information About Your Visit        [employer] Information     u.sit lets you send messages to your doctor, view your test results, renew your prescriptions, schedule appointments and more. To sign up, go to [email or web address] . Click on \"Log in\" on the left side of the screen, which will take you to the Welcome page. Then click on \"Sign up Now\" on the right side of the page.     You will be asked to enter the access code listed below, as well as some personal information. Please follow the directions to create your username and password.     Your access code is: [email or web address]  Expires: 2018 12:07 PM     Your access code will  in 90 days. If you need help or a new code, please call your [facility] clinic or [phone].        Care EveryWhere ID     This is your Care EveryWhere ID. This could be used by other organizations to access your [facility] medical records  [id number]        Your Vitals Were     Pulse Respirations Height Pulse Oximetry BMI (Body Mass Index)       60 15 1.651 m (5' 5\") 94% 28.96 kg/m2        Blood Pressure from Last 3 Encounters:   17 119/64   17 122/78   17 135/82    Weight from Last 3 Encounters:   17 78.9 kg (174 lb)   17 79.9 kg (176 lb 1.6 oz)   17 79.2 kg (174 lb 9.6 oz)                 Today's Medication Changes "          These changes are accurate as of: 12/19/17  1:26 PM.  If you have any questions, ask your nurse or doctor.               Start taking these medicines.        Dose/Directions    zolpidem 5 MG tablet   Commonly known as:  AMBIEN   Used for:  Permanent atrial fibrillation (H), BETHANY (obstructive sleep apnea), Pulmonary hypertension, Coronary artery disease without angina pectoris, unspecified vessel or lesion type, unspecified whether native or transplanted heart   Started by:  Dennys Johnson MD        Take tablet by mouth 15 minutes prior to sleep, for Sleep Study   Quantity:  1 tablet   Refills:  0            Where to get your medicines      Some of these will need a paper prescription and others can be bought over the counter.  Ask your nurse if you have questions.     Bring a paper prescription for each of these medications     zolpidem 5 MG tablet                Primary Care Provider Office Phone # Fax #    Simin Lopez [phone]       [address] [address]        Equal Access to Services     ANNELISE CHAVEZ : Hadii aad ku hadasho Soomaali, waaxda luqadaha, qaybta kaalmada adeegyada, waxay caio haynaren herndon . So [facility] [phone].    ATENCIÓN: Si habla español, tiene a duran disposición servicios gratuitos de asistencia lingüística. Ll[facility] [phone].    We comply with applicable federal civil rights laws and [place] laws. We do not discriminate on the basis of race, color, national origin, age, disability, sex, sexual orientation, or gender identity.            Thank you!     Thank you for choosing [facility]  for your care. Our goal is always to provide you with excellent care. Hearing back from our patients is one way we can continue to improve our services. Please take a few minutes to complete the written survey that you may receive in the mail after your visit with us. Thank you!             Your Updated Medication  List - Protect others around you: Learn how to safely use, store and throw away your medicines at www.disposemymeds.org.          This list is accurate as of: 12/19/17  1:26 PM.  Always use your most recent med list.                   Brand Name Dispense Instructions for use Diagnosis    amLODIPine 5 MG tablet    NORVASC    90 tablet    Take 1 tablet (5 mg) by mouth daily    Essential hypertension, benign       citalopram 10 MG tablet    celeXA    90 tablet    Take 1 tablet (10 mg) by mouth daily    Major depressive disorder, recurrent episode, moderate (H), MARY (generalized anxiety disorder)       diazepam 5 MG tablet    VALIUM    40 tablet    1/2 to 1 po q 8 hours prn    MARY (generalized anxiety disorder)       levothyroxine 50 MCG tablet    SYNTHROID/LEVOTHROID    90 tablet    Take 1 tablet (50 mcg) by mouth daily    Acquired hypothyroidism       metoprolol 100 MG tablet    LOPRESSOR    180 tablet    Take 1 tablet (100 mg) by mouth 2 times daily    Essential hypertension, benign, ASCVD (arteriosclerotic cardiovascular disease)       rosuvastatin 10 MG tablet    CRESTOR    30 tablet    Take 1 tablet (10 mg) by mouth daily    Hyperlipidemia LDL goal <70, Coronary artery disease involving native coronary artery of native heart without angina pectoris       traMADol 50 MG tablet    ULTRAM    50 tablet    Take 1 tablet (50 mg) by mouth every 8 hours as needed for moderate pain    Chronic left-sided low back pain with left-sided sciatica       VITAMIN D (CHOLECALCIFEROL) PO      Take 1,000 Units by mouth daily        warfarin 5 MG tablet    COUMADIN    108 tablet    Take 1 tablet (5 mg) daily, except take 1 and 1/2 tablet (7.5 mg) on Tuesday, and Saturday  or as instructed.    Chronic atrial fibrillation (H), Long term (current) use of anticoagulants       zolpidem 5 MG tablet    AMBIEN    1 tablet    Take tablet by mouth 15 minutes prior to sleep, for Sleep Study    Permanent atrial fibrillation (H), BETHANY (obstructive  sleep apnea), Pulmonary hypertension, Coronary artery disease without angina pectoris, unspecified vessel or lesion type, unspecified whether native or transplanted heart

## 2017-12-19 NOTE — PATIENT INSTRUCTIONS
Your BMI is Body mass index is 28.96 kg/(m^2).  Weight management is a personal decision.  If you are interested in exploring weight loss strategies, the following discussion covers the approaches that may be successful. Body mass index (BMI) is one way to tell whether you are at a healthy weight, overweight, or obese. It measures your weight in relation to your height.  A BMI of 18.5 to 24.9 is in the healthy range. A person with a BMI of 25 to 29.9 is considered overweight, and someone with a BMI of 30 or greater is considered obese. More than two-thirds of American adults are considered overweight or obese.  Being overweight or obese increases the risk for further weight gain. Excess weight may lead to heart disease and diabetes.  Creating and following plans for healthy eating and physical activity may help you improve your health.  Weight control is part of healthy lifestyle and includes exercise, emotional health, and healthy eating habits. Careful eating habits lifelong are the mainstay of weight control. Though there are significant health benefits from weight loss, long-term weight loss with diet alone may be very difficult to achieve- studies show long-term success with dietary management in less than 10% of people. Attaining a healthy weight may be especially difficult to achieve in those with severe obesity. In some cases, medications, devices and surgical management might be considered.  What can you do?  If you are overweight or obese and are interested in methods for weight loss, you should discuss this with your provider.     Consider reducing daily calorie intake by 500 calories.     Keep a food journal.     Avoiding skipping meals, consider cutting portions instead.    Diet combined with exercise helps maintain muscle while optimizing fat loss. Strength training is particularly important for building and maintaining muscle mass. Exercise helps reduce stress, increase energy, and improves  fitness. Increasing exercise without diet control, however, may not burn enough calories to loose weight.       Start walking three days a week 10-20 minutes at a time    Work towards walking thirty minutes five days a week     Eventually, increase the speed of your walking for 1-2 minutes at time    In addition, we recommend that you review healthy lifestyles and methods for weight loss available through the National Institutes of Health patient information sites:  http://win.niddk.nih.gov/publications/index.htm    And look into health and wellness programs that may be available through your health insurance provider, employer, local community center, or [employer] club.    Weight management plan: Patient was referred to their PCP to discuss a diet and exercise plan.     Your blood pressure was checked while you were in clinic today.  Please read the guidelines below about what these numbers mean and what you should do about them.  Your systolic blood pressure is the top number.  This is the pressure when the heart is pumping.  Your diastolic blood pressure is the bottom number.  This is the pressure in between beats.  If your systolic blood pressure is less than 120 and your diastolic blood pressure is less than 80, then your blood pressure is normal. There is nothing more that you need to do about it  If your systolic blood pressure is 120-139 or your diastolic blood pressure is 80-89, your blood pressure may be higher than it should be.  You should have your blood pressure re-checked within a year by a primary care provider.  If your systolic blood pressure is 140 or greater or your diastolic blood pressure is 90 or greater, you may have high blood pressure.  High blood pressure is treatable, but if left untreated over time it can put you at risk for heart attack, stroke, or kidney failure.  You should have your blood pressure re-checked by a primary care provider within the next four weeks.    1. CPAP-  WHAT DOES IT  DO AND HOW CAN I LEARN TO WEAR IT?                               BEFORE I START, CAN I WATCH A MOVIE TO GET A PLAN ON HOW TO USE CPAP?  https://www.youtube.com/watch?y=q0V96ht194O      Continuous positive airway pressure, or CPAP, is the most effective treatment for obstructive sleep apnea. It works by blowing room air, through a mask, to hold your throat open. A decision to use CPAP is a major step forward in the pursuit of a healthier life. The successful use of CPAP will help you breathe easier, sleep better and live healthier. You can choose CPAP equipment from any durable medical equipment provider that meets your needs.  Using CPAP can be a positive experience if you keep these [place] points in mind:  1. Commitment  CPAP is not a quick fix for your problem. It involves a long-term commitment to improve your sleep and your health.    2. Communication  Stay in close communication with both your sleep doctor and your CPAP supplier. Ask lots of questions and seek help when you need it.    3. Consistency  Use CPAP all night, every night and for every nap. You will receive the maximum health benefits from CPAP when you use it every time that you sleep. This will also make it easier for your body to adjust to the treatment.    4. Correction  The first machine and mask that you try may not be the best ones for you. Work with your sleep doctor and your CPAP supplier to make corrections to your equipment selection. Ask about trying a different type of machine or mask if you have ongoing problems. Make sure that your mask is a good fit and learn to use your equipment properly.    5. Challenge  Tell a family member or close friend to ask you each morning if you used your CPAP the previous night. Have someone to challenge you to give it your best effort.    6. Connection   Your adjustment to CPAP will be easier if you are able to connect with others who use the same treatment. Ask your sleep doctor if there is a support group  "in your area for people who have sleep apnea, or look for one on the Internet.  7. Comfort   Increase your level of comfort by using a saline spray, decongestant or heated humidifier if CPAP irritates your nose, mouth or throat. Use your unit's \"ramp\" setting to slowly get used to the air pressure level. There may be soft pads you can buy that will fit over your mask straps. Look on www.CPAP.com for accessories that can help make CPAP use more comfortable.  8. Cleaning   Clean your mask, tubing and headgear on a regular basis. Put this time in your schedule so that you don't forget to do it. Check and replace the filters for your CPAP unit and humidifier.    9. Completion   Although you are never finished with CPAP therapy, you should reward yourself by celebrating the completion of your first month of treatment. Expect this first month to be your hardest period of adjustment. It will involve some trial and error as you find the machine, mask and pressure settings that are right for you.    10. Continuation  After your first month of treatment, continue to make a daily commitment to use your CPAP all night, every night and for every nap.    CPAP-Tips to starting with success:  Begin using your CPAP for short periods of time during the day while you watch TV or read.    Use CPAP every night and for every nap. Using it less often reduces the health benefits and makes it harder for your body to get used to it.    Make small adjustments to your mask, tubing, straps and headgear until you get the right fit. Tightening the mask may actually worsen the leak.  If it leaves significant marks on your face or irritates the bridge of your nose, it may not be the best mask for you.  Speak with the person who supplied the mask and consider trying other masks. Insurances will allow you to try different masks during the first month of starting CPAP.  Insurance also covers a new mask, hose and filter about every 6 months.    Use a " saline nasal spray to ease mild nasal congestion. Neti-Pot or saline nasal rinses may also help. Nasal gel sprays can help reduce nasal dryness.  Biotene mouthwash can be helpful to protect your teeth if you experience frequent dry mouth.  Dry mouth may be a sign of air escaping out of your mouth or out of the mask in the case of a full face mask.  Speak with your provider if you expect that is the case.     Take a nasal decongestant to relieve more severe nasal or sinus congestion.  Do not use Afrin (oxymetazoline) nasal spray more than 3 days in a row.  Speak with your sleep doctor if your nasal congestion is chronic.    Use a heated humidifier that fits your CPAP model to enhance your breathing comfort. Adjust the heat setting up if you get a dry nose or throat, down if you get condensation in the hose or mask.  Position the CPAP lower than you so that any condensation in the hose drains back into the machine rather than towards the mask.    Try a system that uses nasal pillows if traditional masks give you problems.    Clean your mask, tubing and headgear once a week. Make sure the equipment dries fully.    Regularly check and replace the filters for your CPAP unit and humidifier.    Work closely with your sleep provider and your CPAP supplier to make sure that you have the machine, mask and air pressure setting that works best for you. It is better to stop using it and call your provider to solve problems than to lay awake all night frustrated with the device.    Daytime naps are not advised, but use the PAP device if taking naps. Many insurances require that we prove you are using the PAP device at least 4 hours on at least 70% of nights over a 30 day period. We have 90 days to meet those criteria.    You can get new supplies (mask, hose and filter) for your device every 3-6 months (covered by insurance). You do not need to get supplies that often, but they are available if you would like them. You may  exchange the mask once within the first month if you feel the initial mask does not fit well. Please, contact your medical equipment provider for equipment issues.    Please let me know if you have any snoring, daytime sleepiness, or poor sleep quality. We will want to make sure your PAP device is adequately treating your condition.    There is a website called CPAP.com that has accessories that may make CPAP use easier. Please visit it at your convenience.    Please, schedule follow up visit with the nurse (she will coming into the room and meet with you).     Thank you!    Dennys Johnson MD, MPH  Clinical Sleep and Occupational / Environmental Medicine

## 2018-01-04 ENCOUNTER — ANTICOAGULATION THERAPY VISIT (OUTPATIENT)
Dept: ANTICOAGULATION | Facility: CLINIC | Age: 78
End: 2018-01-04
Payer: MEDICARE

## 2018-01-04 DIAGNOSIS — I48.20 CHRONIC ATRIAL FIBRILLATION (H): ICD-10-CM

## 2018-01-04 DIAGNOSIS — Z79.01 LONG-TERM (CURRENT) USE OF ANTICOAGULANTS: ICD-10-CM

## 2018-01-04 LAB — INR POINT OF CARE: 4.9 (ref 0.86–1.14)

## 2018-01-04 PROCEDURE — 99207 ZZC NO CHARGE NURSE ONLY: CPT

## 2018-01-04 PROCEDURE — 85610 PROTHROMBIN TIME: CPT | Mod: QW

## 2018-01-04 PROCEDURE — 36416 COLLJ CAPILLARY BLOOD SPEC: CPT

## 2018-01-04 NOTE — MR AVS SNAPSHOT
Cielo Aguillon   1/4/2018 1:15 PM   Anticoagulation Therapy Visit    Description:  77 year old female   Provider[facility]  [facility]   Department:  [facility]           INR as of 1/4/2018     Today's INR 4.9!      Anticoagulation Summary as of 1/4/2018     INR goal 2.0-3.0   Today's INR 4.9!   Full instructions 1/4: Hold; 1/5: 2.5 mg; 1/6: 5 mg; Otherwise 7.5 mg on Tue, Sat; 5 mg all other days   Next INR check 1/10/2018    Indications   Chronic atrial fibrillation (H) [I48.2]  Long-term (current) use of anticoagulants [Z79.01] [Z79.01]         Your next Anticoagulation Clinic appointment(s)     Reyes 10, 2018  8:15 AM CST   [facility] Visit with [facility]   [facility]    [address] [facility] [address]  [facility] [id number] [phone]              Contact Numbers     [facility] Phone Numbers:  Anticoagulation Clinic Appointments : [phone]  Anticoagulation Nurse: [phone]         January 2018 Details    Sun Mon Tue Wed Thu Fri Sat      1               2               3               4      Hold   See details      5      2.5 mg         6      5 mg           7      5 mg         8      5 mg         9      7.5 mg         10            11               12               13                 14               15               16               17               18               19               20                 21               22               23               24               25               26               27                 28               29               30               31                   Date Details   01/04 This INR check       Date of next INR:  1/10/2018         How to take your warfarin dose     To take:  2.5 mg Take 0.5 of a 5 mg tablet.    To take:  5 mg Take 1 of the 5 mg tablets.    To take:  7.5 mg Take 1.5 of the 5 mg tablets.    Hold Do not take your warfarin dose. See the Details table to the  right for additional instructions.

## 2018-01-04 NOTE — PROGRESS NOTES
ANTICOAGULATION FOLLOW-UP CLINIC VISIT    Patient Name:  Cielo Aguillon  Date:  1/4/2018  Contact Type:  Face to Face    SUBJECTIVE:     Patient Findings     Positives Change in diet/appetite (Pt reports fewer greens than usual, pt will try to increase greens in diet again. ), Unexplained INR or factor level change    Comments Pt denies any changes other than diet.            OBJECTIVE    INR Protime   Date Value Ref Range Status   01/04/2018 4.9 (A) 0.86 - 1.14 Final       ASSESSMENT / PLAN  INR assessment SUPRA    Recheck INR In: 6 DAYS    INR Location Clinic      Anticoagulation Summary as of 1/4/2018     INR goal 2.0-3.0   Today's INR 4.9!   Maintenance plan 7.5 mg (5 mg x 1.5) on Tue, Sat; 5 mg (5 mg x 1) all other days   Full instructions 1/4: Hold; 1/5: 2.5 mg; 1/6: 5 mg; Otherwise 7.5 mg on Tue, Sat; 5 mg all other days   Weekly total 40 mg   Plan last modified Cindi Preston RN (5/9/2017)   Next INR check 1/10/2018   Priority INR   Target end date     Indications   Chronic atrial fibrillation (H) [I48.2]  Long-term (current) use of anticoagulants [Z79.01] [Z79.01]         Anticoagulation Episode Summary     INR check location     Preferred lab     Send INR reminders to [facility]    Comments       Anticoagulation Care Providers     Provider Role Specialty Phone number    Simin Lopez MD Responsible Internal Medicine [phone]            See the Encounter Report to view Anticoagulation Flowsheet and Dosing Calendar (Go to Encounters tab in chart review, and find the Anticoagulation Therapy Visit)    Dosage adjustment made based on physician directed care plan.    Cindi Preston, RN

## 2018-01-10 ENCOUNTER — ANTICOAGULATION THERAPY VISIT (OUTPATIENT)
Dept: ANTICOAGULATION | Facility: CLINIC | Age: 78
End: 2018-01-10
Payer: MEDICARE

## 2018-01-10 DIAGNOSIS — Z79.01 LONG-TERM (CURRENT) USE OF ANTICOAGULANTS: ICD-10-CM

## 2018-01-10 DIAGNOSIS — I48.20 CHRONIC ATRIAL FIBRILLATION (H): ICD-10-CM

## 2018-01-10 LAB — INR POINT OF CARE: 2.8 (ref 0.86–1.14)

## 2018-01-10 PROCEDURE — 99207 ZZC NO CHARGE NURSE ONLY: CPT

## 2018-01-10 PROCEDURE — 36416 COLLJ CAPILLARY BLOOD SPEC: CPT

## 2018-01-10 PROCEDURE — 85610 PROTHROMBIN TIME: CPT | Mod: QW

## 2018-01-10 NOTE — PROGRESS NOTES
ANTICOAGULATION FOLLOW-UP CLINIC VISIT    Patient Name:  Cielo Aguillon  Date:  1/10/2018  Contact Type:  Face to Face    SUBJECTIVE:     Patient Findings     Positives Intentional hold of therapy (dose was held and recently adjusted due to elevated INR)           OBJECTIVE    INR Protime   Date Value Ref Range Status   01/10/2018 2.8 (A) 0.86 - 1.14 Final       ASSESSMENT / PLAN  INR assessment THER    Recheck INR In: 10 DAYS    INR Location Clinic      Anticoagulation Summary as of 1/10/2018     INR goal 2.0-3.0   Today's INR 2.8   Maintenance plan 7.5 mg (5 mg x 1.5) on Tue, Sat; 5 mg (5 mg x 1) all other days   Full instructions 1/13: 5 mg; 1/16: 5 mg; Otherwise 7.5 mg on Tue, Sat; 5 mg all other days   Weekly total 40 mg   Plan last modified Cindi Preston RN (5/9/2017)   Next INR check 1/19/2018   Priority INR   Target end date     Indications   Chronic atrial fibrillation (H) [I48.2]  Long-term (current) use of anticoagulants [Z79.01] [Z79.01]         Anticoagulation Episode Summary     INR check location     Preferred lab     Send INR reminders to [facility]    Comments       Anticoagulation Care Providers     Provider Role Specialty Phone number    Simin Lopez MD Responsible Internal Medicine [phone]            See the Encounter Report to view Anticoagulation Flowsheet and Dosing Calendar (Go to Encounters tab in chart review, and find the Anticoagulation Therapy Visit)    Dosage adjustment made based on physician directed care plan.    Mckenna Robison RN

## 2018-01-10 NOTE — MR AVS SNAPSHOT
Cielo Aguillon   1/10/2018 8:15 AM   Anticoagulation Therapy Visit    Description:  77 year old female   Provider[facility]  [facility]   Department:  [facility]           INR as of 1/10/2018     Today's INR 2.8      Anticoagulation Summary as of 1/10/2018     INR goal 2.0-3.0   Today's INR 2.8   Full instructions 1/13: 5 mg; 1/16: 5 mg; Otherwise 7.5 mg on Tue, Sat; 5 mg all other days   Next INR check 1/19/2018    Indications   Chronic atrial fibrillation (H) [I48.2]  Long-term (current) use of anticoagulants [Z79.01] [Z79.01]         Your next Anticoagulation Clinic appointment(s)     Jan 19, 2018  2:00 PM CST   [facility] with [facility]   [facility]    [address] [facility] [address]  [facility] [id number] [phone]              Contact Numbers     [facility] Phone Numbers:  Anticoagulation Clinic Appointments : [phone]  Anticoagulation Nurse: [phone]         January 2018 Details    Sun Mon Tue Wed Thu Fri Sat      1               2               3               4               5               6                 7               8               9               10      5 mg   See details      11      5 mg         12      5 mg         13      5 mg           14      5 mg         15      5 mg         16      5 mg         17      5 mg         18      5 mg         19            20                 21               22               23               24               25               26               27                 28               29               30               31                   Date Details   01/10 This INR check       Date of next INR:  1/19/2018         How to take your warfarin dose     To take:  5 mg Take 1 of the 5 mg tablets.

## 2018-01-19 ENCOUNTER — ANTICOAGULATION THERAPY VISIT (OUTPATIENT)
Dept: ANTICOAGULATION | Facility: CLINIC | Age: 78
End: 2018-01-19
Payer: MEDICARE

## 2018-01-19 DIAGNOSIS — I48.20 CHRONIC ATRIAL FIBRILLATION (H): ICD-10-CM

## 2018-01-19 DIAGNOSIS — Z79.01 LONG-TERM (CURRENT) USE OF ANTICOAGULANTS: ICD-10-CM

## 2018-01-19 LAB — INR POINT OF CARE: 2.5 (ref 0.86–1.14)

## 2018-01-19 PROCEDURE — 99207 ZZC NO CHARGE NURSE ONLY: CPT

## 2018-01-19 PROCEDURE — 85610 PROTHROMBIN TIME: CPT | Mod: QW

## 2018-01-19 PROCEDURE — 36416 COLLJ CAPILLARY BLOOD SPEC: CPT

## 2018-01-19 NOTE — MR AVS SNAPSHOT
Cielo Aguillon   1/19/2018 2:00 PM   Anticoagulation Therapy Visit    Description:  77 year old female   Provider[facility]   Department:  [facility]           INR as of 1/19/2018     Today's INR 2.5      Anticoagulation Summary as of 1/19/2018     INR goal 2.0-3.0   Today's INR 2.5   Full instructions 1/23: 5 mg; 1/30: 5 mg; 2/6: 5 mg; Otherwise 7.5 mg on Tue, Sat; 5 mg all other days   Next INR check 2/8/2018    Indications   Chronic atrial fibrillation (H) [I48.2]  Long-term (current) use of anticoagulants [Z79.01] [Z79.01]         Your next Anticoagulation Clinic appointment(s)     Feb 08, 2018  5:30 PM CST   [facility]   [facility]    [address] [facility] [address]  [facility] [address] [phone]              Contact Numbers     [facility] Phone Numbers:  Anticoagulation Clinic Appointments : [phone]  Anticoagulation Nurse: [phone]         January 2018 Details    Sun Mon Tue Wed Thu Fri Sat      1               2               3               4               5               6                 7               8               9               10               11               12               13                 14               15               16               17               18               19      5 mg   See details      20      7.5 mg           21      5 mg         22      5 mg         23      5 mg         24      5 mg         25      5 mg         26      5 mg         27      7.5 mg           28      5 mg         29      5 mg         30      5 mg         31      5 mg             Date Details   01/19 This INR check               How to take your warfarin dose     To take:  5 mg Take 1 of the 5 mg tablets.    To take:  7.5 mg Take 1.5 of the 5 mg tablets.           February 2018 Details    Sun Mon Tue Wed Thu Fri Sat         1      5 mg         2      5 mg         3       7.5 mg           4      5 mg         5      5 mg         6      5 mg         7      5 mg         8            9               10                 11               12               13               14               15               16               17                 18               19               20               21               22               23               24                 25               26               27               28                   Date Details   No additional details    Date of next INR:  2/8/2018         How to take your warfarin dose     To take:  5 mg Take 1 of the 5 mg tablets.    To take:  7.5 mg Take 1.5 of the 5 mg tablets.

## 2018-01-19 NOTE — PROGRESS NOTES
ANTICOAGULATION FOLLOW-UP CLINIC VISIT    Patient Name:  Cielo Aguillon  Date:  1/19/2018  Contact Type:  Face to Face    SUBJECTIVE:     Patient Findings     Positives No Problem Findings           OBJECTIVE    INR Protime   Date Value Ref Range Status   01/19/2018 2.5 (A) 0.86 - 1.14 Final       ASSESSMENT / PLAN  INR assessment THER    Recheck INR In: 3 WEEKS    INR Location Clinic      Anticoagulation Summary as of 1/19/2018     INR goal 2.0-3.0   Today's INR 2.5   Maintenance plan 7.5 mg (5 mg x 1.5) on Tue, Sat; 5 mg (5 mg x 1) all other days   Full instructions 1/23: 5 mg; 1/30: 5 mg; 2/6: 5 mg; Otherwise 7.5 mg on Tue, Sat; 5 mg all other days   Weekly total 40 mg   Plan last modified Cindi Prestno, RN (5/9/2017)   Next INR check 2/8/2018   Priority INR   Target end date     Indications   Chronic atrial fibrillation (H) [I48.2]  Long-term (current) use of anticoagulants [Z79.01] [Z79.01]         Anticoagulation Episode Summary     INR check location     Preferred lab     Send INR reminders to [facility]    Comments       Anticoagulation Care Providers     Provider Role Specialty Phone number    Simin Lopez MD Responsible Internal Medicine [phone]            See the Encounter Report to view Anticoagulation Flowsheet and Dosing Calendar (Go to Encounters tab in chart review, and find the Anticoagulation Therapy Visit)    Dosage adjustment made based on physician directed care plan.    Cindi Preston, RN

## 2018-01-20 DIAGNOSIS — F41.1 GAD (GENERALIZED ANXIETY DISORDER): ICD-10-CM

## 2018-01-20 DIAGNOSIS — F33.1 MAJOR DEPRESSIVE DISORDER, RECURRENT EPISODE, MODERATE (H): ICD-10-CM

## 2018-01-26 DIAGNOSIS — I10 ESSENTIAL HYPERTENSION, BENIGN: ICD-10-CM

## 2018-01-26 DIAGNOSIS — E03.9 ACQUIRED HYPOTHYROIDISM: ICD-10-CM

## 2018-01-26 DIAGNOSIS — I25.10 ASCVD (ARTERIOSCLEROTIC CARDIOVASCULAR DISEASE): ICD-10-CM

## 2018-01-26 DIAGNOSIS — Z79.01 LONG TERM CURRENT USE OF ANTICOAGULANT THERAPY: ICD-10-CM

## 2018-01-26 DIAGNOSIS — I48.20 CHRONIC ATRIAL FIBRILLATION (H): ICD-10-CM

## 2018-01-26 RX ORDER — CITALOPRAM HYDROBROMIDE 10 MG/1
20 TABLET ORAL DAILY
Qty: 180 TABLET | Refills: 1 | Status: SHIPPED | OUTPATIENT
Start: 2018-01-26 | End: 2018-12-20 | Stop reason: DRUGHIGH

## 2018-01-26 NOTE — TELEPHONE ENCOUNTER
"Requested Prescriptions   Pending Prescriptions Disp Refills     citalopram (CELEXA) 10 MG tablet [Pharmacy Med Name: CITALOPRAM 10MG TAB 10 TAB] 90 tablet 3     Sig: TAKE ONE TABLET (10MG) BY MOUTH EVERY DAY    SSRIs Protocol Failed    1/20/2018 10:33 AM       Failed - PHQ-9 score less than 5 in past 6 months    The PHQ-9 criteria is meant to fail. It requires a PHQ-9 score review         Passed - Patient is age 18 or older       Passed - No active pregnancy on record       Passed - No positive pregnancy test in last 12 months       Passed - Recent (6 mo) or future visit with authorizing provider's specialty    Patient had office visit in the last 6 months or has a visit in the next 30 days with authorizing provider.  See \"Patient Info\" tab in inbasket, or \"Choose Columns\" in Meds & Orders section of the refill encounter.            PHQ-9 score:    PHQ-9 SCORE 11/19/2017   Total Score -   Total Score 6       Routing refill request to provider for review/approval because:  Labs out of range:  PHQ-9              "

## 2018-02-01 RX ORDER — METOPROLOL TARTRATE 100 MG
TABLET ORAL
Qty: 180 TABLET | Refills: 2 | Status: SHIPPED | OUTPATIENT
Start: 2018-02-01 | End: 2018-10-31

## 2018-02-01 RX ORDER — WARFARIN SODIUM 5 MG/1
TABLET ORAL
Qty: 108 TABLET | Refills: 0 | Status: SHIPPED | OUTPATIENT
Start: 2018-02-01 | End: 2018-04-27

## 2018-02-01 RX ORDER — LEVOTHYROXINE SODIUM 50 UG/1
TABLET ORAL
Qty: 90 TABLET | Refills: 2 | Status: SHIPPED | OUTPATIENT
Start: 2018-02-01 | End: 2018-10-31

## 2018-02-01 NOTE — TELEPHONE ENCOUNTER
"Requested Prescriptions   Pending Prescriptions Disp Refills     warfarin (COUMADIN) 5 MG tablet [Pharmacy Med Name: WARFARIN SODIUM 5 MG TABLET 5 TAB] 108 tablet 1     Sig: TAKE 1&1/2 TABLETS (7.5MG) BY MOUTH ONCE DAILY ON TUESDAY & SATURDAY THEN 1 TABLET (5MG) ALL THE OTHER DAYS OF THE WEEK    Vitamin K Antagonists Failed    1/26/2018 12:02 PM       Failed - INR is within goal in the past 6 weeks    Confirm INR is within goal in the past 6 weeks.     Recent Labs   Lab Test 01/19/18   INR  2.5*                      Passed - Recent or future visit with authorizing provider's specialty    Patient had office visit in the last year or has a visit in the next 30 days with authorizing provider.  See \"Patient Info\" tab in inbasket, or \"Choose Columns\" in Meds & Orders section of the refill encounter.            Passed - Patient is 18 years of age or older       Passed - Patient is not pregnant       Passed - No positive pregnancy on file in past 12 months        metoprolol tartrate (LOPRESSOR) 100 MG tablet [Pharmacy Med Name: METOPROLOL TARTRATE 100  TAB] 180 tablet 0     Sig: TAKE 1 TABLET (100MG) BY MOUTH 2 TIMES DAILY    Beta-Blockers Protocol Passed    1/26/2018 12:02 PM       Passed - Blood pressure under 140/90    BP Readings from Last 3 Encounters:   12/19/17 119/64   11/14/17 122/78   11/14/17 135/82                Passed - Patient is age 6 or older       Passed - Recent or future visit with authorizing provider's specialty    Patient had office visit in the last year or has a visit in the next 30 days with authorizing provider.  See \"Patient Info\" tab in inbasket, or \"Choose Columns\" in Meds & Orders section of the refill encounter.             levothyroxine (SYNTHROID/LEVOTHROID) 50 MCG tablet [Pharmacy Med Name: LEVOTHYROXINE 50 MCG TABLET 50 TAB] 90 tablet 3     Sig: TAKE 1 TABLET (50 MCG) BY MOUTH DAILY    Thyroid Protocol Passed    1/26/2018 12:02 PM       Passed - Patient is 12 years or older       " "Passed - Recent or future visit with authorizing provider's specialty    Patient had office visit in the last year or has a visit in the next 30 days with authorizing provider.  See \"Patient Info\" tab in inbasket, or \"Choose Columns\" in Meds & Orders section of the refill encounter.            Passed - Normal TSH on file in past 12 months    Recent Labs   Lab Test  11/14/17   1435   TSH  3.32             Passed - No active pregnancy on record    If patient is pregnant or has had a positive pregnancy test, please check TSH.         Passed - No positive pregnancy test in past 12 months    If patient is pregnant or has had a positive pregnancy test, please check TSH.        Prescription approved per [facility] [place] [facility].    "

## 2018-02-08 ENCOUNTER — THERAPY VISIT (OUTPATIENT)
Dept: SLEEP MEDICINE | Facility: CLINIC | Age: 78
End: 2018-02-08
Payer: MEDICARE

## 2018-02-08 ENCOUNTER — ANTICOAGULATION THERAPY VISIT (OUTPATIENT)
Dept: ANTICOAGULATION | Facility: CLINIC | Age: 78
End: 2018-02-08
Payer: MEDICARE

## 2018-02-08 DIAGNOSIS — I48.21 PERMANENT ATRIAL FIBRILLATION (H): ICD-10-CM

## 2018-02-08 DIAGNOSIS — I48.20 CHRONIC ATRIAL FIBRILLATION (H): ICD-10-CM

## 2018-02-08 DIAGNOSIS — G47.33 OSA (OBSTRUCTIVE SLEEP APNEA): ICD-10-CM

## 2018-02-08 DIAGNOSIS — Z79.01 LONG-TERM (CURRENT) USE OF ANTICOAGULANTS: ICD-10-CM

## 2018-02-08 DIAGNOSIS — I27.20 PULMONARY HYPERTENSION (H): ICD-10-CM

## 2018-02-08 DIAGNOSIS — I25.10 CORONARY ARTERY DISEASE WITHOUT ANGINA PECTORIS, UNSPECIFIED VESSEL OR LESION TYPE, UNSPECIFIED WHETHER NATIVE OR TRANSPLANTED HEART: ICD-10-CM

## 2018-02-08 LAB — INR POINT OF CARE: 2.2 (ref 0.86–1.14)

## 2018-02-08 PROCEDURE — 82805 BLOOD GASES W/O2 SATURATION: CPT | Performed by: FAMILY MEDICINE

## 2018-02-08 PROCEDURE — 82805 BLOOD GASES W/O2 SATURATION: CPT

## 2018-02-08 PROCEDURE — 99207 ZZC NO CHARGE NURSE ONLY: CPT

## 2018-02-08 PROCEDURE — 95811 POLYSOM 6/>YRS CPAP 4/> PARM: CPT | Performed by: FAMILY MEDICINE

## 2018-02-08 PROCEDURE — 36416 COLLJ CAPILLARY BLOOD SPEC: CPT

## 2018-02-08 PROCEDURE — 85610 PROTHROMBIN TIME: CPT | Mod: QW

## 2018-02-08 NOTE — MR AVS SNAPSHOT
After Visit Summary   2/8/2018    Cielo Aguillon    MRN: [id number]           Patient Information     Date Of Birth          1940        Visit Information        Provider Department      2/8/2018 8:30 PM BED 5 [facility]        Today's Diagnoses     Coronary artery disease without angina pectoris, unspecified vessel or lesion type, unspecified whether native or transplanted heart        Pulmonary hypertension        BETHANY (obstructive sleep apnea)        Permanent atrial fibrillation (H)           Follow-ups after your visit        Your next 10 appointments already scheduled     Feb 15, 2018  2:00 PM CST   Return Sleep Patient with Dennys Johnson MD   [facility]    [address]  [facility] [address] [phone]            Feb 22, 2018  1:15 PM CST   Anticoagulation Visit with [facility]   [facility]    [address]  [facility] [id number] [phone]              Who to contact     If you have questions or need follow up information about today's clinic visit or your schedule please contact [facility] directly at [phone].  Normal or non-critical lab and imaging results will be communicated to you by MyChart, letter or phone within 4 business days after the clinic has received the results. If you do not hear from us within 7 days, please contact the clinic through [employer] or phone. If you have a critical or abnormal lab result, we will notify you by phone as soon as possible.  Submit refill requests through [employer] or call your pharmacy and they will forward the refill request to us. Please allow 3 business days for your refill to be completed.          Additional Information About Your Visit        [employer] [employer] Information     [employer] lets you send messages to your doctor, view your  "test results, renew your prescriptions, schedule appointments and more. To sign up, go to [email or web address] . Click on \"Log in\" on the left side of the screen, which will take you to the Welcome page. Then click on \"Sign up Now\" on the right side of the page.     You will be asked to enter the access code listed below, as well as some personal information. Please follow the directions to create your username and password.     Your access code is: [email or web address]  Expires: 2018 12:07 PM     Your access code will  in 90 days. If you need help or a new code, please call your [place] clinic or [phone].        Care EveryWhere ID     This is your Care EveryWhere ID. This could be used by other organizations to access your [place] medical records  [id number]         Blood Pressure from Last 3 Encounters:   17 119/64   17 122/78   17 135/82    Weight from Last 3 Encounters:   17 78.9 kg (174 lb)   17 79.9 kg (176 lb 1.6 oz)   17 79.2 kg (174 lb 9.6 oz)              We Performed the Following     ABG-Blood Gas Arterial ([facility] [place])     Blood gas arterial and oxyhgb     Comprehensive Sleep Study        Primary Care Provider Office Phone # Fax #    Simin Lopez [phone]       [address] [address]        Equal Access to Services     [facility]: Hadii aad ku hadasho Soomaali, waaxda luqadaha, qaybta kaalmada adeegyada, waxviviana martin. So [facility] [phone].    ATENCIÓN: Si habla español, tiene a duran disposición servicios gratuitos de asistencia lingüística. Llame al [phone].    We comply with applicable federal civil rights laws and [place] laws. We do not discriminate on the basis of race, color, national origin, age, disability, sex, sexual orientation, or gender identity.            Thank you!     Thank you for choosing [place] [facility]  for your care. Our goal is " always to provide you with excellent care. Hearing back from our patients is one way we can continue to improve our services. Please take a few minutes to complete the written survey that you may receive in the mail after your visit with us. Thank you!             Your Updated Medication List - Protect others around you: Learn how to safely use, store and throw away your medicines at www.disposemymeds.org.          This list is accurate as of 2/8/18 11:59 PM.  Always use your most recent med list.                   Brand Name Dispense Instructions for use Diagnosis    amLODIPine 5 MG tablet    NORVASC    90 tablet    Take 1 tablet (5 mg) by mouth daily    Essential hypertension, benign       citalopram 10 MG tablet    celeXA    180 tablet    Take 2 tablets (20 mg) by mouth daily    Major depressive disorder, recurrent episode, moderate (H), MARY (generalized anxiety disorder)       diazepam 5 MG tablet    VALIUM    40 tablet    1/2 to 1 po q 8 hours prn    MARY (generalized anxiety disorder)       levothyroxine 50 MCG tablet    SYNTHROID/LEVOTHROID    90 tablet    TAKE 1 TABLET (50 MCG) BY MOUTH DAILY    Acquired hypothyroidism       metoprolol tartrate 100 MG tablet    LOPRESSOR    180 tablet    TAKE 1 TABLET (100MG) BY MOUTH 2 TIMES DAILY    Essential hypertension, benign, ASCVD (arteriosclerotic cardiovascular disease)       rosuvastatin 10 MG tablet    CRESTOR    30 tablet    Take 1 tablet (10 mg) by mouth daily    Hyperlipidemia LDL goal <70, Coronary artery disease involving native coronary artery of native heart without angina pectoris       traMADol 50 MG tablet    ULTRAM    50 tablet    Take 1 tablet (50 mg) by mouth every 8 hours as needed for moderate pain    Chronic left-sided low back pain with left-sided sciatica       VITAMIN D (CHOLECALCIFEROL) PO      Take 1,000 Units by mouth daily        warfarin 5 MG tablet    COUMADIN    108 tablet    TAKE 1&1/2 TABLETS (7.5MG) BY MOUTH ONCE DAILY ON TUESDAY &  SATURDAY THEN 1 TABLET (5MG) ALL THE OTHER DAYS OF THE WEEK    Chronic atrial fibrillation (H), Long term current use of anticoagulant therapy       zolpidem 5 MG tablet    AMBIEN    1 tablet    Take tablet by mouth 15 minutes prior to sleep, for Sleep Study    Permanent atrial fibrillation (H), BETHANY (obstructive sleep apnea), Pulmonary hypertension, Coronary artery disease without angina pectoris, unspecified vessel or lesion type, unspecified whether native or transplanted heart

## 2018-02-08 NOTE — PROGRESS NOTES
ANTICOAGULATION FOLLOW-UP CLINIC VISIT    Patient Name:  Cielo Aguillon  Date:  2/8/2018  Contact Type:  Face to Face    SUBJECTIVE:     Patient Findings     Positives No Problem Findings           OBJECTIVE    INR Protime   Date Value Ref Range Status   02/08/2018 2.2 (A) 0.86 - 1.14 Final       ASSESSMENT / PLAN  INR assessment THER    Recheck INR In: 2 WEEKS    INR Location Clinic      Anticoagulation Summary as of 2/8/2018     INR goal 2.0-3.0   Today's INR 2.2   Maintenance plan 7.5 mg (5 mg x 1.5) on Tue, Sat; 5 mg (5 mg x 1) all other days   Full instructions 7.5 mg on Tue, Sat; 5 mg all other days   Weekly total 40 mg   No change documented Cindi Preston RN   Plan last modified Cindi Preston RN (5/9/2017)   Next INR check 2/22/2018   Priority INR   Target end date     Indications   Chronic atrial fibrillation (H) [I48.2]  Long-term (current) use of anticoagulants [Z79.01] [Z79.01]         Anticoagulation Episode Summary     INR check location     Preferred lab     Send INR reminders to [facility]    Comments       Anticoagulation Care Providers     Provider Role Specialty Phone number    Simin Lopez MD Responsible Internal Medicine [phone]            See the Encounter Report to view Anticoagulation Flowsheet and Dosing Calendar (Go to Encounters tab in chart review, and find the Anticoagulation Therapy Visit)    Dosage adjustment made based on physician directed care plan.    Cindi Preston RN

## 2018-02-08 NOTE — MR AVS SNAPSHOT
Cielo Aguillon   2/8/2018 5:30 PM   Anticoagulation Therapy Visit    Description:  77 year old female   Provider:  [facility]   Department:  [facility]           INR as of 2/8/2018     Today's INR 2.2      Anticoagulation Summary as of 2/8/2018     INR goal 2.0-3.0   Today's INR 2.2   Full instructions 7.5 mg on Tue, Sat; 5 mg all other days   Next INR check 2/22/2018    Indications   Chronic atrial fibrillation (H) [I48.2]  Long-term (current) use of anticoagulants [Z79.01] [Z79.01]         Your next Anticoagulation Clinic appointment(s)     Feb 22, 2018  1:15 PM CST   [facility]    [address] [facility] [address]  [facility] [address] [phone]              Contact Numbers     [facility] Phone Numbers:  Anticoagulation Clinic Appointments : [phone]  Anticoagulation Nurse: [phone]         February 2018 Details    Sun Mon Tue Wed Thu Fri Sat         1               2               3                 4               5               6               7               8      5 mg   See details      9      5 mg         10      7.5 mg           11      5 mg         12      5 mg         13      7.5 mg         14      5 mg         15      5 mg         16      5 mg         17      7.5 mg           18      5 mg         19      5 mg         20      7.5 mg         21      5 mg         22            23               24                 25               26               27               28                   Date Details   02/08 This INR check       Date of next INR:  2/22/2018         How to take your warfarin dose     To take:  5 mg Take 1 of the 5 mg tablets.    To take:  7.5 mg Take 1.5 of the 5 mg tablets.

## 2018-02-09 ENCOUNTER — TELEPHONE (OUTPATIENT)
Dept: INTERNAL MEDICINE | Facility: CLINIC | Age: 78
End: 2018-02-09

## 2018-02-09 NOTE — PROGRESS NOTES
Completed a all night titration PSG per provider order.    A final therapeutic PAP pressure was not achieved.    Supine REM was not seen on therapeutic pressure.    Patient reports feeling not refreshed in AM.

## 2018-02-15 ENCOUNTER — OFFICE VISIT (OUTPATIENT)
Dept: SLEEP MEDICINE | Facility: CLINIC | Age: 78
End: 2018-02-15
Payer: MEDICARE

## 2018-02-15 VITALS
HEIGHT: 65 IN | OXYGEN SATURATION: 95 % | BODY MASS INDEX: 28.99 KG/M2 | RESPIRATION RATE: 24 BRPM | HEART RATE: 63 BPM | WEIGHT: 174 LBS

## 2018-02-15 DIAGNOSIS — G47.33 OSA (OBSTRUCTIVE SLEEP APNEA): ICD-10-CM

## 2018-02-15 DIAGNOSIS — I48.21 PERMANENT ATRIAL FIBRILLATION (H): ICD-10-CM

## 2018-02-15 DIAGNOSIS — R06.3 CHEYNE-STOKES BREATHING: ICD-10-CM

## 2018-02-15 DIAGNOSIS — G47.39 COMPLEX SLEEP APNEA SYNDROME: Primary | ICD-10-CM

## 2018-02-15 DIAGNOSIS — I25.10 CORONARY ARTERY DISEASE INVOLVING NATIVE CORONARY ARTERY OF NATIVE HEART WITHOUT ANGINA PECTORIS: ICD-10-CM

## 2018-02-15 DIAGNOSIS — I27.20 PULMONARY HYPERTENSION (H): ICD-10-CM

## 2018-02-15 LAB
BASE EXCESS BLDA CALC-SCNC: 0.1 MMOL/L
HCO3 BLD-SCNC: 25 MMOL/L (ref 21–28)
OXYHGB MFR BLD: 95 % (ref 92–100)
PCO2 BLD: 39 MM HG (ref 35–45)
PH BLD: 7.41 PH (ref 7.35–7.45)
PO2 BLD: 76 MM HG (ref 80–105)

## 2018-02-15 PROCEDURE — 99215 OFFICE O/P EST HI 40 MIN: CPT | Performed by: FAMILY MEDICINE

## 2018-02-15 NOTE — PROGRESS NOTES
[facility]  Outpatient Sleep Medicine Followed Up Visit  February 15, 2018    Name: Cielo Aguillon MRN# [id number]   Age: 77 year old YOB: 1940     Date of Follow Up Visit: February 15, 2018  Consultation is requested by: Simin Lopez MD  [address]  [place] [address]  Primary care provider: Simin Lopez    Patient is accompanied by: Patient presents with , Brayden, today.       Reason for Sleep Consult:     Cielo Aguillon is a 77 year old female patient that presents here for a follow up evaluation after completing a PSG titration study.         Assessment and Plan:     Summary Sleep Diagnoses:    (1) Permanent Atrial Fibrillation  (2) Severe BETHANY / Complex Sleep Apnea  (3) Cheyne-Stoke Breathing  (4) Pulmonary Hypertension  (5) CAD    Summary Recommendations / Discussion:    This patient was diagnosed with severe BETHANY with an AHI of 39 events per hour. The patient was successfully treated with a CPAP at fixed pressure of 10 cmH2O. During the last visit, the PAP's download showed that the device was effective when used with a residual AHI of only 3.6 events per hour. Since the patient's health was stable and recent echocardiogram showed preserved LVEF, a new PSG was not needed. Instead, the patient had an overnight oximetry with the PAP in place. This showed prolonged sleep associated hypoxemia with 230.7 minutes below the SpO2 of 88%. As such, this patient underwent an in-lab PSG titration study with TCM and ABG. During the study, the patient was adequately titrated to an ASV at 4/0/20 cmH2O. Some Cheyne-Stoke breathing was noted during the PSG sleep study while on CPAP therapy. Given the preserved LVEF at 55 to 60%, ASV may be utilized. Sleep associated hypoxemia resolved with ASV therapy. Today, the patient will be started on auto-ASV with EPAP min of 6 cmH2O, EPAP max of 11 cmH2O, PS min of 0 cmH2O, PS max of 19 cmH2O. PAP compliance was discussed, explained,  and encouraged. Today, the nature and pathophysiology of BETHANY were discussed. The different treatment options for BETHANY were also reviewed and explained today again. Lifestyle recommendations including healthy dietary and exercising habits were discussed. Pt will follow up with me after completing 4 weeks of ASV therapy.    The patient was advised to not fly or go to high altitude places for now (this was carefully discussed and explained).    Coding:  (I48.2) Permanent atrial fibrillation (H)  (primary encounter diagnosis)  (G47.33) BETHANY (obstructive sleep apnea)  (I27.20) Pulmonary HTN  (I25.10) Coronary artery disease, angina presence unspecified, unspecified vessel or lesion type, unspecified whether native or transplanted heart    Counseling included a comprehensive review of diagnostic and therapeutic strategies as well as risks of inadequate therapy.    Educational materials provided in instructions. The patient was instructed to avoid driving or operating any heavy machinery when experiencing drowsiness.    All questions and concerns were addressed today. Pt agrees and understands the assessment and plan.         History of Present Illness:   Cielo Aguillon is a 77 year old  RHD female pt with PMH of cystocele, rectocele, hypertension, peptic ulcer disease, chornic rhinitis, Lichenification and Lichen Simplex Chronicus, fatty liver, hyperlipidemia, chronic back pain, non-obstructive coronary artery disease, mild to moderate pulmonary hypertension, generalized anxiety disorder, major depressive disorder, chronic atrial fibrillation (daignosed 5 yrs ago), hypothyroidism, and previously diagnosed BETHANY presents for a follow up evaluation today after completing a PSG titration study. This patient has been reporting GREEN for several years now. Pt was evaluated by cardiology, pulmonology, and even neurology. The dyspnea is due to a multifactorial nature.     This medically complex patient underwent a PSG sleep  rhoda in 2015 at [facility]. The study showed severe BETHANY with an AHI of 54 events per hour with a RDI of 58 events per hour scored at 4% for Medicare criteria, the AHI was 39 events per hour). Most events were obstructive in nature. The PAP download during the initial visit showed that the patient was using a CPAP at fixed pressure of 10 cmH2O. This was effective at treating the condition with a residual AHI of 3.6 events per hour. No significant leakage noted.    During the initial visit, the patient explained to me that she needed new CPAP supplies. The patient explained that she was not able to use the PAP device because the mask needed to be replaced and she was not able to get new supplies without repeating the PSG sleep study. She was using a CPAP with a nasal mask. The  explained that with the PAP device the patient would not snore, have witnessed apneas, or gasping / choking for air. Without the PAP device, the patient reports snoring and witnessed apneas. Some non-restorative sleep and sleep inertia reported. Pt denied EDS, but admits having physical fatigue. No inadvertent naps reported. Pt is not currently driving due to vision problems.    The patient was continue on CPAP at fixed pressure of 10 cmH2O and an overnight oximetry test was performed with the PAP therapy in place. This showed prolonged sleep associated hypoxemia with 230.7 minutes spent under the SpO2 of 88%. The study also demonstrated a sawtooth pattern suggestive of poorly controlled BETHANY. Therefore, the patient underwent a PSG titration study on 02/08/2018. During the study the patient had some central hypopneas with a Cheyne-Stoke pattern. The patient was adequately titrated to an ASV at 4/0/20 with a residual AHI of 6.9 events per hour. The patient had atrial fibrillation during the PSG sleep study.    The patient explains she wishes to go to [place] soon. She denies any new sxs or concerns.     PREVIOUS IN- LAB or HOME SLEEP STUDIES: The  patient reports the study was conducted in [facility]   Date: 2015   TYPE: PSG   AHI: 58 events per hour   BMI: 28.7 kg/m2   Intervention: CPAP    SLEEP-WAKE SCHEDULE:     Cielo KARSON Aguillon      -Describes herself as a night person; prefers to go to sleep at 10:30 PM and wakes up at 9:00 AM.      -The patient takes no naps during the week; takes no inadvertent naps.      -ON WEEKDAYS, goes to sleep at 10:30 PM during the week; awakens 9:00 AM with an alarm; falls asleep in 10 minutes; denies difficulty falling asleep.      -ON WEEKENDS, goes to sleep at 10:30 PM and wakes up at 9:00 AM with an alarm; falls asleep in 10 minutes.        -Awakens 1-2 times a night for 5 minutes before falling back to sleep; awakens to go to the bathroom and external stimuli.      BEDTIME ACTIVITIES AND SHIFT WORK:    Cielo Aguillon     -Bedtime Activities and Other Sleeping Information: Pt lives . Pt sleeps alone on a twin size bed ( sleeps in a different room due to snoring). Pt sleeps on her sides. Pt does not use any electronics in bed and uses bedroom only for sleeping.      -Occupation: Retired (Pharmacy Tech)     SCALES        -SLEEPINESS: [place] sleepiness scale (ESS):  2 / 24 (initial visit)    Drowsy driving / near accidents: Denies any near accidents    Consequences: Non-restorative sleep    SLEEP COMPLAINTS:  Cardio-respiratory     -Snoring: Significant snoring reported    -Dyspnea: Pt admits having witnessed apneas   -Morning headaches or confusion: Denies any morning cephalgia   -Coexisting Lung disease: Pulmonary HTN     -Coexisting Heart disease: Atria fibrillation with non-obstructive CAD     -Does patient have a bed partner: Patient sleeps alone   -Has bed partner been sleeping separately because of snoring:  No            RLS Screen:    -When you try to relax in the evening or sleep at night, do you ever have unpleasant, restless feelings in your legs that can be relieved by walking or movement? None  Reported     -Periodic limb movement: None Reported    Narcolepsy:     - Denies sudden urges of sleep attacks   - Denies cataplexy   - Denies sleep paralysis    - Admits hallucinations. Some hypnagogic hallucinations a few times a month consistent of little kids. This is not scary to her.     - Denies feeling refreshed after a nap.    Sleep Behaviors:   - Denies leg symptoms/movements   - Denies motor restlessness   - Denies night terrors   - Denies bruxism   - Denies automatic behaviors    Other Subjective Complaints:   - Denies anxiety or rumination    - Denies pain and discomfort at night   - Denies waking up with heart pounding or racing   - Denies GERD or aspiration         Parasomnia:    -NREM - Denies recurrent persistent confusional arousal, night eating, sleep walking or sleep terrors.      -REM - Denies dream enactment or injuries.     -Driving Accident or Near Accidents: Pt does not drive         Medications:     Current Outpatient Prescriptions   Medication Sig     warfarin (COUMADIN) 5 MG tablet TAKE 1&1/2 TABLETS (7.5MG) BY MOUTH ONCE DAILY ON TUESDAY & SATURDAY THEN 1 TABLET (5MG) ALL THE OTHER DAYS OF THE WEEK     metoprolol tartrate (LOPRESSOR) 100 MG tablet TAKE 1 TABLET (100MG) BY MOUTH 2 TIMES DAILY     levothyroxine (SYNTHROID/LEVOTHROID) 50 MCG tablet TAKE 1 TABLET (50 MCG) BY MOUTH DAILY     citalopram (CELEXA) 10 MG tablet Take 2 tablets (20 mg) by mouth daily     zolpidem (AMBIEN) 5 MG tablet Take tablet by mouth 15 minutes prior to sleep, for Sleep Study     diazepam (VALIUM) 5 MG tablet 1/2 to 1 po q 8 hours prn     rosuvastatin (CRESTOR) 10 MG tablet Take 1 tablet (10 mg) by mouth daily     traMADol (ULTRAM) 50 MG tablet Take 1 tablet (50 mg) by mouth every 8 hours as needed for moderate pain     amLODIPine (NORVASC) 5 MG tablet Take 1 tablet (5 mg) by mouth daily     VITAMIN D, CHOLECALCIFEROL, PO Take 1,000 Units by mouth daily      No current facility-administered medications for this  visit.       Allergies   Allergen Reactions     Ace Inhibitors      Cough (Zestril)     Aspirin      hx bleeding ulcers     Hydrochlorothiazide [Hctz] Rash     Ibuprofen      hx bleeding ulcers     Amoxicillin Rash     Losartan Rash     Penicillins Rash          Past Medical History:     Denies needing any 02 supplement at night.    Past Medical History:   Diagnosis Date     Anxiety     related to travel     Arthritis      ASCVD (arteriosclerotic cardiovascular disease) 2/12    60-70% stenoses of OM2, D1, medical management     Atrial fibrillation (H)      Atrial flutter (H)      Coronary artery disease     2/2012- 50% mid Cfx, 60-70% prox OM2, 50-70% D1     Essential hypertension, benign      Fatty liver 5/10    mild fibrosis on biopsy     Hypertriglyceridemia      Major depression      BETHANY (obstructive sleep apnea)      Other ventral hernia without mention of obstruction or gangrene      Peptic ulcer 2002     Peptic ulcer, unspecified site, unspecified as acute or chronic, without mention of hemorrhage, perforation, or obstruction 1983     Shortness of breath      Thyroid disease      Tubular adenoma 6/08             Past Surgical History:    Admits previous upper airway surgery.     Past Surgical History:   Procedure Laterality Date     APPENDECTOMY       C NONSPECIFIC PROCEDURE      Appendectomy     C NONSPECIFIC PROCEDURE      Tonsillectomy     C NONSPECIFIC PROCEDURE      Tubal ligation     C NONSPECIFIC PROCEDURE  11/01    Shave bone spurs from left foot     COLONOSCOPY  9/1/2016    Dr. Camejo [facility]     COLONOSCOPY N/A 9/1/2016    Procedure: COMBINED COLONOSCOPY, SINGLE OR MULTIPLE BIOPSY/POLYPECTOMY BY BIOPSY;  Surgeon: Pillo Camejo MD;  Location:  GI     CORONARY ANGIOGRAPHY ADULT ORDER  2/13/2012    50% mid Cfx, 60-70% prox OM2, 50-70% D1     ESOPHAGOSCOPY, GASTROSCOPY, DUODENOSCOPY (EGD), COMBINED  9/1/2016    Dr. Camejo [facility]     ESOPHAGOSCOPY, GASTROSCOPY, DUODENOSCOPY (EGD), COMBINED N/A  9/1/2016    Procedure: COMBINED ESOPHAGOSCOPY, GASTROSCOPY, DUODENOSCOPY (EGD), BIOPSY SINGLE OR MULTIPLE;  Surgeon: Pillo Camejo MD;  Location:  GI     GYN SURGERY       HEART CATH RIGHT AND LEFT HEART CATH  04/06/2015    Mid LAD 50-60%, 1st Diagonal 70%, 1st OM 30%, 2nd OM 40-50%, review report for right heart cath results.           Social History:     Social History   Substance Use Topics     Smoking status: Never Smoker     Smokeless tobacco: Never Used     Alcohol use 0.0 oz/week     0 Standard drinks or equivalent per week      Comment: occ - wine     Chemical History:     Tobacco: Never smoked     Uses no caffeine.   Supplements for wakefulness: Patient does not use any supplements to stay awake    EtOH: 1 to 2 drinks a week  Recreational Drugs: Patient denies using any recreational drugs     Psych Hx:   Current dangers to self or others: No. Pt denies any SI / HI, hallucinations, or delusions         Family History:     Family History   Problem Relation Age of Onset     Respiratory Father      emphysema--secon. to smoking     Alzheimer Disease Mother      Congenital Anomalies Mother      OSTEOPOROSIS Mother      DIABETES Maternal Aunt      DIABETES Other      cousins     DIABETES Other      cousin on mothers side of family     Depression Son      Depression Son      Colon Cancer No family hx of       Sleep Family Hx:       RLS - None reported   BETHANY - None reported  Insomnia - None reported  Parasomnia - None reported         Review of Systems:     A complete 10 point review of systems was negative other than HPI or as commented below:     CONSTITUTIONAL: NEGATIVE for weight gain/loss. Pt admits some chills.  EYES: NEGATIVE for changes in vision, blind spots, double vision.  ENT: NEGATIVE for ear pain. Pt admits having some sore throat, sinus pain, post-nasal drip, runny nose, and bloody nose.  CARDIAC: Positive for fast heartbeats or fluttering in chest, chest pain or pressure, breathlessness when  "lying flat, swollen legs / swollen feet.  NEUROLOGIC: Pt admits having headaches and leg weakness.  DERMATOLOGIC: NEGATIVE for rashes, new moles or change in mole(s)  PULMONARY: NEGATIVE coughing up blood, wheezing or whistling when breathing. Pt reports SOB at rest, SOB with activity, dry cough, and productive cough.     GASTROINTESTINAL: NEGATIVE for nausea or vomitting, loose or watery stools, fat or grease in stools, constipation, abdominal pain, bowel movements black in color or blood noted.  GENITOURINARY: NEGATIVE for pain during urination, blood in urine, urinating more frequently than usual, irregular menstrual periods.  MUSCULOSKELETAL: Positive for muscle pain, bone or joint pain, and swollen joints.  ENDOCRINE: NEGATIVE for increased thirst or urination, diabetes.  LYMPHATIC: NEGATIVE for swollen lymph nodes, lumps or bumps in the breasts or nipple discharge.         Physical Examination:   Pulse 63  Resp 24  Ht 1.651 m (5' 5\")  Wt 78.9 kg (174 lb)  SpO2 95%  BMI 28.96 kg/m2     VS: Reviewed and normal. 6 minute walk showed a deconditioned patient with SpO2 ranging at 95% to 96%.   General: Alert, oriented, not in distress. Dressed casually; Good eye contact; Comfortably sitting in a chair; in no apparent distress  Lungs: Both hemithoraces are symmetrical, normal to palpation, no dullness to percussion, auscultation of lungs revealed normal breath sounds with no expirium prolongation, wheezing, rhonci and crackles.  CVS: Normal S1 and S2 heart sounds with no extra heart sounds. No murmur, rubs, or clicks. Normal peripheral pulses throughout with no obvious peripheral edema. Irregularly irregular rhythm noted.  Psychiatry: Mood and affect are appropriate. Euthymic with affect congruent with full range and intensity. No SI/HI with adequate insight and judgement.          Data: All pertinent previous laboratory data reviewed     Lab Results   Component Value Date    PH 7.41 02/08/2018    PH 7.34 (L) " 04/06/2015    PO2 76 (L) 02/08/2018    PO2 62 (L) 04/06/2015    PCO2 39 02/08/2018    PCO2 40 04/06/2015    HCO3 25 02/08/2018    HCO3 22 04/06/2015    SRIDHAR 0.1 02/08/2018     Lab Results   Component Value Date    TSH 3.32 11/14/2017    TSH 4.42 (H) 08/30/2017     Lab Results   Component Value Date    GLC 96 08/30/2017     (H) 02/24/2016     Lab Results   Component Value Date    HGB 13.8 08/30/2017    HGB 13.7 02/16/2017     Lab Results   Component Value Date    BUN 20 08/30/2017    BUN 16 02/24/2016    CR 1.03 08/30/2017    CR 1.08 02/24/2016     Echocardiology:    -Most recent echocardiogram obtained on 07/10/2017 showed LV or normal size with moderate concentric left ventricular hypertrophy. LV systolic function was normal with LVEF estimated at 55 - 60%. The RV was normal in side, structure, and function. There was mod-severe biatrial enlargement noted. No obvious atrial shunt observed. Mild to moderate mitral annular calcification was noted. There was also mild mitral regurgitation present. Mild pulmonic valve regurgitation noted. The rhythm was atrial fibrillation during the study.    Chest x-ray: None Recent Studies Available    PFT: None Recent Studies Available    Laboratory Studies:   Component Value Flag Ref Range Units Status Collected Lab   Sodium 140  133 - 144 mmol/L Final 08/30/2017 12:39 PM 65   Potassium 4.3  3.4 - 5.3 mmol/L Final 08/30/2017 12:39 PM 65   Chloride 106  94 - 109 mmol/L Final 08/30/2017 12:39 PM 65   Carbon Dioxide 26  20 - 32 mmol/L Final 08/30/2017 12:39 PM 65   Anion Gap 8  3 - 14 mmol/L Final 08/30/2017 12:39 PM 65   Glucose 96  70 - 99 mg/dL Final 08/30/2017 12:39 PM 65   Urea Nitrogen 20  7 - 30 mg/dL Final 08/30/2017 12:39 PM 65   Creatinine 1.03  0.52 - 1.04 mg/dL Final 08/30/2017 12:39 PM 65   GFR Estimate 52 (L) >60 mL/min/1.7m2 Final 08/30/2017 12:39 PM 65   Comment:   Non  GFR Calc   GFR Estimate If Black 63  >60 mL/min/1.7m2 Final 08/30/2017 12:39  PM 65   Comment:   African American GFR Calc   Calcium 9.3  8.5 - 10.1 mg/dL Final 08/30/2017 12:39 PM 65   Bilirubin Total 0.6  0.2 - 1.3 mg/dL Final 08/30/2017 12:39 PM 65   Albumin 3.6  3.4 - 5.0 g/dL Final 08/30/2017 12:39 PM 65   Protein Total 7.6  6.8 - 8.8 g/dL Final 08/30/2017 12:39 PM 65   Alkaline Phosphatase 68  40 - 150 U/L Final 08/30/2017 12:39 PM 65   ALT 32  0 - 50 U/L Final 08/30/2017 12:39 PM 65   AST 44  0 - 45 U/L Final 08/30/2017 12:39 PM 65     Component Value Flag Ref Range Units Status Collected Lab   WBC 7.4  4.0 - 11.0 10e9/L Final 08/30/2017 12:39 PM 79   RBC Count 4.01  3.8 - 5.2 10e12/L Final 08/30/2017 12:39 PM 79   Hemoglobin 13.8  11.7 - 15.7 g/dL Final 08/30/2017 12:39 PM 79   Hematocrit 41.3  35.0 - 47.0 % Final 08/30/2017 12:39 PM 79    (H) 78 - 100 fl Final 08/30/2017 12:39 PM 79   MCH 34.4 (H) 26.5 - 33.0 pg Final 08/30/2017 12:39 PM 79   Comment:   Reviewed: OK with previous   MCHC 33.4  31.5 - 36.5 g/dL Final 08/30/2017 12:39 PM 79   RDW 13.3  10.0 - 15.0 % Final 08/30/2017 12:39 PM 79   Platelet Count 161  150 - 450 10e9/L Final 08/30/2017 12:39 PM 79   Diff Method     Final 08/30/2017 12:39 PM 79   Automated Method   % Neutrophils 67.4   % Final 08/30/2017 12:39 PM 79   % Lymphocytes 22.6   % Final 08/30/2017 12:39 PM 79   % Monocytes 8.8   % Final 08/30/2017 12:39 PM 79   % Eosinophils 1.1   % Final 08/30/2017 12:39 PM 79   % Basophils 0.1   % Final 08/30/2017 12:39 PM 79   Absolute Neutrophil 5.0  1.6 - 8.3 10e9/L Final 08/30/2017 12:39 PM 79   Absolute Lymphocytes 1.7  0.8 - 5.3 10e9/L Final 08/30/2017 12:39 PM 79   Absolute Monocytes 0.7  0.0 - 1.3 10e9/L Final 08/30/2017 12:39 PM 79   Absolute Eosinophils 0.1  0.0 - 0.7 10e9/L Final 08/30/2017 12:39 PM 79   Absolute Basophils 0.0  0.0 - 0.2 10e9/L Final 08/30/2017 12:39 PM 79     Component Value Flag Ref Range Units Status Collected Lab   TSH 4.42 (H) 0.40 - 4.00 mU/L Final 08/30/2017 12:39 PM 65     Component  Value Flag Ref Range Units Status Collected Lab   T4 Free 1.10  0.76 - 1.46 ng/dL Final 08/30/2017 12:39 PM 65     Dennys Johnson MD, MPH  Clinical Sleep Medicine    Total time spent with patient: 40 min. Over >50% of the time was spent for face to face counseling, education, and evaluation.

## 2018-02-15 NOTE — NURSING NOTE
"Chief Complaint   Patient presents with     RECHECK     f/u titration 2/9/18       Initial Pulse 63  Resp 24  Ht 1.651 m (5' 5\")  Wt 78.9 kg (174 lb)  SpO2 95%  BMI 28.96 kg/m2 Estimated body mass index is 28.96 kg/(m^2) as calculated from the following:    Height as of this encounter: 1.651 m (5' 5\").    Weight as of this encounter: 78.9 kg (174 lb).  Medication Reconciliation: complete         Damaris Guerra LPN/HALIMA  "

## 2018-02-15 NOTE — MR AVS SNAPSHOT
After Visit Summary   2/15/2018    Cielo Aguillon    MRN: [id number]           Patient Information     Date Of Birth          1940        Visit Information        Provider Department      2/15/2018 2:00 PM Dennys Johnson MD [facility]        Today's Diagnoses     Complex sleep apnea syndrome    -  1      Care Instructions          Your BMI is Body mass index is 28.96 kg/(m^2).  Weight management is a personal decision.  If you are interested in exploring weight loss strategies, the following discussion covers the approaches that may be successful. Body mass index (BMI) is one way to tell whether you are at a healthy weight, overweight, or obese. It measures your weight in relation to your height.  A BMI of 18.5 to 24.9 is in the healthy range. A person with a BMI of 25 to 29.9 is considered overweight, and someone with a BMI of 30 or greater is considered obese. More than two-thirds of American adults are considered overweight or obese.  Being overweight or obese increases the risk for further weight gain. Excess weight may lead to heart disease and diabetes.  Creating and following plans for healthy eating and physical activity may help you improve your health.  Weight control is part of healthy lifestyle and includes exercise, emotional health, and healthy eating habits. Careful eating habits lifelong are the mainstay of weight control. Though there are significant health benefits from weight loss, long-term weight loss with diet alone may be very difficult to achieve- studies show long-term success with dietary management in less than 10% of people. Attaining a healthy weight may be especially difficult to achieve in those with severe obesity. In some cases, medications, devices and surgical management might be considered.  What can you do?  If you are overweight or obese and are interested in methods for weight loss, you should discuss this with your provider.      Consider reducing daily calorie intake by 500 calories.     Keep a food journal.     Avoiding skipping meals, consider cutting portions instead.    Diet combined with exercise helps maintain muscle while optimizing fat loss. Strength training is particularly important for building and maintaining muscle mass. Exercise helps reduce stress, increase energy, and improves fitness. Increasing exercise without diet control, however, may not burn enough calories to loose weight.       Start walking three days a week 10-20 minutes at a time    Work towards walking thirty minutes five days a week     Eventually, increase the speed of your walking for 1-2 minutes at time    In addition, we recommend that you review healthy lifestyles and methods for weight loss available through the National Institutes of Health patient information sites:  http://win.niddk.nih.gov/publications/index.htm    And look into health and wellness programs that may be available through your health insurance provider, employer, local community center, or omar club.    Weight management plan: Patient was referred to their PCP to discuss a diet and exercise plan.     Your blood pressure was checked while you were in clinic today.  Please read the guidelines below about what these numbers mean and what you should do about them.  Your systolic blood pressure is the top number.  This is the pressure when the heart is pumping.  Your diastolic blood pressure is the bottom number.  This is the pressure in between beats.  If your systolic blood pressure is less than 120 and your diastolic blood pressure is less than 80, then your blood pressure is normal. There is nothing more that you need to do about it  If your systolic blood pressure is 120-139 or your diastolic blood pressure is 80-89, your blood pressure may be higher than it should be.  You should have your blood pressure re-checked within a year by a primary care provider.  If your systolic blood  pressure is 140 or greater or your diastolic blood pressure is 90 or greater, you may have high blood pressure.  High blood pressure is treatable, but if left untreated over time it can put you at risk for heart attack, stroke, or kidney failure.  You should have your blood pressure re-checked by a primary care provider within the next four weeks.    1. CPAP-  WHAT DOES IT DO AND HOW CAN I LEARN TO WEAR IT?                               BEFORE I START, CAN I WATCH A MOVIE TO GET A PLAN ON HOW TO USE CPAP?  https://www.Cazoodle.com/watch?f=m6U72nt784O      Continuous positive airway pressure, or CPAP, is the most effective treatment for obstructive sleep apnea. It works by blowing room air, through a mask, to hold your throat open. A decision to use CPAP is a major step forward in the pursuit of a healthier life. The successful use of CPAP will help you breathe easier, sleep better and live healthier. You can choose CPAP equipment from any durable medical equipment provider that meets your needs.  Using CPAP can be a positive experience if you keep these [place] points in mind:  1. Commitment  CPAP is not a quick fix for your problem. It involves a long-term commitment to improve your sleep and your health.    2. Communication  Stay in close communication with both your sleep doctor and your CPAP supplier. Ask lots of questions and seek help when you need it.    3. Consistency  Use CPAP all night, every night and for every nap. You will receive the maximum health benefits from CPAP when you use it every time that you sleep. This will also make it easier for your body to adjust to the treatment.    4. Correction  The first machine and mask that you try may not be the best ones for you. Work with your sleep doctor and your CPAP supplier to make corrections to your equipment selection. Ask about trying a different type of machine or mask if you have ongoing problems. Make sure that your mask is a good fit and learn to use  "your equipment properly.    5. Challenge  Tell a family member or close friend to ask you each morning if you used your CPAP the previous night. Have someone to challenge you to give it your best effort.    6. Connection   Your adjustment to CPAP will be easier if you are able to connect with others who use the same treatment. Ask your sleep doctor if there is a support group in your area for people who have sleep apnea, or look for one on the Internet.  7. Comfort   Increase your level of comfort by using a saline spray, decongestant or heated humidifier if CPAP irritates your nose, mouth or throat. Use your unit's \"ramp\" setting to slowly get used to the air pressure level. There may be soft pads you can buy that will fit over your mask straps. Look on www.CPAP.com for accessories that can help make CPAP use more comfortable.  8. Cleaning   Clean your mask, tubing and headgear on a regular basis. Put this time in your schedule so that you don't forget to do it. Check and replace the filters for your CPAP unit and humidifier.    9. Completion   Although you are never finished with CPAP therapy, you should reward yourself by celebrating the completion of your first month of treatment. Expect this first month to be your hardest period of adjustment. It will involve some trial and error as you find the machine, mask and pressure settings that are right for you.    10. Continuation  After your first month of treatment, continue to make a daily commitment to use your CPAP all night, every night and for every nap.    CPAP-Tips to starting with success:  Begin using your CPAP for short periods of time during the day while you watch TV or read.    Use CPAP every night and for every nap. Using it less often reduces the health benefits and makes it harder for your body to get used to it.    Make small adjustments to your mask, tubing, straps and headgear until you get the right fit. Tightening the mask may actually worsen " the leak.  If it leaves significant marks on your face or irritates the bridge of your nose, it may not be the best mask for you.  Speak with the person who supplied the mask and consider trying other masks. Insurances will allow you to try different masks during the first month of starting CPAP.  Insurance also covers a new mask, hose and filter about every 6 months.    Use a saline nasal spray to ease mild nasal congestion. Neti-Pot or saline nasal rinses may also help. Nasal gel sprays can help reduce nasal dryness.  Biotene mouthwash can be helpful to protect your teeth if you experience frequent dry mouth.  Dry mouth may be a sign of air escaping out of your mouth or out of the mask in the case of a full face mask.  Speak with your provider if you expect that is the case.     Take a nasal decongestant to relieve more severe nasal or sinus congestion.  Do not use Afrin (oxymetazoline) nasal spray more than 3 days in a row.  Speak with your sleep doctor if your nasal congestion is chronic.    Use a heated humidifier that fits your CPAP model to enhance your breathing comfort. Adjust the heat setting up if you get a dry nose or throat, down if you get condensation in the hose or mask.  Position the CPAP lower than you so that any condensation in the hose drains back into the machine rather than towards the mask.    Try a system that uses nasal pillows if traditional masks give you problems.    Clean your mask, tubing and headgear once a week. Make sure the equipment dries fully.    Regularly check and replace the filters for your CPAP unit and humidifier.    Work closely with your sleep provider and your CPAP supplier to make sure that you have the machine, mask and air pressure setting that works best for you. It is better to stop using it and call your provider to solve problems than to lay awake all night frustrated with the device.    Daytime naps are not advised, but use the PAP device if taking naps. Many  insurances require that we prove you are using the PAP device at least 4 hours on at least 70% of nights over a 30 day period. We have 90 days to meet those criteria.    You can get new supplies (mask, hose and filter) for your device every 3-6 months (covered by insurance). You do not need to get supplies that often, but they are available if you would like them. You may exchange the mask once within the first month if you feel the initial mask does not fit well. Please, contact your medical equipment provider for equipment issues.    Please let me know if you have any snoring, daytime sleepiness, or poor sleep quality. We will want to make sure your PAP device is adequately treating your condition.    There is a website called CPAP.com that has accessories that may make CPAP use easier. Please visit it at your convenience.    Please, schedule follow up visit with the nurse (she will coming into the room and meet with you).    Thank you!    Dennys Johnson MD, MPH  Clinical Sleep and Occupational / Environmental Medicine                Follow-ups after your visit        Your next 10 appointments already scheduled     Feb 22, 2018  1:15 PM CST   [facility] with [facility]   [facility]    [address]  [facility] [id number] [phone]              Future tests that were ordered for you today     Open Future Orders        Priority Expected Expires Ordered    Overnight oximetry study Routine  8/14/2018 2/15/2018            Who to contact     If you have questions or need follow up information about today's clinic visit or your schedule please [place] [facility] [place] at [phone].  Normal or non-critical lab and imaging results will be communicated to you by MyChart, letter or phone within 4 business days after the clinic has received the results. If you do not hear from us within 7 days,  "please contact the clinic through [employer] or phone. If you have a critical or abnormal lab result, we will notify you by phone as soon as possible.  Submit refill requests through [employer] or call your pharmacy and they will forward the refill request to us. Please allow 3 business days for your refill to be completed.          Additional Information About Your Visit        Conjectur[employer]Tag & See Information     [employer] lets you send messages to your doctor, view your test results, renew your prescriptions, schedule appointments and more. To sign up, go to [email or web address] . Click on \"Log in\" on the left side of the screen, which will take you to the Welcome page. Then click on \"Sign up Now\" on the right side of the page.     You will be asked to enter the access code listed below, as well as some personal information. Please follow the directions to create your username and password.     Your access code is: [email or web address]  Expires: 2018  2:07 PM     Your access code will  in 90 days. If you need help or a new code, please call your [place] clinic or [phone].        Care EveryWhere ID     This is your Care EveryWhere ID. This could be used by other organizations to access your [place] medical records  [id number]        Your Vitals Were     Pulse Respirations Height Pulse Oximetry BMI (Body Mass Index)       63 24 1.651 m (5' 5\") 95% 28.96 kg/m2        Blood Pressure from Last 3 Encounters:   17 119/64   17 122/78   17 135/82    Weight from Last 3 Encounters:   02/15/18 78.9 kg (174 lb)   17 78.9 kg (174 lb)   17 79.9 kg (176 lb 1.6 oz)              We Performed the Following     Comprehensive DME        Primary Care Provider Office Phone # Fax #    Simin Lopez [phone]       [address]  [facility] [address]        Equal Access to Services     ANNELISE CHAVEZ AH: Hadii jamari Jack, javed antoine, qaybta yaneth woo " geraldo daileydaysi lasararodolfo ah. So [facility] [phone].    ATENCIÓN: Si malickla kacey, tiene a duran disposición servicios gratuitos de asistencia lingüística. Gurwinder gardiner [phone].    We comply with applicable federal civil rights laws and [place] laws. We do not discriminate on the basis of race, color, national origin, age, disability, sex, sexual orientation, or gender identity.            Thank you!     Thank you for choosing [facility]  for your care. Our goal is always to provide you with excellent care. Hearing back from our patients is one way we can continue to improve our services. Please take a few minutes to complete the written survey that you may receive in the mail after your visit with us. Thank you!             Your Updated Medication List - Protect others around you: Learn how to safely use, store and throw away your medicines at www.disposemymeds.org.          This list is accurate as of 2/15/18  2:08 PM.  Always use your most recent med list.                   Brand Name Dispense Instructions for use Diagnosis    amLODIPine 5 MG tablet    NORVASC    90 tablet    Take 1 tablet (5 mg) by mouth daily    Essential hypertension, benign       citalopram 10 MG tablet    celeXA    180 tablet    Take 2 tablets (20 mg) by mouth daily    Major depressive disorder, recurrent episode, moderate (H), MARY (generalized anxiety disorder)       diazepam 5 MG tablet    VALIUM    40 tablet    1/2 to 1 po q 8 hours prn    MARY (generalized anxiety disorder)       levothyroxine 50 MCG tablet    SYNTHROID/LEVOTHROID    90 tablet    TAKE 1 TABLET (50 MCG) BY MOUTH DAILY    Acquired hypothyroidism       metoprolol tartrate 100 MG tablet    LOPRESSOR    180 tablet    TAKE 1 TABLET (100MG) BY MOUTH 2 TIMES DAILY    Essential hypertension, benign, ASCVD (arteriosclerotic cardiovascular disease)       rosuvastatin 10 MG tablet    CRESTOR    30 tablet    Take 1 tablet (10 mg) by mouth daily     Hyperlipidemia LDL goal <70, Coronary artery disease involving native coronary artery of native heart without angina pectoris       traMADol 50 MG tablet    ULTRAM    50 tablet    Take 1 tablet (50 mg) by mouth every 8 hours as needed for moderate pain    Chronic left-sided low back pain with left-sided sciatica       VITAMIN D (CHOLECALCIFEROL) PO      Take 1,000 Units by mouth daily        warfarin 5 MG tablet    COUMADIN    108 tablet    TAKE 1&1/2 TABLETS (7.5MG) BY MOUTH ONCE DAILY ON TUESDAY & SATURDAY THEN 1 TABLET (5MG) ALL THE OTHER DAYS OF THE WEEK    Chronic atrial fibrillation (H), Long term current use of anticoagulant therapy       zolpidem 5 MG tablet    AMBIEN    1 tablet    Take tablet by mouth 15 minutes prior to sleep, for Sleep Study    Permanent atrial fibrillation (H), BETHANY (obstructive sleep apnea), Pulmonary hypertension, Coronary artery disease without angina pectoris, unspecified vessel or lesion type, unspecified whether native or transplanted heart

## 2018-02-15 NOTE — PATIENT INSTRUCTIONS
Your BMI is Body mass index is 28.96 kg/(m^2).  Weight management is a personal decision.  If you are interested in exploring weight loss strategies, the following discussion covers the approaches that may be successful. Body mass index (BMI) is one way to tell whether you are at a healthy weight, overweight, or obese. It measures your weight in relation to your height.  A BMI of 18.5 to 24.9 is in the healthy range. A person with a BMI of 25 to 29.9 is considered overweight, and someone with a BMI of 30 or greater is considered obese. More than two-thirds of American adults are considered overweight or obese.  Being overweight or obese increases the risk for further weight gain. Excess weight may lead to heart disease and diabetes.  Creating and following plans for healthy eating and physical activity may help you improve your health.  Weight control is part of healthy lifestyle and includes exercise, emotional health, and healthy eating habits. Careful eating habits lifelong are the mainstay of weight control. Though there are significant health benefits from weight loss, long-term weight loss with diet alone may be very difficult to achieve- studies show long-term success with dietary management in less than 10% of people. Attaining a healthy weight may be especially difficult to achieve in those with severe obesity. In some cases, medications, devices and surgical management might be considered.  What can you do?  If you are overweight or obese and are interested in methods for weight loss, you should discuss this with your provider.     Consider reducing daily calorie intake by 500 calories.     Keep a food journal.     Avoiding skipping meals, consider cutting portions instead.    Diet combined with exercise helps maintain muscle while optimizing fat loss. Strength training is particularly important for building and maintaining muscle mass. Exercise helps reduce stress, increase energy, and improves  fitness. Increasing exercise without diet control, however, may not burn enough calories to loose weight.       Start walking three days a week 10-20 minutes at a time    Work towards walking thirty minutes five days a week     Eventually, increase the speed of your walking for 1-2 minutes at time    In addition, we recommend that you review healthy lifestyles and methods for weight loss available through the National Institutes of Health patient information sites:  http://win.niddk.nih.gov/publications/index.htm    And look into health and wellness programs that may be available through your health insurance provider, employer, local community center, or omar club.    Weight management plan: Patient was referred to their PCP to discuss a diet and exercise plan.     Your blood pressure was checked while you were in clinic today.  Please read the guidelines below about what these numbers mean and what you should do about them.  Your systolic blood pressure is the top number.  This is the pressure when the heart is pumping.  Your diastolic blood pressure is the bottom number.  This is the pressure in between beats.  If your systolic blood pressure is less than 120 and your diastolic blood pressure is less than 80, then your blood pressure is normal. There is nothing more that you need to do about it  If your systolic blood pressure is 120-139 or your diastolic blood pressure is 80-89, your blood pressure may be higher than it should be.  You should have your blood pressure re-checked within a year by a primary care provider.  If your systolic blood pressure is 140 or greater or your diastolic blood pressure is 90 or greater, you may have high blood pressure.  High blood pressure is treatable, but if left untreated over time it can put you at risk for heart attack, stroke, or kidney failure.  You should have your blood pressure re-checked by a primary care provider within the next four weeks.    1. CPAP-  WHAT DOES IT  DO AND HOW CAN I LEARN TO WEAR IT?                               BEFORE I START, CAN I WATCH A MOVIE TO GET A PLAN ON HOW TO USE CPAP?  https://www.youtube.com/watch?p=b3I00zp148G      Continuous positive airway pressure, or CPAP, is the most effective treatment for obstructive sleep apnea. It works by blowing room air, through a mask, to hold your throat open. A decision to use CPAP is a major step forward in the pursuit of a healthier life. The successful use of CPAP will help you breathe easier, sleep better and live healthier. You can choose CPAP equipment from any durable medical equipment provider that meets your needs.  Using CPAP can be a positive experience if you keep these [place] points in mind:  1. Commitment  CPAP is not a quick fix for your problem. It involves a long-term commitment to improve your sleep and your health.    2. Communication  Stay in close communication with both your sleep doctor and your CPAP supplier. Ask lots of questions and seek help when you need it.    3. Consistency  Use CPAP all night, every night and for every nap. You will receive the maximum health benefits from CPAP when you use it every time that you sleep. This will also make it easier for your body to adjust to the treatment.    4. Correction  The first machine and mask that you try may not be the best ones for you. Work with your sleep doctor and your CPAP supplier to make corrections to your equipment selection. Ask about trying a different type of machine or mask if you have ongoing problems. Make sure that your mask is a good fit and learn to use your equipment properly.    5. Challenge  Tell a family member or close friend to ask you each morning if you used your CPAP the previous night. Have someone to challenge you to give it your best effort.    6. Connection   Your adjustment to CPAP will be easier if you are able to connect with others who use the same treatment. Ask your sleep doctor if there is a support group  "in your area for people who have sleep apnea, or look for one on the Internet.  7. Comfort   Increase your level of comfort by using a saline spray, decongestant or heated humidifier if CPAP irritates your nose, mouth or throat. Use your unit's \"ramp\" setting to slowly get used to the air pressure level. There may be soft pads you can buy that will fit over your mask straps. Look on www.CPAP.com for accessories that can help make CPAP use more comfortable.  8. Cleaning   Clean your mask, tubing and headgear on a regular basis. Put this time in your schedule so that you don't forget to do it. Check and replace the filters for your CPAP unit and humidifier.    9. Completion   Although you are never finished with CPAP therapy, you should reward yourself by celebrating the completion of your first month of treatment. Expect this first month to be your hardest period of adjustment. It will involve some trial and error as you find the machine, mask and pressure settings that are right for you.    10. Continuation  After your first month of treatment, continue to make a daily commitment to use your CPAP all night, every night and for every nap.    CPAP-Tips to starting with success:  Begin using your CPAP for short periods of time during the day while you watch TV or read.    Use CPAP every night and for every nap. Using it less often reduces the health benefits and makes it harder for your body to get used to it.    Make small adjustments to your mask, tubing, straps and headgear until you get the right fit. Tightening the mask may actually worsen the leak.  If it leaves significant marks on your face or irritates the bridge of your nose, it may not be the best mask for you.  Speak with the person who supplied the mask and consider trying other masks. Insurances will allow you to try different masks during the first month of starting CPAP.  Insurance also covers a new mask, hose and filter about every 6 months.    Use a " saline nasal spray to ease mild nasal congestion. Neti-Pot or saline nasal rinses may also help. Nasal gel sprays can help reduce nasal dryness.  Biotene mouthwash can be helpful to protect your teeth if you experience frequent dry mouth.  Dry mouth may be a sign of air escaping out of your mouth or out of the mask in the case of a full face mask.  Speak with your provider if you expect that is the case.     Take a nasal decongestant to relieve more severe nasal or sinus congestion.  Do not use Afrin (oxymetazoline) nasal spray more than 3 days in a row.  Speak with your sleep doctor if your nasal congestion is chronic.    Use a heated humidifier that fits your CPAP model to enhance your breathing comfort. Adjust the heat setting up if you get a dry nose or throat, down if you get condensation in the hose or mask.  Position the CPAP lower than you so that any condensation in the hose drains back into the machine rather than towards the mask.    Try a system that uses nasal pillows if traditional masks give you problems.    Clean your mask, tubing and headgear once a week. Make sure the equipment dries fully.    Regularly check and replace the filters for your CPAP unit and humidifier.    Work closely with your sleep provider and your CPAP supplier to make sure that you have the machine, mask and air pressure setting that works best for you. It is better to stop using it and call your provider to solve problems than to lay awake all night frustrated with the device.    Daytime naps are not advised, but use the PAP device if taking naps. Many insurances require that we prove you are using the PAP device at least 4 hours on at least 70% of nights over a 30 day period. We have 90 days to meet those criteria.    You can get new supplies (mask, hose and filter) for your device every 3-6 months (covered by insurance). You do not need to get supplies that often, but they are available if you would like them. You may  exchange the mask once within the first month if you feel the initial mask does not fit well. Please, contact your medical equipment provider for equipment issues.    Please let me know if you have any snoring, daytime sleepiness, or poor sleep quality. We will want to make sure your PAP device is adequately treating your condition.    There is a website called CPAP.com that has accessories that may make CPAP use easier. Please visit it at your convenience.    Please, schedule follow up visit with the nurse (she will coming into the room and meet with you).    Thank you!    Dennys Johnson MD, MPH  Clinical Sleep and Occupational / Environmental Medicine

## 2018-02-15 NOTE — PROCEDURES
" SLEEP STUDY INTERPRETATION  TITRATION STUDY      Patient: Cielo Aguillon  YOB: 1940  Study Date: 2/8/2018  MRN: [id number]  Referring Provider: Self Referred  Ordering Provider: Dennys Johnson MD, MPH    Indications for Polysomnography: The patient is a 77 year old female who is 5' 5\" and weighs 174.0 lbs. Her BMI is 29.0 kg/m2, Stephen sleepiness scale 2.0, and neck circumference is 40.0 cm. Relevant medical history includes cystocele, rectocele, hypertension, peptic ulcer disease, chronic rhinitis, Lichenification and Lichen Simplex Chronicus, fatty liver, hyperlipidemia, chronic back pain, non-obstructive coronary artery disease, mild to moderate pulmonary hypertension, generalized anxiety disorder, major depressive disorder, chronic atrial fibrillation, hypothyroidism, and previously diagnosed obstructive sleep apnea (BETHANY). A polysomnogram with PAP titration was performed to correct for obstructive sleep apnea, central sleep apnea (CSA), and / or sleep associated hypoventilation / hypoxemia.    Polysomnogram Data: A full night polysomnogram recorded the standard physiologic parameters including EEG, EOG, EMG, ECG, nasal and oral airflow. Respiratory parameters of chest and abdominal movements were recorded with respiratory inductance plethysmography. Oxygen saturation was recorded by pulse oximetry. Hypopnea scoring rule used: 1B 4%.    Treatment PSG  Sleep Architecture: Abnormal sleep architecture with no N3 or REM sleep stages. Sleep fragmentation with decreased sleep efficiency noted. Limited supine sleep captured.  The total recording time of the polysomnogram was 500.8 minutes. The total sleep time was 298.5 minutes. Sleep latency was increased at 38.9 minutes with the use of a sleep aid (zolpidem 5 mg). Arousal index was mildly increased at 21.5 arousals per hour. Sleep efficiency was decreased at 59.6%. Wake after sleep onset was 163.0 minutes. The patient spent 12.2% of total sleep " time in Stage N1, 87.8% in Stage N2, 0.0% in Stage N3, and 0.0% in REM. There was no REM sleep or REM supine sleep captured during the PSG sleep study.    Respiration: Adequate titration with an ASV at final settings at 4/0/20 cmH2O with a residual AHI of 6.9 events per hour. No REM supine captured during this final setting. No sleep hypoxemia or hypoventilation noted during PAP therapy. Some Cheyne-Stoke respiratory patterns were noted during CPAP therapy.    The patient was titrated at pressures ranging from ASV 4/0/20 cmH2O up to CPAP 13 cmH2O. The final pressure achieved was ASV 4/0/20 cmH2O with a residual AHI of 6.9 events per hour. There as no REM supine or supine sleep captured during this final setting.    This titration was considered adequate (residual AHI with 75% decrease or above constraints without REM supine sleep at final pressure). However, some snoring was still present even during these final ASV settings.     Snoring - was reported as absent on treatment. However, some snoring was indicated on the snoring channel.    Respiratory rate and pattern - was notable for Cheyne-Redmond respiratory rate and pattern with increased cycle length at 62 seconds (see graph below). The Cheyne-Stoke respiratory pattern was composed by central hypopneas rather than central apneas.    Sustained Sleep Associated Hypoventilation - Transcutaneous carbon dioxide monitoring was used, however significant hypoventilation was not suggested by oximetry and this was also not present on TCM monitoring with a maximum change from 31.7 to 37.9 mmHg and 0 minutes at or greater than 55 mmHg.    Sleep Associated Hypoxemia - (Greater than 5 minutes O2 sat at or below 88%) was not present. Baseline oxygen saturation was 93.1%. Lowest oxygen saturation was 86.0%. Time spent less than or equal to 88% was 0.4 minutes. Time spent less than or equal to 89% was 2.7 minutes.      Movement Activity: Increased periodic limb movements with  mild cortical arousal association. Otherwise, no abnormal nocturnal behaviors or bruxism noted.    Periodic Limb Activity - There were 219 PLMs during the entire study. The PLM index was 44.0 movements per hour. The PLM Arousal Index was 6.6 per hour.    REM EMG Activity - Unable to comment. No REM sleep captured.    Nocturnal Behavior - Abnormal sleep related behaviors were not noted during/arising out of NREM / REM sleep.    Bruxism - None apparent.    Cardiac Summary: Atrial fibrillation with frequent PVCs observed.  The average pulse rate was 52.4 bpm. The minimum pulse rate was 34.9 bpm while the maximum pulse rate was 85.2 bpm. Arrhythmias were noted. An irregularly irregular QRS complexes with no correlated P wave suggestive of atrial fibrillation noted. Frequent premature wide complex beats suggestive of premature ventricular contractions (PVCs) were noted.    Assessment:     The PSG sleep study demonstrated an adequate titration with an ASV at final settings at 4/0/20 cmH2O with a residual AHI of 6.9 events per hour. No REM supine was captured during this final setting. There was no sleep hypoxemia or hypoventilation noted during PAP therapy. Some Cheyne-Stoke respiratory patterns were noted during CPAP therapy and these improved during ASV therapy. Of note, more respirations during ASV therapy were machine initiated.    The study showed abnormal sleep architecture without any N3 or REM sleep stages. Sleep fragmentation with decreased sleep efficiency was also noted. Limited supine sleep captured.    There were increased periodic limb movements with mild cortical arousal association noted during the study. Otherwise, no abnormal nocturnal behaviors or bruxism were noted.    Finally, there was atrial fibrillation with frequent PVCs observed throughout the PSG sleep study.    Recommendations:    Treatment of complex sleep apnea with Cheyne-Stoke respiratory pattern with auto-ASV at EPAP minimum of 6 cmH2O,  EPAP maximum of 11 cmH2O, PS minimum of 0 cmH2O, and PS maximum of 19 cmH2O. Of note, ASV may be utilized given preserved left ventricle systolic function. Recommend clinical follow up with sleep management team, for coaching and review of effectiveness and compliance measures.    Advice regarding the risks of drowsy driving.    Suggest optimizing sleep schedule and avoiding sleep deprivation.    Pharmacologic therapy should be used for management of restless legs syndrome only if present and clinically indicated and not based on the presence of periodic limb movements alone.    Diagnostic Codes:     Obstructive Sleep Apnea G47.33    Sleep Hypoxemia G47.36     Cheyne?Redmond Breathing Pattern R06.3    Periodic Limb Movement Disorder G47.61    2/8/2018 [place] Titration Sleep Study (174.0 lbs) - Treatment was titrated to a pressure of ASV 4/0/20 with an AHI of 6.9. Time Spent in REM supine at this pressure was 0 minutes (no REM supine captured).         ____________________________________________________________   Electronically Signed By: Dennys Johnson MD, MPH 02/15/2018         Range(%) Time in range (min)   0.0 - 89.0 2.7   0.0 - 88.0 0.4         Stage Min(mm Hg) Max(mm Hg)   Wake 32.2 37.7   NREM(1+2+3) 31.7 37.9   REM - -         Range(mmHg) Time in range (min)   55.0 - 100.0 -   Excluded data <20.0 & >65.0 115.5

## 2018-02-16 ENCOUNTER — TELEPHONE (OUTPATIENT)
Dept: SLEEP MEDICINE | Facility: CLINIC | Age: 78
End: 2018-02-16

## 2018-02-16 NOTE — TELEPHONE ENCOUNTER
Received referral into [facility] for ASV set up. Records reviewed for criteria compliance. Spoke with Pt and scheduled set up appointment for Thursday 2/22/18 2:00 pm at [facility]. Pt has another appointment in this area and would like set up to be same day.

## 2018-02-17 ASSESSMENT — PATIENT HEALTH QUESTIONNAIRE - PHQ9: SUM OF ALL RESPONSES TO PHQ QUESTIONS 1-9: 5

## 2018-02-22 ENCOUNTER — DOCUMENTATION ONLY (OUTPATIENT)
Dept: SLEEP MEDICINE | Facility: CLINIC | Age: 78
End: 2018-02-22

## 2018-02-22 ENCOUNTER — ANTICOAGULATION THERAPY VISIT (OUTPATIENT)
Dept: ANTICOAGULATION | Facility: CLINIC | Age: 78
End: 2018-02-22
Payer: MEDICARE

## 2018-02-22 DIAGNOSIS — Z79.01 LONG-TERM (CURRENT) USE OF ANTICOAGULANTS: ICD-10-CM

## 2018-02-22 DIAGNOSIS — I48.20 CHRONIC ATRIAL FIBRILLATION (H): ICD-10-CM

## 2018-02-22 LAB — INR POINT OF CARE: 6.5 (ref 0.86–1.14)

## 2018-02-22 PROCEDURE — 99207 ZZC NO CHARGE NURSE ONLY: CPT

## 2018-02-22 PROCEDURE — 85610 PROTHROMBIN TIME: CPT | Mod: QW

## 2018-02-22 PROCEDURE — 36416 COLLJ CAPILLARY BLOOD SPEC: CPT

## 2018-02-22 NOTE — PROGRESS NOTES
Patient was offered choice of vendor and chose [facility].  Patient Cielo Aguillon was set up at [facility] on February 22, 2018. Patient received a Resmed AirCurve 10 ASV. Pressures were set at EPAP 6-11, PS 0-19 cm H2O.   Patient s ramp is 5 cm H2O for Off and FLEX/EPR is N/A.  Patient received a [employer] Mask name: SIMPLUS  Full Face mask Size Small, heated tubing and heated humidifier.  Patient is enrolled in the STM Program and does need to meet compliance. Patient has a follow up on 4/24/18 with Dr. Johnson.    Taylor Ho

## 2018-02-22 NOTE — MR AVS SNAPSHOT
Cielo Aguillon   2/22/2018 1:15 PM   Anticoagulation Therapy Visit    Description:  77 year old female   Provider:  [facility]   Department:  [facility]           INR as of 2/22/2018     Today's INR 6.5!      Anticoagulation Summary as of 2/22/2018     INR goal 2.0-3.0   Today's INR 6.5!   Full instructions 2/22: Hold; 2/23: Hold; 2/24: 5 mg; Otherwise 7.5 mg on Tue, Sat; 5 mg all other days   Next INR check 2/26/2018    Indications   Chronic atrial fibrillation (H) [I48.2]  Long-term (current) use of anticoagulants [Z79.01] [Z79.01]         Your next Anticoagulation Clinic appointment(s)     Feb 26, 2018  2:45 PM CST   [facility]    [address] [facility] [address]  [facility] [address] [phone]              Contact Numbers     [facility] Phone Numbers:  Anticoagulation Clinic Appointments : [phone]  Anticoagulation Nurse: [phone]         February 2018 Details    Sun Mon Tue Wed Thu Fri Sat         1               2               3                 4               5               6               7               8               9               10                 11               12               13               14               15               16               17                 18               19               20               21               22      Hold   See details      23      Hold         24      5 mg           25      5 mg         26            27               28                   Date Details   02/22 This INR check       Date of next INR:  2/26/2018         How to take your warfarin dose     To take:  5 mg Take 1 of the 5 mg tablets.    Hold Do not take your warfarin dose. See the Details table to the right for additional instructions.

## 2018-02-22 NOTE — PROGRESS NOTES
ANTICOAGULATION FOLLOW-UP CLINIC VISIT    Patient Name:  Cielo Aguillon  Date:  2/22/2018  Contact Type:  Face to Face    SUBJECTIVE:     Patient Findings     Positives Change in medications (Pt reports taking tramadol today. ), Change in diet/appetite (pt reports decreased appetite. ), Unexplained INR or factor level change           OBJECTIVE    INR Protime   Date Value Ref Range Status   02/22/2018 6.5 (A) 0.86 - 1.14 Final       ASSESSMENT / PLAN  INR assessment SUPRA    Recheck INR In: 4 DAYS    INR Location Clinic      Anticoagulation Summary as of 2/22/2018     INR goal 2.0-3.0   Today's INR 6.5!   Maintenance plan 7.5 mg (5 mg x 1.5) on Tue, Sat; 5 mg (5 mg x 1) all other days   Full instructions 2/22: Hold; 2/23: Hold; 2/24: 5 mg; Otherwise 7.5 mg on Tue, Sat; 5 mg all other days   Weekly total 40 mg   Plan last modified Cindi Preston RN (5/9/2017)   Next INR check 2/26/2018   Priority INR   Target end date     Indications   Chronic atrial fibrillation (H) [I48.2]  Long-term (current) use of anticoagulants [Z79.01] [Z79.01]         Anticoagulation Episode Summary     INR check location     Preferred lab     Send INR reminders to [facility]    Comments       Anticoagulation Care Providers     Provider Role Specialty Phone number    Simin Lopez MD [facility] Internal Medicine [phone]            See the Encounter Report to view Anticoagulation Flowsheet and Dosing Calendar (Go to Encounters tab in chart review, and find the Anticoagulation Therapy Visit)    Dosage adjustment made based on physician directed care plan.    Cindi Preston, RN

## 2018-02-26 ENCOUNTER — ANTICOAGULATION THERAPY VISIT (OUTPATIENT)
Dept: ANTICOAGULATION | Facility: CLINIC | Age: 78
End: 2018-02-26
Payer: MEDICARE

## 2018-02-26 ENCOUNTER — DOCUMENTATION ONLY (OUTPATIENT)
Dept: SLEEP MEDICINE | Facility: CLINIC | Age: 78
End: 2018-02-26
Payer: MEDICARE

## 2018-02-26 DIAGNOSIS — Z79.01 LONG-TERM (CURRENT) USE OF ANTICOAGULANTS: ICD-10-CM

## 2018-02-26 DIAGNOSIS — I48.20 CHRONIC ATRIAL FIBRILLATION (H): ICD-10-CM

## 2018-02-26 DIAGNOSIS — M54.42 CHRONIC LEFT-SIDED LOW BACK PAIN WITH LEFT-SIDED SCIATICA: ICD-10-CM

## 2018-02-26 DIAGNOSIS — G89.29 CHRONIC LEFT-SIDED LOW BACK PAIN WITH LEFT-SIDED SCIATICA: ICD-10-CM

## 2018-02-26 LAB — INR POINT OF CARE: 2.1 (ref 0.86–1.14)

## 2018-02-26 PROCEDURE — 85610 PROTHROMBIN TIME: CPT | Mod: QW

## 2018-02-26 PROCEDURE — 99207 ZZC NO CHARGE NURSE ONLY: CPT

## 2018-02-26 PROCEDURE — 36416 COLLJ CAPILLARY BLOOD SPEC: CPT

## 2018-02-26 NOTE — MR AVS SNAPSHOT
Cielo Aguillon   2/26/2018 2:45 PM   Anticoagulation Therapy Visit    Description:  77 year old female   Provider:  [facility]   Department:  [facility]           INR as of 2/26/2018     Today's INR 2.1      Anticoagulation Summary as of 2/26/2018     INR goal 2.0-3.0   Today's INR 2.1   Full instructions 7.5 mg on Tue, Sat; 5 mg all other days   Next INR check 3/13/2018    Indications   Chronic atrial fibrillation (H) [I48.2]  Long-term (current) use of anticoagulants [Z79.01] [Z79.01]         Your next Anticoagulation Clinic appointment(s)     Mar 13, 2018 11:00 AM CDT   [facility]ticoagulation Visit with [facility]   [facility] ([facility])    [address] [facility] [address]  [facility] [address] [phone]              Contact Numbers     [facility] Phone Numbers:  Anticoagulation Clinic Appointments : [phone]  Anticoagulation Nurse: [phone]         February 2018 Details    Sun Mon Tue Wed Thu Fri Sat         1               2               3                 4               5               6               7               8               9               10                 11               12               13               14               15               16               17                 18               19               20               21               22               23               24                 25               26      5 mg   See details      27      7.5 mg         28      5 mg             Date Details   02/26 This INR check               How to take your warfarin dose     To take:  5 mg Take 1 of the 5 mg tablets.    To take:  7.5 mg Take 1.5 of the 5 mg tablets.           March 2018 Details    Sun Mon Tue Wed Thu Fri Sat         1      5 mg         2      5 mg         3      7.5 mg           4      5 mg         5      5 mg         6      7.5 mg         7      5 mg         8      5 mg         9      5 mg          10      7.5 mg           11      5 mg         12      5 mg         13            14               15               16               17                 18               19               20               21               22               23               24                 25               26               27               28               29               30               31                Date Details   No additional details    Date of next INR:  3/13/2018         How to take your warfarin dose     To take:  5 mg Take 1 of the 5 mg tablets.    To take:  7.5 mg Take 1.5 of the 5 mg tablets.

## 2018-02-26 NOTE — PROGRESS NOTES
ANTICOAGULATION FOLLOW-UP CLINIC VISIT    Patient Name:  Cielo Aguillon  Date:  2/26/2018  Contact Type:  Face to Face    SUBJECTIVE:     Patient Findings     Positives Intentional hold of therapy (Held 2/22/18 and 2/23/18 d/t elevated INR. )           OBJECTIVE    INR Protime   Date Value Ref Range Status   02/26/2018 2.1 (A) 0.86 - 1.14 Final       ASSESSMENT / PLAN  INR assessment THER    Recheck INR In: 2 WEEKS    INR Location Clinic      Anticoagulation Summary as of 2/26/2018     INR goal 2.0-3.0   Today's INR 2.1   Maintenance plan 7.5 mg (5 mg x 1.5) on Tue, Sat; 5 mg (5 mg x 1) all other days   Full instructions 7.5 mg on Tue, Sat; 5 mg all other days   Weekly total 40 mg   No change documented Cindi Preston RN   Plan last modified Cindi Preston RN (5/9/2017)   Next INR check 3/13/2018   Priority INR   Target end date     Indications   Chronic atrial fibrillation (H) [I48.2]  Long-term (current) use of anticoagulants [Z79.01] [Z79.01]         Anticoagulation Episode Summary     INR check location     Preferred lab     Send INR reminders to [facility]    Comments       Anticoagulation Care Providers     Provider Role Specialty Phone number    Simin Lopez MD Responsible Internal Medicine [phone]            See the Encounter Report to view Anticoagulation Flowsheet and Dosing Calendar (Go to Encounters tab in chart review, and find the Anticoagulation Therapy Visit)    Dosage adjustment made based on physician directed care plan.    Cindi Preston RN

## 2018-02-26 NOTE — PROGRESS NOTES
3 DAY STM VISIT    Patient contacted for 3 day STM visit  Subjective measures:  Patient sleeping better with her machine and had no questions or concerns.      Device type: ASV  Device settings from machine   EPAP 6-11, PS 0-19 cm H2O  Assessment: Nightly usage, most nights over four hours.  Action plan: Pt to have f/u 14 day STM visit.  Diagnostic AHI: 39

## 2018-02-27 RX ORDER — TRAMADOL HYDROCHLORIDE 50 MG/1
TABLET ORAL
Qty: 50 TABLET | Refills: 0 | Status: SHIPPED | OUTPATIENT
Start: 2018-02-27 | End: 2018-04-24

## 2018-02-27 NOTE — TELEPHONE ENCOUNTER
Tramadol      Last Written Prescription Date:  11-14-17  Last Fill Quantity: 50,   # refills: 0  Last Office Visit: 11-14-17  Future Office visit:       Routing refill request to provider for review/approval because:  Drug not on the [facility], [facility] or [facility] refill protocol or controlled substance    Signed CSA form in chart.    [place]: access not granted by provider.    [place]  [email or web address]    Please advise, thanks.

## 2018-03-09 ENCOUNTER — DOCUMENTATION ONLY (OUTPATIENT)
Dept: SLEEP MEDICINE | Facility: CLINIC | Age: 78
End: 2018-03-09

## 2018-03-09 NOTE — PROGRESS NOTES
14 DAY STM VISIT    Message left for patient to return call     Assessment: Pt not meeting objective benchmarks for leak and compliance     Action plan: waiting for patient to return call.  and pt to have 30 day STM visit.    Device type: ASV  PAP settings: EPAP 6-11, PS 0-19 cm H2O    Objective measures: 14 day rolling measures      Compliance  57 %      Leak  28.29 lpm  last  upload      AHI 1.57   last  upload      Average number of minutes 249     Objective measure goal  Compliance   Goal >70%  Leak   Goal < 24 lpm  AHI  Goal < 5  Usage  Goal >240

## 2018-03-09 NOTE — PROGRESS NOTES
Patient returned call.     Subjective measures:  Patient meeting subjective benchmarks.     Action plan:pt to have 30 day STM visit.

## 2018-03-13 ENCOUNTER — ANTICOAGULATION THERAPY VISIT (OUTPATIENT)
Dept: ANTICOAGULATION | Facility: CLINIC | Age: 78
End: 2018-03-13
Payer: MEDICARE

## 2018-03-13 ENCOUNTER — OFFICE VISIT (OUTPATIENT)
Dept: SLEEP MEDICINE | Facility: CLINIC | Age: 78
End: 2018-03-13
Payer: MEDICARE

## 2018-03-13 DIAGNOSIS — I48.20 CHRONIC ATRIAL FIBRILLATION (H): ICD-10-CM

## 2018-03-13 DIAGNOSIS — Z79.01 LONG-TERM (CURRENT) USE OF ANTICOAGULANTS: ICD-10-CM

## 2018-03-13 DIAGNOSIS — G47.39 COMPLEX SLEEP APNEA SYNDROME: ICD-10-CM

## 2018-03-13 LAB — INR POINT OF CARE: 5.4 (ref 0.86–1.14)

## 2018-03-13 PROCEDURE — 99207 ZZC NO CHARGE NURSE ONLY: CPT

## 2018-03-13 PROCEDURE — 85610 PROTHROMBIN TIME: CPT | Mod: QW

## 2018-03-13 PROCEDURE — 36416 COLLJ CAPILLARY BLOOD SPEC: CPT

## 2018-03-13 NOTE — MR AVS SNAPSHOT
Cielo Aguillon   3/13/2018 11:00 AM   Anticoagulation Therapy Visit    Description:  77 year old female   Provider[facility]  [facility]   Department:  [facility]           INR as of 3/13/2018     Today's INR 5.4!      Anticoagulation Summary as of 3/13/2018     INR goal 2.0-3.0   Today's INR 5.4!   Full instructions 3/13: Hold; 3/14: Hold; Otherwise 7.5 mg on Tue, Sat; 5 mg all other days   Next INR check 3/20/2018    Indications   Chronic atrial fibrillation (H) [I48.2]  Long-term (current) use of anticoagulants [Z79.01] [Z79.01]         Your next Anticoagulation Clinic appointment(s)     Mar 20, 2018  4:00 PM CDT   Anticoagulation Visit with [facility]   [facility] ([facility]    [address]  [facility] [id number] [phone]              Contact Numbers     [facility] Phone Numbers:  Anticoagulation Clinic Appointments : [phone]  Anticoagulation Nurse: [phone]         March 2018 Details    Sun Mon Tue Wed Thu Fri Sat         1               2               3                 4               5               6               7               8               9               10                 11               12               13      Hold   See details      14      Hold         15      5 mg         16      5 mg         17      7.5 mg           18      5 mg         19      5 mg         20            21               22               23               24                 25               26               27               28               29               30               31                Date Details   03/13 This INR check       Date of next INR:  3/20/2018         How to take your warfarin dose     To take:  5 mg Take 1 of the 5 mg tablets.    To take:  7.5 mg Take 1.5 of the 5 mg tablets.    Hold Do not take your warfarin dose. See the Details table to the right for additional instructions.

## 2018-03-13 NOTE — MR AVS SNAPSHOT
After Visit Summary   3/13/2018    Cielo Aguillon    MRN: [id number]           Patient Information     Date Of Birth          1940        Visit Information        Provider Department      3/13/2018 1:00 PM  SLEEP LAB [facility]        Today's Diagnoses     Complex sleep apnea syndrome           Follow-ups after your visit        Your next 10 appointments already scheduled     Mar 13, 2018  1:00 PM CDT   Oximetry  with  SLEEP LAB   [facility] ([facility])    [address]  [facility] [address]   [phone]            Mar 14, 2018  1:00 PM CDT   Oximetry Drop Off with  SLEEP DME   [facility] ([facility])    [address]  [facility] [address]   [phone]            Mar 20, 2018  1:00 PM CDT   Return Sleep Patient with Dennys Johnson MD   [facility]    [address]  [facility] [address] [phone]            Mar 20, 2018  4:00 PM CDT   Anticoagulation Visit with [facility]    [address]  [facility] [address] [phone]            Apr 24, 2018  2:00 PM CDT   Return Sleep Patient with Dennys Johnson MD   [facility]    [address]  [facility] [address]   [phone]              Who to contact     If you have questions or need follow up information about today's clinic visit or your schedule please contact [facility] directly at [phone].  Normal or non-critical lab and imaging results will be communicated to you by MyChart, letter or phone within 4 business days after the clinic has received the results. If you do not hear  "from us within 7 days, please contact the clinic through [employer] or phone. If you have a critical or abnormal lab result, we will notify you by phone as soon as possible.  Submit refill requests through [employer] or call your pharmacy and they will forward the refill request to us. Please allow 3 business days for your refill to be completed.          Additional Information About Your Visit        haystaggharCuÃ­date Information     [employer] lets you send messages to your doctor, view your test results, renew your prescriptions, schedule appointments and more. To sign up, go to [email or web address] . Click on \"Log in\" on the left side of the screen, which will take you to the Welcome page. Then click on \"Sign up Now\" on the right side of the page.     You will be asked to enter the access code listed below, as well as some personal information. Please follow the directions to create your username and password.     Your access code is: [email or web address]  Expires: 2018  3:07 PM     Your access code will  in 90 days. If you need help or a new code, please call your [place] clinic or [phone].        Care EveryWhere ID     This is your Care EveryWhere ID. This could be used by other organizations to access your [place] medical records  [id number]         Blood Pressure from Last 3 Encounters:   17 119/64   17 122/78   17 135/82    Weight from Last 3 Encounters:   02/15/18 78.9 kg (174 lb)   17 78.9 kg (174 lb)   17 79.9 kg (176 lb 1.6 oz)              We Performed the Following     Overnight oximetry study        Primary Care Provider Office Phone # Fax #    Simin Lopez [phone]       [address] [place] [address]        Equal Access to Services     [facility] Hospital[facility] : Ede lemus Sosue, waaxda luqadaha, qaybta kaalmada rick, yaneth martin. [facility] [phone].    ATENCIÓN: Si bre morley " disposición servicios gratuitos de asistencia lingüística. Gurwinder gardiner [phone].    We comply with applicable federal civil rights laws and [place] laws. We do not discriminate on the basis of race, color, national origin, age, disability, sex, sexual orientation, or gender identity.            Thank you!     Thank you for choosing [facility]  for your care. Our goal is always to provide you with excellent care. Hearing back from our patients is one way we can continue to improve our services. Please take a few minutes to complete the written survey that you may receive in the mail after your visit with us. Thank you!             Your Updated Medication List - Protect others around you: Learn how to safely use, store and throw away your medicines at www.disposemymeds.org.          This list is accurate as of 3/13/18 12:57 PM.  Always use your most recent med list.                   Brand Name Dispense Instructions for use Diagnosis    amLODIPine 5 MG tablet    NORVASC    90 tablet    Take 1 tablet (5 mg) by mouth daily    Essential hypertension, benign       citalopram 10 MG tablet    celeXA    180 tablet    Take 2 tablets (20 mg) by mouth daily    Major depressive disorder, recurrent episode, moderate (H), MARY (generalized anxiety disorder)       diazepam 5 MG tablet    VALIUM    40 tablet    1/2 to 1 po q 8 hours prn    MARY (generalized anxiety disorder)       levothyroxine 50 MCG tablet    SYNTHROID/LEVOTHROID    90 tablet    TAKE 1 TABLET (50 MCG) BY MOUTH DAILY    Acquired hypothyroidism       metoprolol tartrate 100 MG tablet    LOPRESSOR    180 tablet    TAKE 1 TABLET (100MG) BY MOUTH 2 TIMES DAILY    Essential hypertension, benign, ASCVD (arteriosclerotic cardiovascular disease)       rosuvastatin 10 MG tablet    CRESTOR    30 tablet    Take 1 tablet (10 mg) by mouth daily    Hyperlipidemia LDL goal <70, Coronary artery disease involving native coronary artery of native heart  without angina pectoris       traMADol 50 MG tablet    ULTRAM    50 tablet    TAKE ONE TABLET BY MOUTH EVERY 8 HOURS AS NEEDED FOR MODERATE PAIN    Chronic left-sided low back pain with left-sided sciatica       VITAMIN D (CHOLECALCIFEROL) PO      Take 1,000 Units by mouth daily        warfarin 5 MG tablet    COUMADIN    108 tablet    TAKE 1&1/2 TABLETS (7.5MG) BY MOUTH ONCE DAILY ON TUESDAY & SATURDAY THEN 1 TABLET (5MG) ALL THE OTHER DAYS OF THE WEEK    Chronic atrial fibrillation (H), Long term current use of anticoagulant therapy       zolpidem 5 MG tablet    AMBIEN    1 tablet    Take tablet by mouth 15 minutes prior to sleep, for Sleep Study    Permanent atrial fibrillation (H), BETHANY (obstructive sleep apnea), Pulmonary hypertension, Coronary artery disease without angina pectoris, unspecified vessel or lesion type, unspecified whether native or transplanted heart

## 2018-03-13 NOTE — PROGRESS NOTES
ANTICOAGULATION FOLLOW-UP CLINIC VISIT    Patient Name:  Cielo Aguillon  Date:  3/13/2018  Contact Type:  Face to Face    SUBJECTIVE:     Patient Findings     Positives Change in medications (Pt reports has been taking Tramadol, 1 a day. last dose was yesterday. Tramadol can incrase INR. ), Unexplained INR or factor level change    Comments Pt denies any other changes.            OBJECTIVE    INR Protime   Date Value Ref Range Status   03/13/2018 5.4 (A) 0.86 - 1.14 Final       ASSESSMENT / PLAN  INR assessment SUPRA    Recheck INR In: 1 WEEK    INR Location Clinic      Anticoagulation Summary as of 3/13/2018     INR goal 2.0-3.0   Today's INR 5.4!   Maintenance plan 7.5 mg (5 mg x 1.5) on Tue, Sat; 5 mg (5 mg x 1) all other days   Full instructions 3/13: Hold; 3/14: Hold; Otherwise 7.5 mg on Tue, Sat; 5 mg all other days   Weekly total 40 mg   Plan last modified Cindi Preston RN (5/9/2017)   Next INR check 3/20/2018   Priority INR   Target end date     Indications   Chronic atrial fibrillation (H) [I48.2]  Long-term (current) use of anticoagulants [Z79.01] [Z79.01]         Anticoagulation Episode Summary     INR check location     Preferred lab     Send INR reminders to [facility] [address] [facility]    Comments       Anticoagulation Care Providers     Provider Role Specialty Phone number    Simin Lopez MD [facility] Internal Medicine [phone]            See the Encounter Report to view Anticoagulation Flowsheet and Dosing Calendar (Go to Encounters tab in chart review, and find the Anticoagulation Therapy Visit)    Dosage adjustment made based on physician directed care plan.    Cindi Preston, RN

## 2018-03-13 NOTE — PROGRESS NOTES
Patient picked up Oximeter 3/13/18.  Demonstrated back for knowledge of use.  Will return Oximeter 3/14/18

## 2018-03-14 ENCOUNTER — APPOINTMENT (OUTPATIENT)
Dept: GENERAL RADIOLOGY | Facility: CLINIC | Age: 78
DRG: 287 | End: 2018-03-14
Attending: EMERGENCY MEDICINE
Payer: MEDICARE

## 2018-03-14 ENCOUNTER — DOCUMENTATION ONLY (OUTPATIENT)
Dept: CARDIOLOGY | Facility: CLINIC | Age: 78
End: 2018-03-14

## 2018-03-14 ENCOUNTER — HOSPITAL ENCOUNTER (INPATIENT)
Facility: CLINIC | Age: 78
LOS: 2 days | Discharge: HOME | DRG: 287 | End: 2018-03-16
Attending: EMERGENCY MEDICINE | Admitting: HOSPITALIST
Payer: MEDICARE

## 2018-03-14 ENCOUNTER — DOCUMENTATION ONLY (OUTPATIENT)
Dept: SLEEP MEDICINE | Facility: CLINIC | Age: 78
End: 2018-03-14
Payer: MEDICARE

## 2018-03-14 DIAGNOSIS — I10 ESSENTIAL HYPERTENSION, BENIGN: ICD-10-CM

## 2018-03-14 DIAGNOSIS — I10 ESSENTIAL HYPERTENSION, BENIGN: Primary | ICD-10-CM

## 2018-03-14 DIAGNOSIS — I20.0 ACCELERATING ANGINA (H): ICD-10-CM

## 2018-03-14 LAB
ANION GAP SERPL CALCULATED.3IONS-SCNC: 5 MMOL/L (ref 3–14)
BASOPHILS # BLD AUTO: 0 10E9/L (ref 0–0.2)
BASOPHILS NFR BLD AUTO: 0.4 %
BUN SERPL-MCNC: 20 MG/DL (ref 7–30)
CALCIUM SERPL-MCNC: 8.8 MG/DL (ref 8.5–10.1)
CHLORIDE SERPL-SCNC: 107 MMOL/L (ref 94–109)
CO2 SERPL-SCNC: 27 MMOL/L (ref 20–32)
CREAT SERPL-MCNC: 0.9 MG/DL (ref 0.52–1.04)
DIFFERENTIAL METHOD BLD: ABNORMAL
EOSINOPHIL # BLD AUTO: 0.1 10E9/L (ref 0–0.7)
EOSINOPHIL NFR BLD AUTO: 1.1 %
ERYTHROCYTE [DISTWIDTH] IN BLOOD BY AUTOMATED COUNT: 14.8 % (ref 10–15)
GFR SERPL CREATININE-BSD FRML MDRD: 61 ML/MIN/1.7M2
GLUCOSE SERPL-MCNC: 91 MG/DL (ref 70–99)
HCT VFR BLD AUTO: 38.9 % (ref 35–47)
HGB BLD-MCNC: 12.8 G/DL (ref 11.7–15.7)
IMM GRANULOCYTES # BLD: 0 10E9/L (ref 0–0.4)
IMM GRANULOCYTES NFR BLD: 0.2 %
INR PPP: 3.07 (ref 0.86–1.14)
LYMPHOCYTES # BLD AUTO: 1.5 10E9/L (ref 0.8–5.3)
LYMPHOCYTES NFR BLD AUTO: 31.1 %
MCH RBC QN AUTO: 32.7 PG (ref 26.5–33)
MCHC RBC AUTO-ENTMCNC: 32.9 G/DL (ref 31.5–36.5)
MCV RBC AUTO: 100 FL (ref 78–100)
MONOCYTES # BLD AUTO: 0.5 10E9/L (ref 0–1.3)
MONOCYTES NFR BLD AUTO: 11.1 %
NEUTROPHILS # BLD AUTO: 2.6 10E9/L (ref 1.6–8.3)
NEUTROPHILS NFR BLD AUTO: 56.1 %
NRBC # BLD AUTO: 0 10*3/UL
NRBC BLD AUTO-RTO: 0 /100
NT-PROBNP SERPL-MCNC: 2799 PG/ML (ref 0–1800)
PLATELET # BLD AUTO: 147 10E9/L (ref 150–450)
POTASSIUM SERPL-SCNC: 3.9 MMOL/L (ref 3.4–5.3)
RBC # BLD AUTO: 3.91 10E12/L (ref 3.8–5.2)
SODIUM SERPL-SCNC: 139 MMOL/L (ref 133–144)
TROPONIN I SERPL-MCNC: <0.015 UG/L (ref 0–0.04)
TROPONIN I SERPL-MCNC: <0.015 UG/L (ref 0–0.04)
WBC # BLD AUTO: 4.7 10E9/L (ref 4–11)

## 2018-03-14 PROCEDURE — 80048 BASIC METABOLIC PNL TOTAL CA: CPT | Performed by: EMERGENCY MEDICINE

## 2018-03-14 PROCEDURE — 85025 COMPLETE CBC W/AUTO DIFF WBC: CPT | Performed by: EMERGENCY MEDICINE

## 2018-03-14 PROCEDURE — 12000007 ZZH R&B INTERMEDIATE

## 2018-03-14 PROCEDURE — 99285 EMERGENCY DEPT VISIT HI MDM: CPT | Mod: 25

## 2018-03-14 PROCEDURE — 25000128 H RX IP 250 OP 636: Performed by: EMERGENCY MEDICINE

## 2018-03-14 PROCEDURE — A9270 NON-COVERED ITEM OR SERVICE: HCPCS | Performed by: EMERGENCY MEDICINE

## 2018-03-14 PROCEDURE — 36415 COLL VENOUS BLD VENIPUNCTURE: CPT | Performed by: HOSPITALIST

## 2018-03-14 PROCEDURE — 99223 1ST HOSP IP/OBS HIGH 75: CPT | Mod: AI | Performed by: HOSPITALIST

## 2018-03-14 PROCEDURE — 93005 ELECTROCARDIOGRAM TRACING: CPT

## 2018-03-14 PROCEDURE — 83880 ASSAY OF NATRIURETIC PEPTIDE: CPT | Performed by: EMERGENCY MEDICINE

## 2018-03-14 PROCEDURE — 85610 PROTHROMBIN TIME: CPT | Performed by: EMERGENCY MEDICINE

## 2018-03-14 PROCEDURE — 71046 X-RAY EXAM CHEST 2 VIEWS: CPT

## 2018-03-14 PROCEDURE — 84484 ASSAY OF TROPONIN QUANT: CPT | Performed by: EMERGENCY MEDICINE

## 2018-03-14 PROCEDURE — 84484 ASSAY OF TROPONIN QUANT: CPT | Performed by: HOSPITALIST

## 2018-03-14 PROCEDURE — 25000125 ZZHC RX 250: Performed by: EMERGENCY MEDICINE

## 2018-03-14 RX ORDER — POTASSIUM CHLORIDE 1500 MG/1
20-40 TABLET, EXTENDED RELEASE ORAL
Status: DISCONTINUED | OUTPATIENT
Start: 2018-03-14 | End: 2018-03-16 | Stop reason: HOSPADM

## 2018-03-14 RX ORDER — POTASSIUM CL/LIDO/0.9 % NACL 10MEQ/0.1L
10 INTRAVENOUS SOLUTION, PIGGYBACK (ML) INTRAVENOUS
Status: DISCONTINUED | OUTPATIENT
Start: 2018-03-14 | End: 2018-03-16 | Stop reason: HOSPADM

## 2018-03-14 RX ORDER — PROCHLORPERAZINE 25 MG
12.5 SUPPOSITORY, RECTAL RECTAL EVERY 12 HOURS PRN
Status: DISCONTINUED | OUTPATIENT
Start: 2018-03-14 | End: 2018-03-16 | Stop reason: HOSPADM

## 2018-03-14 RX ORDER — POLYETHYLENE GLYCOL 3350 17 G/17G
17 POWDER, FOR SOLUTION ORAL DAILY PRN
Status: DISCONTINUED | OUTPATIENT
Start: 2018-03-14 | End: 2018-03-16 | Stop reason: HOSPADM

## 2018-03-14 RX ORDER — ACETAMINOPHEN 325 MG/1
650 TABLET ORAL EVERY 4 HOURS PRN
Status: DISCONTINUED | OUTPATIENT
Start: 2018-03-14 | End: 2018-03-16 | Stop reason: HOSPADM

## 2018-03-14 RX ORDER — AMOXICILLIN 250 MG
1 CAPSULE ORAL 2 TIMES DAILY PRN
Status: DISCONTINUED | OUTPATIENT
Start: 2018-03-14 | End: 2018-03-16 | Stop reason: HOSPADM

## 2018-03-14 RX ORDER — AMOXICILLIN 250 MG
2 CAPSULE ORAL 2 TIMES DAILY PRN
Status: DISCONTINUED | OUTPATIENT
Start: 2018-03-14 | End: 2018-03-16 | Stop reason: HOSPADM

## 2018-03-14 RX ORDER — POTASSIUM CHLORIDE 1.5 G/1.58G
20-40 POWDER, FOR SOLUTION ORAL
Status: DISCONTINUED | OUTPATIENT
Start: 2018-03-14 | End: 2018-03-16 | Stop reason: HOSPADM

## 2018-03-14 RX ORDER — POTASSIUM CHLORIDE 29.8 MG/ML
20 INJECTION INTRAVENOUS
Status: DISCONTINUED | OUTPATIENT
Start: 2018-03-14 | End: 2018-03-16 | Stop reason: HOSPADM

## 2018-03-14 RX ORDER — MAGNESIUM SULFATE HEPTAHYDRATE 40 MG/ML
4 INJECTION, SOLUTION INTRAVENOUS EVERY 4 HOURS PRN
Status: DISCONTINUED | OUTPATIENT
Start: 2018-03-14 | End: 2018-03-16 | Stop reason: HOSPADM

## 2018-03-14 RX ORDER — NALOXONE HYDROCHLORIDE 0.4 MG/ML
.1-.4 INJECTION, SOLUTION INTRAMUSCULAR; INTRAVENOUS; SUBCUTANEOUS
Status: DISCONTINUED | OUTPATIENT
Start: 2018-03-14 | End: 2018-03-16 | Stop reason: HOSPADM

## 2018-03-14 RX ORDER — PROCHLORPERAZINE MALEATE 5 MG
5 TABLET ORAL EVERY 6 HOURS PRN
Status: DISCONTINUED | OUTPATIENT
Start: 2018-03-14 | End: 2018-03-16 | Stop reason: HOSPADM

## 2018-03-14 RX ORDER — POTASSIUM CHLORIDE 7.45 MG/ML
10 INJECTION INTRAVENOUS
Status: DISCONTINUED | OUTPATIENT
Start: 2018-03-14 | End: 2018-03-16 | Stop reason: HOSPADM

## 2018-03-14 RX ORDER — ONDANSETRON 2 MG/ML
4 INJECTION INTRAMUSCULAR; INTRAVENOUS EVERY 6 HOURS PRN
Status: DISCONTINUED | OUTPATIENT
Start: 2018-03-14 | End: 2018-03-16 | Stop reason: HOSPADM

## 2018-03-14 RX ORDER — BISACODYL 10 MG
10 SUPPOSITORY, RECTAL RECTAL DAILY PRN
Status: DISCONTINUED | OUTPATIENT
Start: 2018-03-14 | End: 2018-03-16 | Stop reason: HOSPADM

## 2018-03-14 RX ORDER — ONDANSETRON 4 MG/1
4 TABLET, ORALLY DISINTEGRATING ORAL EVERY 6 HOURS PRN
Status: DISCONTINUED | OUTPATIENT
Start: 2018-03-14 | End: 2018-03-16 | Stop reason: HOSPADM

## 2018-03-14 RX ADMIN — PHYTONADIONE 2.5 MG: 10 INJECTION, EMULSION INTRAMUSCULAR; INTRAVENOUS; SUBCUTANEOUS at 21:22

## 2018-03-14 ASSESSMENT — ENCOUNTER SYMPTOMS
NAUSEA: 0
SHORTNESS OF BREATH: 1
VOMITING: 0
ABDOMINAL PAIN: 0
NECK PAIN: 1
UNEXPECTED WEIGHT CHANGE: 1

## 2018-03-14 NOTE — IP AVS SNAPSHOT
MRN:[id number]                      After Visit Summary   3/14/2018    Cielo Aguillon    MRN: [id number]           Thank you!     Thank you for choosing [facility] for your care. Our goal is always to provide you with excellent care. Hearing back from our patients is one way we can continue to improve our services. Please take a few minutes to complete the written survey that you may receive in the mail after you visit. If you would like to speak to someone directly about your visit please contact Patient Relations at [phone]. Thank you!          Patient Information     Date Of Birth          1940        Designated Caregiver       Most Recent Value    Caregiver    Will someone help with your care after discharge? yes    Name of designated caregiver Lokesh    Phone number of caregiver [phone]    Caregiver address [address] [place] [address]      About your hospital stay     You were admitted on:  March 14, 2018 You last received care in the:  [place] [address] [facility]    You were discharged on:  March 16, 2018       Who to Call     For medical emergencies, please call 911.  For non-urgent questions about your medical care, please call your primary care provider or clinic, [phone]          Attending Provider     Provider Specialty    Jl Zee MD Emergency Medicine    Peter, Ab Osei DO Internal Medicine       Primary Care Provider Office Phone # Fax #    Simin Lopez [phone]      After Care Instructions     Activity       Your activity upon discharge: activity as tolerated            Diet       Follow this diet upon discharge: Orders Placed This Encounter      Low Saturated Fat Na <2400 mg                  Follow-up Appointments     Follow-up and recommended labs and tests        Follow up with primary care provider, Simin Lopez, within 7 days to evaluate medication change.  The following labs/tests are recommended:  "inr.                  Your next 10 appointments already scheduled     Mar 20, 2018  1:00 PM CDT   [place] with Dennys [place]   [facility]    [address]  [facility] [address]   [phone]            Mar 20, 2018  4:00 PM CDT   Anticoagulation Visit with [facility]   [facility]    [address]  [facility] [id number] [phone]            Apr 24, 2018  2:00 PM CDT   Return Sleep Patient with Dennys Johnson MD   [facility] [place]    [address]  [facility] [address]   [phone]              Pending Results     No orders found from 3/12/2018 to 3/15/2018.            Statement of Approval     Ordered          03/16/18 1235  I have reviewed and agree with all the recommendations and orders detailed in this document.  EFFECTIVE NOW     Approved and electronically signed by:  Agueda Wilson MD             Admission Information     Date & Time Provider Department Dept. Phone    3/14/2018 Ab Green DO [place] [address] [facility] [phone]      Your Vitals Were     Blood Pressure Pulse Temperature Respirations Height Weight    147/80 (BP Location: Right arm) 62 97.5  F (36.4  C) (Oral) 18 1.651 m (5' 5\") 78.3 kg (172 lb 9.6 oz)    Pulse Oximetry BMI (Body Mass Index)                92% 28.72 kg/m2          TRAKLOK Information     TRAKLOK lets you send messages to your doctor, view your test results, renew your prescriptions, schedule appointments and more. To sign up, go to [email or web address] . Click on \"Log in\" on the left side of the screen, which will take you to the Welcome page. Then click on \"Sign up Now\" on the right side of the page.     You will be asked to enter the access code listed below, as well as some personal information. " Please follow the directions to create your username and password.     Your access code is: [email or web address]  Expires: 2018  3:07 PM     Your access code will  in 90 days. If you need help or a new code, please call your [facility] or [phone].        Care EveryWhere ID     This is your Care EveryWhere ID. This could be used by other organizations to access your [facility] medical records  [id number]        Equal Access to Services     ANNELISE CHAVEZ : Hadii aad ku hadasho Soomaali, waaxda luqadaha, qaybta kaalmada adeegyada, waxay idiin hayaan adeangelic daileyannikakadie martin. So [facility] [phone].    ATENCIÓN: Si habla español, tiene a duran disposición servicios gratuitos de asistencia lingüística. [facility] [phone].    We comply with applicable federal civil rights laws and [place] laws. We do not discriminate on the basis of race, color, national origin, age, disability, sex, sexual orientation, or gender identity.               Review of your medicines      START taking        Dose / Directions    isosorbide mononitrate 30 MG 24 hr tablet   Commonly known as:  IMDUR   Used for:  Accelerating angina (H)        Dose:  15 mg   Start taking on:  3/17/2018   Take 0.5 tablets (15 mg) by mouth daily   Quantity:  30 tablet   Refills:  1         CONTINUE these medicines which may have CHANGED, or have new prescriptions. If we are uncertain of the size of tablets/capsules you have at home, strength may be listed as something that might have changed.        Dose / Directions    amLODIPine 10 MG tablet   Commonly known as:  NORVASC   This may have changed:    - medication strength  - how much to take   Used for:  Essential hypertension, benign        Dose:  10 mg   Start taking on:  3/17/2018   Take 1 tablet (10 mg) by mouth daily   Quantity:  30 tablet   Refills:  1       citalopram 10 MG tablet   Commonly known as:  celeXA   This may have changed:  how much to take   Used for:  Major depressive disorder, recurrent  episode, moderate (H), MARY (generalized anxiety disorder)        Dose:  20 mg   Take 2 tablets (20 mg) by mouth daily   Quantity:  180 tablet   Refills:  1       diazepam 5 MG tablet   Commonly known as:  VALIUM   This may have changed:    - how much to take  - how to take this  - when to take this  - reasons to take this  - additional instructions   Used for:  MARY (generalized anxiety disorder)        1/2 to 1 po q 8 hours prn   Quantity:  40 tablet   Refills:  1       rosuvastatin 10 MG tablet   Commonly known as:  CRESTOR   This may have changed:  when to take this   Used for:  Hyperlipidemia LDL goal <70, Coronary artery disease involving native coronary artery of native heart without angina pectoris        Dose:  10 mg   Take 1 tablet (10 mg) by mouth daily   Quantity:  30 tablet   Refills:  5         CONTINUE these medicines which have NOT CHANGED        Dose / Directions    levothyroxine 50 MCG tablet   Commonly known as:  SYNTHROID/LEVOTHROID   Used for:  Acquired hypothyroidism        TAKE 1 TABLET (50 MCG) BY MOUTH DAILY   Quantity:  90 tablet   Refills:  2       MAGNESIUM OXIDE PO        Dose:  200 mg   Take 200 mg by mouth daily (with lunch)   Refills:  0       metoprolol tartrate 100 MG tablet   Commonly known as:  LOPRESSOR   Used for:  Essential hypertension, benign, ASCVD (arteriosclerotic cardiovascular disease)        TAKE 1 TABLET (100MG) BY MOUTH 2 TIMES DAILY   Quantity:  180 tablet   Refills:  2       traMADol 50 MG tablet   Commonly known as:  ULTRAM   Used for:  Chronic left-sided low back pain with left-sided sciatica        TAKE ONE TABLET BY MOUTH EVERY 8 HOURS AS NEEDED FOR MODERATE PAIN   Quantity:  50 tablet   Refills:  0       VITAMIN D (CHOLECALCIFEROL) PO        Dose:  1000 Units   Take 1,000 Units by mouth At Bedtime   Refills:  0       warfarin 5 MG tablet   Commonly known as:  COUMADIN   Used for:  Chronic atrial fibrillation (H), Long term current use of anticoagulant therapy         TAKE 1&1/2 TABLETS (7.5MG) BY MOUTH ONCE DAILY ON TUESDAY & SATURDAY THEN 1 TABLET (5MG) ALL THE OTHER DAYS OF THE WEEK   Quantity:  108 tablet   Refills:  0            Where to get your medicines      These medications were sent to [id number] - [place], [place] - [address]  [address], [place] [address]     Phone:  [phone]     amLODIPine 10 MG tablet    isosorbide mononitrate 30 MG 24 hr tablet                Protect others around you: Learn how to safely use, store and throw away your medicines at www.disposemymeds.org.             Medication List: This is a list of all your medications and when to take them. Check marks below indicate your daily home schedule. Keep this list as a reference.      Medications           Morning Afternoon Evening Bedtime As Needed    amLODIPine 10 MG tablet   Commonly known as:  NORVASC   Take 1 tablet (10 mg) by mouth daily   Start taking on:  3/17/2018   Last time this was given:  10 mg on 3/16/2018 10:11 AM   Next Dose Due:  3/17/18                                   citalopram 10 MG tablet   Commonly known as:  celeXA   Take 2 tablets (20 mg) by mouth daily   Last time this was given:  10 mg on 3/16/2018 10:11 AM   Next Dose Due:  3/17/18                                   diazepam 5 MG tablet   Commonly known as:  VALIUM   1/2 to 1 po q 8 hours prn                                   isosorbide mononitrate 30 MG 24 hr tablet   Commonly known as:  IMDUR   Take 0.5 tablets (15 mg) by mouth daily   Start taking on:  3/17/2018   Last time this was given:  15 mg on 3/16/2018 10:11 AM   Next Dose Due:  3/17/18                                   levothyroxine 50 MCG tablet   Commonly known as:  SYNTHROID/LEVOTHROID   TAKE 1 TABLET (50 MCG) BY MOUTH DAILY   Last time this was given:  50 mcg on 3/16/2018 10:11 AM   Next Dose Due:  3/17/18                                   MAGNESIUM OXIDE PO   Take 200 mg by mouth daily (with lunch)   Next Dose Due:   3/17/18                                   metoprolol tartrate 100 MG tablet   Commonly known as:  LOPRESSOR   TAKE 1 TABLET (100MG) BY MOUTH 2 TIMES DAILY   Last time this was given:  100 mg on 3/16/2018 10:11 AM   Next Dose Due:  3/16/18 Bedtime                                      rosuvastatin 10 MG tablet   Commonly known as:  CRESTOR   Take 1 tablet (10 mg) by mouth daily   Last time this was given:  10 mg on 3/15/2018  8:44 PM   Next Dose Due:  3/16/18                                   traMADol 50 MG tablet   Commonly known as:  ULTRAM   TAKE ONE TABLET BY MOUTH EVERY 8 HOURS AS NEEDED FOR MODERATE PAIN                                   VITAMIN D (CHOLECALCIFEROL) PO   Take 1,000 Units by mouth At Bedtime                                   warfarin 5 MG tablet   Commonly known as:  COUMADIN   TAKE 1&1/2 TABLETS (7.5MG) BY MOUTH ONCE DAILY ON TUESDAY & SATURDAY THEN 1 TABLET (5MG) ALL THE OTHER DAYS OF THE WEEK   Last time this was given:  5 mg on 3/15/2018  6:31 PM   Next Dose Due:  3/16/18  5mg                                             More Information        Patient Education    Isosorbide Mononitrate Oral tablet    Isosorbide Mononitrate Oral tablet, extended-release  Isosorbide Mononitrate Oral tablet  What is this medicine?  ISOSORBIDE MONONITRATE (eye monik SOR bide mon oh DEONDRE trate) is a type of vasodilator. It relaxes blood vessels, increasing the blood and oxygen supply to your heart. This medicine is used to prevent chest pain caused by angina. It will not help to stop an episode of chest pain.  This medicine may be used for other purposes; ask your health care provider or pharmacist if you have questions.  What should I tell my health care provider before I take this medicine?  They need to know if you have any of these conditions:    previous heart attack or heart failure    an unusual or allergic reaction to isosorbide mononitrate, nitrates, other medicines, foods, dyes, or preservatives    pregnant  or trying to get pregnant    breast-feeding  How should I use this medicine?  Take this medicine by mouth with a glass of water. Follow the directions on the prescription label. Take your medicine at regular intervals. Do not take your medicine more often than directed. Do not stop taking this medicine suddenly or your symptoms may get worse. Ask your doctor or health care professional how to gradually reduce the dose.  Talk to your pediatrician regarding the use of this medicine in children. Special care may be needed.  Overdosage: If you think you have taken too much of this medicine contact a poison control center or emergency room at once.  NOTE: This medicine is only for you. Do not share this medicine with others.  What if I miss a dose?  If you miss a dose, take it as soon as you can. If it is almost time for your next dose, take only that dose. Do not take double or extra doses.  What may interact with this medicine?  Do not take this medicine with any of the following medications:    medicines used to treat erectile dysfunction (ED) like avanafil, sildenafil, tadalafil, and vardenafil    riociguat  This medicine may also interact with the following medications:    medicines for high blood pressure    other medicines for angina or heart failure  This list may not describe all possible interactions. Give your health care provider a list of all the medicines, herbs, non-prescription drugs, or dietary supplements you use. Also tell them if you smoke, drink alcohol, or use illegal drugs. Some items may interact with your medicine.  What should I watch for while using this medicine?  Check your heart rate and blood pressure regularly while you are taking this medicine. Ask your doctor or health care professional what your heart rate and blood pressure should be and when you should contact him or her. Tell your doctor or health care professional if you feel your medicine is no longer working.  You may get dizzy.  Do not drive, use machinery, or do anything that needs mental alertness until you know how this medicine affects you. To reduce the risk of dizzy or fainting spells, do not sit or stand up quickly, especially if you are an older patient. Alcohol can make you more dizzy, and increase flushing and rapid heartbeats. Avoid alcoholic drinks.  Do not treat yourself for coughs, colds, or pain while you are taking this medicine without asking your doctor or health care professional for advice. Some ingredients may increase your blood pressure.  What side effects may I notice from receiving this medicine?  Side effects that you should report to your doctor or health care professional as soon as possible:    bluish discoloration of lips, fingernails, or palms of hands    irregular heartbeat, palpitations    low blood pressure    nausea, vomiting    persistent headache    unusually weak or tired  Side effects that usually do not require medical attention (report to your doctor or health care professional if they continue or are bothersome):    flushing of the face or neck    rash  This list may not describe all possible side effects. Call your doctor for medical advice about side effects. You may report side effects to FDA at 6-908-FDA-3445.  Where should I keep my medicine?  Keep out of the reach of children.  Store between 15 and 30 degrees C (59 and 86 degrees F). Keep container tightly closed. Throw away any unused medicine after the expiration date.  NOTE:This sheet is a summary. It may not cover all possible information. If you have questions about this medicine, talk to your doctor, pharmacist, or health care provider. Copyright  2016 [employer] Standard

## 2018-03-14 NOTE — ED NOTES
77-year-old female presents to the ER with complaints of shortness of breath for several months. Pt states the clinic hasn't done anything about it. Pt also states that they had her on the wrong C-pap machine for sleep apnea because she was on a machine for two years but now they just switched it and she has complex sleep apnea. Pt does not appear short of breath at triage. Sats 95%

## 2018-03-14 NOTE — PROGRESS NOTES
Walk- In  ------------------------------------------------------------------------------------  Patient stopped by the clinic.   Symptoms: Chest pressure going up to the throat w/SOB.   Last seen: 07-10-17  -----------------------------------------------------------------------------------  History  CAD: Mid LAD 50-60%, 1st Diag 70%, 1st OM 30%, 2nd OM 40-50%  Permanent atrial fibrillation.   Dyspnea on exertion. Chronic, etiology unclear  Chest pressure: Per Dr. Bateman likely due to anxiety rather than heart disease.   BETHANY: being treated. Patient just got new device.   -----------------------------------------------------------------------------------   Recommendations: Patient advised to go to the ER for evaluation and schedule an appt with a cardiologist or Dr. Bateman.     Patient is due to see Dr. Bateman in July 2018. With plans to possibly repeat a nuclear stress test.    Patient had no questions.     TGatwin STOREY  [facility]

## 2018-03-14 NOTE — IP AVS SNAPSHOT
[place] [address] [facility]    [address]    [facility] [address]    Phone:  [phone]    Fax:  [phone]                                       After Visit Summary   3/14/2018    Cielo Aguillon    MRN: [id number]           After Visit Summary Signature Page     I have received my discharge instructions, and my questions have been answered. I have discussed any challenges I see with this plan with the nurse or doctor.    ..........................................................................................................................................  Patient/Patient Representative Signature      ..........................................................................................................................................  Patient Representative Print Name and Relationship to Patient    ..................................................               ................................................  Date                                            Time    ..........................................................................................................................................  Reviewed by Signature/Title    ...................................................              ..............................................  Date                                                            Time

## 2018-03-14 NOTE — ED PROVIDER NOTES
History     Chief Complaint:  Shortness of Breath    HPI   Cielo Aguillon is a 77 year old female on Coumadin for atrial fibrillation who presents to the emergency department today with her  for evaluation of shortness of breath. The patient reports that for the past two years she has been getting intermittently short of breath, particularly with exertion. Over the past month, however, she has been getting exertional dyspnea along with exertional chest pain, neck pain, and left arm pain. Because of these exertional symptoms, she presented to the office of Dr. Bateman of cardiology and when she told the nurse about her symptoms, she was referred here to the emergency department. Here while at rest on the gurney the patient denies any chest pain, shortness of breath, or any other symptoms. She denies any abdominal pain, nausea, or vomiting. She reports that recently she has noticed that she's been gaining some water weight.     Allergies:  Ace Inhibitors  Aspirin  Hydrochlorothiazide [Hctz]  Ibuprofen  Amoxicillin  Losartan  Penicillins    Medications:    Tramadol  Coumadin  Lopressor  Levothyroxine  Celexa  Ambien  Valium  Crestor  Amlodipine    Past Medical History:    Anxiety   Arthritis   ASCVD (arteriosclerotic cardiovascular disease)   Atrial fibrillation   Atrial flutter    Coronary artery disease   Essential hypertension, benign   Fatty liver   Hypertriglyceridemia   Major depression   BETHANY (obstructive sleep apnea)   Other ventral hernia without mention of obstruction or gangrene   Peptic ulcer   Peptic ulcer, unspecified site, unspecified as acute or chronic, without mention of hemorrhage, perforation, or obstruction   Shortness of breath   Thyroid disease   Tubular adenoma    Past Surgical History:    Appendectomy  Tonsillectomy  Tubal ligation  Shave bone spurs from left foot  Coronary angiography, 50% mid Cfx, 60-70% prox OM2, 50-70% D1  Esophagoscopy, gastroscopy, duodenoscopy (egd), combined x 2  "  Heart catheterization right and left, Mid LAD 50-60%, 1st Diagonal 70%, 1st OM 30%, 2nd OM 40-50%    Family History:    Emphysema   Father   Alzheimer Disease  Mother   Congenital Anomalies  Mother   Osteoporosis   Mother   Diabetes    Maternal Aunt   Depression   Son     Social History:  The patient was accompanied to the ED by her .  Smoking status: Never Smoker  Smokeless tobacco: Never Used  Alcohol use: Positive    Comment: Wine occasionally  Marital Status:       Review of Systems   Constitutional: Positive for unexpected weight change (Increased water weight).   Respiratory: Positive for shortness of breath (Exertional).    Cardiovascular: Positive for chest pain (Exertional).   Gastrointestinal: Negative for abdominal pain, nausea and vomiting.   Musculoskeletal: Positive for neck pain (Exertional).   All other systems reviewed and are negative.    Physical Exam     Patient Vitals for the past 24 hrs:   BP Temp Temp src Pulse Heart Rate Resp SpO2 Height Weight   03/14/18 2139 152/71 96.2  F (35.7  C) Oral - 75 20 95 % 1.651 m (5' 5\") 79 kg (174 lb 3.2 oz)   03/14/18 2046 146/90 - - - - - - - -   03/14/18 2000 165/90 - - - - - - - -   03/14/18 1945 (!) 165/98 - - - - - 93 % - -   03/14/18 1930 155/85 - - - - - 94 % - -   03/14/18 1915 (!) 143/100 - - - - - 92 % - -   03/14/18 1900 (!) 142/94 - - - - - 95 % - -   03/14/18 1830 - - - - 76 23 94 % - -   03/14/18 1815 - - - - 69 19 93 % - -   03/14/18 1642 141/74 97.1  F (36.2  C) Temporal 88 - 18 95 % - 77.1 kg (170 lb)     Physical Exam  Nursing note and vitals reviewed.  Constitutional: Cooperative.   HENT:   Mouth/Throat: Moist mucous membranes.   Eyes: EOMI, nonicteric sclera  Cardiovascular: Normal rate, regular rhythm, no murmurs, rubs, or gallops  Pulmonary/Chest: Effort normal. No respiratory distress. No wheezes. Bibasilar crackles.    Abdominal: Soft. Nontender, nondistended, no guarding or rigidity. BS present.   Musculoskeletal: " Normal range of motion.   Neurological: Alert. Moves all extremities spontaneously.   Skin: Skin is warm and dry. No rash noted.   Psychiatric: Normal mood and affect.       Emergency Department Course     ECG:  ECG taken at 1656, ECG read at 1705  Atrial Fibrillation   Abnormal QRS-T angle, consider primary T wave abnormality   Abnormal ECG   Rate 73 bpm. ME interval * ms. QRS duration 84 ms. QT/QTc 420/462 ms. P-R-T axes * 57 -17.    Imaging:  Radiology findings were communicated with the patient who voiced understanding of the findings.    Chest XR,  PA & LAT  There is mild cardiomegaly. There is increased  interstitial prominence in both lungs that may represent chronic  fibrotic change; however, congestive failure with pulmonary edema  cannot be excluded. There is no pleural effusion or pneumothorax.  There are no airspace opacities to suggest pneumonia.  Reading per radiology    Laboratory:  Laboratory findings were communicated with the patient who voiced understanding of the findings.    CBC: WBC 4.7, HGB 12.8,  (L)  BMP: Creatinine 0.90  BNP: 2799 (H)  Troponin I (Collected 1831): <0.015      Interventions:  2122 Phytonadione 2.5 mg PO    Emergency Department Course:    1823 Nursing notes and vitals reviewed.    1827 I performed an exam of the patient as documented above.     1831 IV was inserted and blood was drawn for laboratory testing, results above.     1843 The patient was sent for a Chest XR, PA & LAT while in the emergency department, results above.      2011 I spoke with Dr. Manish Llamas of cardiology regarding patient's presentation, findings, and plan of care.      2015 The patient was rechecked and updated. I personally reviewed the laboratory, imaging, and EKG results with the patient and answered all related questions prior to admission.    2043 I discussed the patient with Dr. Ab Green, who will admit the patient to a monitored bed for further evaluation and treatment.      Impression & Plan      Medical Decision Making:  Cielo Aguillon is a 77 year old female who presents to the emergency department today for evaluation of exertional chest pain.  Patient has a history of coronary artery disease, and last cath in 2015 showed some disease, but medical management was decided at that time.  In the last month, patient has now developed chest pain in addition to some neck and shoulder pain with exertion.  EKG does not show obvious ischemic changes and she is asymptomatic here.  Troponin also normal.  Given patient's worsening symptoms, I discussed the case with Dr. Llamas of cardiology.  Her recommendation given patient's history was to admit patient to the hospital for likely coronary angiogram.  She stated she would not do any stress testing, nor would she further anticoagulate patient at this time.  Patient's INR was supratherapeutic yesterday, therefore we ordered 2.5mg of po vitamin K for reversal.  I attempted to give patient aspirin, but patient refused citing a history of GI bleed.  Patient also has crackles on exam with a mildly elevated proBNP.  This may represent CHF as well.  I did review patient's old echo which did not appear that suggestive of CHF.  Given cardiology's recommendation I discussed admission with the patient and her  and they were in agreement with this plan.  Discussed with the hospitalist, Dr. Green who accepts.     Diagnosis:    ICD-10-CM    1. Accelerating angina (H) I20.0      Disposition:   The patient is admitted into the care of Dr. Ab Green.    Scribe Disclosure:  PHILIP, Ab Lawrence, am serving as a scribe at 6:27 PM on 3/14/2018 to document services personally performed by Jl Zee MD, based on my observations and the provider's statements to me.    [facility] EMERGENCY DEPARTMENT       Jl Zee MD  03/15/18 [id number]

## 2018-03-15 ENCOUNTER — APPOINTMENT (OUTPATIENT)
Dept: CARDIOLOGY | Facility: CLINIC | Age: 78
DRG: 287 | End: 2018-03-15
Attending: INTERNAL MEDICINE
Payer: MEDICARE

## 2018-03-15 LAB
CHOLEST SERPL-MCNC: 95 MG/DL
HDLC SERPL-MCNC: 42 MG/DL
INR PPP: 1.95 (ref 0.86–1.14)
INTERPRETATION ECG - MUSE: NORMAL
LDLC SERPL CALC-MCNC: 33 MG/DL
NONHDLC SERPL-MCNC: 53 MG/DL
POTASSIUM SERPL-SCNC: 3.8 MMOL/L (ref 3.4–5.3)
TRIGL SERPL-MCNC: 100 MG/DL
TROPONIN I SERPL-MCNC: <0.015 UG/L (ref 0–0.04)
TROPONIN I SERPL-MCNC: <0.015 UG/L (ref 0–0.04)

## 2018-03-15 PROCEDURE — C1894 INTRO/SHEATH, NON-LASER: HCPCS

## 2018-03-15 PROCEDURE — 25000132 ZZH RX MED GY IP 250 OP 250 PS 637: Mod: GY | Performed by: INTERNAL MEDICINE

## 2018-03-15 PROCEDURE — 99152 MOD SED SAME PHYS/QHP 5/>YRS: CPT | Performed by: INTERNAL MEDICINE

## 2018-03-15 PROCEDURE — 25000128 H RX IP 250 OP 636: Performed by: INTERNAL MEDICINE

## 2018-03-15 PROCEDURE — 84132 ASSAY OF SERUM POTASSIUM: CPT | Performed by: HOSPITALIST

## 2018-03-15 PROCEDURE — A9270 NON-COVERED ITEM OR SERVICE: HCPCS | Mod: GY | Performed by: INTERNAL MEDICINE

## 2018-03-15 PROCEDURE — 25000125 ZZHC RX 250: Performed by: INTERNAL MEDICINE

## 2018-03-15 PROCEDURE — 12000007 ZZH R&B INTERMEDIATE

## 2018-03-15 PROCEDURE — 85610 PROTHROMBIN TIME: CPT | Performed by: INTERNAL MEDICINE

## 2018-03-15 PROCEDURE — 80061 LIPID PANEL: CPT | Performed by: HOSPITALIST

## 2018-03-15 PROCEDURE — 99153 MOD SED SAME PHYS/QHP EA: CPT

## 2018-03-15 PROCEDURE — 27210827 ZZH KIT ACIST INJECTOR CR6

## 2018-03-15 PROCEDURE — 27210946 ZZH KIT HC TOTES DISP CR8

## 2018-03-15 PROCEDURE — 27210742 ZZH CATH CR1

## 2018-03-15 PROCEDURE — C1769 GUIDE WIRE: HCPCS

## 2018-03-15 PROCEDURE — 27210856 ZZH ACCESS HEART CATH CR2

## 2018-03-15 PROCEDURE — 36415 COLL VENOUS BLD VENIPUNCTURE: CPT | Performed by: INTERNAL MEDICINE

## 2018-03-15 PROCEDURE — 99233 SBSQ HOSP IP/OBS HIGH 50: CPT | Performed by: INTERNAL MEDICINE

## 2018-03-15 PROCEDURE — A9270 NON-COVERED ITEM OR SERVICE: HCPCS | Mod: GY | Performed by: HOSPITALIST

## 2018-03-15 PROCEDURE — 99152 MOD SED SAME PHYS/QHP 5/>YRS: CPT

## 2018-03-15 PROCEDURE — 93458 L HRT ARTERY/VENTRICLE ANGIO: CPT

## 2018-03-15 PROCEDURE — 4A033BC MEASUREMENT OF ARTERIAL PRESSURE, CORONARY, PERCUTANEOUS APPROACH: ICD-10-PCS | Performed by: INTERNAL MEDICINE

## 2018-03-15 PROCEDURE — B2111ZZ FLUOROSCOPY OF MULTIPLE CORONARY ARTERIES USING LOW OSMOLAR CONTRAST: ICD-10-PCS | Performed by: INTERNAL MEDICINE

## 2018-03-15 PROCEDURE — 25000132 ZZH RX MED GY IP 250 OP 250 PS 637: Mod: GY | Performed by: HOSPITALIST

## 2018-03-15 PROCEDURE — 93571 IV DOP VEL&/PRESS C FLO 1ST: CPT | Mod: 26 | Performed by: INTERNAL MEDICINE

## 2018-03-15 PROCEDURE — 93458 L HRT ARTERY/VENTRICLE ANGIO: CPT | Mod: 26 | Performed by: INTERNAL MEDICINE

## 2018-03-15 PROCEDURE — 4A023N7 MEASUREMENT OF CARDIAC SAMPLING AND PRESSURE, LEFT HEART, PERCUTANEOUS APPROACH: ICD-10-PCS | Performed by: INTERNAL MEDICINE

## 2018-03-15 PROCEDURE — 93571 IV DOP VEL&/PRESS C FLO 1ST: CPT

## 2018-03-15 PROCEDURE — 36415 COLL VENOUS BLD VENIPUNCTURE: CPT | Performed by: HOSPITALIST

## 2018-03-15 PROCEDURE — B2151ZZ FLUOROSCOPY OF LEFT HEART USING LOW OSMOLAR CONTRAST: ICD-10-PCS | Performed by: INTERNAL MEDICINE

## 2018-03-15 PROCEDURE — 99222 1ST HOSP IP/OBS MODERATE 55: CPT | Mod: 25 | Performed by: INTERNAL MEDICINE

## 2018-03-15 PROCEDURE — 84484 ASSAY OF TROPONIN QUANT: CPT | Performed by: HOSPITALIST

## 2018-03-15 RX ORDER — FLUMAZENIL 0.1 MG/ML
0.2 INJECTION, SOLUTION INTRAVENOUS
Status: CANCELLED | OUTPATIENT
Start: 2018-03-15 | End: 2018-03-16

## 2018-03-15 RX ORDER — DEXTROSE MONOHYDRATE 25 G/50ML
12.5-5 INJECTION, SOLUTION INTRAVENOUS EVERY 30 MIN PRN
Status: DISCONTINUED | OUTPATIENT
Start: 2018-03-15 | End: 2018-03-15 | Stop reason: HOSPADM

## 2018-03-15 RX ORDER — NIFEDIPINE 10 MG/1
10 CAPSULE ORAL
Status: DISCONTINUED | OUTPATIENT
Start: 2018-03-15 | End: 2018-03-15 | Stop reason: HOSPADM

## 2018-03-15 RX ORDER — AMLODIPINE BESYLATE 5 MG/1
5 TABLET ORAL DAILY
Status: DISCONTINUED | OUTPATIENT
Start: 2018-03-15 | End: 2018-03-15

## 2018-03-15 RX ORDER — FENTANYL CITRATE 50 UG/ML
25-50 INJECTION, SOLUTION INTRAMUSCULAR; INTRAVENOUS
Status: DISCONTINUED | OUTPATIENT
Start: 2018-03-15 | End: 2018-03-15 | Stop reason: HOSPADM

## 2018-03-15 RX ORDER — LIDOCAINE 40 MG/G
CREAM TOPICAL
Status: CANCELLED | OUTPATIENT
Start: 2018-03-15

## 2018-03-15 RX ORDER — NITROGLYCERIN 0.4 MG/1
0.4 TABLET SUBLINGUAL EVERY 5 MIN PRN
Status: DISCONTINUED | OUTPATIENT
Start: 2018-03-15 | End: 2018-03-15 | Stop reason: HOSPADM

## 2018-03-15 RX ORDER — HEPARIN SODIUM 1000 [USP'U]/ML
INJECTION, SOLUTION INTRAVENOUS; SUBCUTANEOUS
Status: DISCONTINUED
Start: 2018-03-15 | End: 2018-03-15 | Stop reason: HOSPADM

## 2018-03-15 RX ORDER — METOPROLOL TARTRATE 100 MG
100 TABLET ORAL 2 TIMES DAILY
Status: DISCONTINUED | OUTPATIENT
Start: 2018-03-15 | End: 2018-03-16 | Stop reason: HOSPADM

## 2018-03-15 RX ORDER — HYDRALAZINE HYDROCHLORIDE 20 MG/ML
10-20 INJECTION INTRAMUSCULAR; INTRAVENOUS
Status: DISCONTINUED | OUTPATIENT
Start: 2018-03-15 | End: 2018-03-15 | Stop reason: HOSPADM

## 2018-03-15 RX ORDER — ASPIRIN 81 MG/1
81 TABLET ORAL DAILY
Status: CANCELLED | OUTPATIENT
Start: 2018-03-16

## 2018-03-15 RX ORDER — ASPIRIN 81 MG/1
81-324 TABLET, CHEWABLE ORAL
Status: DISCONTINUED | OUTPATIENT
Start: 2018-03-15 | End: 2018-03-15 | Stop reason: HOSPADM

## 2018-03-15 RX ORDER — NITROGLYCERIN 5 MG/ML
100-500 VIAL (ML) INTRAVENOUS
Status: COMPLETED | OUTPATIENT
Start: 2018-03-15 | End: 2018-03-15

## 2018-03-15 RX ORDER — ROSUVASTATIN CALCIUM 5 MG/1
10 TABLET, COATED ORAL AT BEDTIME
Status: DISCONTINUED | OUTPATIENT
Start: 2018-03-15 | End: 2018-03-16 | Stop reason: HOSPADM

## 2018-03-15 RX ORDER — EPINEPHRINE 1 MG/ML
0.3 INJECTION, SOLUTION, CONCENTRATE INTRAVENOUS
Status: DISCONTINUED | OUTPATIENT
Start: 2018-03-15 | End: 2018-03-15 | Stop reason: HOSPADM

## 2018-03-15 RX ORDER — NICARDIPINE HYDROCHLORIDE 2.5 MG/ML
100 INJECTION INTRAVENOUS
Status: DISCONTINUED | OUTPATIENT
Start: 2018-03-15 | End: 2018-03-15 | Stop reason: HOSPADM

## 2018-03-15 RX ORDER — ACETAMINOPHEN 325 MG/1
325-650 TABLET ORAL EVERY 4 HOURS PRN
Status: CANCELLED | OUTPATIENT
Start: 2018-03-15

## 2018-03-15 RX ORDER — LIDOCAINE HYDROCHLORIDE 10 MG/ML
1-10 INJECTION, SOLUTION EPIDURAL; INFILTRATION; INTRACAUDAL; PERINEURAL
Status: DISCONTINUED | OUTPATIENT
Start: 2018-03-15 | End: 2018-03-15 | Stop reason: HOSPADM

## 2018-03-15 RX ORDER — AMLODIPINE BESYLATE 10 MG/1
10 TABLET ORAL DAILY
Status: DISCONTINUED | OUTPATIENT
Start: 2018-03-16 | End: 2018-03-16 | Stop reason: HOSPADM

## 2018-03-15 RX ORDER — POTASSIUM CHLORIDE 1500 MG/1
20 TABLET, EXTENDED RELEASE ORAL
Status: COMPLETED | OUTPATIENT
Start: 2018-03-15 | End: 2018-03-15

## 2018-03-15 RX ORDER — SODIUM CHLORIDE 9 MG/ML
INJECTION, SOLUTION INTRAVENOUS CONTINUOUS
Status: DISCONTINUED | OUTPATIENT
Start: 2018-03-15 | End: 2018-03-15 | Stop reason: HOSPADM

## 2018-03-15 RX ORDER — FLUMAZENIL 0.1 MG/ML
0.2 INJECTION, SOLUTION INTRAVENOUS
Status: DISCONTINUED | OUTPATIENT
Start: 2018-03-15 | End: 2018-03-15 | Stop reason: HOSPADM

## 2018-03-15 RX ORDER — AMLODIPINE BESYLATE 5 MG/1
5 TABLET ORAL ONCE
Status: COMPLETED | OUTPATIENT
Start: 2018-03-15 | End: 2018-03-15

## 2018-03-15 RX ORDER — MORPHINE SULFATE 2 MG/ML
1-2 INJECTION, SOLUTION INTRAMUSCULAR; INTRAVENOUS EVERY 5 MIN PRN
Status: DISCONTINUED | OUTPATIENT
Start: 2018-03-15 | End: 2018-03-15 | Stop reason: HOSPADM

## 2018-03-15 RX ORDER — ASPIRIN 325 MG
325 TABLET ORAL ONCE
Status: COMPLETED | OUTPATIENT
Start: 2018-03-15 | End: 2018-03-15

## 2018-03-15 RX ORDER — NALOXONE HYDROCHLORIDE 0.4 MG/ML
.2-.4 INJECTION, SOLUTION INTRAMUSCULAR; INTRAVENOUS; SUBCUTANEOUS
Status: CANCELLED | OUTPATIENT
Start: 2018-03-15 | End: 2018-03-16

## 2018-03-15 RX ORDER — FUROSEMIDE 10 MG/ML
20-100 INJECTION INTRAMUSCULAR; INTRAVENOUS
Status: DISCONTINUED | OUTPATIENT
Start: 2018-03-15 | End: 2018-03-15 | Stop reason: HOSPADM

## 2018-03-15 RX ORDER — NALOXONE HYDROCHLORIDE 0.4 MG/ML
.1-.4 INJECTION, SOLUTION INTRAMUSCULAR; INTRAVENOUS; SUBCUTANEOUS
Status: CANCELLED | OUTPATIENT
Start: 2018-03-15

## 2018-03-15 RX ORDER — NITROGLYCERIN 5 MG/ML
VIAL (ML) INTRAVENOUS
Status: DISCONTINUED
Start: 2018-03-15 | End: 2018-03-15 | Stop reason: HOSPADM

## 2018-03-15 RX ORDER — VERAPAMIL HYDROCHLORIDE 2.5 MG/ML
1-2.5 INJECTION, SOLUTION INTRAVENOUS
Status: COMPLETED | OUTPATIENT
Start: 2018-03-15 | End: 2018-03-15

## 2018-03-15 RX ORDER — LORAZEPAM 2 MG/ML
.5-2 INJECTION INTRAMUSCULAR EVERY 4 HOURS PRN
Status: DISCONTINUED | OUTPATIENT
Start: 2018-03-15 | End: 2018-03-15 | Stop reason: HOSPADM

## 2018-03-15 RX ORDER — ATROPINE SULFATE 0.1 MG/ML
.5-1 INJECTION INTRAVENOUS
Status: DISCONTINUED | OUTPATIENT
Start: 2018-03-15 | End: 2018-03-15 | Stop reason: HOSPADM

## 2018-03-15 RX ORDER — ATROPINE SULFATE 0.1 MG/ML
0.5 INJECTION INTRAVENOUS EVERY 5 MIN PRN
Status: CANCELLED | OUTPATIENT
Start: 2018-03-15 | End: 2018-03-16

## 2018-03-15 RX ORDER — VERAPAMIL HYDROCHLORIDE 2.5 MG/ML
INJECTION, SOLUTION INTRAVENOUS
Status: DISCONTINUED
Start: 2018-03-15 | End: 2018-03-15 | Stop reason: HOSPADM

## 2018-03-15 RX ORDER — SODIUM NITROPRUSSIDE 25 MG/ML
100-200 INJECTION INTRAVENOUS
Status: DISCONTINUED | OUTPATIENT
Start: 2018-03-15 | End: 2018-03-15 | Stop reason: HOSPADM

## 2018-03-15 RX ORDER — LORAZEPAM 2 MG/ML
0.5 INJECTION INTRAMUSCULAR
Status: DISCONTINUED | OUTPATIENT
Start: 2018-03-15 | End: 2018-03-15 | Stop reason: HOSPADM

## 2018-03-15 RX ORDER — LIDOCAINE HYDROCHLORIDE 10 MG/ML
30 INJECTION, SOLUTION EPIDURAL; INFILTRATION; INTRACAUDAL; PERINEURAL
Status: DISCONTINUED | OUTPATIENT
Start: 2018-03-15 | End: 2018-03-15 | Stop reason: HOSPADM

## 2018-03-15 RX ORDER — PROMETHAZINE HYDROCHLORIDE 25 MG/ML
6.25-25 INJECTION, SOLUTION INTRAMUSCULAR; INTRAVENOUS EVERY 4 HOURS PRN
Status: DISCONTINUED | OUTPATIENT
Start: 2018-03-15 | End: 2018-03-15 | Stop reason: HOSPADM

## 2018-03-15 RX ORDER — POTASSIUM CHLORIDE 7.45 MG/ML
10 INJECTION INTRAVENOUS
Status: DISCONTINUED | OUTPATIENT
Start: 2018-03-15 | End: 2018-03-15 | Stop reason: HOSPADM

## 2018-03-15 RX ORDER — DIPHENHYDRAMINE HYDROCHLORIDE 50 MG/ML
25-50 INJECTION INTRAMUSCULAR; INTRAVENOUS
Status: DISCONTINUED | OUTPATIENT
Start: 2018-03-15 | End: 2018-03-15 | Stop reason: HOSPADM

## 2018-03-15 RX ORDER — SODIUM CHLORIDE 9 MG/ML
INJECTION, SOLUTION INTRAVENOUS CONTINUOUS
Status: CANCELLED | OUTPATIENT
Start: 2018-03-15

## 2018-03-15 RX ORDER — HYDROCODONE BITARTRATE AND ACETAMINOPHEN 5; 325 MG/1; MG/1
1-2 TABLET ORAL EVERY 4 HOURS PRN
Status: CANCELLED | OUTPATIENT
Start: 2018-03-15

## 2018-03-15 RX ORDER — WARFARIN SODIUM 5 MG/1
5 TABLET ORAL
Status: COMPLETED | OUTPATIENT
Start: 2018-03-15 | End: 2018-03-15

## 2018-03-15 RX ORDER — FENTANYL CITRATE 50 UG/ML
25-50 INJECTION, SOLUTION INTRAMUSCULAR; INTRAVENOUS
Status: CANCELLED | OUTPATIENT
Start: 2018-03-15 | End: 2018-03-16

## 2018-03-15 RX ORDER — BUPIVACAINE HYDROCHLORIDE 2.5 MG/ML
1-10 INJECTION, SOLUTION EPIDURAL; INFILTRATION; INTRACAUDAL
Status: DISCONTINUED | OUTPATIENT
Start: 2018-03-15 | End: 2018-03-15 | Stop reason: HOSPADM

## 2018-03-15 RX ORDER — PROTAMINE SULFATE 10 MG/ML
1-5 INJECTION, SOLUTION INTRAVENOUS
Status: DISCONTINUED | OUTPATIENT
Start: 2018-03-15 | End: 2018-03-15 | Stop reason: HOSPADM

## 2018-03-15 RX ORDER — DOBUTAMINE HYDROCHLORIDE 200 MG/100ML
2-20 INJECTION INTRAVENOUS CONTINUOUS PRN
Status: DISCONTINUED | OUTPATIENT
Start: 2018-03-15 | End: 2018-03-15 | Stop reason: HOSPADM

## 2018-03-15 RX ORDER — LIDOCAINE HYDROCHLORIDE 10 MG/ML
INJECTION, SOLUTION EPIDURAL; INFILTRATION; INTRACAUDAL; PERINEURAL
Status: DISCONTINUED
Start: 2018-03-15 | End: 2018-03-15 | Stop reason: HOSPADM

## 2018-03-15 RX ORDER — ALUMINA, MAGNESIA, AND SIMETHICONE 2400; 2400; 240 MG/30ML; MG/30ML; MG/30ML
15 SUSPENSION ORAL EVERY 4 HOURS PRN
Status: DISCONTINUED | OUTPATIENT
Start: 2018-03-15 | End: 2018-03-16 | Stop reason: HOSPADM

## 2018-03-15 RX ORDER — NITROGLYCERIN 20 MG/100ML
.07-2 INJECTION INTRAVENOUS CONTINUOUS PRN
Status: DISCONTINUED | OUTPATIENT
Start: 2018-03-15 | End: 2018-03-15 | Stop reason: HOSPADM

## 2018-03-15 RX ORDER — PROTAMINE SULFATE 10 MG/ML
25-100 INJECTION, SOLUTION INTRAVENOUS EVERY 5 MIN PRN
Status: DISCONTINUED | OUTPATIENT
Start: 2018-03-15 | End: 2018-03-15 | Stop reason: HOSPADM

## 2018-03-15 RX ORDER — ADENOSINE 3 MG/ML
INJECTION, SOLUTION INTRAVENOUS
Status: DISCONTINUED
Start: 2018-03-15 | End: 2018-03-15 | Stop reason: HOSPADM

## 2018-03-15 RX ORDER — IOPAMIDOL 755 MG/ML
100 INJECTION, SOLUTION INTRAVASCULAR ONCE
Status: COMPLETED | OUTPATIENT
Start: 2018-03-15 | End: 2018-03-15

## 2018-03-15 RX ORDER — ISOSORBIDE MONONITRATE 30 MG/1
30 TABLET, EXTENDED RELEASE ORAL DAILY
Status: DISCONTINUED | OUTPATIENT
Start: 2018-03-15 | End: 2018-03-15

## 2018-03-15 RX ORDER — NITROGLYCERIN 5 MG/ML
100-200 VIAL (ML) INTRAVENOUS
Status: DISCONTINUED | OUTPATIENT
Start: 2018-03-15 | End: 2018-03-15 | Stop reason: HOSPADM

## 2018-03-15 RX ORDER — ASPIRIN 325 MG
325 TABLET ORAL
Status: DISCONTINUED | OUTPATIENT
Start: 2018-03-15 | End: 2018-03-15 | Stop reason: HOSPADM

## 2018-03-15 RX ORDER — ENALAPRILAT 1.25 MG/ML
1.25-2.5 INJECTION INTRAVENOUS
Status: DISCONTINUED | OUTPATIENT
Start: 2018-03-15 | End: 2018-03-15 | Stop reason: HOSPADM

## 2018-03-15 RX ORDER — CLOPIDOGREL BISULFATE 75 MG/1
75 TABLET ORAL
Status: DISCONTINUED | OUTPATIENT
Start: 2018-03-15 | End: 2018-03-15 | Stop reason: HOSPADM

## 2018-03-15 RX ORDER — METOPROLOL TARTRATE 1 MG/ML
5 INJECTION, SOLUTION INTRAVENOUS EVERY 5 MIN PRN
Status: DISCONTINUED | OUTPATIENT
Start: 2018-03-15 | End: 2018-03-15 | Stop reason: HOSPADM

## 2018-03-15 RX ORDER — LIDOCAINE 40 MG/G
CREAM TOPICAL
Status: DISCONTINUED | OUTPATIENT
Start: 2018-03-15 | End: 2018-03-15 | Stop reason: HOSPADM

## 2018-03-15 RX ORDER — ONDANSETRON 2 MG/ML
4 INJECTION INTRAMUSCULAR; INTRAVENOUS EVERY 4 HOURS PRN
Status: DISCONTINUED | OUTPATIENT
Start: 2018-03-15 | End: 2018-03-15 | Stop reason: HOSPADM

## 2018-03-15 RX ORDER — POTASSIUM CHLORIDE 29.8 MG/ML
20 INJECTION INTRAVENOUS
Status: DISCONTINUED | OUTPATIENT
Start: 2018-03-15 | End: 2018-03-15 | Stop reason: HOSPADM

## 2018-03-15 RX ORDER — DOPAMINE HYDROCHLORIDE 160 MG/100ML
2-20 INJECTION, SOLUTION INTRAVENOUS CONTINUOUS PRN
Status: DISCONTINUED | OUTPATIENT
Start: 2018-03-15 | End: 2018-03-15 | Stop reason: HOSPADM

## 2018-03-15 RX ORDER — NALOXONE HYDROCHLORIDE 0.4 MG/ML
0.4 INJECTION, SOLUTION INTRAMUSCULAR; INTRAVENOUS; SUBCUTANEOUS EVERY 5 MIN PRN
Status: DISCONTINUED | OUTPATIENT
Start: 2018-03-15 | End: 2018-03-15 | Stop reason: HOSPADM

## 2018-03-15 RX ORDER — CLOPIDOGREL BISULFATE 75 MG/1
300-600 TABLET ORAL
Status: DISCONTINUED | OUTPATIENT
Start: 2018-03-15 | End: 2018-03-15 | Stop reason: HOSPADM

## 2018-03-15 RX ORDER — FENTANYL CITRATE 50 UG/ML
INJECTION, SOLUTION INTRAMUSCULAR; INTRAVENOUS
Status: DISCONTINUED
Start: 2018-03-15 | End: 2018-03-15 | Stop reason: HOSPADM

## 2018-03-15 RX ORDER — PHENYLEPHRINE HCL IN 0.9% NACL 1 MG/10 ML
20-100 SYRINGE (ML) INTRAVENOUS
Status: DISCONTINUED | OUTPATIENT
Start: 2018-03-15 | End: 2018-03-15 | Stop reason: HOSPADM

## 2018-03-15 RX ORDER — PRASUGREL 10 MG/1
10-60 TABLET, FILM COATED ORAL
Status: DISCONTINUED | OUTPATIENT
Start: 2018-03-15 | End: 2018-03-15 | Stop reason: HOSPADM

## 2018-03-15 RX ORDER — METHYLPREDNISOLONE SODIUM SUCCINATE 125 MG/2ML
125 INJECTION, POWDER, LYOPHILIZED, FOR SOLUTION INTRAMUSCULAR; INTRAVENOUS
Status: DISCONTINUED | OUTPATIENT
Start: 2018-03-15 | End: 2018-03-15 | Stop reason: HOSPADM

## 2018-03-15 RX ORDER — LEVOTHYROXINE SODIUM 50 UG/1
50 TABLET ORAL DAILY
Status: DISCONTINUED | OUTPATIENT
Start: 2018-03-15 | End: 2018-03-16 | Stop reason: HOSPADM

## 2018-03-15 RX ORDER — ADENOSINE 3 MG/ML
12-12000 INJECTION, SOLUTION INTRAVENOUS
Status: DISCONTINUED | OUTPATIENT
Start: 2018-03-15 | End: 2018-03-15 | Stop reason: HOSPADM

## 2018-03-15 RX ORDER — LORAZEPAM 0.5 MG/1
0.5 TABLET ORAL
Status: DISCONTINUED | OUTPATIENT
Start: 2018-03-15 | End: 2018-03-15 | Stop reason: HOSPADM

## 2018-03-15 RX ORDER — HEPARIN SODIUM 1000 [USP'U]/ML
1000-10000 INJECTION, SOLUTION INTRAVENOUS; SUBCUTANEOUS EVERY 5 MIN PRN
Status: DISCONTINUED | OUTPATIENT
Start: 2018-03-15 | End: 2018-03-15 | Stop reason: HOSPADM

## 2018-03-15 RX ORDER — CITALOPRAM HYDROBROMIDE 10 MG/1
10 TABLET ORAL DAILY
Status: DISCONTINUED | OUTPATIENT
Start: 2018-03-15 | End: 2018-03-16 | Stop reason: HOSPADM

## 2018-03-15 RX ADMIN — FENTANYL CITRATE 50 MCG: 50 INJECTION INTRAMUSCULAR; INTRAVENOUS at 15:46

## 2018-03-15 RX ADMIN — SODIUM CHLORIDE: 9 INJECTION, SOLUTION INTRAVENOUS at 10:32

## 2018-03-15 RX ADMIN — WARFARIN SODIUM 5 MG: 5 TABLET ORAL at 18:31

## 2018-03-15 RX ADMIN — NITROGLYCERIN 300 MCG: 5 INJECTION, SOLUTION INTRAVENOUS at 16:13

## 2018-03-15 RX ADMIN — VERAPAMIL HYDROCHLORIDE 2.5 MG: 2.5 INJECTION, SOLUTION INTRAVENOUS at 15:49

## 2018-03-15 RX ADMIN — ROSUVASTATIN CALCIUM 10 MG: 5 TABLET, FILM COATED ORAL at 20:44

## 2018-03-15 RX ADMIN — CITALOPRAM HYDROBROMIDE 10 MG: 10 TABLET ORAL at 08:27

## 2018-03-15 RX ADMIN — NITROGLYCERIN 200 MCG: 5 INJECTION, SOLUTION INTRAVENOUS at 15:47

## 2018-03-15 RX ADMIN — Medication 15 MG: at 18:31

## 2018-03-15 RX ADMIN — MIDAZOLAM HYDROCHLORIDE 0.5 MG: 1 INJECTION, SOLUTION INTRAMUSCULAR; INTRAVENOUS at 15:47

## 2018-03-15 RX ADMIN — ALUMINUM HYDROXIDE, MAGNESIUM HYDROXIDE, AND DIMETHICONE 15 ML: 400; 400; 40 SUSPENSION ORAL at 11:10

## 2018-03-15 RX ADMIN — METOPROLOL TARTRATE 100 MG: 100 TABLET, FILM COATED ORAL at 20:44

## 2018-03-15 RX ADMIN — POTASSIUM CHLORIDE 20 MEQ: 1500 TABLET, EXTENDED RELEASE ORAL at 11:11

## 2018-03-15 RX ADMIN — METOPROLOL TARTRATE 100 MG: 100 TABLET, FILM COATED ORAL at 08:27

## 2018-03-15 RX ADMIN — AMLODIPINE BESYLATE 5 MG: 5 TABLET ORAL at 08:27

## 2018-03-15 RX ADMIN — AMLODIPINE BESYLATE 5 MG: 5 TABLET ORAL at 10:30

## 2018-03-15 RX ADMIN — NITROGLYCERIN 300 MCG: 5 INJECTION, SOLUTION INTRAVENOUS at 16:11

## 2018-03-15 RX ADMIN — ASPIRIN 325 MG ORAL TABLET 325 MG: 325 PILL ORAL at 10:30

## 2018-03-15 RX ADMIN — IOPAMIDOL 120 ML: 755 INJECTION, SOLUTION INTRAVASCULAR at 15:00

## 2018-03-15 RX ADMIN — LORAZEPAM 0.5 MG: 0.5 TABLET ORAL at 11:24

## 2018-03-15 RX ADMIN — LEVOTHYROXINE SODIUM 50 MCG: 50 TABLET ORAL at 08:27

## 2018-03-15 RX ADMIN — NITROGLYCERIN 300 MCG: 5 INJECTION, SOLUTION INTRAVENOUS at 15:55

## 2018-03-15 RX ADMIN — ADENOSINE 6 MCG: 3 INJECTION, SOLUTION INTRAVENOUS at 16:07

## 2018-03-15 RX ADMIN — HEPARIN SODIUM 5000 UNITS: 1000 INJECTION, SOLUTION INTRAVENOUS; SUBCUTANEOUS at 15:46

## 2018-03-15 ASSESSMENT — ACTIVITIES OF DAILY LIVING (ADL)
ADLS_ACUITY_SCORE: 12

## 2018-03-15 NOTE — PLAN OF CARE
Problem: Cardiac: ACS (Acute Coronary Syndrome) (Adult)  Goal: Signs and Symptoms of Listed Potential Problems Will be Absent, Minimized or Managed (Cardiac: ACS)  Signs and symptoms of listed potential problems will be absent, minimized or managed by discharge/transition of care (reference Cardiac: ACS (Acute Coronary Syndrome) (Adult) CPG).  Outcome: No Change  RN SHIFT SUMMARY :  DX/STORY : admit of 3/14 with SOB/Chest pressure, at Angio this afternoon.   HX : had angio 2/15 with disease & treated medically, chronic afib - on coumadin , depression, anxiety , arthritis , HTN,   last echo-55%    LABS/PROTOCOLS : K per protocol ok at 3.8 but replaced with Kdur per angio guidelines needing to be > 4 .   INR 5.4 on admit -had Vit K & decreased to 3.07, then repeated pre-op & 1.95 ,   TELE : yes Chronic afib- rate 90's-110 with activity   ASSESS : a/o, denied any pain this am,  Up indep in room with cane,   TEACHING : reviewed POC with pt/spouse - given handouts on meds & C.Angio - has prior knowledge for both   PLAN : at baseline is retired ,   &  From [employer] . Cont. POC of Cardiology Consulting ---Awaiting Angiogram ( at procedure now)

## 2018-03-15 NOTE — ED NOTES
[facility]  ED Nurse Handoff Report    Cielo Aguillon is a 77 year old female   ED Chief complaint: Shortness of Breath  . ED Diagnosis:   Final diagnoses:   Accelerating angina (H)     Allergies:   Allergies   Allergen Reactions     Ace Inhibitors      Cough (Zestril)     Aspirin      hx bleeding ulcers     Hydrochlorothiazide [Hctz] Rash     Ibuprofen      hx bleeding ulcers     Amoxicillin Rash     Losartan Rash     Penicillins Rash       Code Status: Full Code  Activity level - Baseline/Home:  Independent. Activity Level - Current:   Stand with Assist. Lift room needed: No. Bariatric: No   Needed: No   Isolation: No. Infection: Not Applicable.     Vital Signs:   Vitals:    03/14/18 1930 03/14/18 1945 03/14/18 2000 03/14/18 2046   BP: 155/85 (!) 165/98 165/90 146/90   Pulse:       Resp:       Temp:       TempSrc:       SpO2: 94% 93%     Weight:           Cardiac Rhythm:  ,   Cardiac  Cardiac Rhythm: Normal sinus rhythm  Pain level: 0-10 Pain Scale: 0  Patient confused: No. Patient Falls Risk: Yes.   Elimination Status: Has voided   Patient Report - Initial Complaint: SOB for past few months. Focused Assessment: Exertional dyspnea, LE edema, fine crackles in lungs. Denies CP, n/v/d.   Tests Performed: ekg, labs, chest xray. Abnormal Results: elevated INR yesterday, 5.4, BNP 2799. Negative trop. Plan is possible cath tomorrow  Treatments provided: oral vit K  Family Comments:  at bedside  OBS brochure/video discussed/provided to patient:  N/A  ED Medications:   Medications   phytonadione (MEPHYTON/VITAMIN K) 1 MG/ML oral suspension 2.5 mg (not administered)     Drips infusing:  No  For the majority of the shift, the patient's behavior Green. Interventions performed were n/a.     Severe Sepsis OR Septic Shock Diagnosis Present: No      ED Nurse Name/Phone Number: Nola Maharaj,   9:14 PM    RECEIVING UNIT ED HANDOFF REVIEW    Above ED Nurse Handoff Report was reviewed:  Yes  Reviewed by: Regina Martínez on March 14, 2018 at 9:25 PM

## 2018-03-15 NOTE — PHARMACY-ADMISSION MEDICATION HISTORY
Admission medication history interview status for this patient is complete. See [facility] admission navigator for allergy information, prior to admission medications and immunization status.     Medication history interview source(s):Patient  Medication history resources (including written lists, pill bottles, clinic record):None  Primary pharmacy: Not ID'd    Changes made to PTA medication list:  Added: Magnesium  Deleted: Zolpidem  Changed: Cholecalciferol (QHS), Crestor (QHS), Diazepam (PRN Travel), Celexa (10mg AM)    Actions taken by pharmacist (provider contacted, etc):None     Additional medication history information:None    Medication reconciliation/reorder completed by provider prior to medication history? No    Prior to Admission medications    Medication Sig Last Dose Taking? Auth Provider   MAGNESIUM OXIDE PO Take 200 mg by mouth daily (with lunch) 3/13/2018 at AFTERNOON Yes Unknown, Entered By History   traMADol (ULTRAM) 50 MG tablet TAKE ONE TABLET BY MOUTH EVERY 8 HOURS AS NEEDED FOR MODERATE PAIN 3/12/2018 at Unknown time Yes Simin Lopez MD   metoprolol tartrate (LOPRESSOR) 100 MG tablet TAKE 1 TABLET (100MG) BY MOUTH 2 TIMES DAILY 3/14/2018 at AM Yes Simin Lopez MD   levothyroxine (SYNTHROID/LEVOTHROID) 50 MCG tablet TAKE 1 TABLET (50 MCG) BY MOUTH DAILY 3/14/2018 at AM Yes Simin Lopez MD   citalopram (CELEXA) 10 MG tablet Take 2 tablets (20 mg) by mouth daily  Patient taking differently: Take 10 mg by mouth daily  3/14/2018 at AM Yes Simin Lopez MD   diazepam (VALIUM) 5 MG tablet 1/2 to 1 po q 8 hours prn  Patient taking differently: Take 2.5-5 mg by mouth every 8 hours as needed for anxiety (WITH TRAVELING)   Yes Simin Lopez MD   rosuvastatin (CRESTOR) 10 MG tablet Take 1 tablet (10 mg) by mouth daily  Patient taking differently: Take 10 mg by mouth At Bedtime  3/13/2018 at HS Yes Simin Lopez MD   amLODIPine (NORVASC) 5 MG tablet Take 1 tablet (5 mg) by mouth daily 3/14/2018 at AM Yes  Simin Lopez MD   VITAMIN D, CHOLECALCIFEROL, PO Take 1,000 Units by mouth At Bedtime  3/13/2018 at HS Yes Reported, Patient   warfarin (COUMADIN) 5 MG tablet TAKE 1&1/2 TABLETS (7.5MG) BY MOUTH ONCE DAILY ON TUESDAY & SATURDAY THEN 1 TABLET (5MG) ALL THE OTHER DAYS OF THE WEEK 3/12/2018 at PM  Simin Lopez MD

## 2018-03-15 NOTE — PROCEDURES
SLEEP STUDY INTERPRETATION  HOME OXIMETRY      Patient: Cielo Aguillon  MRN: [id number]  YOB: 1940  Study Date: 03/13/2018   Referring Physician: Simin Lopez  Ordering Physician: Dennys Johnson MD     Indications: Complex sleep apnea with sleep associated hypoxemia currently on ASV    Comments: The overnight oximetry was performed in the usual protocol. The study was performed on room air without oxygen. There was no technical artifact (% artifact 0.7%). The baseline oxygen saturation was 90.1%.       Testing Conditions:  ASV - Room air  Quality: artifact-free    Duration of Data Collection:              6 hours 32 minutes              Time less than or equal to SpO2 88% [5 min]:           11 min  Duration monitoring [> 2 hours artifact free]:   6 hours 32 minutes                    4% O2 desat index [ > 15/ hour]:     7.8 / hour                    Lowest SpO2 (O2 Saturation):                                  84%  Average Pulse Rate:                                                  68.5 BPM    Desaturation Pattern:     oscillatory                                  Assessment:    -Mild hypoxemia still present    Recommendations:   -Given mild hypoxemia, patient may continue current ASV without oxygen at current settings    Diagnosis Code(s):     -Hypoxemia G47.36    -Sleep apnea G47.33     Dennys Johnson MD, MPH  Clinical Sleep and Occupational / Environmental Medicine  March 15, 2018

## 2018-03-15 NOTE — CONSULTS
Consult Date:  03/15/2018      CARDIOLOGY CONSULTATION      DATE OF SERVICE:  03/15/2018      REASON FOR CARDIOLOGY CONSULTATION:  Unstable angina.      REQUESTING PHYSICIAN:  Ab Green MD       REASON FOR CONSULTATION:  Dyspnea on exertion, chest, arm and jaw discomfort with exertion.      HISTORY OF PRESENT ILLNESS:  Ms. Aguillon is a very pleasant 77-year-old female with history of chronic atrial fibrillation on rate-control strategy on Coumadin for stroke prophylaxis, history of mild to moderate coronary artery disease on coronary angiogram in 2015, history of preserved ejection fraction, congestive heart failure with right heart catheterization in 2015 showing elevated wedge pressure and pulmonary hypertension, other comorbidities of hypertension, obstructive sleep apnea; was not on treatment for a long time, just recently restarted treatment, history of peptic ulcer disease and according to patient Helicobacter pylori infection which had been treated a few years ago and chronic dyspnea on exertion.  The patient was admitted yesterday from Cardiology Clinic.  She was walking in Cardiology Clinic because of above-mentioned complaints.  Her symptoms were felt to be concerning and she was transferred to the Emergency Room and subsequently admitted.  At the time of this review patient feels quite well.  She tells me over the last 2 months she has noticed progressive discomfort as if somebody is sitting on her chest, especially when she exerts like just walking across the kitchen.  She will get this moderate intensity, tightness in her chest going to the left arm and jaw.  She will then sit down and after a few minutes it will go away.  Remarkably, sometimes she is able to walk without these issues.  She also has longstanding dyspnea on exertion and to be noted, she had right and left heart catheterization done in 2015 that showed 50%-60% mid-LAD disease, small diagonal 1 patch of 70% disease which was tiny.  The  OM1 had 30% disease and OM2 had 40%-50% disease.  RCA was a nondominant small vessel.  Right heart catheterization showed PA mean of 32, wedge of 21, RA of 10.  Echocardiogram in 07/2017 showed LVEF of 55%-60% with moderate to severe left atrial enlargement, mild mitral regurgitation.  Upon admission she had EKG that showed atrial fibrillation with nonspecific ST-T changes with T-wave inversion in lead III and aVF.  Serial troponins have been negative.  NT proBNP is slightly elevated at 2799.  The patient denies any orthopnea.  She has also noticed her dyspnea on exertion has worsened over the last 2 months.  To be noted, she also has history of abnormal diffusion capacity and restrictive disease on PFTs in 2014.  No anticipated surgery.  Her INR was 5.4 yesterday.  She was given oral 2.5 mg of vitamin K. Her INR yesterday evening was 3.  No INR is available from today.        REVIEW OF SYSTEMS:  A complete review of systems was performed and was negative except as in the HPI.      PAST MEDICAL HISTORY:   1.  Chronic atrial fibrillation on rate-control strategy on Coumadin, follows with Dr. Bateman.   2.  Mild to moderate coronary artery disease as noted above in HPI.     3.  Pulmonary hypertension, likely from elevated left-sided filling pressure.   4.  Chronic dyspnea on exertion.   5.  History of peptic ulcer disease with history of Helicobacter pylori which has been treated.  It does not look like she had any issues with gastritis or GI bleeding in the last 2 years.   6.  Obstructive sleep apnea. She was not on treatment, only recently restarted treatment.   7.  Hypertension.   8.  Dyslipidemia.      FAMILY HISTORY:  Reviewed and noncontributory.      SOCIAL HISTORY:  No tobacco abuse.      ALLERGIES:  ACE INHIBITORS, HYDROCHLOROTHIAZIDE, IBUPROFEN, AMOXICILLIN, LOSARTAN, PENICILLIN, ASPIRIN IS LISTED AS AN ALLERGY BUT THIS IS DUE HISTORY OF BLEEDING ULCERS.  AS SUCH, ASPIRIN HAS NEVER CAUSED ANY PARTICULAR ISSUE  TO THE PATIENT.      CURRENT MEDICATIONS:  Amlodipine 5 mg daily, Celexa 10 mg daily, levothyroxine, metoprolol tartrate 100 mg b.i.d.  She received 1 dose of vitamin K orally yesterday, 2.5 mg daily, Crestor 10 mg daily.        PHYSICAL EXAMINATION:   VITAL SIGNS:  Blood pressure 142/82 to 150/83, heart rate 74, regular, respiratory rate 16, 94% on room air.   GENERAL:  The patient appears pleasant, comfortable.   NECK:  Normal JVP.  Negative for hepatojugular reflex.   CARDIOVASCULAR:  S1, S2 normal.  No murmur, rub or gallop.   RESPIRATORY:  There are fine crackles heard scattered throughout the lung fields bilaterally.   ABDOMEN:  Soft, obese.  Nontender.   EXTREMITIES:  No pitting pedal edema.   NEUROLOGIC:  Alert, oriented x 3.   PSYCHIATRIC:  Normal affect.   SKIN:  No obvious rash.     HEENT:  pallor or icterus.      PERTINENT LABS AND IMAGING:  Serial troponin less than 0.015.  NT proBNP at 2799.  CBC and BMP essentially normal.  INR 3.07.  Chest x-ray shows some increased interstitial prominence, likely fibrotic changes.  EKG as noted above shows atrial fibrillation with controlled ventricular rate, T-wave inversion in lead III and aVF.  Overall appears similar to the previous EKGs.      ASSESSMENT AND PLAN:  A pleasant 77-year-old female with history of mild to moderate coronary artery disease on coronary angiogram in 2015, chronic dyspnea on exertion, other comorbidities of hypertension, pulmonary hypertension, history of peptic ulcer disease and Helicobacter pylori infection which has been treated in the past, no recurrence of gastritis clinically in the last few years, chronic atrial fibrillation on Coumadin for stroke prophylaxis on rate-control strategy, who is now admitted with exertional chest, arm, and jaw discomfort, somewhat progressive in nature and happening with less than or daily physical activities.  This is overall concerning for progressive exertional angina.  Fortunately, she did not  have any resting chest discomfort.  She has chronic dyspnea on exertion which is slightly worse over the last 2 months.  I think her dyspnea on exertion is likely multifactorial from underlying lung disease, untreated sleep apnea, there may be an anginal element too.  We discussed there is need for further evaluation of her coronary artery disease.  We discussed the option of stress test versus coronary angiogram.  Given the atypical nature of her symptoms, which is progressive in nature, at this time coronary angiogram is a better choice.  She understands the risks and benefits of each strategy.  She understands the risk of coronary angiogram with risks including but not limited to risk of stroke, MI, death, need for PRBC transfusion, emergent bypass surgery.  She wishes to proceed with coronary angiogram.  Her INR was 3 yesterday, but she has already received vitamin K so I am quite hopeful her INR would have come down.  We will recheck another INR.  The patient has history of GI bleed, but has had, I think, treatment for Helicobacter pylori in the past and has no recurrence of gastritis clinically in the last few years.  She is agreeable to try aspirin.  In case PCI is needed one option would be to use Coumadin, aspirin and Plavix for a week and then switch only to Plavix and Coumadin.  She understands there is a small chance that there may be a recurrence of gastritis after addition of antiplatelet therapy but in case she needs PCI I would recommend adding a PPI also to prevent gastritis.  Her blood pressure is high.  She has allergies to ACE, ARB and hydrochlorothiazide.  She is on 5 mg of amlodipine and maximum dose of beta-blocker.  I recommend increasing amlodipine to 10 mg daily.   1.  Progressive exertional angina happening with less than out of ordinary physical activity, progressive in nature.  No rest discomfort.  Possible unstable angina.  Serial troponins negative.  No new EKG changes; hemodynamically  stable.   2.  Known history of mild to moderate CAD as noted above.     3.  Chronic dyspnea on exertion with slight worsening over the last 2 months.  Dyspnea on exertion likely multifactorial from underlying lung disease, some deconditioning, obesity and some anginal equivalent cannot be ruled out.   4.  Chronic atrial fibrillation on Coumadin for stroke prophylaxis.  CHADS2-VASc score of 4, on metoprolol for rate control.  Rates are well-controlled.   5.  LDL is 33 on Crestor.      RECOMMENDATION:     1.  Coronary angiogram with possible revascularization.   2.  Add aspirin 324 mg once and if she undergoes PCI this could be switched to 81 mg daily from tomorrow.  In case she does get PCI, which means she will be on antiplatelet therapy in addition to Coumadin, I recommend adding a PPI.   3.  Increase amlodipine to 10 mg daily.  Continue metoprolol tartrate 100 mg b.i.d. and Crestor 10 mg  daily.      Thank you for involving me in the case of Ms. Littlejohn.         STEPHANIE AVALOS MD             D: 03/15/2018   T: 03/15/2018   MT: [email or web address]      Name:     FELICITAS LITTLEJOHN   MRN:      [id number]        Account:       [id number]   :      1940           Consult Date:  03/15/2018      Document: [id number]

## 2018-03-15 NOTE — PROGRESS NOTES
Scan of overnight oximetry on ASV settings results 3/13/2018.   Route to Dr Johnson for his review.  Patient has return visit on 3/20/18

## 2018-03-15 NOTE — PLAN OF CARE
Problem: Patient Care Overview  Goal: Plan of Care/Patient Progress Review  Outcome: No Change  [id number]: Pt resting comfortably. Slept well during the night. VSS. A&O. Transfers with SBA. Denies any pain. Denies any SOB or CP. Tele reads A fib CVR. Plan is for angiogram today. Will continue to monitor.

## 2018-03-15 NOTE — PROGRESS NOTES
[facility]  Hospitalist Progress Note  Name: Cielo Aguillon    MRN: [id number]  YOB: 1940    Age: 77 year old  Date of admission: 3/14/2018  Primary care provider: Simin Lopez      Reason for Stay (Diagnosis): Chest pain, concerning for accelerating angina         Assessment and Plan:      Summary of Stay:  This is a 77-year-old female with a history of hypertension, coronary artery disease, atrial fibrillation on chronic Coumadin, obstructive sleep apnea, and somewhat long-term exertional dyspnea, presenting to [facility] with now exertional chest pain.     Problem List/Plan:  1. Chest pain: Concerning for unstable angina.  Cardiology input appreciated.  Status post angiogram on 3/15/18.  Findings demonstrated very small caliber nondominant RCA without significant disease.  Distal left main 25%.  50% mid LAD lesion unchanged.  30-50% stenosis of the mid circumflex.  Recommending only medical management.  Cardiology recommending starting empiric enteric due to small vessels.  Continue amlodipine, aspirin, Crestor, and metoprolol.  Will start patient on  2. Obstructive sleep apnea: May resume patient's chronic CPAP when it becomes available.  3. Hypothyroidism: Resume Synthroid  4. Hypertension: Medications as noted above  5. Hyperlipidemia: Resume patient's Crestor  6. History of atrial fibrillation: Partially reversed for angiogram today.  Will restart Coumadin per pharmacy.      DVT Prophylaxis: Warfarin  Code Status: Lengthy discussion with patient and the patient's  at the bedside.  Per patient's report and after further discussion of what defines a CODE STATUS, patient reports that she would want to be DNR but will be symptoms for the procedure.  She is okay with short-term intubation.  Will readdress this tomorrow morning again to give the patient time to think about it  Discharge Dispo: Home  Estimated Disch Date / # of Days until Disch:  "Suspect tomorrow if symptoms are controlled  Time spent: Greater than 35 minutes      Interval History (Subjective):      Remains chest pain-free this morning.  No other specific complaints and denies any dyspnea.         Physical Exam:      Vital signs:  Temp: 95.4  F (35.2  C) Temp src: Oral BP: 144/78   Heart Rate: 78 Resp: 16 SpO2: 92 % O2 Device: None (Room air)   Height: 165.1 cm (5' 5\") Weight: 79 kg (174 lb 3.2 oz)  Estimated body mass index is 28.99 kg/(m^2) as calculated from the following:    Height as of this encounter: 1.651 m (5' 5\").    Weight as of this encounter: 79 kg (174 lb 3.2 oz).    # Pain Assessment:   Current Pain Score 3/15/2018 3/15/2018 3/14/2018   Patient currently in pain? denies denies denies   Pain score (0-10) - - 0   Pain location - - -   Cielo quintanilla pain level was assessed and she currently denies pain.        I/O last 3 completed shifts:  In: 300 [I.V.:300]  Out: -   Vitals:    03/14/18 1642 03/14/18 2139   Weight: 77.1 kg (170 lb) 79 kg (174 lb 3.2 oz)       Constitutional: Awake, alert, cooperative, no apparent distress   Respiratory: Nl work of breathing. Clear to auscultation bilaterally, no crackles or wheezing   Cardiovascular: Regular rate and rhythm, normal S1 and S2, and no murmur noted       Skin: No rashes, no cyanosis, dry to touch   Neuro: CN 2-12 intact, no localizing weakness   Extremities: No edema, normal range of motion   HEENT Normocephalic, atraumatic, normal nasal turbinates; oropharynx clear   Neck Supple; nl inspection; trachea midline; no thryomegaly   Psychiatric: A+O x3. Normal affect          Medications:      All current medications were reviewed with changes reflected in problem list.         Data:      All new lab and imaging data was reviewed.   Labs:    Recent Labs  Lab 03/14/18  1831   WBC 4.7   HGB 12.8   HCT 38.9      *       Recent Labs  Lab 03/15/18  0554 03/14/18  1831   NA  --  139   POTASSIUM 3.8 3.9   CHLORIDE  --  107   CO2  --  " 27   ANIONGAP  --  5   GLC  --  91   BUN  --  20   CR  --  0.90   GFRESTIMATED  --  61   GFRESTBLACK  --  74   ELIZABETH  --  8.8      Imaging:   Recent Results (from the past 24 hour(s))   Chest XR,  PA & LAT    Narrative    CHEST TWO VIEWS  3/14/2018 6:53 PM     COMPARISON: Two view chest x-ray 5/25/2014.    HISTORY: Dyspnea, worse with exertion.      Impression    IMPRESSION: There is mild cardiomegaly. There is increased  interstitial prominence in both lungs that may represent chronic  fibrotic change; however, congestive failure with pulmonary edema  cannot be excluded. There is no pleural effusion or pneumothorax.  There are no airspace opacities to suggest pneumonia.    MD Agueda MONTELONGO [phone]

## 2018-03-15 NOTE — H&P
HISTORY AND PHYSICAL     Admitted:     03/14/2018      PRIMARY CARE PHYSICIAN:  Dr. Simin Lopez.        CHIEF COMPLAINT:  Chest pain.      HISTORY OF PRESENT ILLNESS:  This is a 77-year-old female with a history of coronary artery disease, atrial fibrillation on chronic Coumadin, anxiety, hypertension, and obstructive sleep apnea presenting to [facility] for evaluation of ongoing chest pain and dyspnea on exertion.  The patient indicates that she has really had shortness of breath with exertion for many months and even potential years.  Over the past month or so, she has noticed more of an exertional chest pressure that also radiates into the neck and left arm.  Because of these issues, she was set up to see Dr. Bateman of Cardiology.  She went to see him today and evidently in nursing triage, she expressed these complaints and it was though prudent to direct her to the Emergency Department.  Here, the patient feels relatively well but reports the same issues.  Her vital signs are stable.  Laboratory work was largely unremarkable except for a BNP of 2800.  Troponin is unremarkable.  EKG shows atrial fibrillation with a rate of 73 and a QTC of 462 with no evidence of ischemia but some nonspecific T-wave abnormalities.  Chest x-ray shows what appears to be pulmonary fibrosis.  The case was discussed with Cardiology, who recommended admission for coronary angiogram tomorrow.  Cardiology recommended 2.5 mg of oral vitamin K for an INR of 5.4, which was performed yesterday.  The patient has no other complaints at this time.      PAST MEDICAL HISTORY:   1.  Anxiety.   2.  Arthritis.   3.  Coronary artery disease.   4.  Atrial fibrillation.   5.  Hypertension.   6.  Fatty liver.   7.  Hypertriglyceridemia.   8.  Depression.   9.  Obstructive sleep apnea.   10. Peptic ulcer disease.   11. Hypothyroidism.      Prior to Admission medications    Medication Sig Last Dose Taking? Auth Provider   MAGNESIUM OXIDE PO Take  200 mg by mouth daily (with lunch) 3/13/2018 at AFTERNOON Yes Unknown, Entered By History   traMADol (ULTRAM) 50 MG tablet TAKE ONE TABLET BY MOUTH EVERY 8 HOURS AS NEEDED FOR MODERATE PAIN 3/12/2018 at Unknown time Yes Simin Lopez MD   metoprolol tartrate (LOPRESSOR) 100 MG tablet TAKE 1 TABLET (100MG) BY MOUTH 2 TIMES DAILY 3/14/2018 at AM Yes Simin Lopez MD   levothyroxine (SYNTHROID/LEVOTHROID) 50 MCG tablet TAKE 1 TABLET (50 MCG) BY MOUTH DAILY 3/14/2018 at AM Yes Simin Lopez MD   citalopram (CELEXA) 10 MG tablet Take 2 tablets (20 mg) by mouth daily  Patient taking differently: Take 10 mg by mouth daily  3/14/2018 at AM Yes Simin Lopez MD   diazepam (VALIUM) 5 MG tablet 1/2 to 1 po q 8 hours prn  Patient taking differently: Take 2.5-5 mg by mouth every 8 hours as needed for anxiety (WITH TRAVELING)   Yes Simin Lopez MD   rosuvastatin (CRESTOR) 10 MG tablet Take 1 tablet (10 mg) by mouth daily  Patient taking differently: Take 10 mg by mouth At Bedtime  3/13/2018 at HS Yes Simin Lopez MD   amLODIPine (NORVASC) 5 MG tablet Take 1 tablet (5 mg) by mouth daily 3/14/2018 at AM Yes Simin Lopez MD   VITAMIN D, CHOLECALCIFEROL, PO Take 1,000 Units by mouth At Bedtime  3/13/2018 at HS Yes Reported, Patient   warfarin (COUMADIN) 5 MG tablet TAKE 1&1/2 TABLETS (7.5MG) BY MOUTH ONCE DAILY ON TUESDAY & SATURDAY THEN 1 TABLET (5MG) ALL THE OTHER DAYS OF THE WEEK 3/12/2018 at PM  Simin Lopez MD     SOCIAL HISTORY:  The patient lives with her .  No smoking, drinking, or drug use.      FAMILY HISTORY:  Assessed and noncontributory.      ALLERGIES:   1.  ACE INHIBITORS.   2.  ASPIRIN.   3.  HYDROCHLOROTHIAZIDE.   4.  IBUPROFEN.   5.  AMOXICILLIN.   6.  LOSARTAN.     7.  PENICILLIN.        REVIEW OF SYSTEMS:  A 10-point review of systems was performed and the pertinent positive findings were presented in the history present illness.  The remainder of review of systems was negative.      PHYSICAL  EXAMINATION:   VITAL SIGNS:  Temperature 97.1, heart rate 88, blood pressure 141/74, respiratory rate 18, oxygen saturation 95% on room air.   GENERAL:  No apparent distress, awake, alert and oriented x 3.   HEENT:  Normocephalic, atraumatic.  Extraocular movements are intact.   NECK:  Supple, without JVD.   CARDIOVASCULAR:  Irregularly irregular without murmurs, rubs or gallops.   PULMONARY:  Clear to auscultation bilaterally.   ABDOMEN:  Soft, nontender, nondistended; bowel sounds are present.   EXTREMITIES:  No cyanosis, clubbing or edema.   SKIN:  No rashes, ulcers or jaundice.   NEUROLOGICAL: Cranial nerves II-XII are grossly intact.  No focal neurologic deficits.   PSYCHIATRIC:  Mood and affect are appropriate.        LABORATORY AND IMAGING:  Personally reviewed and discussed pertinent findings in the HPI.      ASSESSMENT AND PLAN:  This is a 77-year-old female with a history of hypertension, coronary artery disease, atrial fibrillation on chronic Coumadin, obstructive sleep apnea, and somewhat long-term exertional dyspnea, presenting to [facility] with now exertional chest pain.   1.  Accelerating angina:  The patient's symptoms are concerning for accelerating or unstable angina.  Her EKG shows no ischemic findings and troponin is undetectable.  At this time, she is free of any chest pain.  She did have an angiogram in 2015 that showed 50-60% mid-LAD stenosis and a first diagonal that showed 70% stenosis.  The obtuse marginal showed 30% and the second obtuse marginal showed 40-50% narrowing.  Her ejection fraction is normal.  In any event, there could have been progression of her coronary disease since that time.  Cardiology has recommended a diagnostic coronary angiogram with potential for intervention.  She is already anticoagulated and on Coumadin which will require some degree of reversal.  Cardiology has recommended 2.5 mg of oral vitamin K.  The patient has refused administration of an  aspirin due to her history of peptic ulcer disease.  She is already otherwise on good medical management with metoprolol and rosuvastatin.  We will check a fasting lipid panel in the morning.  Cardiology will be consulted to make arrangements for further diagnostic testing.  We will continue to check serial troponins overnight.  We will monitor her on telemetry.   2.  Complex sleep apnea:  The patient is followed in the [facility] Clinic.  She does not have her CPAP with her.  Her  says that he will bring this tomorrow as he lives about 45 minutes away.  She can use our device while here, which will be ordered.     3.  Hypothyroidism:  We will continue the patient's prior to admission levothyroxine.     4.  Hypertension:  Blood pressure is currently slightly elevated.  We will continue the prior to admission metoprolol and amlodipine.   5.  Hyperlipidemia:  We will continue the patient's rosuvastatin and check a fasting lipid panel in the morning.   6.  Atrial fibrillation:  Currently adequately rate controlled.  We will hold her warfarin and provide 2.5 mg of oral vitamin K.   7.  Supratherapeutic INR:  We will address this as above.   8.  The patient will be admitted to inpatient status with an expected 2-night hospitalization.     9.  Code status:  FULL CODE as discussed with patient.         AUGUSTA XAVIER MD             D: 2018   T: 2018   MT:       Name:     FELICITAS LITTLEJOHN   MRN:      [id number]        Account:      [id number]   :      1940        Admitted:     2018                   Document: [id number]

## 2018-03-15 NOTE — PHARMACY-ANTICOAGULATION SERVICE
Clinical Pharmacy - Warfarin Dosing Consult     Pharmacy has been consulted to manage this patient s warfarin therapy for Afib, goal 2-3.       INR   Date Value Ref Range Status   03/15/2018 1.95 (H) 0.86 - 1.14 Final   03/14/2018 3.07 (H) 0.86 - 1.14 Final     Comment:     Reviewed, acceptable       Recommend warfarin 5 mg today, amiodarone just discontinued today. Pharmacy will monitor Cielo Aguillon daily and order warfarin doses to achieve specified goal.      Please contact pharmacy as soon as possible if the warfarin needs to be held for a procedure or if the warfarin goals change.

## 2018-03-15 NOTE — PROCEDURES
Very small caliber nondominant RCA without significant disease.  Distal L Main 25%  50% Mid LAD unchanged from 2015 IFR 0.90 FFR 0.85.  30-50% mid Circ    LVEDP 15 mmHg  EF 55-60%    Rec Med Rx. Consider empiric Imdur due to small vessels.

## 2018-03-16 VITALS
HEIGHT: 65 IN | DIASTOLIC BLOOD PRESSURE: 80 MMHG | WEIGHT: 172.6 LBS | SYSTOLIC BLOOD PRESSURE: 147 MMHG | RESPIRATION RATE: 18 BRPM | BODY MASS INDEX: 28.76 KG/M2 | TEMPERATURE: 97.5 F | OXYGEN SATURATION: 92 % | HEART RATE: 62 BPM

## 2018-03-16 LAB
INR PPP: 1.51 (ref 0.86–1.14)
MAGNESIUM SERPL-MCNC: 2.2 MG/DL (ref 1.6–2.3)
POTASSIUM SERPL-SCNC: 4.3 MMOL/L (ref 3.4–5.3)

## 2018-03-16 PROCEDURE — A9270 NON-COVERED ITEM OR SERVICE: HCPCS | Mod: GY | Performed by: INTERNAL MEDICINE

## 2018-03-16 PROCEDURE — 99207 ZZC CDG-CODE INCORRECT PER BILLING BASED ON TIME: CPT | Performed by: INTERNAL MEDICINE

## 2018-03-16 PROCEDURE — 83735 ASSAY OF MAGNESIUM: CPT | Performed by: INTERNAL MEDICINE

## 2018-03-16 PROCEDURE — 99238 HOSP IP/OBS DSCHRG MGMT 30/<: CPT | Performed by: INTERNAL MEDICINE

## 2018-03-16 PROCEDURE — 25000132 ZZH RX MED GY IP 250 OP 250 PS 637: Mod: GY | Performed by: INTERNAL MEDICINE

## 2018-03-16 PROCEDURE — 85610 PROTHROMBIN TIME: CPT | Performed by: INTERNAL MEDICINE

## 2018-03-16 PROCEDURE — 84132 ASSAY OF SERUM POTASSIUM: CPT | Performed by: INTERNAL MEDICINE

## 2018-03-16 PROCEDURE — 99232 SBSQ HOSP IP/OBS MODERATE 35: CPT | Performed by: INTERNAL MEDICINE

## 2018-03-16 PROCEDURE — 25000132 ZZH RX MED GY IP 250 OP 250 PS 637: Mod: GY | Performed by: HOSPITALIST

## 2018-03-16 PROCEDURE — A9270 NON-COVERED ITEM OR SERVICE: HCPCS | Mod: GY | Performed by: HOSPITALIST

## 2018-03-16 PROCEDURE — 36415 COLL VENOUS BLD VENIPUNCTURE: CPT | Performed by: INTERNAL MEDICINE

## 2018-03-16 RX ORDER — WARFARIN SODIUM 5 MG/1
5 TABLET ORAL
Status: DISCONTINUED | OUTPATIENT
Start: 2018-03-16 | End: 2018-03-16 | Stop reason: HOSPADM

## 2018-03-16 RX ORDER — AMLODIPINE BESYLATE 10 MG/1
10 TABLET ORAL DAILY
Qty: 30 TABLET | Refills: 1 | Status: SHIPPED | OUTPATIENT
Start: 2018-03-17 | End: 2018-03-24 | Stop reason: DRUGHIGH

## 2018-03-16 RX ORDER — ISOSORBIDE MONONITRATE 30 MG/1
15 TABLET, EXTENDED RELEASE ORAL DAILY
Qty: 30 TABLET | Refills: 1 | Status: SHIPPED | OUTPATIENT
Start: 2018-03-17 | End: 2018-05-03 | Stop reason: SINTOL

## 2018-03-16 RX ADMIN — METOPROLOL TARTRATE 100 MG: 100 TABLET, FILM COATED ORAL at 10:11

## 2018-03-16 RX ADMIN — LEVOTHYROXINE SODIUM 50 MCG: 50 TABLET ORAL at 10:11

## 2018-03-16 RX ADMIN — Medication 15 MG: at 10:11

## 2018-03-16 RX ADMIN — CITALOPRAM HYDROBROMIDE 10 MG: 10 TABLET ORAL at 10:11

## 2018-03-16 RX ADMIN — AMLODIPINE BESYLATE 10 MG: 10 TABLET ORAL at 10:11

## 2018-03-16 ASSESSMENT — ACTIVITIES OF DAILY LIVING (ADL)
ADLS_ACUITY_SCORE: 12

## 2018-03-16 NOTE — PROGRESS NOTES
"[facility] Progress Note     Vince Sweeney MD  03/16/2018         Interval History:      No chest pain, breathing stable       Assessment and Plan:      1. Exertional chest pain- no epicardial obstructive disease noted, mild distal LM, moderate mLAD with negative FFR, mild to moderate mLCX- symptoms could be due to small vessel disease  2. Chronic lee- likely multifactorial from lung disease, physical deconditioning  3. Chronic afib- rate controlled- on coumadin    Recommendations  - agree with adding imdur  - continue amlodipine, bb, statin, coumadin  - recommend pulmonary evaluation outpatient.    F/u [facility] in 2-3 weeks, subsequent f/u with Dr Bateman/Dr Rudi Quesada.    Cardiology will sign off. Please call with questions         Physical Exam:      Heart Rate: 71, Blood pressure 147/80, pulse 62, temperature 97.5  F (36.4  C), temperature source Oral, resp. rate 18, height 1.651 m (5' 5\"), weight 78.3 kg (172 lb 9.6 oz), SpO2 92 %, not currently breastfeeding.  Vitals:    03/14/18 1642 03/14/18 2139 03/16/18 0550   Weight: 77.1 kg (170 lb) 79 kg (174 lb 3.2 oz) 78.3 kg (172 lb 9.6 oz)     Vital Signs with Ranges  Temp:  [95.1  F (35.1  C)-98.4  F (36.9  C)] 97.5  F (36.4  C)  Pulse:  [59-75] 62  Heart Rate:  [56-71] 71  Resp:  [16-20] 18  BP: (105-150)/(50-86) 147/80  SpO2:  [90 %-95 %] 92 %  I/O's Last 24 hours  I/O last 3 completed shifts:  In: 300 [I.V.:300]  Out: 250 [Urine:250]  Gen- appears comfortable  CVS- irregular, normal rate, no m/r/g  Resp-CTA b/l  Ext- no pitting pedal edema  Neuro- alert, oriented             Medications:          citalopram  10 mg Oral Daily     levothyroxine  50 mcg Oral Daily     metoprolol tartrate  100 mg Oral BID     rosuvastatin  10 mg Oral At Bedtime     amLODIPine  10 mg Oral Daily     isosorbide mononitrate  15 mg Oral Daily     PRN Meds: alum & mag hydroxide-simethicone, Warfarin Therapy Reminder, naloxone, melatonin, - MEDICATION INSTRUCTIONS -, potassium " chloride, potassium chloride, potassium chloride, potassium chloride with lidocaine, potassium chloride, magnesium sulfate, acetaminophen, senna-docusate **OR** senna-docusate, polyethylene glycol, bisacodyl, ondansetron **OR** ondansetron, prochlorperazine **OR** prochlorperazine **OR** prochlorperazine         Data:      All new lab and imaging data was reviewed.   Recent Labs   Lab Test  03/16/18   0605  03/15/18   0931  03/14/18   1831 08/30/17   1239   02/16/17   1513   11/18/15   1524   WBC   --    --   4.7   --   7.4   --    --    --   5.4   HGB   --    --   12.8   --   13.8   --   13.7   < >  14.2   MCV   --    --   100   --   103*   --    --    --   96   PLT   --    --   147*   --   161   --    --    --   124*   INR  1.51*  1.95*  3.07*   < >   --    < >   --    < >  3.50*    < > = values in this interval not displayed.      Recent Labs   Lab Test  03/16/18   0605  03/15/18   0554  03/14/18 1831 08/30/17   1239  02/24/16   1023   NA   --    --   139  140  141   POTASSIUM  4.3  3.8  3.9  4.3  4.3   CHLORIDE   --    --   107  106  102   CO2   --    --   27  26  28   BUN   --    --   20  20  16   CR   --    --   0.90  1.03  1.08   ANIONGAP   --    --   5  8  15.3   ELIZABETH   --    --   8.8  9.3  10.0   GLC   --    --   91  96  106*     Recent Labs   Lab Test  03/15/18   0554  03/15/18   0148  03/14/18   2230   TROPI  <0.015  <0.015  <0.015        Vince Sweeney MD  3/16/2018  Pager:  [phone]

## 2018-03-16 NOTE — PLAN OF CARE
Problem: Cardiac: ACS (Acute Coronary Syndrome) (Adult)  Goal: Signs and Symptoms of Listed Potential Problems Will be Absent, Minimized or Managed (Cardiac: ACS)  Signs and symptoms of listed potential problems will be absent, minimized or managed by discharge/transition of care (reference Cardiac: ACS (Acute Coronary Syndrome) (Adult) CPG).   Outcome: No Change  A/O  SBA to transfer. Steady gait.     TR band to L wrist.  No intervention,Sm hematoma when arrived to floor. LS clear.  VSS afebrile.  Tele A-Fib.

## 2018-03-16 NOTE — PLAN OF CARE
Problem: Cardiac: ACS (Acute Coronary Syndrome) (Adult)  Goal: Signs and Symptoms of Listed Potential Problems Will be Absent, Minimized or Managed (Cardiac: ACS)  Signs and symptoms of listed potential problems will be absent, minimized or managed by discharge/transition of care (reference Cardiac: ACS (Acute Coronary Syndrome) (Adult) CPG).   Outcome: No Change  VS stable. +1 edema to bilateral ankles and feet. No complaints of pain. CMS intact to left wrist. TR band removed. Small hematoma to left wrist that was present when patient returned to the floor from cath lab per report from previous RN. Tele is a fib CVR. Slept well throughout the night.

## 2018-03-16 NOTE — PHARMACY-ANTICOAGULATION SERVICE
Clinical Pharmacy- Warfarin Discharge Note  This patient is currently on warfarin for the treatment of Atrial fibrillation.  INR Goal= 2-3  Expected length of therapy lifetime.    Anticoagulation Dose History     Recent Dosing and Labs Latest Ref Rng & Units 3/14/2018 3/15/2018 3/16/2018    Warfarin 5 mg - - 5 mg -    INR 0.86 - 1.14 3.07(H) 1.95(H) 1.51(H)    INR 0.86 - 1.14 - - -          Vitamin K doses administered during the last 7 days: 2.5mg iv 3/14 pm  FFP administered during the last 7 days: ---    Agree w/MD plan to discharge pt on PTA warfarin dose w/INR as already scheduled on 3/20.  Med changes made this admission not likely to affect INR/warfarin dosing.

## 2018-03-16 NOTE — PLAN OF CARE
Problem: Cardiac: ACS (Acute Coronary Syndrome) (Adult)  Goal: Signs and Symptoms of Listed Potential Problems Will be Absent, Minimized or Managed (Cardiac: ACS)  Signs and symptoms of listed potential problems will be absent, minimized or managed by discharge/transition of care (reference Cardiac: ACS (Acute Coronary Syndrome) (Adult) CPG).   Outcome: Adequate for Discharge Date Met: 03/16/18  Discharge and education complete. Patient to discharge home with .  All belongings are with patient and family.  Left wrist is bruised but dry and intact. Patient denies pain. VSS.

## 2018-03-17 NOTE — DISCHARGE SUMMARY
[facility]  Discharge Summary        Cielo Aguillon MRN# [id number]   YOB: 1940 Age: 77 year old     Date of Admission:  3/14/2018  Date of Discharge:  3/16/2018  2:30 PM  Admitting Physician:  Ab Green DO  Discharge Physician: Agueda Wilson MD  Discharging Service: Hospitalist     Primary Provider: Simin Olson  Primary Care Physician Phone Number: [phone]         Discharge Diagnoses/Problem Oriented Hospital Course (Providers):    Cielo Aguillon was admitted on 3/14/2018 by Ab Green DO and I would refer you to their history and physical.      Patient was seen and examined on the day of discharge    Discharge diagnoses:  1. Exertional chest pain  2. History of obstructive sleep apnea  3. Hypothyroidism  4. Hypertension  5. Hyperlipidemia  6. History of atrial fibrillation, chronically on anticoagulation    Hospital course:  1. Exertional chest pain: This is a 77-year-old female with a history of hypertension, coronary artery disease, atrial fibrillation on chronic Coumadin, obstructive sleep apnea, and somewhat long-term exertional dyspnea, presenting to [facility] with now exertional chest pain.  Given her history of underlying coronary artery disease despite no stenting, this is concerning for accelerated unstable angina.  Patient was admitted and placed on a heparin drip.  Cardiology was consulted and patient underwent an angiogram on 3/15/18.  This demonstrated a very small caliber nondominant RCA without significant stenosis.  Distal left main was 25%, 50% mid LAD stenosis along with 30-50% stenosis of the mid circumflex.  No intervention was required.  Suspect symptoms can be due to microvascular disease.  Medical management was recommended and patient's amlodipine was increased to 10 mg p.o. daily.  In addition, Imdur was added at 50 mg p.o. daily and may be titrated.  Patient was able to ambulate independently in the hallway without  further symptoms.  Continue Crestor and metoprolol.  Unfortunately, patient is allergic to aspirin and NSAIDs.  2. History of paroxysmal atrial fibrillation: Her INR was partially reversed for angiogram.  Resume Coumadin at discharge.    Disposition: Discharged to home in stable condition.  Primary MD follow-up within a week.               Pending Results:        Unresulted Labs Ordered in the Past 30 Days of this Admission     No orders found from 1/13/2018 to 3/15/2018.            Discharge Instructions and Follow-Up:      Follow-up Appointments     Follow-up and recommended labs and tests        Follow up with primary care provider, Simin Lopez, within 7 days to   evaluate medication change.  The following labs/tests are recommended:   inr.                      Discharge Disposition:      Discharged to home         Discharge Medications:        Discharge Medication List as of 3/16/2018  1:15 PM      START taking these medications    Details   isosorbide mononitrate (IMDUR) 30 MG 24 hr tablet Take 0.5 tablets (15 mg) by mouth daily, Disp-30 tablet, R-1, E-Prescribe         CONTINUE these medications which have CHANGED    Details   amLODIPine (NORVASC) 10 MG tablet Take 1 tablet (10 mg) by mouth daily, Disp-30 tablet, R-1, E-Prescribe         CONTINUE these medications which have NOT CHANGED    Details   MAGNESIUM OXIDE PO Take 200 mg by mouth daily (with lunch), Historical      traMADol (ULTRAM) 50 MG tablet TAKE ONE TABLET BY MOUTH EVERY 8 HOURS AS NEEDED FOR MODERATE PAIN, Disp-50 tablet, R-0, Local Print      metoprolol tartrate (LOPRESSOR) 100 MG tablet TAKE 1 TABLET (100MG) BY MOUTH 2 TIMES DAILY, Disp-180 tablet, R-2, E-Prescribe      levothyroxine (SYNTHROID/LEVOTHROID) 50 MCG tablet TAKE 1 TABLET (50 MCG) BY MOUTH DAILY, Disp-90 tablet, R-2, E-Prescribe      citalopram (CELEXA) 10 MG tablet Take 2 tablets (20 mg) by mouth daily, Disp-180 tablet, R-1, E-Prescribe      diazepam (VALIUM) 5 MG tablet 1/2 to 1  po q 8 hours prn, Disp-40 tablet, R-1, Local Print      rosuvastatin (CRESTOR) 10 MG tablet Take 1 tablet (10 mg) by mouth daily, Disp-30 tablet, R-5, E-Prescribe      VITAMIN D, CHOLECALCIFEROL, PO Take 1,000 Units by mouth At Bedtime , Historical      warfarin (COUMADIN) 5 MG tablet TAKE 1&1/2 TABLETS (7.5MG) BY MOUTH ONCE DAILY ON TUESDAY & SATURDAY THEN 1 TABLET (5MG) ALL THE OTHER DAYS OF THE WEEK, Disp-108 tablet, R-0, E-PrescribePT IS ON A 90 DAY MED SYNC PROGRAM. PLEASE SEND RX FOR 90 DAY SUPPLY WITH [facility]. THANK YOU               Allergies:         Allergies   Allergen Reactions     Ace Inhibitors      Cough (Zestril)     Aspirin      hx bleeding ulcers     Hydrochlorothiazide [Hctz] Rash     Ibuprofen      hx bleeding ulcers     Amoxicillin Rash     Losartan Rash     Penicillins Rash           Consultations This Hospital Stay:      Consultation during this admission received from cardiology           Image Results From This Hospital Stay (For Non-EPIC Providers):        Results for orders placed or performed during the hospital encounter of 03/14/18   Chest XR,  PA & LAT    Narrative    CHEST TWO VIEWS  3/14/2018 6:53 PM     COMPARISON: Two view chest x-ray 5/25/2014.    HISTORY: Dyspnea, worse with exertion.      Impression    IMPRESSION: There is mild cardiomegaly. There is increased  interstitial prominence in both lungs that may represent chronic  fibrotic change; however, congestive failure with pulmonary edema  cannot be excluded. There is no pleural effusion or pneumothorax.  There are no airspace opacities to suggest pneumonia.    JANIE JOHNS MD

## 2018-03-19 ENCOUNTER — TELEPHONE (OUTPATIENT)
Dept: INTERNAL MEDICINE | Facility: CLINIC | Age: 78
End: 2018-03-19

## 2018-03-19 ENCOUNTER — CARE COORDINATION (OUTPATIENT)
Dept: CARDIOLOGY | Facility: CLINIC | Age: 78
End: 2018-03-19

## 2018-03-19 DIAGNOSIS — I25.10 CAD (CORONARY ARTERY DISEASE): Primary | ICD-10-CM

## 2018-03-19 NOTE — TELEPHONE ENCOUNTER
IP F/U    Date: 03/16/18  Diagnosis: Essential Hypertension, Benign, Accelerating Angina  Is patient active in care coordination? No  Was patient in TCU? No

## 2018-03-19 NOTE — PROGRESS NOTES
Patient was evaluated by cardiology while inpatient for Exertional CP (underwent coronary angiogram that did not result in any mechanical intervention). Called patient to discuss any post hospital d/c questions she may have, review medication changes, and confirm f/u appts.Patient denied any questions regarding new medications or changes to some of her current medications that she was taking prior to admission. Patient denied any increased SOB, chest pain, or light headedness. RN confirmed with patient that we would like her to schedule a f/u apt with an KIMBERLY within the next two weeks. Patient advised to call clinic with any cardiac related questions or concerns. Patient verbalized understanding and agreed with plan. RN transferred patient to scheduling to arrange f/u apt with KIMBERLY.

## 2018-03-20 ENCOUNTER — ANTICOAGULATION THERAPY VISIT (OUTPATIENT)
Dept: ANTICOAGULATION | Facility: CLINIC | Age: 78
End: 2018-03-20
Payer: MEDICARE

## 2018-03-20 ENCOUNTER — OFFICE VISIT (OUTPATIENT)
Dept: SLEEP MEDICINE | Facility: CLINIC | Age: 78
End: 2018-03-20
Payer: MEDICARE

## 2018-03-20 ENCOUNTER — TELEPHONE (OUTPATIENT)
Dept: SLEEP MEDICINE | Facility: CLINIC | Age: 78
End: 2018-03-20

## 2018-03-20 VITALS
HEART RATE: 67 BPM | WEIGHT: 172 LBS | OXYGEN SATURATION: 95 % | DIASTOLIC BLOOD PRESSURE: 63 MMHG | RESPIRATION RATE: 18 BRPM | HEIGHT: 65 IN | BODY MASS INDEX: 28.66 KG/M2 | SYSTOLIC BLOOD PRESSURE: 123 MMHG

## 2018-03-20 DIAGNOSIS — G47.39 COMPLEX SLEEP APNEA SYNDROME: Primary | ICD-10-CM

## 2018-03-20 DIAGNOSIS — I48.21 PERMANENT ATRIAL FIBRILLATION (H): ICD-10-CM

## 2018-03-20 DIAGNOSIS — I27.20 PULMONARY HYPERTENSION (H): ICD-10-CM

## 2018-03-20 DIAGNOSIS — Z79.01 LONG-TERM (CURRENT) USE OF ANTICOAGULANTS: ICD-10-CM

## 2018-03-20 DIAGNOSIS — R06.09 DOE (DYSPNEA ON EXERTION): ICD-10-CM

## 2018-03-20 DIAGNOSIS — I48.20 CHRONIC ATRIAL FIBRILLATION (H): ICD-10-CM

## 2018-03-20 LAB — INR POINT OF CARE: 1.5 (ref 0.86–1.14)

## 2018-03-20 PROCEDURE — 99215 OFFICE O/P EST HI 40 MIN: CPT | Performed by: FAMILY MEDICINE

## 2018-03-20 PROCEDURE — 36416 COLLJ CAPILLARY BLOOD SPEC: CPT

## 2018-03-20 PROCEDURE — 99207 ZZC NO CHARGE NURSE ONLY: CPT

## 2018-03-20 PROCEDURE — 85610 PROTHROMBIN TIME: CPT | Mod: QW

## 2018-03-20 NOTE — MR AVS SNAPSHOT
Cielo Aguillon   3/20/2018 4:00 PM   Anticoagulation Therapy Visit    Description:  78 year old female   Provider:  [facility]   Department:  [facility]           INR as of 3/20/2018     Today's INR 1.5!      Anticoagulation Summary as of 3/20/2018     INR goal 2.0-3.0   Today's INR 1.5!   Full instructions 7.5 mg on Tue, Sat; 5 mg all other days   Next INR check 3/29/2018    Indications   Chronic atrial fibrillation (H) [I48.2]  Long-term (current) use of anticoagulants [Z79.01] [Z79.01]         Your next Anticoagulation Clinic appointment(s)     Mar 20, 2018  4:00 PM CDT   [facility]   [facility]    [address] [facility] [address]  [facility] [id number] [phone]            Mar 29, 2018  1:00 PM CDT   [facility]agulation Visit with [facility]   [facility]    [address] [facility] [address]  [facility] [address] [phone]              Contact Numbers     [facility] Phone Numbers:  Anticoagulation Clinic Appointments : [phone]  Anticoagulation Nurse: [phone]         March 2018 Details    Sun Mon Tue Wed Thu Fri Sat         1               2               3                 4               5               6               7               8               9               10                 11               12               13               14               15               16               17                 18               19               20      7.5 mg   See details      21      5 mg         22      5 mg         23      5 mg         24      7.5 mg           25      5 mg         26      5 mg         27      7.5 mg         28      5 mg         29            30               31                Date Details   03/20 This INR check       Date of next INR:  3/29/2018         How to take your warfarin dose     To take:  5 mg Take  1 of the 5 mg tablets.    To take:  7.5 mg Take 1.5 of the 5 mg tablets.

## 2018-03-20 NOTE — TELEPHONE ENCOUNTER
Pt calls back. She states she doesn't have any chest pain.   She scheduled OV for Thursday. She states she will need referral to Lung clinic, but doesn't want to go to [facility].   Will discuss with Dr Lopez.

## 2018-03-20 NOTE — MR AVS SNAPSHOT
After Visit Summary   3/20/2018    Cielo Aguillon    MRN: [id number]           Patient Information     Date Of Birth          1940        Visit Information        Provider Department      3/20/2018 1:00 PM Dennys Johnson MD [facility]        Today's Diagnoses     Complex sleep apnea syndrome    -  1    GREEN (dyspnea on exertion)        Permanent atrial fibrillation (H)        Pulmonary hypertension          Care Instructions      Your BMI is Body mass index is 28.62 kg/(m^2).  Weight management is a personal decision.  If you are interested in exploring weight loss strategies, the following discussion covers the approaches that may be successful. Body mass index (BMI) is one way to tell whether you are at a healthy weight, overweight, or obese. It measures your weight in relation to your height.  A BMI of 18.5 to 24.9 is in the healthy range. A person with a BMI of 25 to 29.9 is considered overweight, and someone with a BMI of 30 or greater is considered obese. More than two-thirds of American adults are considered overweight or obese.  Being overweight or obese increases the risk for further weight gain. Excess weight may lead to heart disease and diabetes.  Creating and following plans for healthy eating and physical activity may help you improve your health.  Weight control is part of healthy lifestyle and includes exercise, emotional health, and healthy eating habits. Careful eating habits lifelong are the mainstay of weight control. Though there are significant health benefits from weight loss, long-term weight loss with diet alone may be very difficult to achieve- studies show long-term success with dietary management in less than 10% of people. Attaining a healthy weight may be especially difficult to achieve in those with severe obesity. In some cases, medications, devices and surgical management might be considered.  What can you do?  If you are overweight or  obese and are interested in methods for weight loss, you should discuss this with your provider.     Consider reducing daily calorie intake by 500 calories.     Keep a food journal.     Avoiding skipping meals, consider cutting portions instead.    Diet combined with exercise helps maintain muscle while optimizing fat loss. Strength training is particularly important for building and maintaining muscle mass. Exercise helps reduce stress, increase energy, and improves fitness. Increasing exercise without diet control, however, may not burn enough calories to loose weight.       Start walking three days a week 10-20 minutes at a time    Work towards walking thirty minutes five days a week     Eventually, increase the speed of your walking for 1-2 minutes at time    In addition, we recommend that you review healthy lifestyles and methods for weight loss available through the National Institutes of Health patient information sites:  http://win.niddk.nih.gov/publications/index.htm    And look into health and wellness programs that may be available through your health insurance provider, employer, local community center, or omar club.    Weight management plan: Patient was referred to their PCP to discuss a diet and exercise plan.     Your blood pressure was checked while you were in clinic today.  Please read the guidelines below about what these numbers mean and what you should do about them.  Your systolic blood pressure is the top number.  This is the pressure when the heart is pumping.  Your diastolic blood pressure is the bottom number.  This is the pressure in between beats.  If your systolic blood pressure is less than 120 and your diastolic blood pressure is less than 80, then your blood pressure is normal. There is nothing more that you need to do about it  If your systolic blood pressure is 120-139 or your diastolic blood pressure is 80-89, your blood pressure may be higher than it should be.  You should have  your blood pressure re-checked within a year by a primary care provider.  If your systolic blood pressure is 140 or greater or your diastolic blood pressure is 90 or greater, you may have high blood pressure.  High blood pressure is treatable, but if left untreated over time it can put you at risk for heart attack, stroke, or kidney failure.  You should have your blood pressure re-checked by a primary care provider within the next four weeks.    1. CPAP-  WHAT DOES IT DO AND HOW CAN I LEARN TO WEAR IT?                               BEFORE I START, CAN I WATCH A MOVIE TO GET A PLAN ON HOW TO USE CPAP?  https://www.Portafare.com/watch?d=x8T44rg040S      Continuous positive airway pressure, or CPAP, is the most effective treatment for obstructive sleep apnea. It works by blowing room air, through a mask, to hold your throat open. A decision to use CPAP is a major step forward in the pursuit of a healthier life. The successful use of CPAP will help you breathe easier, sleep better and live healthier. You can choose CPAP equipment from any durable medical equipment provider that meets your needs.  Using CPAP can be a positive experience if you keep these [place] points in mind:  1. Commitment  CPAP is not a quick fix for your problem. It involves a long-term commitment to improve your sleep and your health.    2. Communication  Stay in close communication with both your sleep doctor and your CPAP supplier. Ask lots of questions and seek help when you need it.    3. Consistency  Use CPAP all night, every night and for every nap. You will receive the maximum health benefits from CPAP when you use it every time that you sleep. This will also make it easier for your body to adjust to the treatment.    4. Correction  The first machine and mask that you try may not be the best ones for you. Work with your sleep doctor and your CPAP supplier to make corrections to your equipment selection. Ask about trying a different type of machine  "or mask if you have ongoing problems. Make sure that your mask is a good fit and learn to use your equipment properly.    5. Challenge  Tell a family member or close friend to ask you each morning if you used your CPAP the previous night. Have someone to challenge you to give it your best effort.    6. Connection   Your adjustment to CPAP will be easier if you are able to connect with others who use the same treatment. Ask your sleep doctor if there is a support group in your area for people who have sleep apnea, or look for one on the Internet.  7. Comfort   Increase your level of comfort by using a saline spray, decongestant or heated humidifier if CPAP irritates your nose, mouth or throat. Use your unit's \"ramp\" setting to slowly get used to the air pressure level. There may be soft pads you can buy that will fit over your mask straps. Look on www.CPAP.com for accessories that can help make CPAP use more comfortable.  8. Cleaning   Clean your mask, tubing and headgear on a regular basis. Put this time in your schedule so that you don't forget to do it. Check and replace the filters for your CPAP unit and humidifier.    9. Completion   Although you are never finished with CPAP therapy, you should reward yourself by celebrating the completion of your first month of treatment. Expect this first month to be your hardest period of adjustment. It will involve some trial and error as you find the machine, mask and pressure settings that are right for you.    10. Continuation  After your first month of treatment, continue to make a daily commitment to use your CPAP all night, every night and for every nap.    CPAP-Tips to starting with success:  Begin using your CPAP for short periods of time during the day while you watch TV or read.    Use CPAP every night and for every nap. Using it less often reduces the health benefits and makes it harder for your body to get used to it.    Make small adjustments to your mask, " tubing, straps and headgear until you get the right fit. Tightening the mask may actually worsen the leak.  If it leaves significant marks on your face or irritates the bridge of your nose, it may not be the best mask for you.  Speak with the person who supplied the mask and consider trying other masks. Insurances will allow you to try different masks during the first month of starting CPAP.  Insurance also covers a new mask, hose and filter about every 6 months.    Use a saline nasal spray to ease mild nasal congestion. Neti-Pot or saline nasal rinses may also help. Nasal gel sprays can help reduce nasal dryness.  Biotene mouthwash can be helpful to protect your teeth if you experience frequent dry mouth.  Dry mouth may be a sign of air escaping out of your mouth or out of the mask in the case of a full face mask.  Speak with your provider if you expect that is the case.     Take a nasal decongestant to relieve more severe nasal or sinus congestion.  Do not use Afrin (oxymetazoline) nasal spray more than 3 days in a row.  Speak with your sleep doctor if your nasal congestion is chronic.    Use a heated humidifier that fits your CPAP model to enhance your breathing comfort. Adjust the heat setting up if you get a dry nose or throat, down if you get condensation in the hose or mask.  Position the CPAP lower than you so that any condensation in the hose drains back into the machine rather than towards the mask.    Try a system that uses nasal pillows if traditional masks give you problems.    Clean your mask, tubing and headgear once a week. Make sure the equipment dries fully.    Regularly check and replace the filters for your CPAP unit and humidifier.    Work closely with your sleep provider and your CPAP supplier to make sure that you have the machine, mask and air pressure setting that works best for you. It is better to stop using it and call your provider to solve problems than to lay awake all night frustrated  with the device.    Daytime naps are not advised, but use the PAP device if taking naps. Many insurances require that we prove you are using the PAP device at least 4 hours on at least 70% of nights over a 30 day period. We have 90 days to meet those criteria.    You can get new supplies (mask, hose and filter) for your device every 3-6 months (covered by insurance). You do not need to get supplies that often, but they are available if you would like them. You may exchange the mask once within the first month if you feel the initial mask does not fit well. Please, contact your medical equipment provider for equipment issues.    Please let me know if you have any snoring, daytime sleepiness, or poor sleep quality. We will want to make sure your PAP device is adequately treating your condition.    There is a website called CPAP.com that has accessories that may make CPAP use easier. Please visit it at your convenience.    Follow up with me after 4 weeks or returning from your trip. Complete the studies ordered.    Thanks!    Dennys Johnson MD, MPH  Clinical Sleep and Occupational / Environmental Medicine              Follow-ups after your visit        Your next 10 appointments already scheduled     Mar 20, 2018  4:00 PM CDT   [facility]    [address]  [facility] [id number] [phone]            Mar 22, 2018  2:40 PM CDT   Office Visit with Simin Lopez MD   [facility]    [address]  [facility] [id number] [phone]           Bring a current list of meds and any records pertaining to this visit. For Physicals, please bring immunization records and any forms needing to be filled out. Please arrive 10 minutes early to complete paperwork.            Mar 29, 2018  1:30 PM CDT   Return Discharge with Ro Hobson PA-C  "  [facility] ([facility])    [address]  [facility] [address] [phone]            Apr 24, 2018  2:00 PM CDT   Return Sleep Patient with Dennys Johnson MD   [facility] ([facility]    [address]  [facility] [address]   [phone]              Future tests that were ordered for you today     Open Future Orders        Priority Expected Expires Ordered    Overnight oximetry study Routine  9/16/2018 3/20/2018    General PFT Lab (Please always keep checked) Routine  3/20/2019 3/20/2018    6 minute walk test Routine  3/20/2019 3/20/2018    Pulmonary Function Test Routine  3/20/2019 3/20/2018    Follow-Up with Cardiac Advanced Practice Provider Routine 3/26/2018 3/19/2019 3/19/2018            Who to contact     If you have questions or need follow up information about today's clinic visit or your schedule please contact [facility] directly at [phone].  Normal or non-critical lab and imaging results will be communicated to you by MyChart, letter or phone within 4 business days after the clinic has received the results. If you do not hear from us within 7 days, please contact the clinic through [employer] or phone. If you have a critical or abnormal lab result, we will notify you by phone as soon as possible.  Submit refill requests through [employer] or call your pharmacy and they will forward the refill request to us. Please allow 3 business days for your refill to be completed.          Additional Information About Your Visit        [employer] Information     [employer] lets you send messages to your doctor, view your test results, renew your prescriptions, schedule appointments and more. To sign up, go to [email or web address] . Click on \"Log in\" on the left side of the screen, which will take you to the Welcome page. Then click on \"Sign up Now\" on " "the right side of the page.     You will be asked to enter the access code listed below, as well as some personal information. Please follow the directions to create your username and password.     Your access code is: [email or web address]  Expires: 2018  3:07 PM     Your access code will  in 90 days. If you need help or a new code, please call your [facility] clinic or [phone].        Care EveryWhere ID     This is your Care EveryWhere ID. This could be used by other organizations to access your [facility] medical records  [id number]        Your Vitals Were     Pulse Respirations Height Pulse Oximetry BMI (Body Mass Index)       67 18 1.651 m (5' 5\") 95% 28.62 kg/m2        Blood Pressure from Last 3 Encounters:   18 123/63   18 147/80   17 119/64    Weight from Last 3 Encounters:   18 78 kg (172 lb)   18 78.3 kg (172 lb 9.6 oz)   02/15/18 78.9 kg (174 lb)              We Performed the Following     Comprehensive DME          Today's Medication Changes          These changes are accurate as of 3/20/18  1:58 PM.  If you have any questions, ask your nurse or doctor.               These medicines have changed or have updated prescriptions.        Dose/Directions    citalopram 10 MG tablet   Commonly known as:  celeXA   This may have changed:  how much to take   Used for:  Major depressive disorder, recurrent episode, moderate (H), MARY (generalized anxiety disorder)        Dose:  20 mg   Take 2 tablets (20 mg) by mouth daily   Quantity:  180 tablet   Refills:  1       diazepam 5 MG tablet   Commonly known as:  VALIUM   This may have changed:    - how much to take  - how to take this  - when to take this  - reasons to take this  - additional instructions   Used for:  MARY (generalized anxiety disorder)        1/2 to 1 po q 8 hours prn   Quantity:  40 tablet   Refills:  1       rosuvastatin 10 MG tablet   Commonly known as:  CRESTOR   This may have changed:  when to take this   Used for:  " Hyperlipidemia LDL goal <70, Coronary artery disease involving native coronary artery of native heart without angina pectoris        Dose:  10 mg   Take 1 tablet (10 mg) by mouth daily   Quantity:  30 tablet   Refills:  5                Primary Care Provider Office Phone # Fax #    Simin Lopez [phone]       [address] [facility] [address]  [facility] [address]        Equal Access to Services     [facility]: Hadii aad ku hadasho Soomaali, waaxda luqadaha, qaybta kaalmada adeegyada, waxay idiin hayaan adeeg kharash la'aan ah. So [facility] [phone].    ATENCIÓN: Si habla español, tiene a duran disposición servicios gratuitos de asistencia lingüística. Gurwinder al [phone].    We comply with applicable federal civil rights laws and [place] laws. We do not discriminate on the basis of race, color, national origin, age, disability, sex, sexual orientation, or gender identity.            Thank you!     Thank you for choosing [facility]  for your care. Our goal is always to provide you with excellent care. Hearing back from our patients is one way we can continue to improve our services. Please take a few minutes to complete the written survey that you may receive in the mail after your visit with us. Thank you!             Your Updated Medication List - Protect others around you: Learn how to safely use, store and throw away your medicines at www.disposemymeds.org.          This list is accurate as of 3/20/18  1:58 PM.  Always use your most recent med list.                   Brand Name Dispense Instructions for use Diagnosis    amLODIPine 10 MG tablet    NORVASC    30 tablet    Take 1 tablet (10 mg) by mouth daily    Essential hypertension, benign       citalopram 10 MG tablet    celeXA    180 tablet    Take 2 tablets (20 mg) by mouth daily    Major depressive disorder, recurrent episode, moderate (H), MARY (generalized anxiety disorder)       diazepam 5 MG tablet    VALIUM    40  tablet    1/2 to 1 po q 8 hours prn    MARY (generalized anxiety disorder)       isosorbide mononitrate 30 MG 24 hr tablet    IMDUR    30 tablet    Take 0.5 tablets (15 mg) by mouth daily    Accelerating angina (H)       levothyroxine 50 MCG tablet    SYNTHROID/LEVOTHROID    90 tablet    TAKE 1 TABLET (50 MCG) BY MOUTH DAILY    Acquired hypothyroidism       MAGNESIUM OXIDE PO      Take 200 mg by mouth daily (with lunch)        metoprolol tartrate 100 MG tablet    LOPRESSOR    180 tablet    TAKE 1 TABLET (100MG) BY MOUTH 2 TIMES DAILY    Essential hypertension, benign, ASCVD (arteriosclerotic cardiovascular disease)       rosuvastatin 10 MG tablet    CRESTOR    30 tablet    Take 1 tablet (10 mg) by mouth daily    Hyperlipidemia LDL goal <70, Coronary artery disease involving native coronary artery of native heart without angina pectoris       traMADol 50 MG tablet    ULTRAM    50 tablet    TAKE ONE TABLET BY MOUTH EVERY 8 HOURS AS NEEDED FOR MODERATE PAIN    Chronic left-sided low back pain with left-sided sciatica       VITAMIN D (CHOLECALCIFEROL) PO      Take 1,000 Units by mouth At Bedtime        warfarin 5 MG tablet    COUMADIN    108 tablet    TAKE 1&1/2 TABLETS (7.5MG) BY MOUTH ONCE DAILY ON TUESDAY & SATURDAY THEN 1 TABLET (5MG) ALL THE OTHER DAYS OF THE WEEK    Chronic atrial fibrillation (H), Long term current use of anticoagulant therapy

## 2018-03-20 NOTE — NURSING NOTE
"Chief Complaint   Patient presents with     RECHECK     f/u cpap and oximetry       Initial /63  Pulse 67  Resp 18  Ht 1.651 m (5' 5\")  Wt 78 kg (172 lb)  SpO2 95%  BMI 28.62 kg/m2 Estimated body mass index is 28.62 kg/(m^2) as calculated from the following:    Height as of this encounter: 1.651 m (5' 5\").    Weight as of this encounter: 78 kg (172 lb).  Medication Reconciliation: complete           Damaris Guerra LPN/HALIMA  "

## 2018-03-20 NOTE — PROGRESS NOTES
ANTICOAGULATION FOLLOW-UP CLINIC VISIT    Patient Name:  Cielo Aguillon  Date:  3/20/2018  Contact Type:  Face to Face    SUBJECTIVE:     Patient Findings     Positives Intentional hold of therapy (Held 3/13/18 and 3/14/18 d/t elevated INR, howeverr, pt went to ED 3/14/18 and was admitted. Pt was given Vit K 3/14/18  for angiogram.)           OBJECTIVE    INR Protime   Date Value Ref Range Status   03/20/2018 1.5 (A) 0.86 - 1.14 Final       ASSESSMENT / PLAN  INR assessment SUB    Recheck INR In: 9 DAYS    INR Location Clinic      Anticoagulation Summary as of 3/20/2018     INR goal 2.0-3.0   Today's INR 1.5!   Maintenance plan 7.5 mg (5 mg x 1.5) on Tue, Sat; 5 mg (5 mg x 1) all other days   Full instructions 7.5 mg on Tue, Sat; 5 mg all other days   Weekly total 40 mg   Plan last modified Cindi Preston RN (5/9/2017)   Next INR check 3/29/2018   Priority INR   Target end date     Indications   Chronic atrial fibrillation (H) [I48.2]  Long-term (current) use of anticoagulants [Z79.01] [Z79.01]         Anticoagulation Episode Summary     INR check location     Preferred lab     Send INR reminders to [facility]    Comments       Anticoagulation Care Providers     Provider Role Specialty Phone number    Simin Lopez MD [facility] [phone]            See the Encounter Report to view Anticoagulation Flowsheet and Dosing Calendar (Go to Encounters tab in chart review, and find the Anticoagulation Therapy Visit)    Dosage adjustment made based on physician directed care plan.    Cindi Preston, RN

## 2018-03-20 NOTE — PATIENT INSTRUCTIONS
Your BMI is Body mass index is 28.62 kg/(m^2).  Weight management is a personal decision.  If you are interested in exploring weight loss strategies, the following discussion covers the approaches that may be successful. Body mass index (BMI) is one way to tell whether you are at a healthy weight, overweight, or obese. It measures your weight in relation to your height.  A BMI of 18.5 to 24.9 is in the healthy range. A person with a BMI of 25 to 29.9 is considered overweight, and someone with a BMI of 30 or greater is considered obese. More than two-thirds of American adults are considered overweight or obese.  Being overweight or obese increases the risk for further weight gain. Excess weight may lead to heart disease and diabetes.  Creating and following plans for healthy eating and physical activity may help you improve your health.  Weight control is part of healthy lifestyle and includes exercise, emotional health, and healthy eating habits. Careful eating habits lifelong are the mainstay of weight control. Though there are significant health benefits from weight loss, long-term weight loss with diet alone may be very difficult to achieve- studies show long-term success with dietary management in less than 10% of people. Attaining a healthy weight may be especially difficult to achieve in those with severe obesity. In some cases, medications, devices and surgical management might be considered.  What can you do?  If you are overweight or obese and are interested in methods for weight loss, you should discuss this with your provider.     Consider reducing daily calorie intake by 500 calories.     Keep a food journal.     Avoiding skipping meals, consider cutting portions instead.    Diet combined with exercise helps maintain muscle while optimizing fat loss. Strength training is particularly important for building and maintaining muscle mass. Exercise helps reduce stress, increase energy, and improves fitness.  Increasing exercise without diet control, however, may not burn enough calories to loose weight.       Start walking three days a week 10-20 minutes at a time    Work towards walking thirty minutes five days a week     Eventually, increase the speed of your walking for 1-2 minutes at time    In addition, we recommend that you review healthy lifestyles and methods for weight loss available through the National Institutes of Health patient information sites:  http://win.niddk.nih.gov/publications/index.htm    And look into health and wellness programs that may be available through your health insurance provider, employer, local community center, or omar club.    Weight management plan: Patient was referred to their PCP to discuss a diet and exercise plan.     Your blood pressure was checked while you were in clinic today.  Please read the guidelines below about what these numbers mean and what you should do about them.  Your systolic blood pressure is the top number.  This is the pressure when the heart is pumping.  Your diastolic blood pressure is the bottom number.  This is the pressure in between beats.  If your systolic blood pressure is less than 120 and your diastolic blood pressure is less than 80, then your blood pressure is normal. There is nothing more that you need to do about it  If your systolic blood pressure is 120-139 or your diastolic blood pressure is 80-89, your blood pressure may be higher than it should be.  You should have your blood pressure re-checked within a year by a primary care provider.  If your systolic blood pressure is 140 or greater or your diastolic blood pressure is 90 or greater, you may have high blood pressure.  High blood pressure is treatable, but if left untreated over time it can put you at risk for heart attack, stroke, or kidney failure.  You should have your blood pressure re-checked by a primary care provider within the next four weeks.    1. CPAP-  WHAT DOES IT DO AND  HOW CAN I LEARN TO WEAR IT?                               BEFORE I START, CAN I WATCH A MOVIE TO GET A PLAN ON HOW TO USE CPAP?  https://www.youtube.com/watch?u=s7Y34ta305V      Continuous positive airway pressure, or CPAP, is the most effective treatment for obstructive sleep apnea. It works by blowing room air, through a mask, to hold your throat open. A decision to use CPAP is a major step forward in the pursuit of a healthier life. The successful use of CPAP will help you breathe easier, sleep better and live healthier. You can choose CPAP equipment from any durable medical equipment provider that meets your needs.  Using CPAP can be a positive experience if you keep these [place] points in mind:  1. Commitment  CPAP is not a quick fix for your problem. It involves a long-term commitment to improve your sleep and your health.    2. Communication  Stay in close communication with both your sleep doctor and your CPAP supplier. Ask lots of questions and seek help when you need it.    3. Consistency  Use CPAP all night, every night and for every nap. You will receive the maximum health benefits from CPAP when you use it every time that you sleep. This will also make it easier for your body to adjust to the treatment.    4. Correction  The first machine and mask that you try may not be the best ones for you. Work with your sleep doctor and your CPAP supplier to make corrections to your equipment selection. Ask about trying a different type of machine or mask if you have ongoing problems. Make sure that your mask is a good fit and learn to use your equipment properly.    5. Challenge  Tell a family member or close friend to ask you each morning if you used your CPAP the previous night. Have someone to challenge you to give it your best effort.    6. Connection   Your adjustment to CPAP will be easier if you are able to connect with others who use the same treatment. Ask your sleep doctor if there is a support group in  "your area for people who have sleep apnea, or look for one on the Internet.  7. Comfort   Increase your level of comfort by using a saline spray, decongestant or heated humidifier if CPAP irritates your nose, mouth or throat. Use your unit's \"ramp\" setting to slowly get used to the air pressure level. There may be soft pads you can buy that will fit over your mask straps. Look on www.CPAP.com for accessories that can help make CPAP use more comfortable.  8. Cleaning   Clean your mask, tubing and headgear on a regular basis. Put this time in your schedule so that you don't forget to do it. Check and replace the filters for your CPAP unit and humidifier.    9. Completion   Although you are never finished with CPAP therapy, you should reward yourself by celebrating the completion of your first month of treatment. Expect this first month to be your hardest period of adjustment. It will involve some trial and error as you find the machine, mask and pressure settings that are right for you.    10. Continuation  After your first month of treatment, continue to make a daily commitment to use your CPAP all night, every night and for every nap.    CPAP-Tips to starting with success:  Begin using your CPAP for short periods of time during the day while you watch TV or read.    Use CPAP every night and for every nap. Using it less often reduces the health benefits and makes it harder for your body to get used to it.    Make small adjustments to your mask, tubing, straps and headgear until you get the right fit. Tightening the mask may actually worsen the leak.  If it leaves significant marks on your face or irritates the bridge of your nose, it may not be the best mask for you.  Speak with the person who supplied the mask and consider trying other masks. Insurances will allow you to try different masks during the first month of starting CPAP.  Insurance also covers a new mask, hose and filter about every 6 months.    Use a " saline nasal spray to ease mild nasal congestion. Neti-Pot or saline nasal rinses may also help. Nasal gel sprays can help reduce nasal dryness.  Biotene mouthwash can be helpful to protect your teeth if you experience frequent dry mouth.  Dry mouth may be a sign of air escaping out of your mouth or out of the mask in the case of a full face mask.  Speak with your provider if you expect that is the case.     Take a nasal decongestant to relieve more severe nasal or sinus congestion.  Do not use Afrin (oxymetazoline) nasal spray more than 3 days in a row.  Speak with your sleep doctor if your nasal congestion is chronic.    Use a heated humidifier that fits your CPAP model to enhance your breathing comfort. Adjust the heat setting up if you get a dry nose or throat, down if you get condensation in the hose or mask.  Position the CPAP lower than you so that any condensation in the hose drains back into the machine rather than towards the mask.    Try a system that uses nasal pillows if traditional masks give you problems.    Clean your mask, tubing and headgear once a week. Make sure the equipment dries fully.    Regularly check and replace the filters for your CPAP unit and humidifier.    Work closely with your sleep provider and your CPAP supplier to make sure that you have the machine, mask and air pressure setting that works best for you. It is better to stop using it and call your provider to solve problems than to lay awake all night frustrated with the device.    Daytime naps are not advised, but use the PAP device if taking naps. Many insurances require that we prove you are using the PAP device at least 4 hours on at least 70% of nights over a 30 day period. We have 90 days to meet those criteria.    You can get new supplies (mask, hose and filter) for your device every 3-6 months (covered by insurance). You do not need to get supplies that often, but they are available if you would like them. You may  exchange the mask once within the first month if you feel the initial mask does not fit well. Please, contact your medical equipment provider for equipment issues.    Please let me know if you have any snoring, daytime sleepiness, or poor sleep quality. We will want to make sure your PAP device is adequately treating your condition.    There is a website called CPAP.com that has accessories that may make CPAP use easier. Please visit it at your convenience.    Follow up with me after 4 weeks or returning from your trip. Complete the studies ordered.    Thanks!    Dennys Johnson MD, MPH  Clinical Sleep and Occupational / Environmental Medicine

## 2018-03-22 ENCOUNTER — OFFICE VISIT (OUTPATIENT)
Dept: INTERNAL MEDICINE | Facility: CLINIC | Age: 78
End: 2018-03-22
Payer: MEDICARE

## 2018-03-22 VITALS
SYSTOLIC BLOOD PRESSURE: 96 MMHG | OXYGEN SATURATION: 96 % | WEIGHT: 172.5 LBS | HEART RATE: 68 BPM | DIASTOLIC BLOOD PRESSURE: 68 MMHG | RESPIRATION RATE: 16 BRPM | BODY MASS INDEX: 28.71 KG/M2

## 2018-03-22 DIAGNOSIS — I10 ESSENTIAL HYPERTENSION, BENIGN: ICD-10-CM

## 2018-03-22 DIAGNOSIS — R06.09 DYSPNEA ON EXERTION: ICD-10-CM

## 2018-03-22 DIAGNOSIS — I25.118 CORONARY ARTERY DISEASE OF NATIVE ARTERY OF NATIVE HEART WITH STABLE ANGINA PECTORIS (H): Primary | ICD-10-CM

## 2018-03-22 PROCEDURE — 99496 TRANSJ CARE MGMT HIGH F2F 7D: CPT | Performed by: INTERNAL MEDICINE

## 2018-03-22 NOTE — PROGRESS NOTES
SUBJECTIVE:   Cielo Aguillon is a 78 year old female who presents to clinic today for the following health issues:        Hospital Follow-up Visit:    Hospital/Nursing Home/IP Rehab Facility: [facility]  Date of Admission: 03/14/2018  Date of Discharge: 03/16/2018  Reason(s) for Admission:chest pain            Problems taking medications regularly:  None       Medication changes since discharge: None       Problems adhering to non-medication therapy:  None    Summary of hospitalization:  [facility] discharge summary reviewed  Diagnostic Tests/Treatments reviewed.  Follow up needed: none  Other Healthcare Providers Involved in Patient s Care:         Specialist appointment - Cardiology next week  Update since discharge: improved.   She had angiogram showing no large vessel changes.  Cardiologist did feel that her pain was cardiac and adjusted her medications.  She has not had any more chest pains.   She does complain she feels a little lightheaded and is a little concerned that her blood pressure is lower with medication changes, causing the lightheadedness.  She had been started on isosorbide  and her amlodipine dose was increased.    She reports that the cardiologist did not think her dyspnea was related to her heart.  She is seeing sleep clinic and they are going to be doing formal pulmonary testing.  She now has been identified as having mixed apnea and is on a new machine with improved results.      Patient Active Problem List   Diagnosis     Essential hypertension, benign     Chronic rhinitis     Other ventral hernia without mention of obstruction or gangrene     Lichenification and lichen simplex chronicus     Fatty liver     Advanced directives, counseling/discussion     Hyperlipidemia LDL goal <70     Chronic right-sided back pain     Coronary artery disease     Cystocele, midline     Rectocele     BETHANY (obstructive sleep apnea)     Long-term (current) use of anticoagulants [Z79.01]      MARY (generalized anxiety disorder)     Major depressive disorder, recurrent episode, moderate (H)     Controlled substance agreement signed     Chronic atrial fibrillation (H)     Facet arthropathy, lumbar     Acquired hypothyroidism     Current Outpatient Prescriptions   Medication Sig Dispense Refill     isosorbide mononitrate (IMDUR) 30 MG 24 hr tablet Take 0.5 tablets (15 mg) by mouth daily 30 tablet 1     amLODIPine (NORVASC) 10 MG tablet Take 1 tablet (10 mg) by mouth daily 30 tablet 1     MAGNESIUM OXIDE PO Take 200 mg by mouth daily (with lunch)       traMADol (ULTRAM) 50 MG tablet TAKE ONE TABLET BY MOUTH EVERY 8 HOURS AS NEEDED FOR MODERATE PAIN 50 tablet 0     warfarin (COUMADIN) 5 MG tablet TAKE 1&1/2 TABLETS (7.5MG) BY MOUTH ONCE DAILY ON TUESDAY & SATURDAY THEN 1 TABLET (5MG) ALL THE OTHER DAYS OF THE WEEK 108 tablet 0     metoprolol tartrate (LOPRESSOR) 100 MG tablet TAKE 1 TABLET (100MG) BY MOUTH 2 TIMES DAILY 180 tablet 2     levothyroxine (SYNTHROID/LEVOTHROID) 50 MCG tablet TAKE 1 TABLET (50 MCG) BY MOUTH DAILY 90 tablet 2     citalopram (CELEXA) 10 MG tablet Take 2 tablets (20 mg) by mouth daily (Patient taking differently: Take 10 mg by mouth daily ) 180 tablet 1     diazepam (VALIUM) 5 MG tablet 1/2 to 1 po q 8 hours prn (Patient taking differently: Take 2.5-5 mg by mouth every 8 hours as needed for anxiety (WITH TRAVELING) ) 40 tablet 1     rosuvastatin (CRESTOR) 10 MG tablet Take 1 tablet (10 mg) by mouth daily (Patient taking differently: Take 10 mg by mouth At Bedtime ) 30 tablet 5     VITAMIN D, CHOLECALCIFEROL, PO Take 1,000 Units by mouth At Bedtime         Social History   Substance Use Topics     Smoking status: Never Smoker     Smokeless tobacco: Never Used     Alcohol use 0.0 oz/week     0 Standard drinks or equivalent per week      Comment: occ - wine        ROS:   No fever, chills, cough, palpitations, syncope, edema .    Objective:  Patient alert in NAD  BP 96/68  Pulse 68   Resp 16  Wt 172 lb 8 oz (78.2 kg)  SpO2 96%  BMI 28.71 kg/m2       Lungs: clear  CV: irregular S1, S2 without murmur, S3 or S4.     ASSESSMENT:   (I25.118) Coronary artery disease of native artery of native heart with stable angina pectoris (H)  (primary encounter diagnosis)  Comment: Improvement of chest pain, potentially may have spasms.  She is having lightheadedness with lower blood pressure so recommend she decrease the amlodipine back to 5 mg  Plan: Keep cardiology follow-up next week and they can reassess her blood pressure, lightheadedness and chest pains, advised that the metoprolol dose is important for maintaining heart rate with her atrial fibrillation so would not want to change that dose.    (I10) Essential hypertension, benign  Comment: BP lower   Plan: Decrease amlodipine as above    (R06.09) Dyspnea on exertion  Comment:   Plan: per sleep clinic       Post Discharge Medication Reconciliation: discharge medications reconciled and changed, per note/orders (see AVS).  Plan of care communicated with patient     Coding guidelines for this visit:  Type of Medical   Decision Making Face-to-Face Visit       within 7 Days of discharge Face-to-Face Visit        within 14 days of discharge   Moderate Complexity [id number]   High Complexity [id number]        Simin Lopez MD  [facility]

## 2018-03-22 NOTE — NURSING NOTE
"Chief Complaint   Patient presents with     Hospital F/U       Initial BP 96/68  Pulse 68  Resp 16  Wt 172 lb 8 oz (78.2 kg)  SpO2 96%  BMI 28.71 kg/m2 Estimated body mass index is 28.71 kg/(m^2) as calculated from the following:    Height as of 3/20/18: 5' 5\" (1.651 m).    Weight as of this encounter: 172 lb 8 oz (78.2 kg).  Medication Reconciliation: complete      Jd Stiles CMA      "

## 2018-03-22 NOTE — MR AVS SNAPSHOT
After Visit Summary   3/22/2018    Cielo Aguillon    MRN: [id number]           Patient Information     Date Of Birth          1940        Visit Information        Provider Department      3/22/2018 2:40 PM Simin Lopez MD [facility]        Care Instructions    Decrease the amlodipine back to 5 mg to see if that helps the lightheadedness.           Follow-ups after your visit        Your next 10 appointments already scheduled     Mar 29, 2018  1:00 PM CDT   [facility]    [address]  [facility] [address]   [phone]            Mar 29, 2018  1:30 PM CDT   Return Discharge with Ro Hobson PA-C   [facility] ([facility])    [address]  [facility] [id number] [phone]            Apr 24, 2018  2:00 PM CDT   Return Sleep Patient with Dennys Johnson MD   [facility] ([facility])    [address]  [facility] [id number] [phone]              Who to contact     If you have questions or need follow up information about today's clinic visit or your schedule please contact [facility] directly at [phone].  Normal or non-critical lab and imaging results will be communicated to you by MyChart, letter or phone within 4 business days after the clinic has received the results. If you do not hear from us within 7 days, please contact the clinic through MyChart or phone. If you have a critical or abnormal lab result, we will notify you by phone as soon as possible.  Submit refill requests through [employer] or call your pharmacy and they will forward the refill request to us. Please allow 3 business days for your refill to be completed.          Additional Information About Your Visit       "  MyChart Information     [employer] lets you send messages to your doctor, view your test results, renew your prescriptions, schedule appointments and more. To sign up, go to [email or web address] . Click on \"Log in\" on the left side of the screen, which will take you to the Welcome page. Then click on \"Sign up Now\" on the right side of the page.     You will be asked to enter the access code listed below, as well as some personal information. Please follow the directions to create your username and password.     Your access code is: [email or web address]  Expires: 2018  3:07 PM     Your access code will  in 90 days. If you need help or a new code, please call your [facility] clinic or [phone].        Care EveryWhere ID     This is your Care EveryWhere ID. This could be used by other organizations to access your [facility] medical records  [id number]        Your Vitals Were     Pulse Respirations Pulse Oximetry BMI (Body Mass Index)          68 16 96% 28.71 kg/m2         Blood Pressure from Last 3 Encounters:   18 96/68   18 123/63   18 147/80    Weight from Last 3 Encounters:   18 172 lb 8 oz (78.2 kg)   18 172 lb (78 kg)   18 172 lb 9.6 oz (78.3 kg)              Today, you had the following     No orders found for display         Today's Medication Changes          These changes are accurate as of 3/22/18  3:28 PM.  If you have any questions, ask your nurse or doctor.               These medicines have changed or have updated prescriptions.        Dose/Directions    citalopram 10 MG tablet   Commonly known as:  celeXA   This may have changed:  how much to take   Used for:  Major depressive disorder, recurrent episode, moderate (H), MARY (generalized anxiety disorder)        Dose:  20 mg   Take 2 tablets (20 mg) by mouth daily   Quantity:  180 tablet   Refills:  1       diazepam 5 MG tablet   Commonly known as:  VALIUM   This may have changed:    - how much to take  - how to " take this  - when to take this  - reasons to take this  - additional instructions   Used for:  MARY (generalized anxiety disorder)        1/2 to 1 po q 8 hours prn   Quantity:  40 tablet   Refills:  1       rosuvastatin 10 MG tablet   Commonly known as:  CRESTOR   This may have changed:  when to take this   Used for:  Hyperlipidemia LDL goal <70, Coronary artery disease involving native coronary artery of native heart without angina pectoris        Dose:  10 mg   Take 1 tablet (10 mg) by mouth daily   Quantity:  30 tablet   Refills:  5                Primary Care Provider Office Phone # Fax #    Simin Lopez [phone]       [facility] [address] [facility] [address]  [facility] [address]        Equal Access to Services     [facility] Center[facility]SO[facility] : Hadii jamari salazar hadasho Sosue, waaxda luqadaha, qaybta kaalmada adeangelicyada, yaneth herndon . So [facility] [phone].    ATENCIÓN: Si habla español, tiene a duran disposición servicios gratuitos de asistencia lingüística. Llame al [phone].    We comply with applicable federal civil rights laws and [place] laws. We do not discriminate on the basis of race, color, national origin, age, disability, sex, sexual orientation, or gender identity.            Thank you!     Thank you for choosing [facility]  for your care. Our goal is always to provide you with excellent care. Hearing back from our patients is one way we can continue to improve our services. Please take a few minutes to complete the written survey that you may receive in the mail after your visit with us. Thank you!             Your Updated Medication List - Protect others around you: Learn how to safely use, store and throw away your medicines at www.disposemymeds.org.          This list is accurate as of 3/22/18  3:28 PM.  Always use your most recent med list.                   Brand Name Dispense Instructions for use Diagnosis    amLODIPine 10 MG tablet    NORVASC    30 tablet     Take 1 tablet (10 mg) by mouth daily    Essential hypertension, benign       citalopram 10 MG tablet    celeXA    180 tablet    Take 2 tablets (20 mg) by mouth daily    Major depressive disorder, recurrent episode, moderate (H), MARY (generalized anxiety disorder)       diazepam 5 MG tablet    VALIUM    40 tablet    1/2 to 1 po q 8 hours prn    MARY (generalized anxiety disorder)       isosorbide mononitrate 30 MG 24 hr tablet    IMDUR    30 tablet    Take 0.5 tablets (15 mg) by mouth daily    Accelerating angina (H)       levothyroxine 50 MCG tablet    SYNTHROID/LEVOTHROID    90 tablet    TAKE 1 TABLET (50 MCG) BY MOUTH DAILY    Acquired hypothyroidism       MAGNESIUM OXIDE PO      Take 200 mg by mouth daily (with lunch)        metoprolol tartrate 100 MG tablet    LOPRESSOR    180 tablet    TAKE 1 TABLET (100MG) BY MOUTH 2 TIMES DAILY    Essential hypertension, benign, ASCVD (arteriosclerotic cardiovascular disease)       rosuvastatin 10 MG tablet    CRESTOR    30 tablet    Take 1 tablet (10 mg) by mouth daily    Hyperlipidemia LDL goal <70, Coronary artery disease involving native coronary artery of native heart without angina pectoris       traMADol 50 MG tablet    ULTRAM    50 tablet    TAKE ONE TABLET BY MOUTH EVERY 8 HOURS AS NEEDED FOR MODERATE PAIN    Chronic left-sided low back pain with left-sided sciatica       VITAMIN D (CHOLECALCIFEROL) PO      Take 1,000 Units by mouth At Bedtime        warfarin 5 MG tablet    COUMADIN    108 tablet    TAKE 1&1/2 TABLETS (7.5MG) BY MOUTH ONCE DAILY ON TUESDAY & SATURDAY THEN 1 TABLET (5MG) ALL THE OTHER DAYS OF THE WEEK    Chronic atrial fibrillation (H), Long term current use of anticoagulant therapy

## 2018-03-24 PROBLEM — I20.0 UNSTABLE ANGINA (H): Status: RESOLVED | Noted: 2018-03-14 | Resolved: 2018-03-24

## 2018-03-24 RX ORDER — AMLODIPINE BESYLATE 5 MG/1
5 TABLET ORAL DAILY
Qty: 90 TABLET | Refills: 1 | Status: SHIPPED | OUTPATIENT
Start: 2018-03-24 | End: 2018-10-31

## 2018-03-27 ENCOUNTER — DOCUMENTATION ONLY (OUTPATIENT)
Dept: SLEEP MEDICINE | Facility: CLINIC | Age: 78
End: 2018-03-27

## 2018-03-27 NOTE — PROGRESS NOTES
30 DAY [facility] VISIT    Message left for patient to return call     Assessment: Pt not meeting objective benchmarks for leak and compliance .  Order placed by provider for mask refit.   Action plan: waiting for patient to return call.   Patient has a follow up visit with Dr. Johnson on 4/24/2018.   Device type: ASV  PAP settings:   ResMed ASV Auto ()    EPAP Min 6    EPAP Max 11    PS Min 0    PS Max 19    Rate Mode Auto        Objective measures: 14 day rolling measures      Compliance  64 %      Leak  87.8 lpm  last  upload      AHI 3.54   last  upload      Average number of minutes 248          Objective measure goal  Compliance   Goal >70%  Leak   Goal < 24 lpm  AHI  Goal < 5  Usage  Goal >240

## 2018-03-27 NOTE — PROGRESS NOTES
Patient reporting that she is working with [facility] and got her mask fixed.  Recently had a cardiac workup and was given clear on her tests.  She will call back with any questions or concerns.

## 2018-03-29 ENCOUNTER — OFFICE VISIT (OUTPATIENT)
Dept: CARDIOLOGY | Facility: CLINIC | Age: 78
End: 2018-03-29
Attending: INTERNAL MEDICINE
Payer: MEDICARE

## 2018-03-29 ENCOUNTER — ANTICOAGULATION THERAPY VISIT (OUTPATIENT)
Dept: ANTICOAGULATION | Facility: CLINIC | Age: 78
End: 2018-03-29
Payer: MEDICARE

## 2018-03-29 VITALS
HEART RATE: 68 BPM | BODY MASS INDEX: 29.04 KG/M2 | DIASTOLIC BLOOD PRESSURE: 60 MMHG | HEIGHT: 65 IN | SYSTOLIC BLOOD PRESSURE: 110 MMHG | WEIGHT: 174.3 LBS

## 2018-03-29 DIAGNOSIS — Z79.01 LONG-TERM (CURRENT) USE OF ANTICOAGULANTS: ICD-10-CM

## 2018-03-29 DIAGNOSIS — I48.20 CHRONIC ATRIAL FIBRILLATION (H): ICD-10-CM

## 2018-03-29 DIAGNOSIS — I25.10 CAD IN NATIVE ARTERY: Primary | ICD-10-CM

## 2018-03-29 LAB — INR POINT OF CARE: 3.7 (ref 0.86–1.14)

## 2018-03-29 PROCEDURE — 99207 ZZC NO CHARGE NURSE ONLY: CPT

## 2018-03-29 PROCEDURE — 36416 COLLJ CAPILLARY BLOOD SPEC: CPT

## 2018-03-29 PROCEDURE — 85610 PROTHROMBIN TIME: CPT | Mod: QW

## 2018-03-29 PROCEDURE — 99214 OFFICE O/P EST MOD 30 MIN: CPT | Performed by: PHYSICIAN ASSISTANT

## 2018-03-29 NOTE — PROGRESS NOTES
Please see separate dictation for HPI, PHYSICAL EXAM AND IMPRESSION/PLAN.    CURRENT MEDICATIONS:  Current Outpatient Prescriptions   Medication Sig Dispense Refill     amLODIPine (NORVASC) 5 MG tablet Take 1 tablet (5 mg) by mouth daily 90 tablet 1     isosorbide mononitrate (IMDUR) 30 MG 24 hr tablet Take 0.5 tablets (15 mg) by mouth daily 30 tablet 1     MAGNESIUM OXIDE PO Take 200 mg by mouth daily (with lunch)       traMADol (ULTRAM) 50 MG tablet TAKE ONE TABLET BY MOUTH EVERY 8 HOURS AS NEEDED FOR MODERATE PAIN 50 tablet 0     warfarin (COUMADIN) 5 MG tablet TAKE 1&1/2 TABLETS (7.5MG) BY MOUTH ONCE DAILY ON TUESDAY & SATURDAY THEN 1 TABLET (5MG) ALL THE OTHER DAYS OF THE WEEK 108 tablet 0     metoprolol tartrate (LOPRESSOR) 100 MG tablet TAKE 1 TABLET (100MG) BY MOUTH 2 TIMES DAILY 180 tablet 2     levothyroxine (SYNTHROID/LEVOTHROID) 50 MCG tablet TAKE 1 TABLET (50 MCG) BY MOUTH DAILY 90 tablet 2     citalopram (CELEXA) 10 MG tablet Take 2 tablets (20 mg) by mouth daily (Patient taking differently: Take 10 mg by mouth daily ) 180 tablet 1     diazepam (VALIUM) 5 MG tablet 1/2 to 1 po q 8 hours prn (Patient taking differently: Take 2.5-5 mg by mouth every 8 hours as needed for anxiety (WITH TRAVELING) ) 40 tablet 1     rosuvastatin (CRESTOR) 10 MG tablet Take 1 tablet (10 mg) by mouth daily (Patient taking differently: Take 10 mg by mouth At Bedtime ) 30 tablet 5     VITAMIN D, CHOLECALCIFEROL, PO Take 1,000 Units by mouth At Bedtime          ALLERGIES:     Allergies   Allergen Reactions     Ace Inhibitors      Cough (Zestril)     Aspirin      hx bleeding ulcers     Hydrochlorothiazide [Hctz] Rash     Ibuprofen      hx bleeding ulcers     Amoxicillin Rash     Losartan Rash     Penicillins Rash       PAST MEDICAL HISTORY:  Past Medical History:   Diagnosis Date     Anxiety     related to travel     Arthritis      ASCVD (arteriosclerotic cardiovascular disease) 2/12    60-70% stenoses of OM2, D1, medical  management     Atrial fibrillation (H)      Atrial flutter (H)      Coronary artery disease     2/2012- 50% mid Cfx, 60-70% prox OM2, 50-70% D1     Essential hypertension, benign      Fatty liver 5/10    mild fibrosis on biopsy     Hypertriglyceridemia      Major depression      BETHANY (obstructive sleep apnea)      Other ventral hernia without mention of obstruction or gangrene      Peptic ulcer 2002     Peptic ulcer, unspecified site, unspecified as acute or chronic, without mention of hemorrhage, perforation, or obstruction 1983     Shortness of breath      Thyroid disease      Tubular adenoma 6/08       PAST SURGICAL HISTORY:  Past Surgical History:   Procedure Laterality Date     APPENDECTOMY       C NONSPECIFIC PROCEDURE      Appendectomy     C NONSPECIFIC PROCEDURE      Tonsillectomy     C NONSPECIFIC PROCEDURE      Tubal ligation     C NONSPECIFIC PROCEDURE  11/01    Shave bone spurs from left foot     COLONOSCOPY  9/1/2016    Dr. Camejo [facility]     COLONOSCOPY N/A 9/1/2016    Procedure: COMBINED COLONOSCOPY, SINGLE OR MULTIPLE BIOPSY/POLYPECTOMY BY BIOPSY;  Surgeon: Pillo Camejo MD;  Location:  GI     CORONARY ANGIOGRAPHY ADULT ORDER  2/13/2012    50% mid Cfx, 60-70% prox OM2, 50-70% D1     ESOPHAGOSCOPY, GASTROSCOPY, DUODENOSCOPY (EGD), COMBINED  9/1/2016    Dr. Camejo [facility]     ESOPHAGOSCOPY, GASTROSCOPY, DUODENOSCOPY (EGD), COMBINED N/A 9/1/2016    Procedure: COMBINED ESOPHAGOSCOPY, GASTROSCOPY, DUODENOSCOPY (EGD), BIOPSY SINGLE OR MULTIPLE;  Surgeon: Pillo Camejo MD;  Location:  GI     GYN SURGERY       HEART CATH RIGHT AND LEFT HEART CATH  04/06/2015    Mid LAD 50-60%, 1st Diagonal 70%, 1st OM 30%, 2nd OM 40-50%, review report for right heart cath results.        SOCIAL HISTORY:  Social History     Social History     Marital status:      Spouse name: N/A     Number of children: 6     Years of education: N/A     Occupational History      [employer]     Social History Main Topics      Smoking status: Never Smoker     Smokeless tobacco: Never Used     Alcohol use 0.0 oz/week     0 Standard drinks or equivalent per week      Comment: occ - wine     Drug use: No     Sexual activity: Yes     Partners: Male     Birth control/ protection: Surgical      Comment: tubal     Other Topics Concern      Service No     Blood Transfusions Yes     1983     Caffeine Concern No     none     Occupational 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     working in a [employer]     Hobby Hazards No     Sleep Concern Yes     Stress Concern Yes     Weight Concern No     Special Diet Yes     pt on warfarin     Back Care No     Exercise No     some exercise at night, walking      Bike Helmet No     Seat Belt Yes     Self-Exams No     Social History Narrative       FAMILY HISTORY:  Family History   Problem Relation Age of Onset     Respiratory Father      emphysema--secon. to smoking     Alzheimer Disease Mother      Congenital Anomalies Mother      OSTEOPOROSIS Mother      DIABETES Maternal Aunt      DIABETES Other      cousins     DIABETES Other      cousin on mothers side of family     Depression Son      Depression Son      Colon Cancer No family hx of        Review of Systems:  Skin:  Positive for bruising     Eyes:  Positive for glasses    ENT:  Positive for hearing loss    Respiratory:  Positive for shortness of breath;dyspnea on exertion;sleep apnea;CPAP     Cardiovascular:    lightheadedness;fatigue;Positive for    Gastroenterology: Positive for stomach or duodenal ulcer;poor appetite hx of ulcers  Genitourinary:  Negative      Musculoskeletal:  Positive for back pain    Neurologic:  Negative      Psychiatric:  Negative      Heme/Lymph/Imm:  Negative      Endocrine:  Positive for thyroid disorder       Reviewed. Remainder of the note dictated.    Ro Hobson PA-C

## 2018-03-29 NOTE — MR AVS SNAPSHOT
After Visit Summary   3/29/2018    Cielo Aguillon    MRN: [id number]           Patient Information     Date Of Birth          1940        Visit Information        Provider Department      3/29/2018 1:30 PM Ro Hobson PA-C [facility]        Today's Diagnoses     CAD in native artery    -  1      Care Instructions    Thank you for your  Heart Care visit today. Your provider has recommended the following:  Medication Changes:  No changes today  Recommendations:  Nothing new at this time  Follow-up:  See Dr Bateman for cardiology follow up in July 2018.    Reminder:  Please bring in your current medication list or your medication, over the counter supplements and vitamin bottles as we will review these at each office visit.                  Follow-ups after your visit        Your next 10 appointments already scheduled     Apr 03, 2018 12:30 PM CDT   Pulmonary Function with RH PULMONARY FUNCTION   [facility] ([facility])    [address]  [facility] [address]   [phone]           No Inhalers for 6 hours prior to test No Smoking 2 hours prior to test            Apr 10, 2018  1:45 PM CDT   Anticoagulation Visit with RI ANTICOAGULATION CLINIC   [facility] ([facility]    [address]  [facility] [address] [phone]            Apr 24, 2018  2:00 PM CDT   Return Sleep Patient with Dennys Johnson MD   [facility] ([facility])    [address]  [facility] [address]   [phone]              Who to contact     If you have questions or need follow up information about today's clinic visit or your schedule please contact [facility] directly at [phone].  Normal or non-critical lab and imaging results will be communicated to you  "by [employer], letter or phone within 4 business days after the clinic has received the results. If you do not hear from us within 7 days, please contact the clinic through [employer] or phone. If you have a critical or abnormal lab result, we will notify you by phone as soon as possible.  Submit refill requests through [employer] or call your pharmacy and they will forward the refill request to us. Please allow 3 business days for your refill to be completed.          Additional Information About Your Visit        [facility] Information     [employer] lets you send messages to your doctor, view your test results, renew your prescriptions, schedule appointments and more. To sign up, go to [email or web address] . Click on \"Log in\" on the left side of the screen, which will take you to the Welcome page. Then click on \"Sign up Now\" on the right side of the page.     You will be asked to enter the access code listed below, as well as some personal information. Please follow the directions to create your username and password.     Your access code is: [email or web address]  Expires: 2018  3:07 PM     Your access code will  in 90 days. If you need help or a new code, please call your [facility] clinic or [phone].        Care EveryWhere ID     This is your Care EveryWhere ID. This could be used by other organizations to access your [facility] medical records  [id number]        Your Vitals Were     Pulse Height Breastfeeding? BMI (Body Mass Index)          68 1.651 m (5' 5\") No 29.01 kg/m2         Blood Pressure from Last 3 Encounters:   18 110/60   18 96/68   18 123/63    Weight from Last 3 Encounters:   18 79.1 kg (174 lb 4.8 oz)   18 78.2 kg (172 lb 8 oz)   18 78 kg (172 lb)              We Performed the Following     Follow-Up with Cardiac Advanced Practice Provider          Today's Medication Changes          These changes are accurate as of 3/29/18  2:13 PM.  If you have any questions, ask " your nurse or doctor.               These medicines have changed or have updated prescriptions.        Dose/Directions    citalopram 10 MG tablet   Commonly known as:  celeXA   This may have changed:  how much to take   Used for:  Major depressive disorder, recurrent episode, moderate (H), MARY (generalized anxiety disorder)        Dose:  20 mg   Take 2 tablets (20 mg) by mouth daily   Quantity:  180 tablet   Refills:  1       diazepam 5 MG tablet   Commonly known as:  VALIUM   This may have changed:    - how much to take  - how to take this  - when to take this  - reasons to take this  - additional instructions   Used for:  MARY (generalized anxiety disorder)        1/2 to 1 po q 8 hours prn   Quantity:  40 tablet   Refills:  1       rosuvastatin 10 MG tablet   Commonly known as:  CRESTOR   This may have changed:  when to take this   Used for:  Hyperlipidemia LDL goal <70, Coronary artery disease involving native coronary artery of native heart without angina pectoris        Dose:  10 mg   Take 1 tablet (10 mg) by mouth daily   Quantity:  30 tablet   Refills:  5                Primary Care Provider Office Phone # Fax #    Simin Lopez [phone]       [address] [facility] [address]  [facility] [address]        Equal Access to Services     [facility] AH: Hadii jamari salazar hadasho Soomaali, waaxda luqadaha, qaybta kaalmada adeegyada, yaneth herndon . So [facility] [phone].    ATENCIÓN: Si habla español, tiene a duran disposición servicios gratuitos de asistencia lingüística. [facility] [phone].    We comply with applicable federal civil rights laws and [place] laws. We do not discriminate on the basis of race, color, national origin, age, disability, sex, sexual orientation, or gender identity.            Thank you!     Thank you for choosing [facility]  for your care. Our goal is always to provide you with excellent care. Hearing back from our  patients is one way we can continue to improve our services. Please take a few minutes to complete the written survey that you may receive in the mail after your visit with us. Thank you!             Your Updated Medication List - Protect others around you: Learn how to safely use, store and throw away your medicines at www.disposemymeds.org.          This list is accurate as of 3/29/18  2:13 PM.  Always use your most recent med list.                   Brand Name Dispense Instructions for use Diagnosis    amLODIPine 5 MG tablet    NORVASC    90 tablet    Take 1 tablet (5 mg) by mouth daily    Essential hypertension, benign       citalopram 10 MG tablet    celeXA    180 tablet    Take 2 tablets (20 mg) by mouth daily    Major depressive disorder, recurrent episode, moderate (H), MARY (generalized anxiety disorder)       diazepam 5 MG tablet    VALIUM    40 tablet    1/2 to 1 po q 8 hours prn    MARY (generalized anxiety disorder)       isosorbide mononitrate 30 MG 24 hr tablet    IMDUR    30 tablet    Take 0.5 tablets (15 mg) by mouth daily    Accelerating angina (H)       levothyroxine 50 MCG tablet    SYNTHROID/LEVOTHROID    90 tablet    TAKE 1 TABLET (50 MCG) BY MOUTH DAILY    Acquired hypothyroidism       MAGNESIUM OXIDE PO      Take 200 mg by mouth daily (with lunch)        metoprolol tartrate 100 MG tablet    LOPRESSOR    180 tablet    TAKE 1 TABLET (100MG) BY MOUTH 2 TIMES DAILY    Essential hypertension, benign, ASCVD (arteriosclerotic cardiovascular disease)       rosuvastatin 10 MG tablet    CRESTOR    30 tablet    Take 1 tablet (10 mg) by mouth daily    Hyperlipidemia LDL goal <70, Coronary artery disease involving native coronary artery of native heart without angina pectoris       traMADol 50 MG tablet    ULTRAM    50 tablet    TAKE ONE TABLET BY MOUTH EVERY 8 HOURS AS NEEDED FOR MODERATE PAIN    Chronic left-sided low back pain with left-sided sciatica       VITAMIN D (CHOLECALCIFEROL) PO      Take 1,000  Units by mouth At Bedtime        warfarin 5 MG tablet    COUMADIN    108 tablet    TAKE 1&1/2 TABLETS (7.5MG) BY MOUTH ONCE DAILY ON TUESDAY & SATURDAY THEN 1 TABLET (5MG) ALL THE OTHER DAYS OF THE WEEK    Chronic atrial fibrillation (H), Long term current use of anticoagulant therapy

## 2018-03-29 NOTE — LETTER
3/29/2018      Simin Lopez MD  [address] [facility] [address]  [facility] [address]      RE: Cielo TY Pal       Dear Colleague,    I had the pleasure of seeing Cielo Aguillon in the [facility].    Service Date: 03/29/2018      HISTORY OF PRESENT ILLNESS:  Ms. Aguillon is a pleasant 78-year-old female who presents to Cardiology office today for followup after a recent hospitalization due to chest pain.      Her cardiovascular history includes nonobstructive coronary artery disease, chronic atrial fibrillation, hypertension and hyperlipidemia.  She also has a history of complex sleep apnea for which she is being treated.  She recently walked in to the clinic to make an appointment for chest discomfort and was sent to the Emergency Department.  She ruled out for an acute coronary syndrome.  She did undergo a repeat cardiac catheterization.  She was again noted to have small caliber vessels.  There was a 50% lesion in the mid LAD which was assessed by FFR and was found to not be hemodynamically significant.  Otherwise, there was mild to moderate disease.  Her LVEDP was at the upper limits of normal at 15 mmHg.  Her EF was preserved.  There was some question if her symptoms could be from small-vessel disease, and she was started on Imdur 15 mg daily and her amlodipine was increased.  She did well and was discharged home.  She did follow up with her primary care provider, and at that point due to low blood pressure and a history of lightheadedness, her amlodipine was decreased back to 5 mg daily.      At this point, the patient states she has not had any recurrent chest discomfort.  Her main concern today is ongoing dyspnea on exertion, which in questioning her and reviewing the chart sounds like has been going on for several years.  She does state that she will be undergoing pulmonary function testing within the next couple of weeks and will be following up with her pulmonologist in regard  to this.  She has not been having any orthopnea, PND or lower extremity edema.      CURRENT CARDIAC MEDICATIONS:   1.  Amlodipine 5 mg daily.   2.  Imdur 30 mg half tablet daily.   3.  Coumadin as directed.   4.  Metoprolol tartrate 100 mg b.i.d.   5.  Crestor 10 mg nightly.      The remainder of her medications, allergies and review of systems were reviewed and as are documented separately.      PHYSICAL EXAMINATION:   GENERAL:  The patient is a pleasant 78-year-old female who appears her stated age.  She is in no apparent distress.   VITAL SIGNS:  Her blood pressure is 110/60.  Pulse is 68.  Weight is 174 pounds which is stable.   LUNGS:  Her breathing is nonlabored.  Her lungs are clear to auscultation.   CARDIAC:  Reveals an irregular rate and rhythm.  No significant murmur is appreciated.   EXTREMITIES:  Lower extremities show trace edema bilaterally.   NEUROLOGIC:  Alert and oriented.      ASSESSMENT AND PLAN:  The patient is a pleasant 78-year-old female with chronic atrial fibrillation and nonobstructive coronary artery disease who has had longstanding dyspnea on exertion and a few recent episodes of chest discomfort concerning for possible angina.  Repeat cardiac catheterization did not show any significant coronary artery disease.  She was noted to have small vessels.  She was started on antianginal therapy in the form of Imdur, and her amlodipine was increased (but then decreased again due to low BP).  She has not had any further chest discomfort, but her dyspnea on exertion has not improved.  In reviewing the chart, in the past, it was noted that the dyspnea was present even when she was in sinus rhythm.  It appears that she was also trialed on diuretic therapy but did not have any improvement in her symptomatology.      At this time, I recommended continuing her current cardiac medication regimen unchanged.  She did mention that her INR tends to fluctuate.  We talked about NOACs.  She told me that she  has been on one in the past but that she could not afford it.  She is comfortable with continuing Coumadin for the time being.      I encouraged her to follow through with her pulmonary evaluation.      She will follow up with Dr. Bateman in the summer of  as previously recommended.  Of course, I encouraged her to contact us sooner with questions or concerns.         YANNA SALAS PA-C             D: 2018   T: 2018   MT: JAS      Name:     FELICITAS LITTLEJOHN   MRN:      [id number]        Account:      [id number]   :      1940           Service Date: 2018      Document: [id number]           Outpatient Encounter Prescriptions as of 3/29/2018   Medication Sig Dispense Refill     amLODIPine (NORVASC) 5 MG tablet Take 1 tablet (5 mg) by mouth daily 90 tablet 1     isosorbide mononitrate (IMDUR) 30 MG 24 hr tablet Take 0.5 tablets (15 mg) by mouth daily 30 tablet 1     MAGNESIUM OXIDE PO Take 200 mg by mouth daily (with lunch)       traMADol (ULTRAM) 50 MG tablet TAKE ONE TABLET BY MOUTH EVERY 8 HOURS AS NEEDED FOR MODERATE PAIN 50 tablet 0     warfarin (COUMADIN) 5 MG tablet TAKE 1&1/2 TABLETS (7.5MG) BY MOUTH ONCE DAILY ON TUESDAY & SATURDAY THEN 1 TABLET (5MG) ALL THE OTHER DAYS OF THE WEEK 108 tablet 0     metoprolol tartrate (LOPRESSOR) 100 MG tablet TAKE 1 TABLET (100MG) BY MOUTH 2 TIMES DAILY 180 tablet 2     levothyroxine (SYNTHROID/LEVOTHROID) 50 MCG tablet TAKE 1 TABLET (50 MCG) BY MOUTH DAILY 90 tablet 2     citalopram (CELEXA) 10 MG tablet Take 2 tablets (20 mg) by mouth daily (Patient taking differently: Take 10 mg by mouth daily ) 180 tablet 1     diazepam (VALIUM) 5 MG tablet 1/2 to 1 po q 8 hours prn (Patient taking differently: Take 2.5-5 mg by mouth every 8 hours as needed for anxiety (WITH TRAVELING) ) 40 tablet 1     rosuvastatin (CRESTOR) 10 MG tablet Take 1 tablet (10 mg) by mouth daily (Patient taking differently: Take 10 mg by mouth At Bedtime ) 30 tablet 5      VITAMIN D, CHOLECALCIFEROL, PO Take 1,000 Units by mouth At Bedtime        No facility-administered encounter medications on file as of 3/29/2018.        Again, thank you for allowing me to participate in the care of your patient.      Sincerely,    Ro Hobson PA-C     [facility]

## 2018-03-29 NOTE — LETTER
3/29/2018    Simin Lopez MD  [address]  [facility] [address]    RE: Cielo Aguillon       Dear Colleague,    I had the pleasure of seeing Cielo Aguillon in the [facility].    Please see separate dictation for HPI, PHYSICAL EXAM AND IMPRESSION/PLAN.    CURRENT MEDICATIONS:  Current Outpatient Prescriptions   Medication Sig Dispense Refill     amLODIPine (NORVASC) 5 MG tablet Take 1 tablet (5 mg) by mouth daily 90 tablet 1     isosorbide mononitrate (IMDUR) 30 MG 24 hr tablet Take 0.5 tablets (15 mg) by mouth daily 30 tablet 1     MAGNESIUM OXIDE PO Take 200 mg by mouth daily (with lunch)       traMADol (ULTRAM) 50 MG tablet TAKE ONE TABLET BY MOUTH EVERY 8 HOURS AS NEEDED FOR MODERATE PAIN 50 tablet 0     warfarin (COUMADIN) 5 MG tablet TAKE 1&1/2 TABLETS (7.5MG) BY MOUTH ONCE DAILY ON TUESDAY & SATURDAY THEN 1 TABLET (5MG) ALL THE OTHER DAYS OF THE WEEK 108 tablet 0     metoprolol tartrate (LOPRESSOR) 100 MG tablet TAKE 1 TABLET (100MG) BY MOUTH 2 TIMES DAILY 180 tablet 2     levothyroxine (SYNTHROID/LEVOTHROID) 50 MCG tablet TAKE 1 TABLET (50 MCG) BY MOUTH DAILY 90 tablet 2     citalopram (CELEXA) 10 MG tablet Take 2 tablets (20 mg) by mouth daily (Patient taking differently: Take 10 mg by mouth daily ) 180 tablet 1     diazepam (VALIUM) 5 MG tablet 1/2 to 1 po q 8 hours prn (Patient taking differently: Take 2.5-5 mg by mouth every 8 hours as needed for anxiety (WITH TRAVELING) ) 40 tablet 1     rosuvastatin (CRESTOR) 10 MG tablet Take 1 tablet (10 mg) by mouth daily (Patient taking differently: Take 10 mg by mouth At Bedtime ) 30 tablet 5     VITAMIN D, CHOLECALCIFEROL, PO Take 1,000 Units by mouth At Bedtime          ALLERGIES:     Allergies   Allergen Reactions     Ace Inhibitors      Cough (Zestril)     Aspirin      hx bleeding ulcers     Hydrochlorothiazide [Hctz] Rash     Ibuprofen      hx bleeding ulcers     Amoxicillin Rash     Losartan Rash     Penicillins  Rash       PAST MEDICAL HISTORY:  Past Medical History:   Diagnosis Date     Anxiety     related to travel     Arthritis      ASCVD (arteriosclerotic cardiovascular disease) 2/12    60-70% stenoses of OM2, D1, medical management     Atrial fibrillation (H)      Atrial flutter (H)      Coronary artery disease     2/2012- 50% mid Cfx, 60-70% prox OM2, 50-70% D1     Essential hypertension, benign      Fatty liver 5/10    mild fibrosis on biopsy     Hypertriglyceridemia      Major depression      BETHANY (obstructive sleep apnea)      Other ventral hernia without mention of obstruction or gangrene      Peptic ulcer 2002     Peptic ulcer, unspecified site, unspecified as acute or chronic, without mention of hemorrhage, perforation, or obstruction 1983     Shortness of breath      Thyroid disease      Tubular adenoma 6/08       PAST SURGICAL HISTORY:  Past Surgical History:   Procedure Laterality Date     APPENDECTOMY       C NONSPECIFIC PROCEDURE      Appendectomy     C NONSPECIFIC PROCEDURE      Tonsillectomy     C NONSPECIFIC PROCEDURE      Tubal ligation     C NONSPECIFIC PROCEDURE  11/01    Shave bone spurs from left foot     COLONOSCOPY  9/1/2016    Dr. Camejo [facility]     COLONOSCOPY N/A 9/1/2016    Procedure: COMBINED COLONOSCOPY, SINGLE OR MULTIPLE BIOPSY/POLYPECTOMY BY BIOPSY;  Surgeon: Pillo Camejo MD;  Location:  GI     CORONARY ANGIOGRAPHY ADULT ORDER  2/13/2012    50% mid Cfx, 60-70% prox OM2, 50-70% D1     ESOPHAGOSCOPY, GASTROSCOPY, DUODENOSCOPY (EGD), COMBINED  9/1/2016    Dr. Camejo [facility]     ESOPHAGOSCOPY, GASTROSCOPY, DUODENOSCOPY (EGD), COMBINED N/A 9/1/2016    Procedure: COMBINED ESOPHAGOSCOPY, GASTROSCOPY, DUODENOSCOPY (EGD), BIOPSY SINGLE OR MULTIPLE;  Surgeon: Pillo Camejo MD;  Location:  GI     GYN SURGERY       HEART CATH RIGHT AND LEFT HEART CATH  04/06/2015    Mid LAD 50-60%, 1st Diagonal 70%, 1st OM 30%, 2nd OM 40-50%, review report for right heart cath results.        SOCIAL  HISTORY:  Social History     Social History     Marital status:      Spouse name: N/A     Number of children: 6     Years of education: N/A     Occupational History      [employer]     Social History Main Topics     Smoking status: Never Smoker     Smokeless tobacco: Never Used     Alcohol use 0.0 oz/week     0 Standard drinks or equivalent per week      Comment: occ - wine     Drug use: No     Sexual activity: Yes     Partners: Male     Birth control/ protection: Surgical      Comment: tubal     Other Topics Concern      Service No     Blood Transfusions Yes     1983     Caffeine Concern No     none     Occupational 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     working in a pharmacy     Hobby Hazards No     Sleep Concern Yes     Stress Concern Yes     Weight Concern No     Special Diet Yes     pt on warfarin     Back Care No     Exercise No     some exercise at night, walking      Bike Helmet No     Seat Belt Yes     Self-Exams No     Social History Narrative       FAMILY HISTORY:  Family History   Problem Relation Age of Onset     Respiratory Father      emphysema--secon. to smoking     Alzheimer Disease Mother      Congenital Anomalies Mother      OSTEOPOROSIS Mother      DIABETES Maternal Aunt      DIABETES Other      cousins     DIABETES Other      cousin on mothers side of family     Depression Son      Depression Son      Colon Cancer No family hx of        Review of Systems:  Skin:  Positive for bruising     Eyes:  Positive for glasses    ENT:  Positive for hearing loss    Respiratory:  Positive for shortness of breath;dyspnea on exertion;sleep apnea;CPAP     Cardiovascular:    lightheadedness;fatigue;Positive for    Gastroenterology: Positive for stomach or duodenal ulcer;poor appetite hx of ulcers  Genitourinary:  Negative      Musculoskeletal:  Positive for back pain    Neurologic:  Negative      Psychiatric:  Negative      Heme/Lymph/Imm:  Negative      Endocrine:  Positive for thyroid disorder       Reviewed.  Remainder of the note dictated.    Ro Hobson PA-C        Service Date: 03/29/2018      HISTORY OF PRESENT ILLNESS:  Ms. Aguillon is a pleasant 78-year-old female who presents to Cardiology office today for followup after a recent hospitalization due to chest pain.      Her cardiovascular history includes nonobstructive coronary artery disease, chronic atrial fibrillation, hypertension and hyperlipidemia.  She also has a history of complex sleep apnea for which she is being treated.  She recently walked in to the clinic to make an appointment for chest discomfort and was sent to the Emergency Department.  She ruled out for an acute coronary syndrome.  She did undergo a repeat cardiac catheterization.  She was again noted to have small caliber vessels.  There was a 50% lesion in the mid LAD which was assessed by FFR and was found to not be hemodynamically significant.  Otherwise, there was mild to moderate disease.  Her LVEDP was at the upper limits of normal at 15 mmHg.  Her EF was preserved.  There was some question if her symptoms could be from small-vessel disease, and she was started on Imdur 15 mg daily and her amlodipine was increased.  She did well and was discharged home.  She did follow up with her primary care provider, and at that point due to low blood pressure and a history of lightheadedness, her amlodipine was decreased back to 5 mg daily.      At this point, the patient states she has not had any recurrent chest discomfort.  Her main concern today is ongoing dyspnea on exertion, which in questioning her and reviewing the chart sounds like has been going on for several years.  She does state that she will be undergoing pulmonary function testing within the next couple of weeks and will be following up with her pulmonologist in regard to this.  She has not been having any orthopnea, PND or lower extremity edema.      CURRENT CARDIAC MEDICATIONS:   1.  Amlodipine 5 mg daily.   2.  Imdur 30  mg half tablet daily.   3.  Coumadin as directed.   4.  Metoprolol tartrate 100 mg b.i.d.   5.  Crestor 10 mg nightly.      The remainder of her medications, allergies and review of systems were reviewed and as are documented separately.      PHYSICAL EXAMINATION:   GENERAL:  The patient is a pleasant 78-year-old female who appears her stated age.  She is in no apparent distress.   VITAL SIGNS:  Her blood pressure is 110/60.  Pulse is 68.  Weight is 174 pounds which is stable.   LUNGS:  Her breathing is nonlabored.  Her lungs are clear to auscultation.   CARDIAC:  Reveals an irregular rate and rhythm.  No significant murmur is appreciated.   EXTREMITIES:  Lower extremities show trace edema bilaterally.   NEUROLOGIC:  Alert and oriented.      ASSESSMENT AND PLAN:  The patient is a pleasant 78-year-old female with chronic atrial fibrillation and nonobstructive coronary artery disease who has had longstanding dyspnea on exertion and a few recent episodes of chest discomfort concerning for possible angina.  Repeat cardiac catheterization did not show any significant coronary artery disease.  She was noted to have small vessels.  She was started on antianginal therapy in the form of Imdur, and her amlodipine was increased (but then decreased again due to low BP).  She has not had any further chest discomfort, but her dyspnea on exertion has not improved.  In reviewing the chart, in the past, it was noted that the dyspnea was present even when she was in sinus rhythm.  It appears that she was also trialed on diuretic therapy but did not have any improvement in her symptomatology.      At this time, I recommended continuing her current cardiac medication regimen unchanged.  She did mention that her INR tends to fluctuate.  We talked about NOACs.  She told me that she has been on one in the past but that she could not afford it.  She is comfortable with continuing Coumadin for the time being.      I encouraged her to follow  through with her pulmonary evaluation.      She will follow up with Dr. Bateman in the summer of  as previously recommended.  Of course, I encouraged her to contact us sooner with questions or concerns.         RO SALAS PA-C             D: 2018   T: 2018   MT: JAS      Name:     FELICITAS LITTLEJOHN   MRN:      [id number]        Account:      [id number]   :      1940           Service Date: 2018      Document: [id number]        Thank you for allowing me to participate in the care of your patient.      Sincerely,     Ro Salas PA-C     [facility] [place]    cc:   Vince Sweeney MD  [address]  [place] [id number]

## 2018-03-29 NOTE — PROGRESS NOTES
ANTICOAGULATION FOLLOW-UP CLINIC VISIT    Patient Name:  Cielo Aguillon  Date:  3/29/2018  Contact Type:  Face to Face    SUBJECTIVE:     Patient Findings     Positives Unexplained INR or factor level change    Comments Pt denies any changes.            OBJECTIVE    INR Protime   Date Value Ref Range Status   03/29/2018 3.7 (A) 0.86 - 1.14 Final       ASSESSMENT / PLAN  INR assessment SUPRA    Recheck INR In: 2 WEEKS    INR Location Clinic      Anticoagulation Summary as of 3/29/2018     INR goal 2.0-3.0   Today's INR 3.7!   Maintenance plan 7.5 mg (5 mg x 1.5) on Tue, Sat; 5 mg (5 mg x 1) all other days   Full instructions 3/29: 2.5 mg; 3/31: 5 mg; 4/7: 5 mg; Otherwise 7.5 mg on Tue, Sat; 5 mg all other days   Weekly total 40 mg   Plan last modified Cindi Preston RN (5/9/2017)   Next INR check 4/10/2018   Priority INR   Target end date     Indications   Chronic atrial fibrillation (H) [I48.2]  Long-term (current) use of anticoagulants [Z79.01] [Z79.01]         Anticoagulation Episode Summary     INR check location     Preferred lab     Send INR reminders to [facility]    Comments       Anticoagulation Care Providers     Provider Role Specialty Phone number    Simin Lopez MD Responsible Internal Medicine [phone]            See the Encounter Report to view Anticoagulation Flowsheet and Dosing Calendar (Go to Encounters tab in chart review, and find the Anticoagulation Therapy Visit)    Dosage adjustment made based on physician directed care plan.    Cindi Preston, RN

## 2018-03-29 NOTE — MR AVS SNAPSHOT
Cielo Aguillon   3/29/2018 1:00 PM   Anticoagulation Therapy Visit    Description:  78 year old female   Provider:  [facility]   Department:  [facility]           INR as of 3/29/2018     Today's INR 3.7!      Anticoagulation Summary as of 3/29/2018     INR goal 2.0-3.0   Today's INR 3.7!   Full instructions 3/29: 2.5 mg; 3/31: 5 mg; 4/7: 5 mg; Otherwise 7.5 mg on Tue, Sat; 5 mg all other days   Next INR check 4/10/2018    Indications   Chronic atrial fibrillation (H) [I48.2]  Long-term (current) use of anticoagulants [Z79.01] [Z79.01]         Your next Anticoagulation Clinic appointment(s)     Apr 10, 2018  1:45 PM CDT   Anticoagulation Visit with [facility]   [facility] ([facility])    [address] [facility] [address]  [facility] [id number] [phone]              Contact Numbers     [facility] Phone Numbers:  Anticoagulation Clinic Appointments : [phone]  Anticoagulation Nurse: [phone]         March 2018 Details    Sun Mon Tue Wed Thu Fri Sat         1               2               3                 4               5               6               7               8               9               10                 11               12               13               14               15               16               17                 18               19               20               21               22               23               24                 25               26               27               28               29      2.5 mg   See details      30      5 mg         31      5 mg          Date Details   03/29 This INR check               How to take your warfarin dose     To take:  2.5 mg Take 0.5 of a 5 mg tablet.    To take:  5 mg Take 1 of the 5 mg tablets.           April 2018 Details    Sun Mon Tue Wed Thu Fri Sat     1      5 mg         2      5 mg         3      7.5 mg         4      5 mg         5      5 mg          6      5 mg         7      5 mg           8      5 mg         9      5 mg         10            11               12               13               14                 15               16               17               18               19               20               21                 22               23               24               25               26               27               28                 29               30                     Date Details   No additional details    Date of next INR:  4/10/2018         How to take your warfarin dose     To take:  5 mg Take 1 of the 5 mg tablets.    To take:  7.5 mg Take 1.5 of the 5 mg tablets.

## 2018-03-29 NOTE — PATIENT INSTRUCTIONS
Thank you for your [facility] visit today. Your provider has recommended the following:  Medication Changes:  No changes today  Recommendations:  Nothing new at this time  Follow-up:  See Dr Bateman for cardiology follow up in July 2018.    Reminder:  Please bring in your current medication list or your medication, over the counter supplements and vitamin bottles as we will review these at each office visit.

## 2018-03-30 NOTE — PROGRESS NOTES
Service Date: 03/29/2018      HISTORY OF PRESENT ILLNESS:  Ms. Aguillon is a pleasant 78-year-old female who presents to Cardiology office today for followup after a recent hospitalization due to chest pain.      Her cardiovascular history includes nonobstructive coronary artery disease, chronic atrial fibrillation, hypertension and hyperlipidemia.  She also has a history of complex sleep apnea for which she is being treated.  She recently walked in to the clinic to make an appointment for chest discomfort and was sent to the Emergency Department.  She ruled out for an acute coronary syndrome.  She did undergo a repeat cardiac catheterization.  She was again noted to have small caliber vessels.  There was a 50% lesion in the mid LAD which was assessed by FFR and was found to not be hemodynamically significant.  Otherwise, there was mild to moderate disease.  Her LVEDP was at the upper limits of normal at 15 mmHg.  Her EF was preserved.  There was some question if her symptoms could be from small-vessel disease, and she was started on Imdur 15 mg daily and her amlodipine was increased.  She did well and was discharged home.  She did follow up with her primary care provider, and at that point due to low blood pressure and a history of lightheadedness, her amlodipine was decreased back to 5 mg daily.      At this point, the patient states she has not had any recurrent chest discomfort.  Her main concern today is ongoing dyspnea on exertion, which in questioning her and reviewing the chart sounds like has been going on for several years.  She does state that she will be undergoing pulmonary function testing within the next couple of weeks and will be following up with her pulmonologist in regard to this.  She has not been having any orthopnea, PND or lower extremity edema.      CURRENT CARDIAC MEDICATIONS:   1.  Amlodipine 5 mg daily.   2.  Imdur 30 mg half tablet daily.   3.  Coumadin as directed.   4.  Metoprolol  tartrate 100 mg b.i.d.   5.  Crestor 10 mg nightly.      The remainder of her medications, allergies and review of systems were reviewed and as are documented separately.      PHYSICAL EXAMINATION:   GENERAL:  The patient is a pleasant 78-year-old female who appears her stated age.  She is in no apparent distress.   VITAL SIGNS:  Her blood pressure is 110/60.  Pulse is 68.  Weight is 174 pounds which is stable.   LUNGS:  Her breathing is nonlabored.  Her lungs are clear to auscultation.   CARDIAC:  Reveals an irregular rate and rhythm.  No significant murmur is appreciated.   EXTREMITIES:  Lower extremities show trace edema bilaterally.   NEUROLOGIC:  Alert and oriented.      ASSESSMENT AND PLAN:  The patient is a pleasant 78-year-old female with chronic atrial fibrillation and nonobstructive coronary artery disease who has had longstanding dyspnea on exertion and a few recent episodes of chest discomfort concerning for possible angina.  Repeat cardiac catheterization did not show any significant coronary artery disease.  She was noted to have small vessels.  She was started on antianginal therapy in the form of Imdur, and her amlodipine was increased (but then decreased again due to low BP).  She has not had any further chest discomfort, but her dyspnea on exertion has not improved.  In reviewing the chart, in the past, it was noted that the dyspnea was present even when she was in sinus rhythm.  It appears that she was also trialed on diuretic therapy but did not have any improvement in her symptomatology.      At this time, I recommended continuing her current cardiac medication regimen unchanged.  She did mention that her INR tends to fluctuate.  We talked about NOACs.  She told me that she has been on one in the past but that she could not afford it.  She is comfortable with continuing Coumadin for the time being.      I encouraged her to follow through with her pulmonary evaluation.      She will follow up  with Dr. Bateman in the summer of  as previously recommended.  Of course, I encouraged her to contact us sooner with questions or concerns.         YANNA SALAS PA-C             D: 2018   T: 2018   MT: JAS      Name:     FELICITAS LITTLEJOHN   MRN:      [id number]        Account:      [id number]   :      1940           Service Date: 2018      Document: [id number]

## 2018-04-03 ENCOUNTER — HOSPITAL ENCOUNTER (OUTPATIENT)
Dept: RESPIRATORY THERAPY | Facility: CLINIC | Age: 78
Discharge: HOME | End: 2018-04-03
Attending: FAMILY MEDICINE | Admitting: FAMILY MEDICINE
Payer: MEDICARE

## 2018-04-03 DIAGNOSIS — I27.20 PULMONARY HYPERTENSION (H): ICD-10-CM

## 2018-04-03 DIAGNOSIS — I48.21 PERMANENT ATRIAL FIBRILLATION (H): ICD-10-CM

## 2018-04-03 DIAGNOSIS — G47.39 COMPLEX SLEEP APNEA SYNDROME: ICD-10-CM

## 2018-04-03 DIAGNOSIS — R06.09 DOE (DYSPNEA ON EXERTION): ICD-10-CM

## 2018-04-03 LAB
DLCOCOR-%PRED-PRE: 54 %
DLCOCOR-PRE: 11 ML/MIN/MMHG
DLCOUNC-%PRED-PRE: 53 %
DLCOUNC-PRE: 10.8 ML/MIN/MMHG
DLCOUNC-PRED: 20.23 ML/MIN/MMHG
ERV-%PRED-PRE: 29 %
ERV-PRE: 0.15 L
ERV-PRED: 0.52 L
EXPTIME-PRE: 5.55 SEC
FEF2575-%PRED-PRE: 90 %
FEF2575-PRE: 1.43 L/SEC
FEF2575-PRED: 1.58 L/SEC
FEFMAX-%PRED-PRE: 93 %
FEFMAX-PRE: 4.87 L/SEC
FEFMAX-PRED: 5.21 L/SEC
FEV1-%PRED-PRE: 72 %
FEV1-PRE: 1.4 L
FEV1FEV6-PRE: 82 %
FEV1FEV6-PRED: 78 %
FEV1FVC-PRE: 82 %
FEV1FVC-PRED: 74 %
FEV1SVC-PRE: 79 %
FEV1SVC-PRED: 63 %
FIFMAX-PRE: 1.77 L/SEC
FRCPLETH-%PRED-PRE: 73 %
FRCPLETH-PRE: 2.05 L
FRCPLETH-PRED: 2.8 L
FVC-%PRED-PRE: 68 %
FVC-PRE: 1.72 L
FVC-PRED: 2.52 L
IC-%PRED-PRE: 63 %
IC-PRE: 1.63 L
IC-PRED: 2.56 L
RVPLETH-%PRED-PRE: 83 %
RVPLETH-PRE: 1.89 L
RVPLETH-PRED: 2.26 L
TLCPLETH-%PRED-PRE: 70 %
TLCPLETH-PRE: 3.68 L
TLCPLETH-PRED: 5.19 L
VA-%PRED-PRE: 54 %
VA-PRE: 2.89 L
VC-%PRED-PRE: 57 %
VC-PRE: 1.78 L
VC-PRED: 3.08 L

## 2018-04-03 PROCEDURE — 94726 PLETHYSMOGRAPHY LUNG VOLUMES: CPT

## 2018-04-03 PROCEDURE — 94010 BREATHING CAPACITY TEST: CPT

## 2018-04-03 PROCEDURE — 94729 DIFFUSING CAPACITY: CPT

## 2018-04-03 NOTE — PROGRESS NOTES
PFT Note:        Pt completed pulmonary function testing with DLCO.  Good Pt effort and cooperation.     Spirometry  Meets all ATS-ERS recommendations.    Plethysmography  All plethysmographic measurements meet ATS-ERS recommendations.    DLCO  Meets all ATS-ERS recommendations.  DLCO is an average of 2 maneuvers.  Predicted DLCO is corrected for a Hgb of 12.8 drawn on 3/14/2018.    April 3, 2018.12:41 PM  Amilcar Castellanos

## 2018-04-06 NOTE — PROCEDURES
"Procedure Date: 2018      Please see medical chart for graphs and statistics related to this report.       REFERRING PHYSICIAN: Dennys Johnson   TECHNICIAN: Amilcar Castellanos   DIAGNOSIS: SOB, Complex sleep apnea, pulmonary hypertension, permanent atrial fibrillation   HEIGHT: 65.50\"      WEIGHT: 172 lbs   DYSPNEA: after any exertion   COUGH: no cough   WHEEZE: no wheeze   TOBACCO PROD: never smoked    POST-TEST COMMENTS: Patient completed pulmonary function testing with DLCO. Good patient effort and cooperation. All testing meets ATS-ERS recommendations. DLCO is an average of 2 maneuvers. Predicted DLCO is corrected for a Hgb of 12.8 drawn on 2018.       INTERPRETATION:   1. Slightly decreased since 2014 studies   2. Moderate mixed ventilatory defect   3. Moderate restriction in volumes   4. Gas transfer is normal for reduced volumes         JEANNA VARNER MD             D: 2018   T: 2018   MT:       Name:     FELICITAS LITTLEJOHN   MRN:      [id number]        Account:        [id number]   :      1940           Procedure Date: 2018      Document: [id number]       cc: Dennys Lopez MD   "

## 2018-04-10 ENCOUNTER — ANTICOAGULATION THERAPY VISIT (OUTPATIENT)
Dept: ANTICOAGULATION | Facility: CLINIC | Age: 78
End: 2018-04-10
Payer: MEDICARE

## 2018-04-10 DIAGNOSIS — I48.20 CHRONIC ATRIAL FIBRILLATION (H): ICD-10-CM

## 2018-04-10 DIAGNOSIS — Z79.01 LONG-TERM (CURRENT) USE OF ANTICOAGULANTS: ICD-10-CM

## 2018-04-10 LAB — INR POINT OF CARE: 3.6 (ref 0.86–1.14)

## 2018-04-10 PROCEDURE — 36416 COLLJ CAPILLARY BLOOD SPEC: CPT

## 2018-04-10 PROCEDURE — 85610 PROTHROMBIN TIME: CPT | Mod: QW

## 2018-04-10 PROCEDURE — 99207 ZZC NO CHARGE NURSE ONLY: CPT

## 2018-04-10 NOTE — PROGRESS NOTES
ANTICOAGULATION FOLLOW-UP CLINIC VISIT    Patient Name:  Cielo Aguillon  Date:  4/10/2018  Contact Type:  Face to Face    SUBJECTIVE:     Patient Findings     Positives Bruising (Pt reports foot is bruised, pt stated took an ambien and was sleepwalking.), Med error (Pt reports took 7.5 mg on Saturdays instead of 5 mg as instructed. ), Unexplained INR or factor level change           OBJECTIVE    INR Protime   Date Value Ref Range Status   04/10/2018 3.6 (A) 0.86 - 1.14 Final       ASSESSMENT / PLAN  INR assessment THER    Recheck INR In: 2 WEEKS    INR Location Clinic      Anticoagulation Summary as of 4/10/2018     INR goal 2.0-3.0   Today's INR 3.6!   Maintenance plan 7.5 mg (5 mg x 1.5) on Tue, Sat; 5 mg (5 mg x 1) all other days   Full instructions 4/10: 2.5 mg; 4/14: 5 mg; 4/21: 5 mg; Otherwise 7.5 mg on Tue, Sat; 5 mg all other days   Weekly total 40 mg   Plan last modified Cindi Preston RN (5/9/2017)   Next INR check 4/24/2018   Priority INR   Target end date     Indications   Chronic atrial fibrillation (H) [I48.2]  Long-term (current) use of anticoagulants [Z79.01] [Z79.01]         Anticoagulation Episode Summary     INR check location     Preferred lab     Send INR reminders to [facility]    Comments       Anticoagulation Care Providers     Provider Role Specialty Phone number    Simin Lopez MD [facility] Internal Medicine [phone]            See the Encounter Report to view Anticoagulation Flowsheet and Dosing Calendar (Go to Encounters tab in chart review, and find the Anticoagulation Therapy Visit)    Dosage adjustment made based on physician directed care plan.    Cindi Preston, RN

## 2018-04-10 NOTE — MR AVS SNAPSHOT
Cielo Aguillon   4/10/2018 1:45 PM   Anticoagulation Therapy Visit    Description:  78 year old female   Provider:  [facility]   Department:  [facility]           INR as of 4/10/2018     Today's INR 3.6!      Anticoagulation Summary as of 4/10/2018     INR goal 2.0-3.0   Today's INR 3.6!   Full instructions 4/10: 2.5 mg; 4/14: 5 mg; 4/21: 5 mg; Otherwise 7.5 mg on Tue, Sat; 5 mg all other days   Next INR check 4/24/2018    Indications   Chronic atrial fibrillation (H) [I48.2]  Long-term (current) use of anticoagulants [Z79.01] [Z79.01]         Your next Anticoagulation Clinic appointment(s)     Apr 24, 2018  3:00 PM CDT   [facility] Visit with [facility]   [facility]    [address] [facility] [address]  [facility] [id number] [phone]              Contact Numbers     [facility] Phone Numbers:  Anticoagulation Clinic Appointments : [phone]  Anticoagulation Nurse: [phone]         April 2018 Details    Sun Mon Tue Wed Thu Fri Sat     1               2               3               4               5               6               7                 8               9               10      2.5 mg   See details      11      5 mg         12      5 mg         13      5 mg         14      5 mg           15      5 mg         16      5 mg         17      7.5 mg         18      5 mg         19      5 mg         20      5 mg         21      5 mg           22      5 mg         23      5 mg         24            25               26               27               28                 29               30                     Date Details   04/10 This INR check       Date of next INR:  4/24/2018         How to take your warfarin dose     To take:  2.5 mg Take 0.5 of a 5 mg tablet.    To take:  5 mg Take 1 of the 5 mg tablets.    To take:  7.5 mg Take 1.5 of the 5 mg tablets.

## 2018-04-24 DIAGNOSIS — F41.1 GAD (GENERALIZED ANXIETY DISORDER): ICD-10-CM

## 2018-04-24 DIAGNOSIS — M54.42 CHRONIC LEFT-SIDED LOW BACK PAIN WITH LEFT-SIDED SCIATICA: ICD-10-CM

## 2018-04-24 DIAGNOSIS — G89.29 CHRONIC LEFT-SIDED LOW BACK PAIN WITH LEFT-SIDED SCIATICA: ICD-10-CM

## 2018-04-25 NOTE — TELEPHONE ENCOUNTER
Last refill on Diazepam-12/7/17-#40 x1      Last refill on Ultram-2/27/18-#50      Last OV-3/22/18    CSA on file

## 2018-04-26 ENCOUNTER — HOSPITAL ENCOUNTER (OUTPATIENT)
Dept: CARDIAC REHAB | Facility: CLINIC | Age: 78
End: 2018-04-26
Attending: FAMILY MEDICINE
Payer: MEDICARE

## 2018-04-26 ENCOUNTER — ANTICOAGULATION THERAPY VISIT (OUTPATIENT)
Dept: ANTICOAGULATION | Facility: CLINIC | Age: 78
End: 2018-04-26
Payer: MEDICARE

## 2018-04-26 ENCOUNTER — OFFICE VISIT (OUTPATIENT)
Dept: SLEEP MEDICINE | Facility: CLINIC | Age: 78
End: 2018-04-26
Payer: MEDICARE

## 2018-04-26 DIAGNOSIS — I48.20 CHRONIC ATRIAL FIBRILLATION (H): ICD-10-CM

## 2018-04-26 DIAGNOSIS — R06.09 DOE (DYSPNEA ON EXERTION): ICD-10-CM

## 2018-04-26 DIAGNOSIS — Z79.01 LONG-TERM (CURRENT) USE OF ANTICOAGULANTS: ICD-10-CM

## 2018-04-26 DIAGNOSIS — I27.20 PULMONARY HYPERTENSION (H): ICD-10-CM

## 2018-04-26 DIAGNOSIS — G47.39 COMPLEX SLEEP APNEA SYNDROME: ICD-10-CM

## 2018-04-26 DIAGNOSIS — I48.21 PERMANENT ATRIAL FIBRILLATION (H): ICD-10-CM

## 2018-04-26 DIAGNOSIS — G47.33 OBSTRUCTIVE SLEEP APNEA SYNDROME: Primary | ICD-10-CM

## 2018-04-26 LAB — INR POINT OF CARE: 3.9 (ref 0.86–1.14)

## 2018-04-26 PROCEDURE — 94618 PULMONARY STRESS TESTING: CPT

## 2018-04-26 PROCEDURE — 85610 PROTHROMBIN TIME: CPT | Mod: QW

## 2018-04-26 PROCEDURE — 36416 COLLJ CAPILLARY BLOOD SPEC: CPT

## 2018-04-26 PROCEDURE — 99207 ZZC NO CHARGE NURSE ONLY: CPT

## 2018-04-26 PROCEDURE — 40000116 ZZH STATISTIC OP CR VISIT

## 2018-04-26 RX ORDER — TRAMADOL HYDROCHLORIDE 50 MG/1
TABLET ORAL
Qty: 50 TABLET | Refills: 2 | Status: SHIPPED | OUTPATIENT
Start: 2018-04-26 | End: 2018-11-01

## 2018-04-26 RX ORDER — DIAZEPAM 5 MG
TABLET ORAL
Qty: 40 TABLET | Refills: 1 | Status: SHIPPED | OUTPATIENT
Start: 2018-04-26 | End: 2018-08-28

## 2018-04-26 NOTE — PROGRESS NOTES
SIX MINUTE WALK TEST  Cardiac Rehab  2018    Cielo Aguillon MRN# [id number]   YOB: 1940 Age: 78 year old     Height: Data Unavailable  Weight (lbs): 0 lbs 0 oz Weight (kg): 79.1 kg (actual weight)    Supplemental oxygen during the test: No, flow 0 L/min    Oxygen Appliance: None    Oximetry: Finger Probe    Gait Aid: Walker     Pre-test Post-test   Time 2pm 2:30 pm   Blood Pressure (mm Hg) 120/60 120/68   Heart rate (bpm) 55 77   Rated Perceived Dyspnea (Jese Scale) 0 -- Nothing at all  0 -- Nothing at all    Rated Perceived Exertion (Jese Scale) 5 -- Strong (heavy)  4 -- Somewhat strong    SpO2 (%) 94 97     Total distance walked in 6 minutes: 518 feet, 157.92 meters    Paused during test?No  Number of pauses: 0  Total Time stopped: 0    Stopped test before 6 minutes? No  Time completed: 6  Distance: 518  Reason: none    Did the patient experience any pain or discomfort during the test? No  Pain Ratin/10  Pain Description: has chronic back discomfort.  Comments: none    Oxygen Titration Required: No  Resting oxygen requirement: 0 Liters on None  Exercise oxygen requirement: 0 Liters on None    Performing Staff: Rosalie Victor, RT

## 2018-04-26 NOTE — MR AVS SNAPSHOT
Cielo Aguillon   4/26/2018 3:15 PM   Anticoagulation Therapy Visit    Description:  78 year old female   Provider[facility]  [facility]   Department:  [facility]           INR as of 4/26/2018     Today's INR 3.9!      Anticoagulation Summary as of 4/26/2018     INR goal 2.0-3.0   Today's INR 3.9!   Full instructions 4/26: 2.5 mg; 4/28: 5 mg; 5/1: 5 mg; Otherwise 7.5 mg on Tue, Sat; 5 mg all other days   Next INR check 5/3/2018    Indications   Chronic atrial fibrillation (H) [I48.2]  Long-term (current) use of anticoagulants [Z79.01] [Z79.01]         Your next Anticoagulation Clinic appointment(s)     May 03, 2018  2:45 PM CDT   [facility]agulation [facility] with [facility]   [facility]    [address] [facility] [address]  [facility] [id number] [phone]              Contact Numbers     [facility] Phone Numbers:  Anticoagulation Clinic Appointments : [phone]  Anticoagulation Nurse: [phone]         April 2018 Details    Sun Mon Tue Wed Thu Fri Sat     1               2               3               4               5               6               7                 8               9               10               11               12               13               14                 15               16               17               18               19               20               21                 22               23               24               25               26      2.5 mg   See details      27      5 mg         28      5 mg           29      5 mg         30      5 mg               Date Details   04/26 This INR check               How to take your warfarin dose     To take:  2.5 mg Take 0.5 of a 5 mg tablet.    To take:  5 mg Take 1 of the 5 mg tablets.           May 2018 Details    Sun Mon Tue Wed Thu Fri Sat       1      5 mg         2      5 mg         3            4               5                 6               7                8               9               10               11               12                 13               14               15               16               17               18               19                 20               21               22               23               24               25               26                 27               28               29               30               31                  Date Details   No additional details    Date of next INR:  5/3/2018         How to take your warfarin dose     To take:  5 mg Take 1 of the 5 mg tablets.

## 2018-04-26 NOTE — PROGRESS NOTES
ANTICOAGULATION FOLLOW-UP CLINIC VISIT    Patient Name:  Cielo Aguillon  Date:  4/26/2018  Contact Type:  Face to Face    SUBJECTIVE:     Patient Findings     Positives Change in medications (Pt reports will be starting Celexa 20 mg QD instead of 10 mg QD. Celexa can increase INR. ), Unexplained INR or factor level change    Comments Pt denies any changes over the past 2 weeks             OBJECTIVE    INR Protime   Date Value Ref Range Status   04/26/2018 3.9 (A) 0.86 - 1.14 Final       ASSESSMENT / PLAN  INR assessment SUPRA    Recheck INR In: 1 WEEK    INR Location Clinic      Anticoagulation Summary as of 4/26/2018     INR goal 2.0-3.0   Today's INR 3.9!   Maintenance plan 7.5 mg (5 mg x 1.5) on Tue, Sat; 5 mg (5 mg x 1) all other days   Full instructions 4/26: 2.5 mg; 4/28: 5 mg; 5/1: 5 mg; Otherwise 7.5 mg on Tue, Sat; 5 mg all other days   Weekly total 40 mg   Plan last modified Cindi Pretson RN (5/9/2017)   Next INR check 5/3/2018   Priority INR   Target end date     Indications   Chronic atrial fibrillation (H) [I48.2]  Long-term (current) use of anticoagulants [Z79.01] [Z79.01]         Anticoagulation Episode Summary     INR check location     Preferred lab     Send INR reminders to [facility]    Comments       Anticoagulation Care Providers     Provider Role Specialty Phone number    Simin Lopez MD Responsible Internal Medicine [phone]            See the Encounter Report to view Anticoagulation Flowsheet and Dosing Calendar (Go to Encounters tab in chart review, and find the Anticoagulation Therapy Visit)    Dosage adjustment made based on physician directed care plan.    Cindi Preston, RN

## 2018-04-26 NOTE — MR AVS SNAPSHOT
"              After Visit Summary   4/26/2018    Cielo Aguillon    MRN: [id number]           Patient Information     Date Of Birth          1940        Visit Information        Provider Department      4/26/2018 1:30 PM  SLEEP LAB [facility]        Today's Diagnoses     Obstructive sleep apnea syndrome    -  1       Follow-ups after your visit        Your next 10 appointments already scheduled     Apr 27, 2018 11:30 AM CDT   Return Sleep Patient with Dennys Johnson MD   [facility] ([facility])    [address]  [facility] [address] [phone]            May 03, 2018  2:45 PM CDT   [facility]   [facility]    [address] [facility] [address]  [facility] [address] [phone]              Who to contact     If you have questions or need follow up information about today's clinic visit or your schedule please contact [facility] directly at [phone].  Normal or non-critical lab and imaging results will be communicated to you by [employer], letter or phone within 4 business days after the clinic has received the results. If you do not hear from us within 7 days, please contact the clinic through [employer] or phone. If you have a critical or abnormal lab result, we will notify you by phone as soon as possible.  Submit refill requests through [employer] or call your pharmacy and they will forward the refill request to us. Please allow 3 business days for your refill to be completed.          Additional Information About Your Visit        [employer] Information     [employer] lets you send messages to your doctor, view your test results, renew your prescriptions, schedule appointments and more. To sign up, go to [email or web address] . Click on \"Log in\" on the left side of the screen, which will take you to the " "Welcome page. Then click on \"Sign up Now\" on the right side of the page.     You will be asked to enter the access code listed below, as well as some personal information. Please follow the directions to create your username and password.     Your access code is: [email or web address]  Expires: 2018  3:07 PM     Your access code will  in 90 days. If you need help or a new code, please call your [place] clinic or [phone].        Care EveryWhere ID     This is your Care EveryWhere ID. This could be used by other organizations to access your [place] medical records  [id number]         Blood Pressure from Last 3 Encounters:   18 110/60   18 96/68   18 123/63    Weight from Last 3 Encounters:   18 79.1 kg (174 lb 4.8 oz)   18 78.2 kg (172 lb 8 oz)   18 78 kg (172 lb)              Today, you had the following     No orders found for display         Today's Medication Changes          These changes are accurate as of 18  3:33 PM.  If you have any questions, ask your nurse or doctor.               These medicines have changed or have updated prescriptions.        Dose/Directions    citalopram 10 MG tablet   Commonly known as:  celeXA   This may have changed:  how much to take   Used for:  Major depressive disorder, recurrent episode, moderate (H), MARY (generalized anxiety disorder)        Dose:  20 mg   Take 2 tablets (20 mg) by mouth daily   Quantity:  180 tablet   Refills:  1       rosuvastatin 10 MG tablet   Commonly known as:  CRESTOR   This may have changed:  when to take this   Used for:  Hyperlipidemia LDL goal <70, Coronary artery disease involving native coronary artery of native heart without angina pectoris        Dose:  10 mg   Take 1 tablet (10 mg) by mouth daily   Quantity:  30 tablet   Refills:  5                Primary Care Provider Office Phone # Fax #    Simin Lopez [phone]       [address]  [facility] [address]      "   Equal Access to Services     [facility] : Hadii aad ku haddougiemanish Jose Miguelali, washemarda luqadaha, qaybta kacareyyaneth mcnamara. So [facility] [phone].    ATENCIÓN: Si habla español, tiene a duran disposición servicios gratuitos de asistencia lingüística. Gurwinder al [phone].    We comply with applicable federal civil rights laws and [place] laws. We do not discriminate on the basis of race, color, national origin, age, disability, sex, sexual orientation, or gender identity.            Thank you!     Thank you for choosing [facility]  for your care. Our goal is always to provide you with excellent care. Hearing back from our patients is one way we can continue to improve our services. Please take a few minutes to complete the written survey that you may receive in the mail after your visit with us. Thank you!             Your Updated Medication List - Protect others around you: Learn how to safely use, store and throw away your medicines at www.disposemymeds.org.          This list is accurate as of 4/26/18  3:33 PM.  Always use your most recent med list.                   Brand Name Dispense Instructions for use Diagnosis    amLODIPine 5 MG tablet    NORVASC    90 tablet    Take 1 tablet (5 mg) by mouth daily    Essential hypertension, benign       citalopram 10 MG tablet    celeXA    180 tablet    Take 2 tablets (20 mg) by mouth daily    Major depressive disorder, recurrent episode, moderate (H), MARY (generalized anxiety disorder)       diazepam 5 MG tablet    VALIUM    40 tablet    TAKE 1/2 TO 1 TABLET BY MOUTH EVERY 8 HOURS AS NEEDED    MARY (generalized anxiety disorder)       isosorbide mononitrate 30 MG 24 hr tablet    IMDUR    30 tablet    Take 0.5 tablets (15 mg) by mouth daily    Accelerating angina (H)       levothyroxine 50 MCG tablet    SYNTHROID/LEVOTHROID    90 tablet    TAKE 1 TABLET (50 MCG) BY MOUTH DAILY    Acquired hypothyroidism        MAGNESIUM OXIDE PO      Take 200 mg by mouth daily (with lunch)        metoprolol tartrate 100 MG tablet    LOPRESSOR    180 tablet    TAKE 1 TABLET (100MG) BY MOUTH 2 TIMES DAILY    Essential hypertension, benign, ASCVD (arteriosclerotic cardiovascular disease)       rosuvastatin 10 MG tablet    CRESTOR    30 tablet    Take 1 tablet (10 mg) by mouth daily    Hyperlipidemia LDL goal <70, Coronary artery disease involving native coronary artery of native heart without angina pectoris       traMADol 50 MG tablet    ULTRAM    50 tablet    TAKE ONE TABLET BY MOUTH EVERY 8 HOURS AS NEEDED FOR MODERATE PAIN    Chronic left-sided low back pain with left-sided sciatica       VITAMIN D (CHOLECALCIFEROL) PO      Take 1,000 Units by mouth At Bedtime        warfarin 5 MG tablet    COUMADIN    108 tablet    TAKE 1&1/2 TABLETS (7.5MG) BY MOUTH ONCE DAILY ON TUESDAY & SATURDAY THEN 1 TABLET (5MG) ALL THE OTHER DAYS OF THE WEEK    Chronic atrial fibrillation (H), Long term current use of anticoagulant therapy

## 2018-04-26 NOTE — PROGRESS NOTES
"Patient picked up oximeter and was instructed on use. They showed understanding by demonstrating it back. Cielo will return tomorrow at her follow up visit 4/27/18 @ 11\"30A  "

## 2018-04-27 ENCOUNTER — DOCUMENTATION ONLY (OUTPATIENT)
Dept: SLEEP MEDICINE | Facility: CLINIC | Age: 78
End: 2018-04-27

## 2018-04-27 ENCOUNTER — OFFICE VISIT (OUTPATIENT)
Dept: SLEEP MEDICINE | Facility: CLINIC | Age: 78
End: 2018-04-27
Payer: MEDICARE

## 2018-04-27 VITALS
DIASTOLIC BLOOD PRESSURE: 71 MMHG | HEIGHT: 65 IN | HEART RATE: 60 BPM | BODY MASS INDEX: 28.99 KG/M2 | OXYGEN SATURATION: 96 % | WEIGHT: 174 LBS | SYSTOLIC BLOOD PRESSURE: 119 MMHG | RESPIRATION RATE: 15 BRPM

## 2018-04-27 DIAGNOSIS — I27.20 PULMONARY HYPERTENSION (H): ICD-10-CM

## 2018-04-27 DIAGNOSIS — I48.20 CHRONIC ATRIAL FIBRILLATION (H): ICD-10-CM

## 2018-04-27 DIAGNOSIS — I25.10 CORONARY ARTERY DISEASE INVOLVING NATIVE CORONARY ARTERY OF NATIVE HEART WITHOUT ANGINA PECTORIS: ICD-10-CM

## 2018-04-27 DIAGNOSIS — R06.09 DOE (DYSPNEA ON EXERTION): ICD-10-CM

## 2018-04-27 DIAGNOSIS — G47.34 NOCTURNAL HYPOXEMIA: ICD-10-CM

## 2018-04-27 DIAGNOSIS — G47.39 COMPLEX SLEEP APNEA SYNDROME: Primary | ICD-10-CM

## 2018-04-27 DIAGNOSIS — I48.21 PERMANENT ATRIAL FIBRILLATION (H): ICD-10-CM

## 2018-04-27 DIAGNOSIS — Z79.01 LONG TERM CURRENT USE OF ANTICOAGULANT THERAPY: ICD-10-CM

## 2018-04-27 DIAGNOSIS — E78.5 HYPERLIPIDEMIA LDL GOAL <70: ICD-10-CM

## 2018-04-27 PROCEDURE — 99215 OFFICE O/P EST HI 40 MIN: CPT | Performed by: FAMILY MEDICINE

## 2018-04-27 NOTE — PROGRESS NOTES
Called patient to let her know we received [employer] and note from her MD stating patient needs a mask fitting due to increased leakage. Left voicemail for patient to call back to schedule appointment at [facility] at [phone] or [facility] Customer Service at [phone].

## 2018-04-27 NOTE — MR AVS SNAPSHOT
After Visit Summary   4/27/2018    Cielo Aguillon    MRN: [id number]           Patient Information     Date Of Birth          1940        Visit Information        Provider Department      4/27/2018 11:30 AM Dennys Johnson MD [facility] [place]        Today's Diagnoses     Complex sleep apnea syndrome    -  1    Permanent atrial fibrillation (H)        Pulmonary hypertension        GREEN (dyspnea on exertion)        Nocturnal hypoxemia          Care Instructions        Your BMI is Body mass index is 28.96 kg/(m^2).  Weight management is a personal decision.  If you are interested in exploring weight loss strategies, the following discussion covers the approaches that may be successful. Body mass index (BMI) is one way to tell whether you are at a healthy weight, overweight, or obese. It measures your weight in relation to your height.  A BMI of 18.5 to 24.9 is in the healthy range. A person with a BMI of 25 to 29.9 is considered overweight, and someone with a BMI of 30 or greater is considered obese. More than two-thirds of American adults are considered overweight or obese.  Being overweight or obese increases the risk for further weight gain. Excess weight may lead to heart disease and diabetes.  Creating and following plans for healthy eating and physical activity may help you improve your health.  Weight control is part of healthy lifestyle and includes exercise, emotional health, and healthy eating habits. Careful eating habits lifelong are the mainstay of weight control. Though there are significant health benefits from weight loss, long-term weight loss with diet alone may be very difficult to achieve- studies show long-term success with dietary management in less than 10% of people. Attaining a healthy weight may be especially difficult to achieve in those with severe obesity. In some cases, medications, devices and surgical management might be considered.  What can you  do?  If you are overweight or obese and are interested in methods for weight loss, you should discuss this with your provider.     Consider reducing daily calorie intake by 500 calories.     Keep a food journal.     Avoiding skipping meals, consider cutting portions instead.    Diet combined with exercise helps maintain muscle while optimizing fat loss. Strength training is particularly important for building and maintaining muscle mass. Exercise helps reduce stress, increase energy, and improves fitness. Increasing exercise without diet control, however, may not burn enough calories to loose weight.       Start walking three days a week 10-20 minutes at a time    Work towards walking thirty minutes five days a week     Eventually, increase the speed of your walking for 1-2 minutes at time    In addition, we recommend that you review healthy lifestyles and methods for weight loss available through the National Institutes of Health patient information sites:  http://win.niddk.nih.gov/publications/index.htm    And look into health and wellness programs that may be available through your health insurance provider, employer, local community center, or omar club.    Weight management plan: Patient was referred to their PCP to discuss a diet and exercise plan.     Your blood pressure was checked while you were in clinic today.  Please read the guidelines below about what these numbers mean and what you should do about them.  Your systolic blood pressure is the top number.  This is the pressure when the heart is pumping.  Your diastolic blood pressure is the bottom number.  This is the pressure in between beats.  If your systolic blood pressure is less than 120 and your diastolic blood pressure is less than 80, then your blood pressure is normal. There is nothing more that you need to do about it  If your systolic blood pressure is 120-139 or your diastolic blood pressure is 80-89, your blood pressure may be higher than  it should be.  You should have your blood pressure re-checked within a year by a primary care provider.  If your systolic blood pressure is 140 or greater or your diastolic blood pressure is 90 or greater, you may have high blood pressure.  High blood pressure is treatable, but if left untreated over time it can put you at risk for heart attack, stroke, or kidney failure.  You should have your blood pressure re-checked by a primary care provider within the next four weeks.    Please, return for a follow up visit within 2 months. Before follow up visit, perform overnight test with PAP and oxygen in place.    Schedule lung specialist evaluation as soon as possible.    Continue the good work!    Dennys Johnson MD, MPH          Follow-ups after your visit        Additional Services     Referral to Pulmonary Medicine       Your provider has referred you to: [facility]: [facility] [phone]   [email or web address]    Please be aware that coverage of these services is subject to the terms and limitations of your health insurance plan.  Call member services at your health plan with any benefit or coverage questions.      Please bring the following with you to your appointment:    (1) Any X-Rays, CTs or MRIs which have been performed.  Contact the facility where they were done to arrange for  prior to your scheduled appointment.    (2) List of current medications   (3) This referral request   (4) Any documents/labs given to you for this referral                  Your next 10 appointments already scheduled     May 03, 2018  2:45 PM CDT   [facility]    [address]  [facility] [id number] [phone]            May 03, 2018  3:00 PM CDT   [facility] with Simin Lopez MD   [facility]    [address]  [facility]  "[id number]   [phone]           Bring a current list of meds and any records pertaining to this visit. For Physicals, please bring immunization records and any forms needing to be filled out. Please arrive 10 minutes early to complete paperwork.              Future tests that were ordered for you today     Open Future Orders        Priority Expected Expires Ordered    Overnight oximetry study Routine  10/24/2018 2018            Who to contact     If you have questions or need follow up information about today's clinic visit or your schedule please contact [facility] directly at [phone].  Normal or non-critical lab and imaging results will be communicated to you by [employer], letter or phone within 4 business days after the clinic has received the results. If you do not hear from us within 7 days, please contact the clinic through [employer] or phone. If you have a critical or abnormal lab result, we will notify you by phone as soon as possible.  Submit refill requests through [employer] or call your pharmacy and they will forward the refill request to us. Please allow 3 business days for your refill to be completed.          Additional Information About Your Visit        MyChart Information     [employer] lets you send messages to your doctor, view your test results, renew your prescriptions, schedule appointments and more. To sign up, go to [email or web address] . Click on \"Log in\" on the left side of the screen, which will take you to the Welcome page. Then click on \"Sign up Now\" on the right side of the page.     You will be asked to enter the access code listed below, as well as some personal information. Please follow the directions to create your username and password.     Your access code is: [email or web address]  Expires: 2018  3:07 PM     Your access code will  in 90 days. If you need help or a new code, please call your [facility] clinic or [phone].        Care " "EveryWhere ID     This is your Care EveryWhere ID. This could be used by other organizations to access your [place] medical records  [id number]        Your Vitals Were     Pulse Respirations Height Pulse Oximetry BMI (Body Mass Index)       60 15 1.651 m (5' 5\") 96% 28.96 kg/m2        Blood Pressure from Last 3 Encounters:   04/27/18 119/71   03/29/18 110/60   03/22/18 96/68    Weight from Last 3 Encounters:   04/27/18 78.9 kg (174 lb)   03/29/18 79.1 kg (174 lb 4.8 oz)   03/22/18 78.2 kg (172 lb 8 oz)              We Performed the Following     Comprehensive DME     Referral to Pulmonary Medicine          Today's Medication Changes          These changes are accurate as of 4/27/18 12:07 PM.  If you have any questions, ask your nurse or doctor.               Start taking these medicines.        Dose/Directions    order for DME   Used for:  Nocturnal hypoxemia, GREEN (dyspnea on exertion), Pulmonary hypertension, Complex sleep apnea syndrome, Permanent atrial fibrillation (H)        Oxygen 3 Li/min at night bled into the PAP device   Quantity:  1 Bottle   Refills:  99         These medicines have changed or have updated prescriptions.        Dose/Directions    citalopram 10 MG tablet   Commonly known as:  celeXA   This may have changed:  how much to take   Used for:  Major depressive disorder, recurrent episode, moderate (H), MARY (generalized anxiety disorder)        Dose:  20 mg   Take 2 tablets (20 mg) by mouth daily   Quantity:  180 tablet   Refills:  1       rosuvastatin 10 MG tablet   Commonly known as:  CRESTOR   This may have changed:  when to take this   Used for:  Hyperlipidemia LDL goal <70, Coronary artery disease involving native coronary artery of native heart without angina pectoris        Dose:  10 mg   Take 1 tablet (10 mg) by mouth daily   Quantity:  30 tablet   Refills:  5            Where to get your medicines      Information about where to get these medications is not yet available     ! Ask " your nurse or doctor about these medications     order for DME                Primary Care Provider Office Phone # Fax #    Simin Lopez [phone]       [address] [place] [id number]        Equal Access to Services     ANNELISE CHAVEZ : Hadii jamari salazar izabellao Soparveenali, waaxda luqadaha, qaybta kaalmada adeangelicyada, yaneth jacksonn ellieangelic zelaya yanick martin. So [facility] [phone].    ATENCIÓN: Si habla español, tiene a duran disposición servicios gratuitos de asistencia lingüística. Llame al [phone].    We comply with applicable federal civil rights laws and [place] laws. We do not discriminate on the basis of race, color, national origin, age, disability, sex, sexual orientation, or gender identity.            Thank you!     Thank you for choosing [facility]  for your care. Our goal is always to provide you with excellent care. Hearing back from our patients is one way we can continue to improve our services. Please take a few minutes to complete the written survey that you may receive in the mail after your visit with us. Thank you!             Your Updated Medication List - Protect others around you: Learn how to safely use, store and throw away your medicines at www.disposemymeds.org.          This list is accurate as of 4/27/18 12:07 PM.  Always use your most recent med list.                   Brand Name Dispense Instructions for use Diagnosis    amLODIPine 5 MG tablet    NORVASC    90 tablet    Take 1 tablet (5 mg) by mouth daily    Essential hypertension, benign       citalopram 10 MG tablet    celeXA    180 tablet    Take 2 tablets (20 mg) by mouth daily    Major depressive disorder, recurrent episode, moderate (H), MARY (generalized anxiety disorder)       diazepam 5 MG tablet    VALIUM    40 tablet    TAKE 1/2 TO 1 TABLET BY MOUTH EVERY 8 HOURS AS NEEDED    MARY (generalized anxiety disorder)       isosorbide mononitrate 30 MG 24 hr tablet    IMDUR    30  tablet    Take 0.5 tablets (15 mg) by mouth daily    Accelerating angina (H)       levothyroxine 50 MCG tablet    SYNTHROID/LEVOTHROID    90 tablet    TAKE 1 TABLET (50 MCG) BY MOUTH DAILY    Acquired hypothyroidism       MAGNESIUM OXIDE PO      Take 200 mg by mouth daily (with lunch)        metoprolol tartrate 100 MG tablet    LOPRESSOR    180 tablet    TAKE 1 TABLET (100MG) BY MOUTH 2 TIMES DAILY    Essential hypertension, benign, ASCVD (arteriosclerotic cardiovascular disease)       order for DME     1 Bottle    Oxygen 3 Li/min at night bled into the PAP device    Nocturnal hypoxemia, GREEN (dyspnea on exertion), Pulmonary hypertension, Complex sleep apnea syndrome, Permanent atrial fibrillation (H)       rosuvastatin 10 MG tablet    CRESTOR    30 tablet    Take 1 tablet (10 mg) by mouth daily    Hyperlipidemia LDL goal <70, Coronary artery disease involving native coronary artery of native heart without angina pectoris       traMADol 50 MG tablet    ULTRAM    50 tablet    TAKE ONE TABLET BY MOUTH EVERY 8 HOURS AS NEEDED FOR MODERATE PAIN    Chronic left-sided low back pain with left-sided sciatica       VITAMIN D (CHOLECALCIFEROL) PO      Take 1,000 Units by mouth At Bedtime        warfarin 5 MG tablet    COUMADIN    108 tablet    TAKE 1&1/2 TABLETS (7.5MG) BY MOUTH ONCE DAILY ON TUESDAY & SATURDAY THEN 1 TABLET (5MG) ALL THE OTHER DAYS OF THE WEEK    Chronic atrial fibrillation (H), Long term current use of anticoagulant therapy

## 2018-04-27 NOTE — PATIENT INSTRUCTIONS
Your BMI is Body mass index is 28.96 kg/(m^2).  Weight management is a personal decision.  If you are interested in exploring weight loss strategies, the following discussion covers the approaches that may be successful. Body mass index (BMI) is one way to tell whether you are at a healthy weight, overweight, or obese. It measures your weight in relation to your height.  A BMI of 18.5 to 24.9 is in the healthy range. A person with a BMI of 25 to 29.9 is considered overweight, and someone with a BMI of 30 or greater is considered obese. More than two-thirds of American adults are considered overweight or obese.  Being overweight or obese increases the risk for further weight gain. Excess weight may lead to heart disease and diabetes.  Creating and following plans for healthy eating and physical activity may help you improve your health.  Weight control is part of healthy lifestyle and includes exercise, emotional health, and healthy eating habits. Careful eating habits lifelong are the mainstay of weight control. Though there are significant health benefits from weight loss, long-term weight loss with diet alone may be very difficult to achieve- studies show long-term success with dietary management in less than 10% of people. Attaining a healthy weight may be especially difficult to achieve in those with severe obesity. In some cases, medications, devices and surgical management might be considered.  What can you do?  If you are overweight or obese and are interested in methods for weight loss, you should discuss this with your provider.     Consider reducing daily calorie intake by 500 calories.     Keep a food journal.     Avoiding skipping meals, consider cutting portions instead.    Diet combined with exercise helps maintain muscle while optimizing fat loss. Strength training is particularly important for building and maintaining muscle mass. Exercise helps reduce stress, increase energy, and improves  fitness. Increasing exercise without diet control, however, may not burn enough calories to loose weight.       Start walking three days a week 10-20 minutes at a time    Work towards walking thirty minutes five days a week     Eventually, increase the speed of your walking for 1-2 minutes at time    In addition, we recommend that you review healthy lifestyles and methods for weight loss available through the National Institutes of Health patient information sites:  http://win.niddk.nih.gov/publications/index.htm    And look into health and wellness programs that may be available through your health insurance provider, employer, local community center, or omar club.    Weight management plan: Patient was referred to their PCP to discuss a diet and exercise plan.     Your blood pressure was checked while you were in clinic today.  Please read the guidelines below about what these numbers mean and what you should do about them.  Your systolic blood pressure is the top number.  This is the pressure when the heart is pumping.  Your diastolic blood pressure is the bottom number.  This is the pressure in between beats.  If your systolic blood pressure is less than 120 and your diastolic blood pressure is less than 80, then your blood pressure is normal. There is nothing more that you need to do about it  If your systolic blood pressure is 120-139 or your diastolic blood pressure is 80-89, your blood pressure may be higher than it should be.  You should have your blood pressure re-checked within a year by a primary care provider.  If your systolic blood pressure is 140 or greater or your diastolic blood pressure is 90 or greater, you may have high blood pressure.  High blood pressure is treatable, but if left untreated over time it can put you at risk for heart attack, stroke, or kidney failure.  You should have your blood pressure re-checked by a primary care provider within the next four weeks.    Please, return for a  follow up visit within 2 months. Before follow up visit, perform overnight test with PAP and oxygen in place.    Schedule lung specialist evaluation as soon as possible.    Continue the good work!    Dennys Johnson MD, MPH

## 2018-04-27 NOTE — NURSING NOTE
"Chief Complaint   Patient presents with     RECHECK     Oximeter d/l and cpap f/u, f/u pft, 6 min walk,  Medicare requires 60 day f/u compliance       Initial /71  Pulse 60  Resp 15  Ht 1.651 m (5' 5\")  Wt 78.9 kg (174 lb)  SpO2 96%  BMI 28.96 kg/m2 Estimated body mass index is 28.96 kg/(m^2) as calculated from the following:    Height as of this encounter: 1.651 m (5' 5\").    Weight as of this encounter: 78.9 kg (174 lb).  Medication Reconciliation: complete         Damaris Guerra LPN/HALIMA  "

## 2018-04-27 NOTE — PROGRESS NOTES
[facility]  Outpatient Sleep Medicine Followed Up Visit  April 27, 2018    Name: Cielo Aguillon MRN# [id number]   Age: 78 year old YOB: 1940     Date of Follow Up Visit: April 27, 2018  Consultation is requested by: Simin Lopez MD  [address]  [place] [address]  Primary care provider: Simin Lopez    Patient is accompanied by: Patient presents with , Brayden, today.       Reason for Sleep Consult:     Cielo Aguillon is a 78 year old female patient that presents here for a complex sleep apnea follow up evaluation.         Assessment and Plan:     Summary Sleep Diagnoses:    (1) Permanent Atrial Fibrillation  (2) Severe BETHANY / Complex Sleep Apnea (AHI of 39 events per hour)  (3) Pulmonary Hypertension  (4) GREEN    Summary Recommendations / Discussion:    During the initial visit, this patient was diagnosed with severe BETHANY with an AHI of 39 events per hour. The patient was successfully treated with a CPAP at fixed pressure of 10 cmH2O. During the last visit, the PAP's download showed that the device was effective when used with a residual AHI of only 3.6 events per hour. Since the patient's health was stable and recent echocardiogram showed preserved LVEF, a new PSG was not needed. Instead, the patient had an overnight oximetry with the PAP in place. This showed prolonged sleep associated hypoxemia with 230.7 minutes below the SpO2 of 88%. As such, this patient underwent an in-lab PSG titration study with TCM and ABG. During the study, the patient was adequately titrated to an ASV at 4/0/20 cmH2O. Some Cheyne-Stoke breathing was noted during the PSG sleep study while on CPAP therapy. Given the preserved LVEF at 55 to 60%, ASV may be utilized in this patient. Sleep associated hypoxemia improved with ASV therapy. As such, the patient was started on auto-ASV with EPAP min of 6 cmH2O, EPAP max of 11 cmH2O, PS min of 0 cmH2O, PS max of 19 cmH2O. The patient has been  complaint with PAP therapy and the device is effective at treating the condition with a residual AHI of 1.8 events per hour. Recent overnight oximetry with ASV showed nocturnal hypoxemia with the patient spending 100.4 minutes under the SpO2 of 88%. Most of the night, the PAP was in place. At this point, current therapy will be continued. Techniques to minimize removal of the mask discussed and encouraged once again. PAP compliance was discussed, explained, and encouraged once gain. Given the significant shortness of breath during exertion, a complete PFT and 6 minute walk were ordered. Moderate restrictive / mixed lung disease was noted. However, the 6 minute walk was normal with no obvious hypoxemia. Recent ABG showed only only mild hypoxemia. Today, the nature and pathophysiology of BETHANY were discussed once again. Lifestyle recommendations including healthy dietary and exercising habits were discussed. At this point, oxygen supplementation will be order to be bled into the ASV at 3 L/min. It is possible that the patient may need to undergo a PSG titration sleep study to qualify for O2 supplementation. In addition, the patient was referred to pulmonologist for further management of daytime dyspnea. Pt will follow up with me after completing 8 weeks of ASV therapy. Another overnight oximetry will be obtained with ASV and O2 supplementation before the follow up visit.    Coding:  (G47.31) Complex sleep apnea syndrome  (primary encounter diagnosis)  (R06.09) GREEN (dyspnea on exertion)  (I48.2) Permanent atrial fibrillation (H)  (I27.20) Pulmonary hypertension    Counseling included a comprehensive review of diagnostic and therapeutic strategies as well as risks of inadequate therapy.    Educational materials provided in instructions. The patient was instructed to avoid driving or operating any heavy machinery when experiencing drowsiness.    All questions and concerns were addressed today. Pt agrees and understands the  assessment and plan.         History of Present Illness:   Cielo Aguillon is a 77 year old  RHD female pt with PMH of cystocele, rectocele, hypertension, peptic ulcer disease, chornic rhinitis, Lichenification and Lichen Simplex Chronicus, fatty liver, hyperlipidemia, chronic back pain, non-obstructive coronary artery disease, mild to moderate pulmonary hypertension, generalized anxiety disorder, major depressive disorder, chronic atrial fibrillation (daignosed 5 yrs ago), hypothyroidism, and previously diagnosed BETHANY presents for a complex sleep apnea follow up evaluation.     As previously explained, this medically complex patient underwent a PSG sleep stud in 2015 at [facility]. The study showed severe BETHANY with an AHI of 54 events per hour with a RDI of 58 events per hour scored at 4% for Medicare criteria, the AHI was 39 events per hour). Most events were obstructive in nature. The PAP download during the initial visit showed that the patient was using a CPAP at fixed pressure of 10 cmH2O. This was effective at treating the condition with a residual AHI of 3.6 events per hour. No significant leakage noted.    During the initial visit, the patient explained to me that she needed new CPAP supplies. The patient explained that she was not able to use the PAP device because the mask needed to be replaced and she was not able to get new supplies without repeating the PSG sleep study. She was using a CPAP with a nasal mask. The  explained that with the PAP device the patient would not snore, have witnessed apneas, or gasping / choking for air. Without the PAP device, the patient reports snoring and witnessed apneas. Some non-restorative sleep and sleep inertia reported. Pt denied EDS, but admits having physical fatigue. No inadvertent naps reported. Pt is not currently driving due to vision problems.    The patient was continue on CPAP at fixed pressure of 10 cmH2O and an overnight oximetry test was performed  with the PAP therapy in place. This showed prolonged sleep associated hypoxemia with 230.7 minutes spent under the SpO2 of 88%. The study also demonstrated a sawtooth pattern suggestive of poorly controlled BETHANY. Therefore, the patient underwent a PSG titration study on 02/08/2018. During the study the patient had some central hypopneas with a Cheyne-Stoke pattern. The patient was adequately titrated to an ASV at 4/0/20 with a residual AHI of 6.9 events per hour. The patient had atrial fibrillation during the PSG sleep study.    As previously explained, the patient recently underwent a coronary angiogram on 03/14/2018. The study showed small caliber vessels with left dominant circulation. Some disease was noted. The LV end-diastolic pressure was mildly elevated at 15 mmHg and the LVEF was preserved at 65%.    During the last visit, the patient was continued on auto-ASV with EPAP min of 6 cmH2O, EPAP max of 11 cmH2O, PS min of 0 cmH2O, and PS max of 19 cmH2O. The PAP download today shows a compliant patient who is using the device 100% of the time with 97% over 4 hours. The device is effective at treating the condition with a residual AHI of 1.8 events per hour. Some mild leaks noted with a 95th percentile at 57.4 L/min.    ASV Compliance:   Dates: April 27, 2018       97 % > 4 hour use    Average Use: 6 hours 19 minutes   Leak: 57.4 L / min    Residual AHI: 1.8 events per hour    Pt reports GREEN and SOB. She explains that she is unable to walk much due to significant GREEN.    After the last visit, the patient completed another PFT study and this demonstrated FVC at 68%, FEV at 72%, FEV1/FVC at 74%, VC at 57%, TCL at 70%, DLCO at 54%. This findings were consistent with mild worsening of respiratory function with moderate mixed ventilatory defect, moderate restriction in volumes, and preserved gas transfer for the lung volumes.    The patient also completed a 6 minutes walk and this showed no obvious hypoxemia on  exertion at room air with saturations ranging in the ~96%.    In addition, the patient completed another overnight oximetry with the PAP device in place. This showed prolonged sleep associated hypoxemia (pt spent 100.4 minutes under the SpO2 of 88%). Although the device was removed at 4 AM, when most of the desaturation can be seen, hypoxemia was also present during the early hours of the night.    The patient explains that she is happy with the ASV, but she removes the interface throughout the night without knowing. This has improved since the last visit and after implementing the recommendations discussed. She also reports some interface leaks. When using the PAP device, the patient reports improved EDS and sleep quality. The patient denies any aerophagia, bloating, snoring, gasping / choking for air, or witnessed apneas. The patient is using a full face interface with some leaks. No integumentary discomfort reported.     PREVIOUS IN- LAB or HOME SLEEP STUDIES: The patient reports the study was conducted in [facility]   Date: 2015   TYPE: PSG   AHI: 58 events per hour   BMI: 28.7 kg/m2   Intervention: CPAP    SLEEP-WAKE SCHEDULE:     Cielo Aguillon      -Describes herself as a night person; prefers to go to sleep at 10:30 PM and wakes up at 9:00 AM.      -The patient takes no naps during the week; takes no inadvertent naps.      -ON WEEKDAYS, goes to sleep at 10:30 PM during the week; awakens 9:00 AM with an alarm; falls asleep in 10 minutes; denies difficulty falling asleep.      -ON WEEKENDS, goes to sleep at 10:30 PM and wakes up at 9:00 AM with an alarm; falls asleep in 10 minutes.        -Awakens 1-2 times a night for 5 minutes before falling back to sleep; awakens to go to the bathroom and external stimuli.      BEDTIME ACTIVITIES AND SHIFT WORK:    Cielo Aguillon     -Bedtime Activities and Other Sleeping Information: Pt lives . Pt sleeps alone on a twin size bed ( sleeps in a different room due  to snoring). Pt sleeps on her sides. Pt does not use any electronics in bed and uses bedroom only for sleeping.      -Occupation: Retired ([employer])     SCALES        -SLEEPINESS: [place] sleepiness scale (ESS):  2 / 24 (initial visit)              2 / 24 (04/27/2018)    Drowsy driving / near accidents: Denies any near accidents    Consequences: Non-restorative sleep    SLEEP COMPLAINTS:  Cardio-respiratory     -Snoring: Significant snoring reported    -Dyspnea: Pt admits having witnessed apneas   -Morning headaches or confusion: Denies any morning cephalgia   -Coexisting Lung disease: Pulmonary HTN     -Coexisting Heart disease: Atria fibrillation with non-obstructive CAD     -Does patient have a bed partner: Patient sleeps alone   -Has bed partner been sleeping separately because of snoring:  No            RLS Screen:    -When you try to relax in the evening or sleep at night, do you ever have unpleasant, restless feelings in your legs that can be relieved by walking or movement? None Reported     -Periodic limb movement: None Reported    Narcolepsy:     - Denies sudden urges of sleep attacks   - Denies cataplexy   - Denies sleep paralysis    - Admits hallucinations. Some hypnagogic hallucinations a few times a month consistent of little kids. This is not scary to her.     - Denies feeling refreshed after a nap.    Sleep Behaviors:   - Denies leg symptoms/movements   - Denies motor restlessness   - Denies night terrors   - Denies bruxism   - Denies automatic behaviors    Other Subjective Complaints:   - Denies anxiety or rumination    - Denies pain and discomfort at night   - Denies waking up with heart pounding or racing   - Denies GERD or aspiration         Parasomnia:    -NREM - Denies recurrent persistent confusional arousal, night eating, sleep walking or sleep terrors.      -REM - Denies dream enactment or injuries.     -Driving Accident or Near Accidents: Pt does not drive         Medications:      Current Outpatient Prescriptions   Medication Sig     amLODIPine (NORVASC) 5 MG tablet Take 1 tablet (5 mg) by mouth daily     citalopram (CELEXA) 10 MG tablet Take 2 tablets (20 mg) by mouth daily (Patient taking differently: Take 10 mg by mouth daily )     diazepam (VALIUM) 5 MG tablet TAKE 1/2 TO 1 TABLET BY MOUTH EVERY 8 HOURS AS NEEDED     isosorbide mononitrate (IMDUR) 30 MG 24 hr tablet Take 0.5 tablets (15 mg) by mouth daily     levothyroxine (SYNTHROID/LEVOTHROID) 50 MCG tablet TAKE 1 TABLET (50 MCG) BY MOUTH DAILY     MAGNESIUM OXIDE PO Take 200 mg by mouth daily (with lunch)     metoprolol tartrate (LOPRESSOR) 100 MG tablet TAKE 1 TABLET (100MG) BY MOUTH 2 TIMES DAILY     rosuvastatin (CRESTOR) 10 MG tablet Take 1 tablet (10 mg) by mouth daily (Patient taking differently: Take 10 mg by mouth At Bedtime )     traMADol (ULTRAM) 50 MG tablet TAKE ONE TABLET BY MOUTH EVERY 8 HOURS AS NEEDED FOR MODERATE PAIN     VITAMIN D, CHOLECALCIFEROL, PO Take 1,000 Units by mouth At Bedtime      warfarin (COUMADIN) 5 MG tablet TAKE 1&1/2 TABLETS (7.5MG) BY MOUTH ONCE DAILY ON TUESDAY & SATURDAY THEN 1 TABLET (5MG) ALL THE OTHER DAYS OF THE WEEK     No current facility-administered medications for this visit.       Allergies   Allergen Reactions     Ace Inhibitors      Cough (Zestril)     Ambien [Zolpidem] Other (See Comments)     Aspirin      hx bleeding ulcers     Hydrochlorothiazide [Hctz] Rash     Ibuprofen      hx bleeding ulcers     Amoxicillin Rash     Losartan Rash     Penicillins Rash          Past Medical History:     Denies needing any 02 supplement at night.    Past Medical History:   Diagnosis Date     Anxiety     related to travel     Arthritis      ASCVD (arteriosclerotic cardiovascular disease) 2/12    60-70% stenoses of OM2, D1, medical management     Atrial fibrillation (H)      Atrial flutter (H)      Coronary artery disease     2/2012- 50% mid Cfx, 60-70% prox OM2, 50-70% D1     Essential  hypertension, benign      Fatty liver 5/10    mild fibrosis on biopsy     Hypertriglyceridemia      Major depression      BETHANY (obstructive sleep apnea)      Other ventral hernia without mention of obstruction or gangrene      Peptic ulcer 2002     Peptic ulcer, unspecified site, unspecified as acute or chronic, without mention of hemorrhage, perforation, or obstruction 1983     Shortness of breath      Thyroid disease      Tubular adenoma 6/08             Past Surgical History:    Admits previous upper airway surgery.     Past Surgical History:   Procedure Laterality Date     APPENDECTOMY       C NONSPECIFIC PROCEDURE      Appendectomy     C NONSPECIFIC PROCEDURE      Tonsillectomy     C NONSPECIFIC PROCEDURE      Tubal ligation     C NONSPECIFIC PROCEDURE  11/01    Shave bone spurs from left foot     COLONOSCOPY  9/1/2016    Dr. Camejo [facility]     COLONOSCOPY N/A 9/1/2016    Procedure: COMBINED COLONOSCOPY, SINGLE OR MULTIPLE BIOPSY/POLYPECTOMY BY BIOPSY;  Surgeon: Pillo Camejo MD;  Location: [facility]     CORONARY ANGIOGRAPHY ADULT ORDER  2/13/2012    50% mid Cfx, 60-70% prox OM2, 50-70% D1     ESOPHAGOSCOPY, GASTROSCOPY, DUODENOSCOPY (EGD), COMBINED  9/1/2016    Dr. Camejo [facility]     ESOPHAGOSCOPY, GASTROSCOPY, DUODENOSCOPY (EGD), COMBINED N/A 9/1/2016    Procedure: COMBINED ESOPHAGOSCOPY, GASTROSCOPY, DUODENOSCOPY (EGD), BIOPSY SINGLE OR MULTIPLE;  Surgeon: Pillo Camejo MD;  Location:  GI     GYN SURGERY       HEART CATH RIGHT AND LEFT HEART CATH  04/06/2015    Mid LAD 50-60%, 1st Diagonal 70%, 1st OM 30%, 2nd OM 40-50%, review report for right heart cath results.           Social History:     Social History   Substance Use Topics     Smoking status: Never Smoker     Smokeless tobacco: Never Used     Alcohol use 0.0 oz/week     0 Standard drinks or equivalent per week      Comment: occ - wine     Chemical History:     Tobacco: Never smoked     Uses no caffeine.   Supplements for wakefulness: Patient does  not use any supplements to stay awake    EtOH: 1 to 2 drinks a week  Recreational Drugs: Patient denies using any recreational drugs     Psych Hx:   Current dangers to self or others: No. Pt denies any SI / HI, hallucinations, or delusions         Family History:     Family History   Problem Relation Age of Onset     Respiratory Father      emphysema--secon. to smoking     Alzheimer Disease Mother      Congenital Anomalies Mother      OSTEOPOROSIS Mother      DIABETES Maternal Aunt      DIABETES Other      cousins     DIABETES Other      cousin on mothers side of family     Depression Son      Depression Son      Colon Cancer No family hx of       Sleep Family Hx:       RLS - None reported   BETHANY - None reported  Insomnia - None reported  Parasomnia - None reported         Review of Systems:     A complete 10 point review of systems was negative other than HPI or as commented below:     CONSTITUTIONAL: NEGATIVE for weight gain/loss. Pt admits some chills.  EYES: NEGATIVE for changes in vision, blind spots, double vision.  ENT: NEGATIVE for ear pain. Pt admits having some sore throat, sinus pain, post-nasal drip, runny nose, and bloody nose.  CARDIAC: Positive for fast heartbeats or fluttering in chest, chest pain or pressure, breathlessness when lying flat, swollen legs / swollen feet.  NEUROLOGIC: Pt admits having headaches and leg weakness.  DERMATOLOGIC: NEGATIVE for rashes, new moles or change in mole(s)  PULMONARY: NEGATIVE coughing up blood, wheezing or whistling when breathing. Pt reports SOB at rest, SOB with activity, dry cough, and productive cough.     GASTROINTESTINAL: NEGATIVE for nausea or vomitting, loose or watery stools, fat or grease in stools, constipation, abdominal pain, bowel movements black in color or blood noted.  GENITOURINARY: NEGATIVE for pain during urination, blood in urine, urinating more frequently than usual, irregular menstrual periods.  MUSCULOSKELETAL: Positive for muscle pain,  "bone or joint pain, and swollen joints.  ENDOCRINE: NEGATIVE for increased thirst or urination, diabetes.  LYMPHATIC: NEGATIVE for swollen lymph nodes, lumps or bumps in the breasts or nipple discharge.         Physical Examination:   /71  Pulse 60  Resp 15  Ht 1.651 m (5' 5\")  Wt 78.9 kg (174 lb)  SpO2 96%  BMI 28.96 kg/m2     VS: Reviewed and normal.   General: Alert, oriented, not in distress. Dressed casually; Good eye contact; Comfortably sitting in a chair; in no apparent distress  Lungs: Both hemithoraces are symmetrical, normal to palpation, no dullness to percussion, auscultation of lungs revealed normal breath sounds with no expirium prolongation, wheezing, rhonci and crackles.  CVS: Normal S1 and S2 heart sounds with no extra heart sounds. No murmur, rubs, or clicks. Normal peripheral pulses throughout with no obvious peripheral edema. Irregularly irregular rhythm noted.  Psychiatry: Mood and affect are appropriate. Euthymic with affect congruent with full range and intensity. No SI/HI with adequate insight and judgement.          Data: All pertinent previous laboratory data reviewed     Lab Results   Component Value Date    PH 7.41 02/08/2018    PH 7.34 (L) 04/06/2015    PO2 76 (L) 02/08/2018    PO2 62 (L) 04/06/2015    PCO2 39 02/08/2018    PCO2 40 04/06/2015    HCO3 25 02/08/2018    HCO3 22 04/06/2015    SRIDHAR 0.1 02/08/2018     Lab Results   Component Value Date    TSH 3.32 11/14/2017    TSH 4.42 (H) 08/30/2017     Lab Results   Component Value Date    GLC 91 03/14/2018    GLC 96 08/30/2017     Lab Results   Component Value Date    HGB 12.8 03/14/2018    HGB 13.8 08/30/2017     Lab Results   Component Value Date    BUN 20 03/14/2018    BUN 20 08/30/2017    CR 0.90 03/14/2018    CR 1.03 08/30/2017     Echocardiology:    -Most recent echocardiogram obtained on 07/10/2017 showed LV or normal size with moderate concentric left ventricular hypertrophy. LV systolic function was normal with LVEF " estimated at 55 - 60%. The RV was normal in side, structure, and function. There was mod-severe biatrial enlargement noted. No obvious atrial shunt observed. Mild to moderate mitral annular calcification was noted. There was also mild mitral regurgitation present. Mild pulmonic valve regurgitation noted. The rhythm was atrial fibrillation during the study.    Chest x-ray: None Recent Studies Available    PFT:   -PFT obtained on 04/03/2018 showed FVC at 68%, FEV at 72%, FEV1/FVC at 74%, VC at 57%, TCL at 70%, DLCO at 54%. This findings were consistent with mild worsening of respiratory function with moderate mixed ventilatory defect, moderate restriction in volumes, and preserved gas transfer for the lung volumes.    Laboratory Studies:   Component Value Flag Ref Range Units Status Collected Lab   Sodium 140  133 - 144 mmol/L Final 08/30/2017 12:39 PM 65   Potassium 4.3  3.4 - 5.3 mmol/L Final 08/30/2017 12:39 PM 65   Chloride 106  94 - 109 mmol/L Final 08/30/2017 12:39 PM 65   Carbon Dioxide 26  20 - 32 mmol/L Final 08/30/2017 12:39 PM 65   Anion Gap 8  3 - 14 mmol/L Final 08/30/2017 12:39 PM 65   Glucose 96  70 - 99 mg/dL Final 08/30/2017 12:39 PM 65   Urea Nitrogen 20  7 - 30 mg/dL Final 08/30/2017 12:39 PM 65   Creatinine 1.03  0.52 - 1.04 mg/dL Final 08/30/2017 12:39 PM 65   GFR Estimate 52 (L) >60 mL/min/1.7m2 Final 08/30/2017 12:39 PM 65   Comment:   Non  GFR Calc   GFR Estimate If Black 63  >60 mL/min/1.7m2 Final 08/30/2017 12:39 PM 65   Comment:   African American GFR Calc   Calcium 9.3  8.5 - 10.1 mg/dL Final 08/30/2017 12:39 PM 65   Bilirubin Total 0.6  0.2 - 1.3 mg/dL Final 08/30/2017 12:39 PM 65   Albumin 3.6  3.4 - 5.0 g/dL Final 08/30/2017 12:39 PM 65   Protein Total 7.6  6.8 - 8.8 g/dL Final 08/30/2017 12:39 PM 65   Alkaline Phosphatase 68  40 - 150 U/L Final 08/30/2017 12:39 PM 65   ALT 32  0 - 50 U/L Final 08/30/2017 12:39 PM 65   AST 44  0 - 45 U/L Final 08/30/2017 12:39 PM 65      Component Value Flag Ref Range Units Status Collected Lab   WBC 7.4  4.0 - 11.0 10e9/L Final 08/30/2017 12:39 PM 79   RBC Count 4.01  3.8 - 5.2 10e12/L Final 08/30/2017 12:39 PM 79   Hemoglobin 13.8  11.7 - 15.7 g/dL Final 08/30/2017 12:39 PM 79   Hematocrit 41.3  35.0 - 47.0 % Final 08/30/2017 12:39 PM 79    (H) 78 - 100 fl Final 08/30/2017 12:39 PM 79   MCH 34.4 (H) 26.5 - 33.0 pg Final 08/30/2017 12:39 PM 79   Comment:   Reviewed: OK with previous   MCHC 33.4  31.5 - 36.5 g/dL Final 08/30/2017 12:39 PM 79   RDW 13.3  10.0 - 15.0 % Final 08/30/2017 12:39 PM 79   Platelet Count 161  150 - 450 10e9/L Final 08/30/2017 12:39 PM 79   Diff Method     Final 08/30/2017 12:39 PM 79   Automated Method   % Neutrophils 67.4   % Final 08/30/2017 12:39 PM 79   % Lymphocytes 22.6   % Final 08/30/2017 12:39 PM 79   % Monocytes 8.8   % Final 08/30/2017 12:39 PM 79   % Eosinophils 1.1   % Final 08/30/2017 12:39 PM 79   % Basophils 0.1   % Final 08/30/2017 12:39 PM 79   Absolute Neutrophil 5.0  1.6 - 8.3 10e9/L Final 08/30/2017 12:39 PM 79   Absolute Lymphocytes 1.7  0.8 - 5.3 10e9/L Final 08/30/2017 12:39 PM 79   Absolute Monocytes 0.7  0.0 - 1.3 10e9/L Final 08/30/2017 12:39 PM 79   Absolute Eosinophils 0.1  0.0 - 0.7 10e9/L Final 08/30/2017 12:39 PM 79   Absolute Basophils 0.0  0.0 - 0.2 10e9/L Final 08/30/2017 12:39 PM 79     Component Value Flag Ref Range Units Status Collected Lab   TSH 4.42 (H) 0.40 - 4.00 mU/L Final 08/30/2017 12:39 PM 65     Component Value Flag Ref Range Units Status Collected Lab   T4 Free 1.10  0.76 - 1.46 ng/dL Final 08/30/2017 12:39 PM 65     Dennys Johnson MD, MPH  Clinical Sleep Medicine    Total time spent with patient: 40 min. Over >50% of the time was spent for face to face counseling, education, and evaluation.

## 2018-04-30 RX ORDER — ROSUVASTATIN CALCIUM 10 MG/1
TABLET, COATED ORAL
Qty: 90 TABLET | Refills: 1 | Status: SHIPPED | OUTPATIENT
Start: 2018-04-30 | End: 2018-10-31

## 2018-04-30 NOTE — TELEPHONE ENCOUNTER
"Requested Prescriptions   Pending Prescriptions Disp Refills     warfarin (COUMADIN) 5 MG tablet [Pharmacy Med Name: WARFARIN SODIUM 5 MG TABLET 5 TAB] 108 tablet 0     Sig: TAKE 1&1/2 TABLETS (7.5MG) BY MOUTH ONCE DAILY ON TUESDAY & SATURDAY THEN 1 TABLET (5MG) ALL THE OTHER DAYS OF THE WEEK    Vitamin K Antagonists Failed    4/27/2018  1:10 PM       Failed - INR is within goal in the past 6 weeks    Confirm INR is within goal in the past 6 weeks.     Recent Labs   Lab Test 04/26/18   INR  3.9*                      Passed - Recent (12 mo) or future (30 days) visit within the authorizing provider's specialty    Patient had office visit in the last 12 months or has a visit in the next 30 days with authorizing provider or within the authorizing provider's specialty.  See \"Patient Info\" tab in inbasket, or \"Choose Columns\" in Meds & Orders section of the refill encounter.           Passed - Patient is 18 years of age or older       Passed - Patient is not pregnant       Passed - No positive pregnancy on file in past 12 months        rosuvastatin (CRESTOR) 10 MG tablet [Pharmacy Med Name: ROSUVASTATIN CALCIUM 10 MG 10 TAB] 30 tablet 5     Sig: TAKE 1 TABLET (10 MG) BY MOUTH DAILY    Statins Protocol Passed    4/27/2018  1:10 PM       Passed - LDL on file in past 12 months    Recent Labs   Lab Test  03/15/18   0554   LDL  33            Passed - No abnormal creatine kinase in past 12 months    Recent Labs   Lab Test  02/09/15   1250   CKT  37               Passed - Recent (12 mo) or future (30 days) visit within the authorizing provider's specialty    Patient had office visit in the last 12 months or has a visit in the next 30 days with authorizing provider or within the authorizing provider's specialty.  See \"Patient Info\" tab in inbasket, or \"Choose Columns\" in Meds & Orders section of the refill encounter.           Passed - Patient is age 18 or older       Passed - No active pregnancy on record       Passed - No " positive pregnancy test in past 12 months        Forwarded coumadin to INR

## 2018-05-01 RX ORDER — WARFARIN SODIUM 5 MG/1
TABLET ORAL
Qty: 108 TABLET | Refills: 1 | Status: SHIPPED | OUTPATIENT
Start: 2018-05-01 | End: 2018-10-31

## 2018-05-01 NOTE — TELEPHONE ENCOUNTER
"Warfarin 5 mg      Last Written Prescription Date:  2/1/18  Last Fill Quantity: 108,   # refills: 0  Last Office Visit: 3/22/18  Future Office visit:    Next 5 appointments (look out 90 days)     May 03, 2018  3:00 PM CDT   Office Visit with Simin Lopez MD   [facility] ([facility]    [address]  [facility] [address]   [phone]                   Requested Prescriptions   Pending Prescriptions Disp Refills     warfarin (COUMADIN) 5 MG tablet [Pharmacy Med Name: WARFARIN SODIUM 5 MG TABLET 5 TAB] 108 tablet 0     Sig: TAKE 1&1/2 TABLETS (7.5MG) BY MOUTH ONCE DAILY ON TUESDAY & SATURDAY THEN 1 TABLET (5MG) ALL THE OTHER DAYS OF THE WEEK    Vitamin K Antagonists Failed    4/30/2018  9:02 AM       Failed - INR is within goal in the past 6 weeks    Confirm INR is within goal in the past 6 weeks.     Recent Labs   Lab Test 04/26/18   INR  3.9*                      Passed - Recent (12 mo) or future (30 days) visit within the authorizing provider's specialty    Patient had office visit in the last 12 months or has a visit in the next 30 days with authorizing provider or within the authorizing provider's specialty.  See \"Patient Info\" tab in inbasket, or \"Choose Columns\" in Meds & Orders section of the refill encounter.           Passed - Patient is 18 years of age or older       Passed - Patient is not pregnant       Passed - No positive pregnancy on file in past 12 months      Signed Prescriptions Disp Refills     rosuvastatin (CRESTOR) 10 MG tablet 90 tablet 1     Sig: TAKE 1 TABLET (10 MG) BY MOUTH DAILY    Statins Protocol Passed    4/27/2018  1:10 PM       Passed - LDL on file in past 12 months    Recent Labs   Lab Test  03/15/18   0554   LDL  33            Passed - No abnormal creatine kinase in past 12 months    Recent Labs   Lab Test  02/09/15   1250   CKT  37               Passed - Recent (12 mo) or future (30 days) visit within the authorizing provider's " "specialty    Patient had office visit in the last 12 months or has a visit in the next 30 days with authorizing provider or within the authorizing provider's specialty.  See \"Patient Info\" tab in inbasket, or \"Choose Columns\" in Meds & Orders section of the refill encounter.           Passed - Patient is age 18 or older       Passed - No active pregnancy on record       Passed - No positive pregnancy test in past 12 months        Prescription approved per [facility] Refill Protocol.  Cindi Preston RN    "

## 2018-05-03 ENCOUNTER — ANTICOAGULATION THERAPY VISIT (OUTPATIENT)
Dept: ANTICOAGULATION | Facility: CLINIC | Age: 78
End: 2018-05-03
Payer: MEDICARE

## 2018-05-03 ENCOUNTER — OFFICE VISIT (OUTPATIENT)
Dept: INTERNAL MEDICINE | Facility: CLINIC | Age: 78
End: 2018-05-03
Payer: MEDICARE

## 2018-05-03 VITALS
WEIGHT: 174.5 LBS | DIASTOLIC BLOOD PRESSURE: 68 MMHG | RESPIRATION RATE: 16 BRPM | TEMPERATURE: 97.7 F | SYSTOLIC BLOOD PRESSURE: 118 MMHG | HEART RATE: 75 BPM | BODY MASS INDEX: 29.04 KG/M2 | OXYGEN SATURATION: 94 %

## 2018-05-03 DIAGNOSIS — I48.20 CHRONIC ATRIAL FIBRILLATION (H): ICD-10-CM

## 2018-05-03 DIAGNOSIS — I48.20 CHRONIC ATRIAL FIBRILLATION (H): Primary | ICD-10-CM

## 2018-05-03 DIAGNOSIS — E03.9 ACQUIRED HYPOTHYROIDISM: ICD-10-CM

## 2018-05-03 DIAGNOSIS — Z79.01 LONG-TERM (CURRENT) USE OF ANTICOAGULANTS: ICD-10-CM

## 2018-05-03 LAB — INR POINT OF CARE: 4.7 (ref 0.86–1.14)

## 2018-05-03 PROCEDURE — 36416 COLLJ CAPILLARY BLOOD SPEC: CPT

## 2018-05-03 PROCEDURE — 84443 ASSAY THYROID STIM HORMONE: CPT | Performed by: INTERNAL MEDICINE

## 2018-05-03 PROCEDURE — 85610 PROTHROMBIN TIME: CPT | Mod: QW

## 2018-05-03 PROCEDURE — 99213 OFFICE O/P EST LOW 20 MIN: CPT | Performed by: INTERNAL MEDICINE

## 2018-05-03 PROCEDURE — 99207 ZZC NO CHARGE NURSE ONLY: CPT

## 2018-05-03 NOTE — LETTER
May 14, 2018      Cielo Aguillon  [address] [facility] [address]          Dear ,    The thyroid level is good. Continue current dose.    Please call clinic if you have any questions.     Resulted Orders   TSH with free T4 reflex   Result Value Ref Range    TSH 3.34 0.40 - 4.00 mU/L       If you have any questions or concerns, please call the clinic at the number listed above.       Sincerely,        Simin Lopez MD

## 2018-05-03 NOTE — PROGRESS NOTES
SUBJECTIVE:   Cielo Aguillon is a 78 year old female who presents to clinic today for the following health issues:    She is referred from the INR nurse  because she has been having a lot of wide swings in her INR levels.  She states she is taking the tramadol little bit more regularly the past few weeks because of more pain.  She is taking once a day.  Based on her results though it is not clear that that matches up with changes in the INR level.  She had been taking Imdur per cardiology but was having bad headaches, stopped about 2 weeks ago.  She had taken it about 6 weeks.    She does not feel that she has been having much change in her diet and feels pretty comfortable with what foods may affect the INR.  She is not taking any over-the-counter medications regularly.    No other acute concerns.    Patient Active Problem List   Diagnosis     Essential hypertension, benign     Chronic rhinitis     Other ventral hernia without mention of obstruction or gangrene     Lichenification and lichen simplex chronicus     Fatty liver     Advanced directives, counseling/discussion     Hyperlipidemia LDL goal <70     Chronic right-sided back pain     Coronary artery disease     Cystocele, midline     Rectocele     BETHANY (obstructive sleep apnea)     Long-term (current) use of anticoagulants [Z79.01]     MARY (generalized anxiety disorder)     Major depressive disorder, recurrent episode, moderate (H)     Controlled substance agreement signed     Chronic atrial fibrillation (H)     Facet arthropathy, lumbar     Acquired hypothyroidism     Current Outpatient Prescriptions   Medication Sig Dispense Refill     amLODIPine (NORVASC) 5 MG tablet Take 1 tablet (5 mg) by mouth daily 90 tablet 1     citalopram (CELEXA) 10 MG tablet Take 2 tablets (20 mg) by mouth daily (Patient taking differently: Take 10 mg by mouth daily ) 180 tablet 1     diazepam (VALIUM) 5 MG tablet TAKE 1/2 TO 1 TABLET BY MOUTH EVERY 8 HOURS AS NEEDED 40 tablet  1     levothyroxine (SYNTHROID/LEVOTHROID) 50 MCG tablet TAKE 1 TABLET (50 MCG) BY MOUTH DAILY 90 tablet 2     metoprolol tartrate (LOPRESSOR) 100 MG tablet TAKE 1 TABLET (100MG) BY MOUTH 2 TIMES DAILY 180 tablet 2     rosuvastatin (CRESTOR) 10 MG tablet TAKE 1 TABLET (10 MG) BY MOUTH DAILY 90 tablet 1     traMADol (ULTRAM) 50 MG tablet TAKE ONE TABLET BY MOUTH EVERY 8 HOURS AS NEEDED FOR MODERATE PAIN 50 tablet 2     VITAMIN D, CHOLECALCIFEROL, PO Take 1,000 Units by mouth At Bedtime        warfarin (COUMADIN) 5 MG tablet TAKE 1&1/2 TABLETS (7.5MG) BY MOUTH ONCE DAILY ON TUESDAY & SATURDAY THEN 1 TABLET (5MG) ALL THE OTHER DAYS OF THE WEEK 108 tablet 1     order for DME Oxygen 3 Li/min at night bled into the PAP device (Patient not taking: Reported on 5/3/2018) 1 Bottle 99      Social History   Substance Use Topics     Smoking status: Never Smoker     Smokeless tobacco: Never Used     Alcohol use 0.0 oz/week     0 Standard drinks or equivalent per week      Comment: occ - wine        Reviewed and updated as needed this visit by clinical staff       Reviewed and updated as needed this visit by Provider         ROS:  negative    OBJECTIVE:     /68  Pulse 75  Temp 97.7  F (36.5  C) (Oral)  Resp 16  Wt 174 lb 8 oz (79.2 kg)  SpO2 94%  BMI 29.04 kg/m2  Body mass index is 29.04 kg/(m^2).      Not examined.     ASSESSMENT/PLAN:             1. Chronic atrial fibrillation (H)  On review, tramadol does not seem to have significant effects on INR but that seems to be the only identified change recently.  Because of her ongoing pain, we will expect she will probably continue taking the tramadol regularly for a while.  Hopefully her INR will get stabilized but then advised if she is doing better and feels she can cut down on the tramadol, we may want her to check a level a little bit sooner again     2. Acquired hypothyroidism  She is due for recheck and we will make sure this is not interfering with her INR  - TSH  with free T4 reflex        Simin Lopez MD  [facility]

## 2018-05-03 NOTE — MR AVS SNAPSHOT
After Visit Summary   5/3/2018    Cielo Aguillon    MRN: [id number]           Patient Information     Date Of Birth          1940        Visit Information        Provider Department      5/3/2018 3:00 PM Simin Lopez MD [facility]        Today's Diagnoses     Chronic atrial fibrillation (H)    -  1    Acquired hypothyroidism           Follow-ups after your visit        Your next 10 appointments already scheduled     May 10, 2018  3:15 PM CDT   Anticoagulation Visit with [facility]   [facility]    [address]  [facility] [id number] [phone]            Jun 25, 2018  1:00 PM CDT   Oximetry  with  SLEEP LAB   [facility] ([facility]    [address]  [facility] [address] [phone]            Jun 26, 2018  2:00 PM CDT   Return Sleep Patient with Dennys Johnson MD   [facility]    [address]  [facility] [id number] [phone]              Who to contact     If you have questions or need follow up information about today's clinic visit or your schedule please contact [facility] directly at [phone].  Normal or non-critical lab and imaging results will be communicated to you by MyChart, letter or phone within 4 business days after the clinic has received the results. If you do not hear from us within 7 days, please contact the clinic through MyChart or phone. If you have a critical or abnormal lab result, we will notify you by phone as soon as possible.  Submit refill requests through [employer] or call your pharmacy and they will forward the refill request to us. Please allow 3 business days for your refill to be completed.          Additional Information About Your Visit        MyChart Information     [employer]  "lets you send messages to your doctor, view your test results, renew your prescriptions, schedule appointments and more. To sign up, go to [email or web address] . Click on \"Log in\" on the left side of the screen, which will take you to the Welcome page. Then click on \"Sign up Now\" on the right side of the page.     You will be asked to enter the access code listed below, as well as some personal information. Please follow the directions to create your username and password.     Your access code is: [email or web address]  Expires: 2018  3:07 PM     Your access code will  in 90 days. If you need help or a new code, please call your [place] clinic or [phone].        Care EveryWhere ID     This is your Care EveryWhere ID. This could be used by other organizations to access your [place] medical records  [id number]        Your Vitals Were     Pulse Temperature Respirations Pulse Oximetry BMI (Body Mass Index)       75 97.7  F (36.5  C) (Oral) 16 94% 29.04 kg/m2        Blood Pressure from Last 3 Encounters:   18 118/68   18 119/71   18 110/60    Weight from Last 3 Encounters:   18 174 lb 8 oz (79.2 kg)   18 174 lb (78.9 kg)   18 174 lb 4.8 oz (79.1 kg)              We Performed the Following     TSH with free T4 reflex          Today's Medication Changes          These changes are accurate as of 5/3/18 11:59 PM.  If you have any questions, ask your nurse or doctor.               These medicines have changed or have updated prescriptions.        Dose/Directions    citalopram 10 MG tablet   Commonly known as:  celeXA   This may have changed:  how much to take   Used for:  Major depressive disorder, recurrent episode, moderate (H), MARY (generalized anxiety disorder)        Dose:  20 mg   Take 2 tablets (20 mg) by mouth daily   Quantity:  180 tablet   Refills:  1         Stop taking these medicines if you haven't already. Please contact your care team if you have questions.     " isosorbide mononitrate 30 MG 24 hr tablet   Commonly known as:  IMDUR   Stopped by:  Simin Lopez MD           MAGNESIUM OXIDE PO   Stopped by:  Simin Lopez MD                    Primary Care Provider Office Phone # Fax #    Simin Lopez [phone]       [address] [facility] [address]  [facility] [address]        Equal Access to Services     [facility]: Hadii aad ku hadasho Soomaali, waaxda luqadaha, qaybta kaalmada adeegyada, waxay idiin hayaan adeeg kharash la'aan ah. So [facility] [phone].    ATENCIÓN: Si habla español, tiene a duran disposición servicios gratuitos de asistencia lingüística. Llame al [phone].    We comply with applicable federal civil rights laws and [place] laws. We do not discriminate on the basis of race, color, national origin, age, disability, sex, sexual orientation, or gender identity.            Thank you!     Thank you for choosing [facility]  for your care. Our goal is always to provide you with excellent care. Hearing back from our patients is one way we can continue to improve our services. Please take a few minutes to complete the written survey that you may receive in the mail after your visit with us. Thank you!             Your Updated Medication List - Protect others around you: Learn how to safely use, store and throw away your medicines at www.disposemymeds.org.          This list is accurate as of 5/3/18 11:59 PM.  Always use your most recent med list.                   Brand Name Dispense Instructions for use Diagnosis    amLODIPine 5 MG tablet    NORVASC    90 tablet    Take 1 tablet (5 mg) by mouth daily    Essential hypertension, benign       citalopram 10 MG tablet    celeXA    180 tablet    Take 2 tablets (20 mg) by mouth daily    Major depressive disorder, recurrent episode, moderate (H), MARY (generalized anxiety disorder)       diazepam 5 MG tablet    VALIUM    40 tablet    TAKE 1/2 TO 1 TABLET BY MOUTH EVERY 8 HOURS AS NEEDED    MARY  (generalized anxiety disorder)       levothyroxine 50 MCG tablet    SYNTHROID/LEVOTHROID    90 tablet    TAKE 1 TABLET (50 MCG) BY MOUTH DAILY    Acquired hypothyroidism       metoprolol tartrate 100 MG tablet    LOPRESSOR    180 tablet    TAKE 1 TABLET (100MG) BY MOUTH 2 TIMES DAILY    Essential hypertension, benign, ASCVD (arteriosclerotic cardiovascular disease)       order for DME     1 Bottle    Oxygen 3 Li/min at night bled into the PAP device    Nocturnal hypoxemia, GREEN (dyspnea on exertion), Pulmonary hypertension, Complex sleep apnea syndrome, Permanent atrial fibrillation (H)       rosuvastatin 10 MG tablet    CRESTOR    90 tablet    TAKE 1 TABLET (10 MG) BY MOUTH DAILY    Hyperlipidemia LDL goal <70, Coronary artery disease involving native coronary artery of native heart without angina pectoris       traMADol 50 MG tablet    ULTRAM    50 tablet    TAKE ONE TABLET BY MOUTH EVERY 8 HOURS AS NEEDED FOR MODERATE PAIN    Chronic left-sided low back pain with left-sided sciatica       VITAMIN D (CHOLECALCIFEROL) PO      Take 1,000 Units by mouth At Bedtime        warfarin 5 MG tablet    COUMADIN    108 tablet    TAKE 1&1/2 TABLETS (7.5MG) BY MOUTH ONCE DAILY ON TUESDAY & SATURDAY THEN 1 TABLET (5MG) ALL THE OTHER DAYS OF THE WEEK    Chronic atrial fibrillation (H), Long term current use of anticoagulant therapy

## 2018-05-03 NOTE — PROGRESS NOTES
ANTICOAGULATION FOLLOW-UP CLINIC VISIT    Patient Name:  Cielo Aguillon  Date:  5/3/2018  Contact Type:  Face to Face    SUBJECTIVE:     Patient Findings     Positives Change in medications (Pt reports has been taking tramadol daily for the last couple weeks. ), Unexplained INR or factor level change           OBJECTIVE    INR Protime   Date Value Ref Range Status   05/03/2018 4.7 (A) 0.86 - 1.14 Final       ASSESSMENT / PLAN  INR assessment SUPRA    Recheck INR In: 1 WEEK    INR Location Clinic      Anticoagulation Summary as of 5/3/2018     INR goal 2.0-3.0   Today's INR 4.7!   Maintenance plan 7.5 mg (5 mg x 1.5) on Tue, Sat; 5 mg (5 mg x 1) all other days   Full instructions 5/3: Hold; 5/4: 2.5 mg; 5/5: 5 mg; 5/8: 5 mg; Otherwise 7.5 mg on Tue, Sat; 5 mg all other days   Weekly total 40 mg   Plan last modified Cindi Preston RN (5/9/2017)   Next INR check 5/10/2018   Priority INR   Target end date     Indications   Chronic atrial fibrillation (H) [I48.2]  Long-term (current) use of anticoagulants [Z79.01] [Z79.01]         Anticoagulation Episode Summary     INR check location     Preferred lab     Send INR reminders to [facility]    Comments       Anticoagulation Care Providers     Provider Role Specialty Phone number    Simin Lopez MD Responsible Internal Medicine [phone]            See the Encounter Report to view Anticoagulation Flowsheet and Dosing Calendar (Go to Encounters tab in chart review, and find the Anticoagulation Therapy Visit)    Dosage adjustment made based on physician directed care plan.    Cindi Preston RN

## 2018-05-03 NOTE — MR AVS SNAPSHOT
Cielo Aguillon   5/3/2018 2:45 PM   Anticoagulation Therapy Visit    Description:  78 year old female   Provider[facility]  [facility]   Department:  [facility]           INR as of 5/3/2018     Today's INR 4.7!      Anticoagulation Summary as of 5/3/2018     INR goal 2.0-3.0   Today's INR 4.7!   Full instructions 5/3: Hold; 5/4: 2.5 mg; 5/5: 5 mg; 5/8: 5 mg; Otherwise 7.5 mg on Tue, Sat; 5 mg all other days   Next INR check 5/10/2018    Indications   Chronic atrial fibrillation (H) [I48.2]  Long-term (current) use of anticoagulants [Z79.01] [Z79.01]         Your next Anticoagulation Clinic appointment(s)     May 10, 2018  3:15 PM CDT   Anticoagulation Visit with [facility]   [facility]    [address] [facility] [address]  [facility] [id number] [phone]              Contact Numbers     [facility] Phone Numbers:  Anticoagulation Clinic Appointments : [phone]  Anticoagulation Nurse: [phone]         May 2018 Details    Sun Mon Tue Wed Thu Fri Sat       1               2               3      Hold   See details      4      2.5 mg         5      5 mg           6      5 mg         7      5 mg         8      5 mg         9      5 mg         10            11               12                 13               14               15               16               17               18               19                 20               21               22               23               24               25               26                 27               28               29               30               31                  Date Details   05/03 This INR check       Date of next INR:  5/10/2018         How to take your warfarin dose     To take:  2.5 mg Take 0.5 of a 5 mg tablet.    To take:  5 mg Take 1 of the 5 mg tablets.    Hold Do not take your warfarin dose. See the Details table to the right for additional instructions.

## 2018-05-04 LAB — TSH SERPL DL<=0.005 MIU/L-ACNC: 3.34 MU/L (ref 0.4–4)

## 2018-05-10 ENCOUNTER — ANTICOAGULATION THERAPY VISIT (OUTPATIENT)
Dept: ANTICOAGULATION | Facility: CLINIC | Age: 78
End: 2018-05-10
Payer: MEDICARE

## 2018-05-10 DIAGNOSIS — I48.20 CHRONIC ATRIAL FIBRILLATION (H): ICD-10-CM

## 2018-05-10 DIAGNOSIS — Z79.01 LONG-TERM (CURRENT) USE OF ANTICOAGULANTS: ICD-10-CM

## 2018-05-10 LAB — INR POINT OF CARE: 3.1 (ref 0.86–1.14)

## 2018-05-10 PROCEDURE — 99207 ZZC NO CHARGE NURSE ONLY: CPT

## 2018-05-10 PROCEDURE — 85610 PROTHROMBIN TIME: CPT | Mod: QW

## 2018-05-10 PROCEDURE — 36416 COLLJ CAPILLARY BLOOD SPEC: CPT

## 2018-05-10 NOTE — PROGRESS NOTES
ANTICOAGULATION FOLLOW-UP CLINIC VISIT    Patient Name:  Cielo Aguillon  Date:  5/10/2018  Contact Type:  Face to Face    SUBJECTIVE:     Patient Findings     Positives Change in medications (Pt reports has not taken any Tramadol since 5/3/18 to see if this med was increasing INR.  Pt reports continues to take 20 mg Celexa which can incrase INR. )           OBJECTIVE    INR Protime   Date Value Ref Range Status   05/10/2018 3.1 (A) 0.86 - 1.14 Final       ASSESSMENT / PLAN  INR assessment THER    Recheck INR In: 1 WEEK    INR Location Clinic      Anticoagulation Summary as of 5/10/2018     INR goal 2.0-3.0   Today's INR 3.1!   Maintenance plan 7.5 mg (5 mg x 1.5) on Tue, Sat; 5 mg (5 mg x 1) all other days   Full instructions 5/10: 2.5 mg; 5/12: 2.5 mg; 5/14: 2.5 mg; 5/15: 5 mg; Otherwise 7.5 mg on Tue, Sat; 5 mg all other days   Weekly total 40 mg   Plan last modified Cidni Preston RN (5/9/2017)   Next INR check 5/17/2018   Priority INR   Target end date     Indications   Chronic atrial fibrillation (H) [I48.2]  Long-term (current) use of anticoagulants [Z79.01] [Z79.01]         Anticoagulation Episode Summary     INR check location     Preferred lab     Send INR reminders to [facility]    Comments       Anticoagulation Care Providers     Provider Role Specialty Phone number    Simin Lopez MD Responsible Internal Medicine [phone]            See the Encounter Report to view Anticoagulation Flowsheet and Dosing Calendar (Go to Encounters tab in chart review, and find the Anticoagulation Therapy Visit)    Dosage adjustment made based on physician directed care plan.    Cindi Preston, RN

## 2018-05-10 NOTE — MR AVS SNAPSHOT
Cielo Aguillon   5/10/2018 3:15 PM   Anticoagulation Therapy Visit    Description:  78 year old female   Provider[facility]  [facility]   Department:  [facility]           INR as of 5/10/2018     Today's INR 3.1!      Anticoagulation Summary as of 5/10/2018     INR goal 2.0-3.0   Today's INR 3.1!   Full instructions 5/10: 2.5 mg; 5/12: 2.5 mg; 5/14: 2.5 mg; 5/15: 5 mg; Otherwise 7.5 mg on Tue, Sat; 5 mg all other days   Next INR check 5/17/2018    Indications   Chronic atrial fibrillation (H) [I48.2]  Long-term (current) use of anticoagulants [Z79.01] [Z79.01]         Your next Anticoagulation Clinic appointment(s)     May 17, 2018  3:15 PM CDT   Anticoagulation Visit with [facility]   [facility]    [address] [facility] [address]  [facility] [address] [phone]              Contact Numbers     [facility] Phone Numbers:  Anticoagulation Clinic Appointments : [phone]  Anticoagulation Nurse: [phone]         May 2018 Details    Sun Mon Tue Wed Thu Fri Sat       1               2               3               4               5                 6               7               8               9               10      2.5 mg   See details      11      5 mg         12      2.5 mg           13      5 mg         14      2.5 mg         15      5 mg         16      5 mg         17            18               19                 20               21               22               23               24               25               26                 27               28               29               30               31                  Date Details   05/10 This INR check       Date of next INR:  5/17/2018         How to take your warfarin dose     To take:  2.5 mg Take 0.5 of a 5 mg tablet.    To take:  5 mg Take 1 of the 5 mg tablets.

## 2018-05-15 NOTE — PROGRESS NOTES
Sent email  4/27/18, to Taylor Ho at [facility] to order oxygen for Cielo.  Taylor responded saying she spoke with patient.    Pt has Medicare so the Overnight Oximetry study performed in the home will not be accepted by Medicare to cover the oxygen equipment. Pt will need to be titrated in the lab on her ASV lowering AHI =<10 then still show desaturation of =<88% for >= 5mins. Please call me if you have any questions. If Pt is willing to pay out of pocket until she qualifies appropriately through the lab this could be an option too.     Let us know your plan.     Forwarded to Dr Johnson  for his review/order.  She does need titration even though she has restrictive lung disease.

## 2018-05-17 ENCOUNTER — ANTICOAGULATION THERAPY VISIT (OUTPATIENT)
Dept: ANTICOAGULATION | Facility: CLINIC | Age: 78
End: 2018-05-17
Payer: MEDICARE

## 2018-05-17 DIAGNOSIS — Z79.01 LONG-TERM (CURRENT) USE OF ANTICOAGULANTS: ICD-10-CM

## 2018-05-17 DIAGNOSIS — I48.20 CHRONIC ATRIAL FIBRILLATION (H): ICD-10-CM

## 2018-05-17 LAB — INR POINT OF CARE: 2.9 (ref 0.86–1.14)

## 2018-05-17 PROCEDURE — 36416 COLLJ CAPILLARY BLOOD SPEC: CPT

## 2018-05-17 PROCEDURE — 99207 ZZC NO CHARGE NURSE ONLY: CPT

## 2018-05-17 PROCEDURE — 85610 PROTHROMBIN TIME: CPT | Mod: QW

## 2018-05-17 NOTE — MR AVS SNAPSHOT
Cielo Aguillon   5/17/2018 3:15 PM   Anticoagulation Therapy Visit    Description:  78 year old female   [facility]   Department:  [facility]           INR as of 5/17/2018     Today's INR 2.9      Anticoagulation Summary as of 5/17/2018     INR goal 2.0-3.0   Today's INR 2.9   Full instructions 5/17: 2.5 mg; 5/19: 2.5 mg; 5/22: 2.5 mg; 5/24: 2.5 mg; 5/26: 2.5 mg; 5/29: 2.5 mg; Otherwise 7.5 mg on Tue, Sat; 5 mg all other days   Next INR check 5/31/2018    Indications   Chronic atrial fibrillation (H) [I48.2]  Long-term (current) use of anticoagulants [Z79.01] [Z79.01]         Your next Anticoagulation Clinic appointment(s)     May 17, 2018  3:15 PM CDT   [facility]    [address] [facility] [address]  [facility] [place] [address] [phone]            May 31, 2018  1:00 PM CDT   [facility]   [facility]    [address]  [facility] [place] [address] [phone]              Contact Numbers     [facility] Phone Numbers:  Anticoagulation Clinic Appointments : [phone]  Anticoagulation Nurse: [phone]         May 2018 Details    Sun Mon Tue Wed Thu Fri Sat       1               2               3               4               5                 6               7               8               9               10               11               12                 13               14               15               16               17      2.5 mg   See details      18      5 mg         19      2.5 mg           20      5 mg         21      5 mg         22      2.5 mg         23      5 mg         24      2.5 mg         25      5 mg         26      2.5 mg           27      5 mg         28      5 mg         29      2.5 mg         30      5 mg         31               Date Details    05/17 This INR check       Date of next INR:  5/31/2018         How to take your warfarin dose     To take:  2.5 mg Take 0.5 of a 5 mg tablet.    To take:  5 mg Take 1 of the 5 mg tablets.

## 2018-05-17 NOTE — PROGRESS NOTES
ANTICOAGULATION FOLLOW-UP CLINIC VISIT    Patient Name:  Cielo Aguillon  Date:  5/17/2018  Contact Type:  Face to Face    SUBJECTIVE:     Patient Findings     Positives No Problem Findings           OBJECTIVE    INR Protime   Date Value Ref Range Status   05/17/2018 2.9 (A) 0.86 - 1.14 Final       ASSESSMENT / PLAN  INR assessment THER    Recheck INR In: 2 WEEKS    INR Location Clinic      Anticoagulation Summary as of 5/17/2018     INR goal 2.0-3.0   Today's INR 2.9   Maintenance plan 7.5 mg (5 mg x 1.5) on Tue, Sat; 5 mg (5 mg x 1) all other days   Full instructions 5/17: 2.5 mg; 5/19: 2.5 mg; 5/22: 2.5 mg; 5/24: 2.5 mg; 5/26: 2.5 mg; 5/29: 2.5 mg; Otherwise 7.5 mg on Tue, Sat; 5 mg all other days   Weekly total 40 mg   Plan last modified Cindi Preston RN (5/9/2017)   Next INR check 5/31/2018   Priority INR   Target end date     Indications   Chronic atrial fibrillation (H) [I48.2]  Long-term (current) use of anticoagulants [Z79.01] [Z79.01]         Anticoagulation Episode Summary     INR check location     Preferred lab     Send INR reminders to [facility]    Comments       Anticoagulation Care Providers     Provider Role Specialty Phone number    Simin Lopez MD [facility] Internal Medicine [phone]            See the Encounter Report to view Anticoagulation Flowsheet and Dosing Calendar (Go to Encounters tab in chart review, and find the Anticoagulation Therapy Visit)    Dosage adjustment made based on physician directed care plan.    Cindi Preston RN

## 2018-05-22 DIAGNOSIS — G47.39 COMPLEX SLEEP APNEA SYNDROME: Primary | ICD-10-CM

## 2018-05-22 NOTE — PROGRESS NOTES
Pt will undergo PSG titration sleep study with TCM and ASV due to prolonged hypoxemia. If hypoxemia persists even after obstructive events are reduced to below 10 events per hour, oxygen supplementation should be started. ABG should only be obtained if TCM suggests hypoventilation.    The current ASV settings are auto-ASV with EPAP min of 6 cmH2O, EPAP max of 11 cmH2O, PS min of 0 cmH2O, PS max of 19 cmH2O.    The patient was called to informed her about this and voicemail was left. Order forwarded to [facility] team.    Dennys Johnson MD, MPH  Clinical Sleep and Occupational / Environmental Medicine

## 2018-05-24 ENCOUNTER — TELEPHONE (OUTPATIENT)
Dept: SLEEP MEDICINE | Facility: CLINIC | Age: 78
End: 2018-05-24

## 2018-05-24 NOTE — TELEPHONE ENCOUNTER
LVM to please call nurseline to schedule titration/tcm on asv .  Cielo has to have testing to qualify for oxygen therapy if needed.  Left nurseline to call, also when scheduling her at [facility], please make sure there is a room for her  to stay as they live in [place].

## 2018-05-25 ENCOUNTER — TELEPHONE (OUTPATIENT)
Dept: SLEEP MEDICINE | Facility: CLINIC | Age: 78
End: 2018-05-25

## 2018-05-25 NOTE — TELEPHONE ENCOUNTER
"Cielo returned call placed to her on 5/24/18 to schedule an in lab titration on asv to qualify for oxygen.  She would like to follow up with her \"lung doctor\", before scheduling any sleep appointments.  She will call after her appointment with the lung doctor.  Route to Dr Johnson for his review.  "

## 2018-05-31 ENCOUNTER — ANTICOAGULATION THERAPY VISIT (OUTPATIENT)
Dept: ANTICOAGULATION | Facility: CLINIC | Age: 78
End: 2018-05-31
Payer: MEDICARE

## 2018-05-31 DIAGNOSIS — Z79.01 LONG-TERM (CURRENT) USE OF ANTICOAGULANTS: ICD-10-CM

## 2018-05-31 DIAGNOSIS — I48.20 CHRONIC ATRIAL FIBRILLATION (H): ICD-10-CM

## 2018-05-31 LAB — INR POINT OF CARE: 2.8 (ref 0.86–1.14)

## 2018-05-31 PROCEDURE — 36416 COLLJ CAPILLARY BLOOD SPEC: CPT

## 2018-05-31 PROCEDURE — 85610 PROTHROMBIN TIME: CPT | Mod: QW

## 2018-05-31 PROCEDURE — 99207 ZZC NO CHARGE NURSE ONLY: CPT

## 2018-05-31 NOTE — PROGRESS NOTES
ANTICOAGULATION FOLLOW-UP CLINIC VISIT    Patient Name:  Cielo Aguillon  Date:  5/31/2018  Contact Type:  Face to Face    SUBJECTIVE:     Patient Findings     Positives No Problem Findings           OBJECTIVE    INR Protime   Date Value Ref Range Status   05/31/2018 2.8 (A) 0.86 - 1.14 Final       ASSESSMENT / PLAN  INR assessment THER    Recheck INR In: 2 WEEKS    INR Location Clinic      Anticoagulation Summary as of 5/31/2018     INR goal 2.0-3.0   Today's INR 2.8   Warfarin maintenance plan 7.5 mg (5 mg x 1.5) on Tue, Sat; 5 mg (5 mg x 1) all other days   Full warfarin instructions 5/31: 2.5 mg; 6/2: 2.5 mg; 6/5: 2.5 mg; 6/7: 2.5 mg; 6/9: 2.5 mg; Otherwise 7.5 mg on Tue, Sat; 5 mg all other days   Weekly warfarin total 40 mg   Plan last modified Cindi Preston RN (5/9/2017)   Next INR check 6/11/2018   Priority INR   Target end date     Indications   Chronic atrial fibrillation (H) [I48.2]  Long-term (current) use of anticoagulants [Z79.01] [Z79.01]         Anticoagulation Episode Summary     INR check location     Preferred lab     Send INR reminders to [facility]    Comments       Anticoagulation Care Providers     Provider Role Specialty Phone number    Simin Lopez MD [facility] Internal Medicine [phone]            See the Encounter Report to view Anticoagulation Flowsheet and Dosing Calendar (Go to Encounters tab in chart review, and find the Anticoagulation Therapy Visit)    Dosage adjustment made based on physician directed care plan.    Cindi Preston, [place]

## 2018-05-31 NOTE — MR AVS SNAPSHOT
Cielo Aguillon   5/31/2018 1:00 PM   Anticoagulation Therapy Visit    Description:  78 year old female   Provider[facility]   Department:  [facility]           INR as of 5/31/2018     Today's INR 2.8      Anticoagulation Summary as of 5/31/2018     INR goal 2.0-3.0   Today's INR 2.8   Full warfarin instructions 5/31: 2.5 mg; 6/2: 2.5 mg; 6/5: 2.5 mg; 6/7: 2.5 mg; 6/9: 2.5 mg; Otherwise 7.5 mg on Tue, Sat; 5 mg all other days   Next INR check 6/11/2018    Indications   Chronic atrial fibrillation (H) [I48.2]  Long-term (current) use of anticoagulants [Z79.01] [Z79.01]         Your next Anticoagulation Clinic appointment(s)     Jun 11, 2018  1:45 PM CDT   [facility] with [facility]   [facility]    [address] [facility] [address] [facility] [address]  [facility] [id number] [phone]              Contact Numbers     [facility] Phone Numbers:  Anticoagulation Clinic Appointments : [phone]  Anticoagulation Nurse: [phone]         May 2018 Details    Sun Mon Tue Wed Thu Fri Sat       1               2               3               4               5                 6               7               8               9               10               11               12                 13               14               15               16               17               18               19                 20               21               22               23               24               25               26                 27               28               29               30               31      2.5 mg   See details         Date Details   05/31 This INR check               How to take your warfarin dose     To take:  2.5 mg Take 0.5 of a 5 mg tablet.           June 2018 Details    Sun Mon Tue Wed Thu Fri Sat          1      5 mg         2      2.5 mg           3      5 mg         4      5 mg         5      2.5 mg         6       5 mg         7      2.5 mg         8      5 mg         9      2.5 mg           10      5 mg         11            12               13               14               15               16                 17               18               19               20               21               22               23                 24               25               26               27               28               29               30                Date Details   No additional details    Date of next INR:  6/11/2018         How to take your warfarin dose     To take:  2.5 mg Take 0.5 of a 5 mg tablet.    To take:  5 mg Take 1 of the 5 mg tablets.

## 2018-06-11 ENCOUNTER — ANTICOAGULATION THERAPY VISIT (OUTPATIENT)
Dept: ANTICOAGULATION | Facility: CLINIC | Age: 78
End: 2018-06-11
Payer: MEDICARE

## 2018-06-11 DIAGNOSIS — I48.20 CHRONIC ATRIAL FIBRILLATION (H): ICD-10-CM

## 2018-06-11 DIAGNOSIS — Z79.01 LONG-TERM (CURRENT) USE OF ANTICOAGULANTS: ICD-10-CM

## 2018-06-11 LAB — INR POINT OF CARE: 3 (ref 0.86–1.14)

## 2018-06-11 PROCEDURE — 85610 PROTHROMBIN TIME: CPT | Mod: QW

## 2018-06-11 PROCEDURE — 36416 COLLJ CAPILLARY BLOOD SPEC: CPT

## 2018-06-11 PROCEDURE — 99207 ZZC NO CHARGE NURSE ONLY: CPT

## 2018-06-11 NOTE — MR AVS SNAPSHOT
iCelo Aguillon   6/11/2018 1:45 PM   Anticoagulation Therapy Visit    Description:  78 year old female   Provider[facility]   Department:  [facility]           INR as of 6/11/2018     Today's INR 3.0      Anticoagulation Summary as of 6/11/2018     INR goal 2.0-3.0   Today's INR 3.0   Full warfarin instructions 6/12: 2.5 mg; 6/14: 2.5 mg; 6/16: 2.5 mg; 6/19: 2.5 mg; 6/21: 2.5 mg; 6/23: 2.5 mg; Otherwise 7.5 mg on Tue, Sat; 5 mg all other days   Next INR check 6/25/2018    Indications   Chronic atrial fibrillation (H) [I48.2]  Long-term (current) use of anticoagulants [Z79.01] [Z79.01]         Your next Anticoagulation Clinic appointment(s)     Jun 25, 2018  1:30 PM CDT   [facility] with [facility]   [facility]    [address]  [facility] [address] [phone]              Contact Numbers     [facility] Phone Numbers:  Anticoagulation Clinic Appointments : [phone]  Anticoagulation Nurse: [phone]         June 2018 Details    Sun Mon Tue Wed Thu Fri Sat          1               2                 3               4               5               6               7               8               9                 10               11      5 mg   See details      12      2.5 mg         13      5 mg         14      2.5 mg         15      5 mg         16      2.5 mg           17      5 mg         18      5 mg         19      2.5 mg         20      5 mg         21      2.5 mg         22      5 mg         23      2.5 mg           24      5 mg         25            26               27               28               29               30                Date Details   06/11 This INR check       Date of next INR:  6/25/2018         How to take your warfarin dose     To take:  2.5 mg Take 0.5 of a 5 mg tablet.    To take:  5 mg Take 1 of the 5 mg tablets.

## 2018-06-11 NOTE — PROGRESS NOTES
ANTICOAGULATION FOLLOW-UP CLINIC VISIT    Patient Name:  Cielo Aguillon  Date:  6/11/2018  Contact Type:  Face to Face    SUBJECTIVE:     Patient Findings     Positives Change in diet/appetite (Pt reports decreased green intake, will resume usual diet. ), No Problem Findings           OBJECTIVE    INR Protime   Date Value Ref Range Status   06/11/2018 3.0 (A) 0.86 - 1.14 Final       ASSESSMENT / PLAN  INR assessment THER    Recheck INR In: 2 WEEKS    INR Location Clinic      Anticoagulation Summary as of 6/11/2018     INR goal 2.0-3.0   Today's INR 3.0   Warfarin maintenance plan 7.5 mg (5 mg x 1.5) on Tue, Sat; 5 mg (5 mg x 1) all other days   Full warfarin instructions 6/12: 2.5 mg; 6/14: 2.5 mg; 6/16: 2.5 mg; 6/19: 2.5 mg; 6/21: 2.5 mg; 6/23: 2.5 mg; Otherwise 7.5 mg on Tue, Sat; 5 mg all other days   Weekly warfarin total 40 mg   Plan last modified Cindi Preston RN (5/9/2017)   Next INR check 6/25/2018   Priority INR   Target end date     Indications   Chronic atrial fibrillation (H) [I48.2]  Long-term (current) use of anticoagulants [Z79.01] [Z79.01]         Anticoagulation Episode Summary     INR check location     Preferred lab     Send INR reminders to [facility]    Comments       Anticoagulation Care Providers     Provider Role Specialty Phone number    Simin Lopez MD Responsible Internal Medicine [phone]            See the Encounter Report to view Anticoagulation Flowsheet and Dosing Calendar (Go to Encounters tab in chart review, and find the Anticoagulation Therapy Visit)    Dosage adjustment made based on physician directed care plan.    Cindi Preston, RN

## 2018-06-25 ENCOUNTER — ANTICOAGULATION THERAPY VISIT (OUTPATIENT)
Dept: ANTICOAGULATION | Facility: CLINIC | Age: 78
End: 2018-06-25
Payer: MEDICARE

## 2018-06-25 DIAGNOSIS — I48.20 CHRONIC ATRIAL FIBRILLATION (H): ICD-10-CM

## 2018-06-25 DIAGNOSIS — Z79.01 LONG-TERM (CURRENT) USE OF ANTICOAGULANTS: ICD-10-CM

## 2018-06-25 LAB — INR POINT OF CARE: 1.8 (ref 0.86–1.14)

## 2018-06-25 PROCEDURE — 36416 COLLJ CAPILLARY BLOOD SPEC: CPT

## 2018-06-25 PROCEDURE — 99207 ZZC NO CHARGE NURSE ONLY: CPT

## 2018-06-25 PROCEDURE — 85610 PROTHROMBIN TIME: CPT | Mod: QW

## 2018-06-25 NOTE — PROGRESS NOTES
ANTICOAGULATION FOLLOW-UP CLINIC VISIT    Patient Name:  Cielo Aguillon  Date:  6/25/2018  Contact Type:  Face to Face    SUBJECTIVE:     Patient Findings     Positives No Problem Findings    Comments Recent dose decrease           OBJECTIVE    INR Protime   Date Value Ref Range Status   06/25/2018 1.8 (A) 0.86 - 1.14 Final       ASSESSMENT / PLAN  INR assessment THER    Recheck INR In: 2 WEEKS    INR Location Clinic      Anticoagulation Summary as of 6/25/2018     INR goal 2.0-3.0   Today's INR 1.8!   Warfarin maintenance plan 7.5 mg (5 mg x 1.5) on Tue, Sat; 5 mg (5 mg x 1) all other days   Full warfarin instructions 6/26: 5 mg; 6/30: 2.5 mg; 7/3: 5 mg; 7/7: 2.5 mg; Otherwise 7.5 mg on Tue, Sat; 5 mg all other days   Weekly warfarin total 40 mg   Plan last modified Cindi Preston RN (5/9/2017)   Next INR check 7/9/2018   Priority INR   Target end date     Indications   Chronic atrial fibrillation (H) [I48.2]  Long-term (current) use of anticoagulants [Z79.01] [Z79.01]         Anticoagulation Episode Summary     INR check location     Preferred lab     Send INR reminders to [facility]    Comments       Anticoagulation Care Providers     Provider Role Specialty Phone number    Simin Lopez MD [facility] Internal Medicine [phone]            See the Encounter Report to view Anticoagulation Flowsheet and Dosing Calendar (Go to Encounters tab in chart review, and find the Anticoagulation Therapy Visit)    Dosage adjustment made based on physician directed care plan.    Cindi Preston, RN

## 2018-06-25 NOTE — MR AVS SNAPSHOT
Cielo Aguillon   6/25/2018 1:30 PM   Anticoagulation Therapy Visit    Description:  78 year old female   Provider[facility]   Department:  [facility]           INR as of 6/25/2018     Today's INR 1.8!      Anticoagulation Summary as of 6/25/2018     INR goal 2.0-3.0   Today's INR 1.8!   Full warfarin instructions 6/26: 5 mg; 6/30: 2.5 mg; 7/3: 5 mg; 7/7: 2.5 mg; Otherwise 7.5 mg on Tue, Sat; 5 mg all other days   Next INR check 7/9/2018    Indications   Chronic atrial fibrillation (H) [I48.2]  Long-term (current) use of anticoagulants [Z79.01] [Z79.01]         Your next Anticoagulation Clinic appointment(s)     Jul 09, 2018  1:45 PM CDT   [facility]   [facility]    [address] [facility] [address]  [facility] [address] [phone]              Contact Numbers     [facility] Phone Numbers:  Anticoagulation Clinic Appointments : [phone]  Anticoagulation Nurse: [phone]         June 2018 Details    Sun Mon Tue Wed Thu Fri Sat          1               2                 3               4               5               6               7               8               9                 10               11               12               13               14               15               16                 17               18               19               20               21               22               23                 24               25      5 mg   See details      26      5 mg         27      5 mg         28      5 mg         29      5 mg         30      2.5 mg          Date Details   06/25 This INR check               How to take your warfarin dose     To take:  2.5 mg Take 0.5 of a 5 mg tablet.    To take:  5 mg Take 1 of the 5 mg tablets.           July 2018 Details    Sun Mon Tue Wed Thu Fri Sat     1      5 mg         2      5 mg         3      5 mg         4      5 mg         5       5 mg         6      5 mg         7      2.5 mg           8      5 mg         9            10               11               12               13               14                 15               16               17               18               19               20               21                 22               23               24               25               26               27               28                 29               30               31                    Date Details   No additional details    Date of next INR:  7/9/2018         How to take your warfarin dose     To take:  2.5 mg Take 0.5 of a 5 mg tablet.    To take:  5 mg Take 1 of the 5 mg tablets.

## 2018-06-26 ENCOUNTER — OFFICE VISIT (OUTPATIENT)
Dept: SLEEP MEDICINE | Facility: CLINIC | Age: 78
End: 2018-06-26
Payer: MEDICARE

## 2018-06-26 VITALS
BODY MASS INDEX: 28.99 KG/M2 | RESPIRATION RATE: 21 BRPM | HEART RATE: 79 BPM | HEIGHT: 65 IN | SYSTOLIC BLOOD PRESSURE: 129 MMHG | OXYGEN SATURATION: 94 % | WEIGHT: 174 LBS | DIASTOLIC BLOOD PRESSURE: 72 MMHG

## 2018-06-26 DIAGNOSIS — G47.39 COMPLEX SLEEP APNEA SYNDROME: Primary | ICD-10-CM

## 2018-06-26 DIAGNOSIS — I48.21 PERMANENT ATRIAL FIBRILLATION (H): ICD-10-CM

## 2018-06-26 DIAGNOSIS — I27.20 PULMONARY HYPERTENSION (H): ICD-10-CM

## 2018-06-26 DIAGNOSIS — R06.09 DOE (DYSPNEA ON EXERTION): ICD-10-CM

## 2018-06-26 PROCEDURE — 99215 OFFICE O/P EST HI 40 MIN: CPT | Performed by: FAMILY MEDICINE

## 2018-06-26 RX ORDER — ZOLPIDEM TARTRATE 5 MG/1
TABLET ORAL
Qty: 1 TABLET | Refills: 0 | Status: SHIPPED | OUTPATIENT
Start: 2018-06-26 | End: 2018-08-01

## 2018-06-26 NOTE — PROGRESS NOTES
Sleep Center [facility]  Outpatient Sleep Medicine Followed Up Visit  June 26, 2018    Name: Cielo Aguillon MRN# [id number]   Age: 78 year old YOB: 1940     Date of Follow Up Visit:June 26, 2018  Consultation is requested by: Simin Lopez MD  [address]  [place] [address]  Primary care provider: Simin Lopez    Patient is accompanied by: Patient presents alone today.       Reason for Sleep Consult:     Cielo Aguillon is a 78 year old female patient that presents here for a complex sleep apnea follow up evaluation.         Assessment and Plan:     Summary Sleep Diagnoses:    (1) Permanent Atrial Fibrillation  (2) Severe BETHANY / Complex Sleep Apnea (AHI of 39 events per hour)  (3) Pulmonary Hypertension  (4) GREEN    Summary Recommendations / Discussion:    During the initial visit, this patient was diagnosed with severe BETHANY with an AHI of 39 events per hour. The patient was successfully treated with a CPAP at fixed pressure of 10 cmH2O. During the last visit, the PAP's download showed that the device was effective when used with a residual AHI of only 3.6 events per hour. Since the patient's health was stable and recent echocardiogram showed preserved LVEF, a new PSG was not needed. Instead, the patient had an overnight oximetry with the PAP in place. This showed prolonged sleep associated hypoxemia with 230.7 minutes below the SpO2 of 88%. As such, this patient underwent an in-lab PSG titration study with TCM and ABG. During the study, the patient was adequately titrated to an ASV at 4/0/20 cmH2O. Some Cheyne-Stoke breathing was noted during the PSG sleep study while on CPAP therapy. Given the preserved LVEF at 55 to 60%, ASV may be utilized in this patient. Sleep associated hypoxemia improved with ASV therapy. As such, the patient was started on auto-ASV with EPAP min of 6 cmH2O, EPAP max of 11 cmH2O, PS min of 0 cmH2O, PS max of 19 cmH2O. The patient has been complaint with PAP  therapy and the device is effective at treating the condition with a residual AHI of 1.8 events per hour. Recent overnight oximetry with ASV showed nocturnal hypoxemia with the patient spending 100.4 minutes under the SpO2 of 88%. Most of the night, the PAP was in place. At this point, current therapy will be continued. Techniques to minimize removal of the mask discussed and encouraged once again. PAP compliance was discussed, explained, and encouraged once gain. Given the significant shortness of breath during exertion, a complete PFT and 6 minute walk were ordered. Moderate restrictive / mixed lung disease was noted. However, the 6 minute walk was normal with no obvious hypoxemia. Recent ABG showed only mild hypoxemia. Today, the nature and pathophysiology of BETHANY were discussed once again. Lifestyle recommendations including healthy dietary and exercising habits were discussed. Unfortunately, oxygen supplementation was denied. As such, the patient needs to undergo a PSG titration sleep study to qualify for O2 supplementation. The patient has a pulmonology appointment coming up. The patient was also referred to DME for mask fitting and evaluation of the ASV device. Pt will follow up with me after completing the PSG sleep study.    Coding:  (G47.31) Complex sleep apnea syndrome  (primary encounter diagnosis)  (R06.09) GREEN (dyspnea on exertion)  (I48.2) Permanent atrial fibrillation (H)  (I27.20) Pulmonary hypertension    Counseling included a comprehensive review of diagnostic and therapeutic strategies as well as risks of inadequate therapy.    Educational materials provided in instructions. The patient was instructed to avoid driving or operating any heavy machinery when experiencing drowsiness.    All questions and concerns were addressed today. Pt agrees and understands the assessment and plan.         History of Present Illness:   Cielo Aguillon is a 77 year old  RHD female pt with PMH of cystocele,  rectocele, hypertension, peptic ulcer disease, chornic rhinitis, Lichenification and Lichen Simplex Chronicus, fatty liver, hyperlipidemia, chronic back pain, non-obstructive coronary artery disease, mild to moderate pulmonary hypertension, generalized anxiety disorder, major depressive disorder, chronic atrial fibrillation (daignosed 5 yrs ago), hypothyroidism, and previously diagnosed BETHANY presents for a complex sleep apnea follow up evaluation.     As previously explained, this medically complex patient underwent a PSG sleep stud in 2015 at [facility]. The study showed severe BETHANY with an AHI of 54 events per hour with a RDI of 58 events per hour scored at 4% for Medicare criteria, the AHI was 39 events per hour). Most events were obstructive in nature. The PAP download during the initial visit showed that the patient was using a CPAP at fixed pressure of 10 cmH2O. This was effective at treating the condition with a residual AHI of 3.6 events per hour. No significant leakage noted.    During the initial visit, the patient explained to me that she needed new CPAP supplies. The patient explained that she was not able to use the PAP device because the mask needed to be replaced and she was not able to get new supplies without repeating the PSG sleep study. She was using a CPAP with a nasal mask. The  explained that with the PAP device the patient would not snore, have witnessed apneas, or gasping / choking for air. Without the PAP device, the patient reports snoring and witnessed apneas. Some non-restorative sleep and sleep inertia reported. Pt denied EDS, but admits having physical fatigue. No inadvertent naps reported. Pt is not currently driving due to vision problems.    The patient was continue on CPAP at fixed pressure of 10 cmH2O and an overnight oximetry test was performed with the PAP therapy in place. This showed prolonged sleep associated hypoxemia with 230.7 minutes spent under the SpO2 of 88%. The study  also demonstrated a sawtooth pattern suggestive of poorly controlled BETHANY. Therefore, the patient underwent a PSG titration study on 02/08/2018. During the study the patient had some central hypopneas with a Cheyne-Stoke pattern. The patient was adequately titrated to an ASV at 4/0/20 with a residual AHI of 6.9 events per hour. The patient had atrial fibrillation during the PSG sleep study.    As previously explained, the patient recently underwent a coronary angiogram on 03/14/2018. The study showed small caliber vessels with left dominant circulation. Some disease was noted. The LV end-diastolic pressure was mildly elevated at 15 mmHg and the LVEF was preserved at 65%.    During the last visit, the patient was continued on auto-ASV with EPAP min of 6 cmH2O, EPAP max of 11 cmH2O, PS min of 0 cmH2O, and PS max of 19 cmH2O. Nocturnal O2 supplementation was ordered during the last visit, but this was denied.     The PAP download today shows a compliant patient who is using the device 70% of the time with 10% over 4 hours. The device is effective at treating the condition with a residual AHI of 2.2 events per hour. Some mild leaks noted with a 95th percentile at 59.0 L/min.    ASV Compliance:   Dates: June 26, 2018       33 % > 4 hour use    Average Use: 4 hours 12 minutes   Leak: 59.0 L / min    Residual AHI: 2.2 events per hour    Pt reports GREEN and SOB. She explains that she is unable to walk much due to significant GREEN.    After the last visit, the patient completed another PFT study and this demonstrated FVC at 68%, FEV at 72%, FEV1/FVC at 74%, VC at 57%, TCL at 70%, DLCO at 54%. This findings were consistent with mild worsening of respiratory function with moderate mixed ventilatory defect, moderate restriction in volumes, and preserved gas transfer for the lung volumes.    The patient also completed a 6 minutes walk and this showed no obvious hypoxemia on exertion at room air with saturations ranging in the  ~96%.    In addition, the patient completed another overnight oximetry with the PAP device in place. This showed prolonged sleep associated hypoxemia (pt spent 100.4 minutes under the SpO2 of 88%). Although the device was removed at 4 AM, when most of the desaturation can be seen, hypoxemia was also present during the early hours of the night.    Today, the patient explains that she has not been able to use the ASV due to interface discomfort. The patient states that the interface is giving her sores and areas of discomfort. In addition, she explains that the PAP device is not working correctly. She thinks that humidifier is malfunctioning. Pt denies any snoring, gasping / choking for air, or witnessed apneas.    The patient explains that O2 supplementation was denied by medicare. She needs another PSG titration study for this to be approved.     PREVIOUS IN- LAB or HOME SLEEP STUDIES: The patient reports the study was conducted in [facility]   Date: 2015   TYPE: PSG   AHI: 58 events per hour   BMI: 28.7 kg/m2   Intervention: CPAP    SLEEP-WAKE SCHEDULE:     Cielo Aguillon      -Describes herself as a night person; prefers to go to sleep at 10:30 PM and wakes up at 9:00 AM.      -The patient takes no naps during the week; takes no inadvertent naps.      -ON WEEKDAYS, goes to sleep at 10:30 PM during the week; awakens 9:00 AM with an alarm; falls asleep in 10 minutes; denies difficulty falling asleep.      -ON WEEKENDS, goes to sleep at 10:30 PM and wakes up at 9:00 AM with an alarm; falls asleep in 10 minutes.        -Awakens 1-2 times a night for 5 minutes before falling back to sleep; awakens to go to the bathroom and external stimuli.      BEDTIME ACTIVITIES AND SHIFT WORK:    Cielo Aguillon     -Bedtime Activities and Other Sleeping Information: Pt lives . Pt sleeps alone on a twin size bed ( sleeps in a different room due to snoring). Pt sleeps on her sides. Pt does not use any electronics in bed and  uses bedroom only for sleeping.      -Occupation: Retired (Pharmacy Tech)     SCALES        -SLEEPINESS: [place] sleepiness scale (ESS):  2 / 24 (initial visit)              2 / 24 (04/27/2018)    Drowsy driving / near accidents: Denies any near accidents    Consequences: Non-restorative sleep    SLEEP COMPLAINTS:  Cardio-respiratory     -Snoring: None with PAP device in place   -Dyspnea: None with PAP device in place   -Morning headaches or confusion: Denies any morning cephalgia   -Coexisting Lung disease: Pulmonary HTN     -Coexisting Heart disease: Atria fibrillation with non-obstructive CAD     -Does patient have a bed partner: Patient sleeps alone   -Has bed partner been sleeping separately because of snoring:  No            RLS Screen:    -When you try to relax in the evening or sleep at night, do you ever have unpleasant, restless feelings in your legs that can be relieved by walking or movement? None Reported     -Periodic limb movement: None Reported    Narcolepsy:     - Denies sudden urges of sleep attacks   - Denies cataplexy   - Denies sleep paralysis    - Admits hallucinations. Some hypnagogic hallucinations a few times a month consistent of little kids. This is not scary to her.     - Denies feeling refreshed after a nap.    Sleep Behaviors:   - Denies leg symptoms/movements   - Denies motor restlessness   - Denies night terrors   - Denies bruxism   - Denies automatic behaviors    Other Subjective Complaints:   - Denies anxiety or rumination    - Denies pain and discomfort at night   - Denies waking up with heart pounding or racing   - Denies GERD or aspiration         Parasomnia:    -NREM - Denies recurrent persistent confusional arousal, night eating, sleep walking or sleep terrors.      -REM - Denies dream enactment or injuries.     -Driving Accident or Near Accidents: Pt does not drive         Medications:     Current Outpatient Prescriptions   Medication Sig     amLODIPine (NORVASC) 5 MG tablet  Take 1 tablet (5 mg) by mouth daily     citalopram (CELEXA) 10 MG tablet Take 2 tablets (20 mg) by mouth daily (Patient taking differently: Take 10 mg by mouth daily )     diazepam (VALIUM) 5 MG tablet TAKE 1/2 TO 1 TABLET BY MOUTH EVERY 8 HOURS AS NEEDED     levothyroxine (SYNTHROID/LEVOTHROID) 50 MCG tablet TAKE 1 TABLET (50 MCG) BY MOUTH DAILY     metoprolol tartrate (LOPRESSOR) 100 MG tablet TAKE 1 TABLET (100MG) BY MOUTH 2 TIMES DAILY     order for DME Oxygen 3 Li/min at night bled into the PAP device     rosuvastatin (CRESTOR) 10 MG tablet TAKE 1 TABLET (10 MG) BY MOUTH DAILY     traMADol (ULTRAM) 50 MG tablet TAKE ONE TABLET BY MOUTH EVERY 8 HOURS AS NEEDED FOR MODERATE PAIN     VITAMIN D, CHOLECALCIFEROL, PO Take 1,000 Units by mouth At Bedtime      warfarin (COUMADIN) 5 MG tablet TAKE 1&1/2 TABLETS (7.5MG) BY MOUTH ONCE DAILY ON TUESDAY & SATURDAY THEN 1 TABLET (5MG) ALL THE OTHER DAYS OF THE WEEK     No current facility-administered medications for this visit.       Allergies   Allergen Reactions     Ace Inhibitors      Cough (Zestril)     Ambien [Zolpidem] Other (See Comments)     Aspirin      hx bleeding ulcers     Hydrochlorothiazide [Hctz] Rash     Ibuprofen      hx bleeding ulcers     Amoxicillin Rash     Losartan Rash     Penicillins Rash          Past Medical History:     Denies needing any 02 supplement at night.    Past Medical History:   Diagnosis Date     Anxiety     related to travel     Arthritis      ASCVD (arteriosclerotic cardiovascular disease) 2/12    60-70% stenoses of OM2, D1, medical management     Atrial fibrillation (H)      Atrial flutter (H)      Coronary artery disease     2/2012- 50% mid Cfx, 60-70% prox OM2, 50-70% D1     Essential hypertension, benign      Fatty liver 5/10    mild fibrosis on biopsy     Hypertriglyceridemia      Major depression      BETHANY (obstructive sleep apnea)      Other ventral hernia without mention of obstruction or gangrene      Peptic ulcer 2002      Peptic ulcer, unspecified site, unspecified as acute or chronic, without mention of hemorrhage, perforation, or obstruction 1983     Shortness of breath      Thyroid disease      Tubular adenoma 6/08           Past Surgical History:    Admits previous upper airway surgery.     Past Surgical History:   Procedure Laterality Date     APPENDECTOMY       C NONSPECIFIC PROCEDURE      Appendectomy     C NONSPECIFIC PROCEDURE      Tonsillectomy     C NONSPECIFIC PROCEDURE      Tubal ligation     C NONSPECIFIC PROCEDURE  11/01    Shave bone spurs from left foot     COLONOSCOPY  9/1/2016    Dr. Camejo [facility]     COLONOSCOPY N/A 9/1/2016    Procedure: COMBINED COLONOSCOPY, SINGLE OR MULTIPLE BIOPSY/POLYPECTOMY BY BIOPSY;  Surgeon: Pillo Camejo MD;  Location:  GI     CORONARY ANGIOGRAPHY ADULT ORDER  2/13/2012    50% mid Cfx, 60-70% prox OM2, 50-70% D1     ESOPHAGOSCOPY, GASTROSCOPY, DUODENOSCOPY (EGD), COMBINED  9/1/2016    Dr. Camejo [facility]     ESOPHAGOSCOPY, GASTROSCOPY, DUODENOSCOPY (EGD), COMBINED N/A 9/1/2016    Procedure: COMBINED ESOPHAGOSCOPY, GASTROSCOPY, DUODENOSCOPY (EGD), BIOPSY SINGLE OR MULTIPLE;  Surgeon: Pillo Camejo MD;  Location:  GI     GYN SURGERY       HEART CATH RIGHT AND LEFT HEART CATH  04/06/2015    Mid LAD 50-60%, 1st Diagonal 70%, 1st OM 30%, 2nd OM 40-50%, review report for right heart cath results.           Social History:     Social History   Substance Use Topics     Smoking status: Never Smoker     Smokeless tobacco: Never Used     Alcohol use 0.0 oz/week     0 Standard drinks or equivalent per week      Comment: occ - wine     Chemical History:     Tobacco: Never smoked     Uses no caffeine.   Supplements for wakefulness: Patient does not use any supplements to stay awake    EtOH: 1 to 2 drinks a week  Recreational Drugs: Patient denies using any recreational drugs     Psych Hx:   Current dangers to self or others: No. Pt denies any SI / HI, hallucinations, or delusions          Family History:     Family History   Problem Relation Age of Onset     Respiratory Father      emphysema--secon. to smoking     Alzheimer Disease Mother      Congenital Anomalies Mother      Osteoperosis Mother      Diabetes Maternal Aunt      Diabetes Other      cousins     Diabetes Other      cousin on mothers side of family     Depression Son      Depression Son      Colon Cancer No family hx of       Sleep Family Hx:       RLS - None reported   BETHANY - None reported  Insomnia - None reported  Parasomnia - None reported         Review of Systems:     A complete 10 point review of systems was negative other than HPI or as commented below:     CONSTITUTIONAL: NEGATIVE for weight gain/loss. Pt admits some chills.  EYES: NEGATIVE for changes in vision, blind spots, double vision.  ENT: NEGATIVE for ear pain. Pt admits having some sore throat, sinus pain, post-nasal drip, runny nose, and bloody nose.  CARDIAC: Positive for fast heartbeats or fluttering in chest, chest pain or pressure, breathlessness when lying flat, swollen legs / swollen feet.  NEUROLOGIC: Pt admits having headaches and leg weakness.  DERMATOLOGIC: NEGATIVE for rashes, new moles or change in mole(s)  PULMONARY: NEGATIVE coughing up blood, wheezing or whistling when breathing. Pt reports SOB at rest, SOB with activity, dry cough, and productive cough.     GASTROINTESTINAL: NEGATIVE for nausea or vomitting, loose or watery stools, fat or grease in stools, constipation, abdominal pain, bowel movements black in color or blood noted.  GENITOURINARY: NEGATIVE for pain during urination, blood in urine, urinating more frequently than usual, irregular menstrual periods.  MUSCULOSKELETAL: Positive for muscle pain, bone or joint pain, and swollen joints.  ENDOCRINE: NEGATIVE for increased thirst or urination, diabetes.  LYMPHATIC: NEGATIVE for swollen lymph nodes, lumps or bumps in the breasts or nipple discharge.         Physical Examination:   /72   "Pulse 79  Resp 21  Ht 1.651 m (5' 5\")  Wt 78.9 kg (174 lb)  SpO2 94%  BMI 28.96 kg/m2     VS: Reviewed and normal.   General: Alert, oriented, not in distress. Dressed casually; Good eye contact; Comfortably sitting in a chair; in no apparent distress  Lungs: Both hemithoraces are symmetrical, normal to palpation, no dullness to percussion, auscultation of lungs revealed normal breath sounds with no expirium prolongation, wheezing, rhonci and crackles.  CVS: Normal S1 and S2 heart sounds with no extra heart sounds. No murmur, rubs, or clicks. Normal peripheral pulses throughout with no obvious peripheral edema. Irregularly irregular rhythm noted.  Psychiatry: Mood and affect are appropriate. Euthymic with affect congruent with full range and intensity. No SI/HI with adequate insight and judgement.          Data: All pertinent previous laboratory data reviewed     Lab Results   Component Value Date    PH 7.41 02/08/2018    PH 7.34 (L) 04/06/2015    PO2 76 (L) 02/08/2018    PO2 62 (L) 04/06/2015    PCO2 39 02/08/2018    PCO2 40 04/06/2015    HCO3 25 02/08/2018    HCO3 22 04/06/2015    SRIDHAR 0.1 02/08/2018     Lab Results   Component Value Date    TSH 3.34 05/03/2018    TSH 3.32 11/14/2017     Lab Results   Component Value Date    GLC 91 03/14/2018    GLC 96 08/30/2017     Lab Results   Component Value Date    HGB 12.8 03/14/2018    HGB 13.8 08/30/2017     Lab Results   Component Value Date    BUN 20 03/14/2018    BUN 20 08/30/2017    CR 0.90 03/14/2018    CR 1.03 08/30/2017     Echocardiology:    -Most recent echocardiogram obtained on 07/10/2017 showed LV or normal size with moderate concentric left ventricular hypertrophy. LV systolic function was normal with LVEF estimated at 55 - 60%. The RV was normal in side, structure, and function. There was mod-severe biatrial enlargement noted. No obvious atrial shunt observed. Mild to moderate mitral annular calcification was noted. There was also mild mitral " regurgitation present. Mild pulmonic valve regurgitation noted. The rhythm was atrial fibrillation during the study.    Chest x-ray: None Recent Studies Available    PFT:   -PFT obtained on 04/03/2018 showed FVC at 68%, FEV at 72%, FEV1/FVC at 74%, VC at 57%, TCL at 70%, DLCO at 54%. This findings were consistent with mild worsening of respiratory function with moderate mixed ventilatory defect, moderate restriction in volumes, and preserved gas transfer for the lung volumes.    Laboratory Studies:   Component Value Flag Ref Range Units Status Collected Lab   Sodium 140  133 - 144 mmol/L Final 08/30/2017 12:39 PM 65   Potassium 4.3  3.4 - 5.3 mmol/L Final 08/30/2017 12:39 PM 65   Chloride 106  94 - 109 mmol/L Final 08/30/2017 12:39 PM 65   Carbon Dioxide 26  20 - 32 mmol/L Final 08/30/2017 12:39 PM 65   Anion Gap 8  3 - 14 mmol/L Final 08/30/2017 12:39 PM 65   Glucose 96  70 - 99 mg/dL Final 08/30/2017 12:39 PM 65   Urea Nitrogen 20  7 - 30 mg/dL Final 08/30/2017 12:39 PM 65   Creatinine 1.03  0.52 - 1.04 mg/dL Final 08/30/2017 12:39 PM 65   GFR Estimate 52 (L) >60 mL/min/1.7m2 Final 08/30/2017 12:39 PM 65   Comment:   Non  GFR Calc   GFR Estimate If Black 63  >60 mL/min/1.7m2 Final 08/30/2017 12:39 PM 65   Comment:   African American GFR Calc   Calcium 9.3  8.5 - 10.1 mg/dL Final 08/30/2017 12:39 PM 65   Bilirubin Total 0.6  0.2 - 1.3 mg/dL Final 08/30/2017 12:39 PM 65   Albumin 3.6  3.4 - 5.0 g/dL Final 08/30/2017 12:39 PM 65   Protein Total 7.6  6.8 - 8.8 g/dL Final 08/30/2017 12:39 PM 65   Alkaline Phosphatase 68  40 - 150 U/L Final 08/30/2017 12:39 PM 65   ALT 32  0 - 50 U/L Final 08/30/2017 12:39 PM 65   AST 44  0 - 45 U/L Final 08/30/2017 12:39 PM 65     Component Value Flag Ref Range Units Status Collected Lab   WBC 7.4  4.0 - 11.0 10e9/L Final 08/30/2017 12:39 PM 79   RBC Count 4.01  3.8 - 5.2 10e12/L Final 08/30/2017 12:39 PM 79   Hemoglobin 13.8  11.7 - 15.7 g/dL Final 08/30/2017 12:39 PM  79   Hematocrit 41.3  35.0 - 47.0 % Final 08/30/2017 12:39 PM 79    (H) 78 - 100 fl Final 08/30/2017 12:39 PM 79   MCH 34.4 (H) 26.5 - 33.0 pg Final 08/30/2017 12:39 PM 79   Comment:   Reviewed: OK with previous   MCHC 33.4  31.5 - 36.5 g/dL Final 08/30/2017 12:39 PM 79   RDW 13.3  10.0 - 15.0 % Final 08/30/2017 12:39 PM 79   Platelet Count 161  150 - 450 10e9/L Final 08/30/2017 12:39 PM 79   Diff Method     Final 08/30/2017 12:39 PM 79   Automated Method   % Neutrophils 67.4   % Final 08/30/2017 12:39 PM 79   % Lymphocytes 22.6   % Final 08/30/2017 12:39 PM 79   % Monocytes 8.8   % Final 08/30/2017 12:39 PM 79   % Eosinophils 1.1   % Final 08/30/2017 12:39 PM 79   % Basophils 0.1   % Final 08/30/2017 12:39 PM 79   Absolute Neutrophil 5.0  1.6 - 8.3 10e9/L Final 08/30/2017 12:39 PM 79   Absolute Lymphocytes 1.7  0.8 - 5.3 10e9/L Final 08/30/2017 12:39 PM 79   Absolute Monocytes 0.7  0.0 - 1.3 10e9/L Final 08/30/2017 12:39 PM 79   Absolute Eosinophils 0.1  0.0 - 0.7 10e9/L Final 08/30/2017 12:39 PM 79   Absolute Basophils 0.0  0.0 - 0.2 10e9/L Final 08/30/2017 12:39 PM 79     Component Value Flag Ref Range Units Status Collected Lab   TSH 4.42 (H) 0.40 - 4.00 mU/L Final 08/30/2017 12:39 PM 65     Component Value Flag Ref Range Units Status Collected Lab   T4 Free 1.10  0.76 - 1.46 ng/dL Final 08/30/2017 12:39 PM 65     Dennys Johnson MD, MPH  Clinical Sleep Medicine    Total time spent with patient: 40 min. Over >50% of the time was spent for face to face counseling, education, and evaluation.

## 2018-06-26 NOTE — NURSING NOTE
"Chief Complaint   Patient presents with     RECHECK     f/u ASV, mask refit       Initial /72  Pulse 79  Resp 21  Ht 1.651 m (5' 5\")  Wt 78.9 kg (174 lb)  SpO2 94%  BMI 28.96 kg/m2 Estimated body mass index is 28.96 kg/(m^2) as calculated from the following:    Height as of this encounter: 1.651 m (5' 5\").    Weight as of this encounter: 78.9 kg (174 lb).    Medication Reconciliation: complete    Damaris Guerra LPN/HALIMA    DME: Y  "

## 2018-06-26 NOTE — MR AVS SNAPSHOT
After Visit Summary   6/26/2018    Cielo Aguillon    MRN: [id number]           Patient Information     Date Of Birth          1940        Visit Information        Provider Department      6/26/2018 2:00 PM Dennys Johnson MD [facility]        Today's Diagnoses     Complex sleep apnea syndrome    -  1    Permanent atrial fibrillation (H)        Pulmonary hypertension        GREEN (dyspnea on exertion)          Care Instructions          Your BMI is Body mass index is 28.96 kg/(m^2).  Weight management is a personal decision.  If you are interested in exploring weight loss strategies, the following discussion covers the approaches that may be successful. Body mass index (BMI) is one way to tell whether you are at a healthy weight, overweight, or obese. It measures your weight in relation to your height.  A BMI of 18.5 to 24.9 is in the healthy range. A person with a BMI of 25 to 29.9 is considered overweight, and someone with a BMI of 30 or greater is considered obese. More than two-thirds of American adults are considered overweight or obese.  Being overweight or obese increases the risk for further weight gain. Excess weight may lead to heart disease and diabetes.  Creating and following plans for healthy eating and physical activity may help you improve your health.  Weight control is part of healthy lifestyle and includes exercise, emotional health, and healthy eating habits. Careful eating habits lifelong are the mainstay of weight control. Though there are significant health benefits from weight loss, long-term weight loss with diet alone may be very difficult to achieve- studies show long-term success with dietary management in less than 10% of people. Attaining a healthy weight may be especially difficult to achieve in those with severe obesity. In some cases, medications, devices and surgical management might be considered.  What can you do?  If you are overweight  or obese and are interested in methods for weight loss, you should discuss this with your provider.     Consider reducing daily calorie intake by 500 calories.     Keep a food journal.     Avoiding skipping meals, consider cutting portions instead.    Diet combined with exercise helps maintain muscle while optimizing fat loss. Strength training is particularly important for building and maintaining muscle mass. Exercise helps reduce stress, increase energy, and improves fitness. Increasing exercise without diet control, however, may not burn enough calories to loose weight.       Start walking three days a week 10-20 minutes at a time    Work towards walking thirty minutes five days a week     Eventually, increase the speed of your walking for 1-2 minutes at time    In addition, we recommend that you review healthy lifestyles and methods for weight loss available through the National Institutes of Health patient information sites:  http://win.niddk.nih.gov/publications/index.htm    And look into health and wellness programs that may be available through your health insurance provider, employer, local community center, or omar club.    Weight management plan: Patient was referred to their PCP to discuss a diet and exercise plan.     Your blood pressure was checked while you were in clinic today.  Please read the guidelines below about what these numbers mean and what you should do about them.  Your systolic blood pressure is the top number.  This is the pressure when the heart is pumping.  Your diastolic blood pressure is the bottom number.  This is the pressure in between beats.  If your systolic blood pressure is less than 120 and your diastolic blood pressure is less than 80, then your blood pressure is normal. There is nothing more that you need to do about it  If your systolic blood pressure is 120-139 or your diastolic blood pressure is 80-89, your blood pressure may be higher than it should be.  You should  have your blood pressure re-checked within a year by a primary care provider.  If your systolic blood pressure is 140 or greater or your diastolic blood pressure is 90 or greater, you may have high blood pressure.  High blood pressure is treatable, but if left untreated over time it can put you at risk for heart attack, stroke, or kidney failure.  You should have your blood pressure re-checked by a primary care provider within the next four weeks.    1. CPAP-  WHAT DOES IT DO AND HOW CAN I LEARN TO WEAR IT?                               BEFORE I START, CAN I WATCH A MOVIE TO GET A PLAN ON HOW TO USE CPAP?  https://www.[employer].com/watch?p=l3N82ui134H      Continuous positive airway pressure, or CPAP, is the most effective treatment for obstructive sleep apnea. It works by blowing room air, through a mask, to hold your throat open. A decision to use CPAP is a major step forward in the pursuit of a healthier life. The successful use of CPAP will help you breathe easier, sleep better and live healthier. You can choose CPAP equipment from any durable medical equipment provider that meets your needs.  Using CPAP can be a positive experience if you keep these [place] points in mind:  1. Commitment  CPAP is not a quick fix for your problem. It involves a long-term commitment to improve your sleep and your health.    2. Communication  Stay in close communication with both your sleep doctor and your CPAP supplier. Ask lots of questions and seek help when you need it.    3. Consistency  Use CPAP all night, every night and for every nap. You will receive the maximum health benefits from CPAP when you use it every time that you sleep. This will also make it easier for your body to adjust to the treatment.    4. Correction  The first machine and mask that you try may not be the best ones for you. Work with your sleep doctor and your CPAP supplier to make corrections to your equipment selection. Ask about trying a different type of  "machine or mask if you have ongoing problems. Make sure that your mask is a good fit and learn to use your equipment properly.    5. Challenge  Tell a family member or close friend to ask you each morning if you used your CPAP the previous night. Have someone to challenge you to give it your best effort.    6. Connection   Your adjustment to CPAP will be easier if you are able to connect with others who use the same treatment. Ask your sleep doctor if there is a support group in your area for people who have sleep apnea, or look for one on the Internet.  7. Comfort   Increase your level of comfort by using a saline spray, decongestant or heated humidifier if CPAP irritates your nose, mouth or throat. Use your unit's \"ramp\" setting to slowly get used to the air pressure level. There may be soft pads you can buy that will fit over your mask straps. Look on www.CPAP.com for accessories that can help make CPAP use more comfortable.  8. Cleaning   Clean your mask, tubing and headgear on a regular basis. Put this time in your schedule so that you don't forget to do it. Check and replace the filters for your CPAP unit and humidifier.    9. Completion   Although you are never finished with CPAP therapy, you should reward yourself by celebrating the completion of your first month of treatment. Expect this first month to be your hardest period of adjustment. It will involve some trial and error as you find the machine, mask and pressure settings that are right for you.    10. Continuation  After your first month of treatment, continue to make a daily commitment to use your CPAP all night, every night and for every nap.    CPAP-Tips to starting with success:  Begin using your CPAP for short periods of time during the day while you watch TV or read.    Use CPAP every night and for every nap. Using it less often reduces the health benefits and makes it harder for your body to get used to it.    Make small adjustments to your " mask, tubing, straps and headgear until you get the right fit. Tightening the mask may actually worsen the leak.  If it leaves significant marks on your face or irritates the bridge of your nose, it may not be the best mask for you.  Speak with the person who supplied the mask and consider trying other masks. Insurances will allow you to try different masks during the first month of starting CPAP.  Insurance also covers a new mask, hose and filter about every 6 months.    Use a saline nasal spray to ease mild nasal congestion. Neti-Pot or saline nasal rinses may also help. Nasal gel sprays can help reduce nasal dryness.  Biotene mouthwash can be helpful to protect your teeth if you experience frequent dry mouth.  Dry mouth may be a sign of air escaping out of your mouth or out of the mask in the case of a full face mask.  Speak with your provider if you expect that is the case.     Take a nasal decongestant to relieve more severe nasal or sinus congestion.  Do not use Afrin (oxymetazoline) nasal spray more than 3 days in a row.  Speak with your sleep doctor if your nasal congestion is chronic.    Use a heated humidifier that fits your CPAP model to enhance your breathing comfort. Adjust the heat setting up if you get a dry nose or throat, down if you get condensation in the hose or mask.  Position the CPAP lower than you so that any condensation in the hose drains back into the machine rather than towards the mask.    Try a system that uses nasal pillows if traditional masks give you problems.    Clean your mask, tubing and headgear once a week. Make sure the equipment dries fully.    Regularly check and replace the filters for your CPAP unit and humidifier.    Work closely with your sleep provider and your CPAP supplier to make sure that you have the machine, mask and air pressure setting that works best for you. It is better to stop using it and call your provider to solve problems than to lay awake all night  frustrated with the device.    Daytime naps are not advised, but use the PAP device if taking naps. Many insurances require that we prove you are using the PAP device at least 4 hours on at least 70% of nights over a 30 day period. We have 90 days to meet those criteria.    You can get new supplies (mask, hose and filter) for your device every 3-6 months (covered by insurance). You do not need to get supplies that often, but they are available if you would like them. You may exchange the mask once within the first month if you feel the initial mask does not fit well. Please, contact your medical equipment provider for equipment issues.    Please let me know if you have any snoring, daytime sleepiness, or poor sleep quality. We will want to make sure your PAP device is adequately treating your condition.    There is a website called CPAP.com that has accessories that may make CPAP use easier. Please visit it at your convenience.    Please, schedule sleep study and follow up visit with the nurse (she will coming into the room and meet with you). Use sleeping aid only if needed during the night of the study.    Thank you!    Dennys Johnson MD, MPH  Clinical Sleep and Occupational / Environmental Medicine              Follow-ups after your visit        Your next 10 appointments already scheduled     Jul 09, 2018  1:45 PM CDT   [facility]ticoagulation Visit with [facility]   [facility]    [address]  [facility] [address] [phone]            Aug 01, 2018  2:45 PM CDT   [facility] EP RETURN with Vinnie Bateman MD   [facility] ([facility])    [address]  [facility] [id number] [phone]              Future tests that were ordered for you today     Open Future Orders        Priority Expected Expires Ordered    Comprehensive Sleep Study Routine  12/23/2018  "6/26/2018    ABG-Blood Gas Arterial ([place] / [place]) Routine  8/25/2018 6/26/2018            Who to contact     If you have questions or need follow up information about today's clinic visit or your schedule please contact [facility] directly at [phone].  Normal or non-critical lab and imaging results will be communicated to you by MyChart, letter or phone within 4 business days after the clinic has received the results. If you do not hear from us within 7 days, please contact the clinic through MyChart or phone. If you have a critical or abnormal lab result, we will notify you by phone as soon as possible.  Submit refill requests through [employer] or call your pharmacy and they will forward the refill request to us. Please allow 3 business days for your refill to be completed.          Additional Information About Your Visit        Care EveryWhere ID     This is your Care EveryWhere ID. This could be used by other organizations to access your [facility] medical records  [id number]        Your Vitals Were     Pulse Respirations Height Pulse Oximetry BMI (Body Mass Index)       79 21 1.651 m (5' 5\") 94% 28.96 kg/m2        Blood Pressure from Last 3 Encounters:   06/26/18 129/72   05/03/18 118/68   04/27/18 119/71    Weight from Last 3 Encounters:   06/26/18 78.9 kg (174 lb)   05/03/18 79.2 kg (174 lb 8 oz)   04/27/18 78.9 kg (174 lb)              We Performed the Following     Comprehensive DME          Today's Medication Changes          These changes are accurate as of 6/26/18  2:35 PM.  If you have any questions, ask your nurse or doctor.               Start taking these medicines.        Dose/Directions    zolpidem 5 MG tablet   Commonly known as:  AMBIEN   Used for:  Permanent atrial fibrillation (H), Complex sleep apnea syndrome, Pulmonary hypertension, GREEN (dyspnea on exertion)        Take 1/2 tablet by mouth 15 minutes prior to sleep, for Sleep Study   Quantity:  1 tablet "   Refills:  0         These medicines have changed or have updated prescriptions.        Dose/Directions    citalopram 10 MG tablet   Commonly known as:  celeXA   This may have changed:  how much to take   Used for:  Major depressive disorder, recurrent episode, moderate (H), MARY (generalized anxiety disorder)        Dose:  20 mg   Take 2 tablets (20 mg) by mouth daily   Quantity:  180 tablet   Refills:  1            Where to get your medicines      Some of these will need a paper prescription and others can be bought over the counter.  Ask your nurse if you have questions.     Bring a paper prescription for each of these medications     zolpidem 5 MG tablet                Primary Care Provider Office Phone # Fax #    Simin Lopez [phone]       [address] [place] [address]        Equal Access to Services     ANNELISE CHAVEZ : Ede parekho Sosue, waaxda luqadaha, qaybta kaalmada adeegyagricel, yaneth martin. So [facility] [phone].    ATENCIÓN: Si habla español, tiene a duran disposición servicios gratuitos de asistencia lingüística. [facility] [phone].    We comply with applicable federal civil rights laws and [place] laws. We do not discriminate on the basis of race, color, national origin, age, disability, sex, sexual orientation, or gender identity.            Thank you!     Thank you for [place] [facility]  [place] your [place]. Our goal is always to provide you with excellent care. Hearing back from our patients is one way we can continue to improve our services. Please take a few minutes to complete the written survey that you may receive in the mail after your visit with us. Thank you!             Your Updated Medication List - Protect others around you: Learn how to safely use, store and throw away your medicines at www.disposemymeds.org.          This list is accurate as of 6/26/18  2:35 PM.  Always use your most recent  med list.                   Brand Name Dispense Instructions for use Diagnosis    amLODIPine 5 MG tablet    NORVASC    90 tablet    Take 1 tablet (5 mg) by mouth daily    Essential hypertension, benign       citalopram 10 MG tablet    celeXA    180 tablet    Take 2 tablets (20 mg) by mouth daily    Major depressive disorder, recurrent episode, moderate (H), MARY (generalized anxiety disorder)       diazepam 5 MG tablet    VALIUM    40 tablet    TAKE 1/2 TO 1 TABLET BY MOUTH EVERY 8 HOURS AS NEEDED    MARY (generalized anxiety disorder)       levothyroxine 50 MCG tablet    SYNTHROID/LEVOTHROID    90 tablet    TAKE 1 TABLET (50 MCG) BY MOUTH DAILY    Acquired hypothyroidism       metoprolol tartrate 100 MG tablet    LOPRESSOR    180 tablet    TAKE 1 TABLET (100MG) BY MOUTH 2 TIMES DAILY    Essential hypertension, benign, ASCVD (arteriosclerotic cardiovascular disease)       order for DME     1 Bottle    Oxygen 3 Li/min at night bled into the PAP device    Nocturnal hypoxemia, GREEN (dyspnea on exertion), Pulmonary hypertension, Complex sleep apnea syndrome, Permanent atrial fibrillation (H)       rosuvastatin 10 MG tablet    CRESTOR    90 tablet    TAKE 1 TABLET (10 MG) BY MOUTH DAILY    Hyperlipidemia LDL goal <70, Coronary artery disease involving native coronary artery of native heart without angina pectoris       traMADol 50 MG tablet    ULTRAM    50 tablet    TAKE ONE TABLET BY MOUTH EVERY 8 HOURS AS NEEDED FOR MODERATE PAIN    Chronic left-sided low back pain with left-sided sciatica       VITAMIN D (CHOLECALCIFEROL) PO      Take 1,000 Units by mouth At Bedtime        warfarin 5 MG tablet    COUMADIN    108 tablet    TAKE 1&1/2 TABLETS (7.5MG) BY MOUTH ONCE DAILY ON TUESDAY & SATURDAY THEN 1 TABLET (5MG) ALL THE OTHER DAYS OF THE WEEK    Chronic atrial fibrillation (H), Long term current use of anticoagulant therapy       zolpidem 5 MG tablet    AMBIEN    1 tablet    Take 1/2 tablet by mouth 15 minutes prior to  sleep, for Sleep Study    Permanent atrial fibrillation (H), Complex sleep apnea syndrome, Pulmonary hypertension, GREEN (dyspnea on exertion)

## 2018-06-26 NOTE — PATIENT INSTRUCTIONS
Your BMI is Body mass index is 28.96 kg/(m^2).  Weight management is a personal decision.  If you are interested in exploring weight loss strategies, the following discussion covers the approaches that may be successful. Body mass index (BMI) is one way to tell whether you are at a healthy weight, overweight, or obese. It measures your weight in relation to your height.  A BMI of 18.5 to 24.9 is in the healthy range. A person with a BMI of 25 to 29.9 is considered overweight, and someone with a BMI of 30 or greater is considered obese. More than two-thirds of American adults are considered overweight or obese.  Being overweight or obese increases the risk for further weight gain. Excess weight may lead to heart disease and diabetes.  Creating and following plans for healthy eating and physical activity may help you improve your health.  Weight control is part of healthy lifestyle and includes exercise, emotional health, and healthy eating habits. Careful eating habits lifelong are the mainstay of weight control. Though there are significant health benefits from weight loss, long-term weight loss with diet alone may be very difficult to achieve- studies show long-term success with dietary management in less than 10% of people. Attaining a healthy weight may be especially difficult to achieve in those with severe obesity. In some cases, medications, devices and surgical management might be considered.  What can you do?  If you are overweight or obese and are interested in methods for weight loss, you should discuss this with your provider.     Consider reducing daily calorie intake by 500 calories.     Keep a food journal.     Avoiding skipping meals, consider cutting portions instead.    Diet combined with exercise helps maintain muscle while optimizing fat loss. Strength training is particularly important for building and maintaining muscle mass. Exercise helps reduce stress, increase energy, and improves  fitness. Increasing exercise without diet control, however, may not burn enough calories to loose weight.       Start walking three days a week 10-20 minutes at a time    Work towards walking thirty minutes five days a week     Eventually, increase the speed of your walking for 1-2 minutes at time    In addition, we recommend that you review healthy lifestyles and methods for weight loss available through the National Institutes of Health patient information sites:  http://win.niddk.nih.gov/publications/index.htm    And look into health and wellness programs that may be available through your health insurance provider, employer, local community center, or omar club.    Weight management plan: Patient was referred to their PCP to discuss a diet and exercise plan.     Your blood pressure was checked while you were in clinic today.  Please read the guidelines below about what these numbers mean and what you should do about them.  Your systolic blood pressure is the top number.  This is the pressure when the heart is pumping.  Your diastolic blood pressure is the bottom number.  This is the pressure in between beats.  If your systolic blood pressure is less than 120 and your diastolic blood pressure is less than 80, then your blood pressure is normal. There is nothing more that you need to do about it  If your systolic blood pressure is 120-139 or your diastolic blood pressure is 80-89, your blood pressure may be higher than it should be.  You should have your blood pressure re-checked within a year by a primary care provider.  If your systolic blood pressure is 140 or greater or your diastolic blood pressure is 90 or greater, you may have high blood pressure.  High blood pressure is treatable, but if left untreated over time it can put you at risk for heart attack, stroke, or kidney failure.  You should have your blood pressure re-checked by a primary care provider within the next four weeks.    1. CPAP-  WHAT DOES IT  DO AND HOW CAN I LEARN TO WEAR IT?                               BEFORE I START, CAN I WATCH A MOVIE TO GET A PLAN ON HOW TO USE CPAP?  https://www.youtube.com/watch?p=i6T79bw923F      Continuous positive airway pressure, or CPAP, is the most effective treatment for obstructive sleep apnea. It works by blowing room air, through a mask, to hold your throat open. A decision to use CPAP is a major step forward in the pursuit of a healthier life. The successful use of CPAP will help you breathe easier, sleep better and live healthier. You can choose CPAP equipment from any durable medical equipment provider that meets your needs.  Using CPAP can be a positive experience if you keep these [place] points in mind:  1. Commitment  CPAP is not a quick fix for your problem. It involves a long-term commitment to improve your sleep and your health.    2. Communication  Stay in close communication with both your sleep doctor and your CPAP supplier. Ask lots of questions and seek help when you need it.    3. Consistency  Use CPAP all night, every night and for every nap. You will receive the maximum health benefits from CPAP when you use it every time that you sleep. This will also make it easier for your body to adjust to the treatment.    4. Correction  The first machine and mask that you try may not be the best ones for you. Work with your sleep doctor and your CPAP supplier to make corrections to your equipment selection. Ask about trying a different type of machine or mask if you have ongoing problems. Make sure that your mask is a good fit and learn to use your equipment properly.    5. Challenge  Tell a family member or close friend to ask you each morning if you used your CPAP the previous night. Have someone to challenge you to give it your best effort.    6. Connection   Your adjustment to CPAP will be easier if you are able to connect with others who use the same treatment. Ask your sleep doctor if there is a support group  "in your area for people who have sleep apnea, or look for one on the Internet.  7. Comfort   Increase your level of comfort by using a saline spray, decongestant or heated humidifier if CPAP irritates your nose, mouth or throat. Use your unit's \"ramp\" setting to slowly get used to the air pressure level. There may be soft pads you can buy that will fit over your mask straps. Look on www.CPAP.com for accessories that can help make CPAP use more comfortable.  8. Cleaning   Clean your mask, tubing and headgear on a regular basis. Put this time in your schedule so that you don't forget to do it. Check and replace the filters for your CPAP unit and humidifier.    9. Completion   Although you are never finished with CPAP therapy, you should reward yourself by celebrating the completion of your first month of treatment. Expect this first month to be your hardest period of adjustment. It will involve some trial and error as you find the machine, mask and pressure settings that are right for you.    10. Continuation  After your first month of treatment, continue to make a daily commitment to use your CPAP all night, every night and for every nap.    CPAP-Tips to starting with success:  Begin using your CPAP for short periods of time during the day while you watch TV or read.    Use CPAP every night and for every nap. Using it less often reduces the health benefits and makes it harder for your body to get used to it.    Make small adjustments to your mask, tubing, straps and headgear until you get the right fit. Tightening the mask may actually worsen the leak.  If it leaves significant marks on your face or irritates the bridge of your nose, it may not be the best mask for you.  Speak with the person who supplied the mask and consider trying other masks. Insurances will allow you to try different masks during the first month of starting CPAP.  Insurance also covers a new mask, hose and filter about every 6 months.    Use a " saline nasal spray to ease mild nasal congestion. Neti-Pot or saline nasal rinses may also help. Nasal gel sprays can help reduce nasal dryness.  Biotene mouthwash can be helpful to protect your teeth if you experience frequent dry mouth.  Dry mouth may be a sign of air escaping out of your mouth or out of the mask in the case of a full face mask.  Speak with your provider if you expect that is the case.     Take a nasal decongestant to relieve more severe nasal or sinus congestion.  Do not use Afrin (oxymetazoline) nasal spray more than 3 days in a row.  Speak with your sleep doctor if your nasal congestion is chronic.    Use a heated humidifier that fits your CPAP model to enhance your breathing comfort. Adjust the heat setting up if you get a dry nose or throat, down if you get condensation in the hose or mask.  Position the CPAP lower than you so that any condensation in the hose drains back into the machine rather than towards the mask.    Try a system that uses nasal pillows if traditional masks give you problems.    Clean your mask, tubing and headgear once a week. Make sure the equipment dries fully.    Regularly check and replace the filters for your CPAP unit and humidifier.    Work closely with your sleep provider and your CPAP supplier to make sure that you have the machine, mask and air pressure setting that works best for you. It is better to stop using it and call your provider to solve problems than to lay awake all night frustrated with the device.    Daytime naps are not advised, but use the PAP device if taking naps. Many insurances require that we prove you are using the PAP device at least 4 hours on at least 70% of nights over a 30 day period. We have 90 days to meet those criteria.    You can get new supplies (mask, hose and filter) for your device every 3-6 months (covered by insurance). You do not need to get supplies that often, but they are available if you would like them. You may  exchange the mask once within the first month if you feel the initial mask does not fit well. Please, contact your medical equipment provider for equipment issues.    Please let me know if you have any snoring, daytime sleepiness, or poor sleep quality. We will want to make sure your PAP device is adequately treating your condition.    There is a website called CPAP.com that has accessories that may make CPAP use easier. Please visit it at your convenience.    Please, schedule sleep study and follow up visit with the nurse (she will coming into the room and meet with you). Use sleeping aid only if needed during the night of the study.    Thank you!    Dennys Johnson MD, MPH  Clinical Sleep and Occupational / Environmental Medicine

## 2018-06-27 ENCOUNTER — TELEPHONE (OUTPATIENT)
Dept: SLEEP MEDICINE | Facility: CLINIC | Age: 78
End: 2018-06-27

## 2018-06-27 NOTE — TELEPHONE ENCOUNTER
Received orders from sleep MD to eval PAP device and offer mask fitting. Left message on both phone numbers on file requesting patient to return my call. Provided my direct phone # [phone].

## 2018-06-29 NOTE — TELEPHONE ENCOUNTER
Patient returned call. Appointment for mask fitting/supplies and eval of device scheduled for Monday 7/9/18 2:30 pm at [facility] with RT staff. Pt instructed to bring in all her equipment.

## 2018-07-09 ENCOUNTER — DOCUMENTATION ONLY (OUTPATIENT)
Dept: SLEEP MEDICINE | Facility: CLINIC | Age: 78
End: 2018-07-09

## 2018-07-09 ENCOUNTER — ANTICOAGULATION THERAPY VISIT (OUTPATIENT)
Dept: ANTICOAGULATION | Facility: CLINIC | Age: 78
End: 2018-07-09
Payer: MEDICARE

## 2018-07-09 DIAGNOSIS — Z79.01 LONG-TERM (CURRENT) USE OF ANTICOAGULANTS: ICD-10-CM

## 2018-07-09 DIAGNOSIS — I48.20 CHRONIC ATRIAL FIBRILLATION (H): ICD-10-CM

## 2018-07-09 LAB — INR POINT OF CARE: 2.8 (ref 0.86–1.14)

## 2018-07-09 PROCEDURE — 85610 PROTHROMBIN TIME: CPT | Mod: QW

## 2018-07-09 PROCEDURE — 36416 COLLJ CAPILLARY BLOOD SPEC: CPT

## 2018-07-09 PROCEDURE — 99207 ZZC NO CHARGE NURSE ONLY: CPT

## 2018-07-09 NOTE — PROGRESS NOTES
ANTICOAGULATION FOLLOW-UP CLINIC VISIT    Patient Name:  Cielo Aguillon  Date:  7/9/2018  Contact Type:  Face to Face    SUBJECTIVE:     Patient Findings     Positives Change in diet/appetite (Pt reports decreased appetite. ), No Problem Findings           OBJECTIVE    INR Protime   Date Value Ref Range Status   07/09/2018 2.8 (A) 0.86 - 1.14 Final       ASSESSMENT / PLAN  INR assessment THER    Recheck INR In: 2 WEEKS    INR Location Clinic      Anticoagulation Summary as of 7/9/2018     INR goal 2.0-3.0   Today's INR 2.8   Warfarin maintenance plan 7.5 mg (5 mg x 1.5) on Tue, Sat; 5 mg (5 mg x 1) all other days   Full warfarin instructions 7/10: 5 mg; 7/14: 2.5 mg; 7/17: 5 mg; 7/21: 2.5 mg; Otherwise 7.5 mg on Tue, Sat; 5 mg all other days   Weekly warfarin total 40 mg   Plan last modified Cindi Preston RN (5/9/2017)   Next INR check 7/23/2018   Priority INR   Target end date     Indications   Chronic atrial fibrillation (H) [I48.2]  Long-term (current) use of anticoagulants [Z79.01] [Z79.01]         Anticoagulation Episode Summary     INR check location     Preferred lab     Send INR reminders to [facility]    Comments       Anticoagulation Care Providers     Provider Role Specialty Phone number    Simin Lopez MD [facility] Internal Medicine [phone]            See the Encounter Report to view Anticoagulation Flowsheet and Dosing Calendar (Go to Encounters tab in chart review, and find the Anticoagulation Therapy Visit)    Dosage adjustment made based on physician directed care plan.    Cindi Preston, RN

## 2018-07-09 NOTE — PROGRESS NOTES
Patient seen at [facility] today. She arrives alone and brings in her Resmed ASV device for evaluation and refit for different style full face mask.  Machine inspected and functioning as expected. Settings are appropriate per CMN. Pt was fitted to Resmed Airfit F20 for Her medium full face mask. Reviewed cleaning and care of equipment and replacing supplies routinely. Pt received new mask and water chamber today. She opted to wait to order tubing until after upcoming sleep study. Pt reports she may need an oxygen adaptor. Pt has the option of Resmed oxy heated tubing. Pt encouraged to continue therapy.

## 2018-07-09 NOTE — MR AVS SNAPSHOT
Cielo Aguillon   7/9/2018 1:45 PM   Anticoagulation Therapy Visit    Description:  78 year old female   Provider:  [facility]   Department:  [facility]           INR as of 7/9/2018     Today's INR 2.8      Anticoagulation Summary as of 7/9/2018     INR goal 2.0-3.0   Today's INR 2.8   Full warfarin instructions 7/10: 5 mg; 7/14: 2.5 mg; 7/17: 5 mg; 7/21: 2.5 mg; Otherwise 7.5 mg on Tue, Sat; 5 mg all other days   Next INR check 7/23/2018    Indications   Chronic atrial fibrillation (H) [I48.2]  Long-term (current) use of anticoagulants [Z79.01] [Z79.01]         Your next Anticoagulation Clinic appointment(s)     Jul 23, 2018  1:45 PM CDT   [facility]ticoagulation [facility] with [facility]   [facility]    [address] [address]  [facility] [address] [phone]              Contact Numbers     [facility] Phone Numbers:  Anticoagulation Clinic Appointments : [phone]  Anticoagulation Nurse: [phone]         July 2018 Details    Sun Mon Tue Wed Thu Fri Sat     1               2               3               4               5               6               7                 8               9      5 mg   See details      10      5 mg         11      5 mg         12      5 mg         13      5 mg         14      2.5 mg           15      5 mg         16      5 mg         17      5 mg         18      5 mg         19      5 mg         20      5 mg         21      2.5 mg           22      5 mg         23            24               25               26               27               28                 29               30               31                    Date Details   07/09 This INR check       Date of next INR:  7/23/2018         How to take your warfarin dose     To take:  2.5 mg Take 0.5 of a 5 mg tablet.    To take:  5 mg Take 1 of the 5 mg tablets.

## 2018-07-24 ENCOUNTER — THERAPY VISIT (OUTPATIENT)
Dept: SLEEP MEDICINE | Facility: CLINIC | Age: 78
End: 2018-07-24
Payer: MEDICARE

## 2018-07-24 DIAGNOSIS — I27.20 PULMONARY HYPERTENSION (H): ICD-10-CM

## 2018-07-24 DIAGNOSIS — I48.21 PERMANENT ATRIAL FIBRILLATION (H): ICD-10-CM

## 2018-07-24 DIAGNOSIS — G47.39 COMPLEX SLEEP APNEA SYNDROME: ICD-10-CM

## 2018-07-24 DIAGNOSIS — R06.09 DOE (DYSPNEA ON EXERTION): ICD-10-CM

## 2018-07-24 PROCEDURE — 95811 POLYSOM 6/>YRS CPAP 4/> PARM: CPT | Performed by: FAMILY MEDICINE

## 2018-07-24 NOTE — MR AVS SNAPSHOT
After Visit Summary   7/24/2018    Cielo Aguillon    MRN: [id number]           Patient Information     Date Of Birth          1940        Visit Information        Provider Department      7/24/2018 8:30 PM BED 3  SLEEP [facility]        Today's Diagnoses     GREEN (dyspnea on exertion)        Pulmonary hypertension        Complex sleep apnea syndrome        Permanent atrial fibrillation (H)           Follow-ups after your visit        Your next 10 appointments already scheduled     Jul 30, 2018  2:45 PM CDT   Anticoagulation Visit with [facility]   [facility]    [address]  [facility] [id number] [phone]            Aug 01, 2018  2:45 PM CDT   [facility] EP RETURN with Vinnie Bateman MD   [facility] ([facility])    [address]  [facility] [id number] [phone]            Aug 17, 2018 11:30 AM CDT   Return Sleep Patient with Dennys Johnson MD   [facility]    [address]  [facility] [id number] [phone]              Who to contact     If you have questions or need follow up information about today's clinic visit or your schedule please contact [facility] directly at [phone].  Normal or non-critical lab and imaging results will be communicated to you by MyChart, letter or phone within 4 business days after the clinic has received the results. If you do not hear from us within 7 days, please contact the clinic through MyChart or phone. If you have a critical or abnormal lab result, we will notify you by phone as soon as possible.  Submit refill requests through [facility] or call your pharmacy and they will forward the refill request to us. Please allow 3 business days for your refill to be completed.           Additional Information About Your Visit        Care EveryWhere ID     This is your Care EveryWhere ID. This could be used by other organizations to access your [place] medical records  [id number]         Blood Pressure from Last 3 Encounters:   06/26/18 129/72   05/03/18 118/68   04/27/18 119/71    Weight from Last 3 Encounters:   06/26/18 78.9 kg (174 lb)   05/03/18 79.2 kg (174 lb 8 oz)   04/27/18 78.9 kg (174 lb)              We Performed the Following     Comprehensive Sleep Study          Today's Medication Changes          These changes are accurate as of 7/24/18 11:59 PM.  If you have any questions, ask your nurse or doctor.               These medicines have changed or have updated prescriptions.        Dose/Directions    citalopram 10 MG tablet   Commonly known as:  celeXA   This may have changed:  how much to take   Used for:  Major depressive disorder, recurrent episode, moderate (H), MARY (generalized anxiety disorder)        Dose:  20 mg   Take 2 tablets (20 mg) by mouth daily   Quantity:  180 tablet   Refills:  1                Primary Care Provider Office Phone # Fax #    Simin Lopez [phone]       [address] [address]        Equal Access to Services     ANNELISE CHAVEZ AH: Hadii jamari parekho Sosue, waaxda luqadaha, qaybta kaalmada adeangelicyada, yaneth martin. So [facility] [phone].    ATENCIÓN: Si habla español, tiene a duran disposición servicios gratuitos de asistencia lingüística. Gurwinder al [phone].    We comply with applicable federal civil rights laws and [place] laws. We do not discriminate on the basis of race, color, national origin, age, disability, sex, sexual orientation, or gender identity.            Thank you!     Thank you for choosing [facility]  for your care. Our goal is always to provide you with excellent care. Hearing back from our patients is one way we can continue to improve our  services. Please take a few minutes to complete the written survey that you may receive in the mail after your visit with us. Thank you!             Your Updated Medication List - Protect others around you: Learn how to safely use, store and throw away your medicines at www.disposemymeds.org.          This list is accurate as of 7/24/18 11:59 PM.  Always use your most recent med list.                   Brand Name Dispense Instructions for use Diagnosis    amLODIPine 5 MG tablet    NORVASC    90 tablet    Take 1 tablet (5 mg) by mouth daily    Essential hypertension, benign       citalopram 10 MG tablet    celeXA    180 tablet    Take 2 tablets (20 mg) by mouth daily    Major depressive disorder, recurrent episode, moderate (H), MARY (generalized anxiety disorder)       diazepam 5 MG tablet    VALIUM    40 tablet    TAKE 1/2 TO 1 TABLET BY MOUTH EVERY 8 HOURS AS NEEDED    MARY (generalized anxiety disorder)       levothyroxine 50 MCG tablet    SYNTHROID/LEVOTHROID    90 tablet    TAKE 1 TABLET (50 MCG) BY MOUTH DAILY    Acquired hypothyroidism       metoprolol tartrate 100 MG tablet    LOPRESSOR    180 tablet    TAKE 1 TABLET (100MG) BY MOUTH 2 TIMES DAILY    Essential hypertension, benign, ASCVD (arteriosclerotic cardiovascular disease)       order for DME     1 Bottle    Oxygen 3 Li/min at night bled into the PAP device    Nocturnal hypoxemia, GREEN (dyspnea on exertion), Pulmonary hypertension, Complex sleep apnea syndrome, Permanent atrial fibrillation (H)       rosuvastatin 10 MG tablet    CRESTOR    90 tablet    TAKE 1 TABLET (10 MG) BY MOUTH DAILY    Hyperlipidemia LDL goal <70, Coronary artery disease involving native coronary artery of native heart without angina pectoris       traMADol 50 MG tablet    ULTRAM    50 tablet    TAKE ONE TABLET BY MOUTH EVERY 8 HOURS AS NEEDED FOR MODERATE PAIN    Chronic left-sided low back pain with left-sided sciatica       VITAMIN D (CHOLECALCIFEROL) PO      Take 1,000 Units by  mouth At Bedtime        warfarin 5 MG tablet    COUMADIN    108 tablet    TAKE 1&1/2 TABLETS (7.5MG) BY MOUTH ONCE DAILY ON TUESDAY & SATURDAY THEN 1 TABLET (5MG) ALL THE OTHER DAYS OF THE WEEK    Chronic atrial fibrillation (H), Long term current use of anticoagulant therapy       zolpidem 5 MG tablet    AMBIEN    1 tablet    Take 1/2 tablet by mouth 15 minutes prior to sleep, for Sleep Study    Permanent atrial fibrillation (H), Complex sleep apnea syndrome, Pulmonary hypertension, GREEN (dyspnea on exertion)

## 2018-07-25 NOTE — PROGRESS NOTES
Completed a all night titration PSG per provider order.    Preliminary AHI >30.  A final therapeutic PAP pressure was not achieved.    Supine REM was not seen on therapeutic pressure.    Patient reports feeling refreshed in AM.

## 2018-07-26 NOTE — PROCEDURES
" SLEEP STUDY INTERPRETATION  TITRATION STUDY      Patient: FELICITAS LITTLEJOHN  YOB: 1940  Study Date: 7/24/2018  MRN: [id number]  Referring Provider: MD Lopez Mary  Ordering Provider: Dennys Johnson MD, MPH    Indications for Polysomnography: The patient is a 78 year old female who is 5' 5\" and weighs 174.0 lbs. Her BMI is 29.0 kg/m2, Una sleepiness scale 2, and neck circumference is 40.0 cm. Relevant medical history includes cystocele, rectocele, hypertension, peptic ulcer disease, chornic rhinitis, Lichenification and Lichen Simplex Chronicus, fatty liver, hyperlipidemia, chronic back pain, non-obstructive coronary artery disease, mild to moderate pulmonary hypertension, generalized anxiety disorder, major depressive disorder, chronic atrial fibrillation, hypothyroidism, previously diagnosed obstructive sleep apnea (BETHANY) as well as complex sleep apnea. A polysomnogram (PSG) with PAP titration was performed to correct for obstructive sleep apnea (BETHANY), periodic limb movements (PLMs), central sleep apnea (CSA), and/or sleep associated hypoventilation/hypoxemia.    Polysomnogram Data: A full night polysomnogram (PSG) recorded the standard physiologic parameters including EEG, EOG, EMG, ECG, nasal and oral airflow. Respiratory parameters of chest and abdominal movements were recorded with respiratory inductance plethysmography. Oxygen saturation was recorded by pulse oximetry. Hypopnea scoring rule used: 1B 4%.    Treatment PSG  Sleep Architecture: Abnormal sleep architecture with sleep fragmentation and decreased sleep efficiency. Prolonged sleep latency noted. No REM sleep captured.  The total recording time of the polysomnogram was 404.5 minutes. The total sleep time was 272.0 minutes. Sleep latency was increased at 43.7 minutes with the use of a sleep aid (zolpidem 2.5 mg). Arousal index was increased at 66.4 arousals per hour. Sleep efficiency was decreased at 67.2%. Wake after sleep onset was " 87.5 minutes. The patient spent 27.6% of total sleep time in Stage N1, 72.4% in Stage N2, 0.0% in Stage N3, and 0.0% in REM. No REM sleep was captured during the PSG sleep study.    Respiration: Adequate PAP titration with ASV at EVV of 8/ PS min of 1/ PS max of 17 cmH2O (residual AHI was 0.5 events per hour). No REM sleep or REM supine captured at this final PAP settings. Sleep associated hypoxemia as well as snoring resolved at final PAP settings.    The patient was titrated at pressures ranging from ASV 6/0/19 cmH2O up to ASV 8/1/17 cmH2O. The final pressure achieved was ASV 8/1/17 cmH2O with a residual AHI of 0.5 events per hour. The patient had no REM sleep     This titration was considered adequate (residual AHI with 75% decrease or above constraints without REM supine sleep at final pressure). Final PAP pressures eliminate sleep associated hypoxemia with a baseline SpO2 of 92.5% and a SpO2 radha of 81.5%.    Snoring - was reported as mild, breakthrough snoring, but this resolved at final PAP pressures.    Respiratory rate and pattern - was notable for normal respiratory rate and pattern.    Sustained Sleep Associated Hypoventilation - Transcutaneous carbon dioxide monitoring was used, however significant hypoventilation was not suggested by oximetry, or this was not present on TCM monitoring with a maximum TcCO2 change from 32.9 to 40.0 mmHg and 0 minutes at or greater than 55 mmHg.    Sleep Associated Hypoxemia - (Greater than 5 minutes O2 sat at or below 88%) was not present. Baseline oxygen saturation was 92.0%. Lowest oxygen saturation was 81.5%. Time spent less than or equal to 88% was 0.4 minutes. Time spent less than or equal to 89% was 1.5 minutes.    Movement Activity: Increased periodic limb activity with cortical arousal association noted. Otherwise, no abnormal nocturnal behaviors or bruxism noted.    Periodic Limb Activity - There were 317 PLMs during the entire study. The PLM index was 69.9  movements per hour. The PLM Arousal Index was 33.3 per hour.    REM EMG Activity - No REM sleep was obtained during the sleep study.    Nocturnal Behavior - Abnormal sleep related behaviors were not noted during/arising out of NREM / REM sleep.    Bruxism - None apparent.    Cardiac Summary: Atrial fibrillation with frequent PVCs observed.  The average pulse rate was 53.9 bpm. The minimum pulse rate was 29.0 bpm while the maximum pulse rate was 184.1 bpm. Arrhythmias were noted. An irregularly irregular QRS complexes with no correlated P wave suggestive of atrial fibrillation was noted. Frequent premature wide complex beats suggestive of premature ventricular contractions (PVCs) were noted.    Assessment:     The PSG sleep study demonstrated an adequate PAP titration with ASV at EVV of 8/ PS min of 1/ PS max of 17 cmH2O (residual AHI was 0.5 events per hour). No REM sleep or REM supine was captured at this final PAP settings. Sleep associated hypoxemia as well as snoring resolved at final PAP settings.    There was abnormal sleep architecture with sleep fragmentation and decreased sleep efficiency noted throughout the sleep study. Prolonged sleep latency was noted. No REM sleep was captured during the study.    There was increased periodic limb activity with cortical arousal association noted. Otherwise, no abnormal nocturnal behaviors or bruxism was observed.    Lastly, atrial fibrillation with frequent PVCs was present throughout the PSG sleep study.    Recommendations:    Based on the PSG titration study, the patient will be placed on ASV PAP therapy with EPAP min of 8 cmH2O, EPAP max of 10 cmH2O, PS min of 0 cmH2O, PS max of 20 cmH2O. Recommend clinical follow up with sleep management team, for coaching and review of effectiveness and compliance measures.    Advice regarding the risks of drowsy driving.    Suggest optimizing sleep schedule and avoiding sleep deprivation.    Pharmacologic therapy should be used  for management of restless legs syndrome only if present and clinically indicated and not based on the presence of periodic limb movements alone.    Diagnostic Codes:     Obstructive Sleep Apnea G47.33    Sleep Hypoxemia G47.36     Cheyne?Redmond Breathing Pattern R06.3    Periodic Limb Movement Disorder G47.61    7/24/2018 [place] Titration Sleep Study (174.0 lbs) - Treatment was titrated to a pressure of ASV 8/1/17 with an AHI of 0.5. Time Spent in REM supine at this pressure was -.       ______________________________________________________________   Electronically Signed By: Dennys Johnson MD, MPH (07/26/2018)         Range(%) Time in range (min)   0.0 - 89.0 1.5   0.0 - 88.0 0.4         Stage Min(mm Hg) Max(mm Hg)   Wake 32.9 39.7   NREM(1+2+3) 36.4 40.0   REM - -         Range(mmHg) Time in range (min)   55.0 - 100.0 -   Excluded data <20.0 & >65.0 24.5

## 2018-07-27 DIAGNOSIS — F41.1 GAD (GENERALIZED ANXIETY DISORDER): Primary | ICD-10-CM

## 2018-07-27 DIAGNOSIS — F33.1 MAJOR DEPRESSIVE DISORDER, RECURRENT EPISODE, MODERATE (H): ICD-10-CM

## 2018-07-27 LAB — SLPCOMP: NORMAL

## 2018-07-27 NOTE — TELEPHONE ENCOUNTER
"Requested Prescriptions   Pending Prescriptions Disp Refills     citalopram (CELEXA) 20 MG tablet [Pharmacy Med Name: CITALOPRAM 20MG TAB 20 TAB] 90 tablet 1    Last Written Prescription Date:  01/23/2018  Last Fill Quantity: 180,  # refills: 01   Last office visit: 5/3/2018 with prescribing provider:     Future Office Visit:   Sig: TAKE 1 TABLET (20MG) BY MOUTH EVERY DAY. DIRECTION CHANGE 1/26/18    SSRIs Protocol Passed    7/27/2018 10:31 AM       Passed - Recent (12 mo) or future (30 days) visit within the authorizing provider's specialty    Patient had office visit in the last 12 months or has a visit in the next 30 days with authorizing provider or within the authorizing provider's specialty.  See \"Patient Info\" tab in inbasket, or \"Choose Columns\" in Meds & Orders section of the refill encounter.           Passed - Patient is age 18 or older       Passed - No active pregnancy on record       Passed - No positive pregnancy test in last 12 months        "

## 2018-07-30 ENCOUNTER — ANTICOAGULATION THERAPY VISIT (OUTPATIENT)
Dept: ANTICOAGULATION | Facility: CLINIC | Age: 78
End: 2018-07-30
Payer: MEDICARE

## 2018-07-30 DIAGNOSIS — Z79.01 LONG-TERM (CURRENT) USE OF ANTICOAGULANTS: ICD-10-CM

## 2018-07-30 DIAGNOSIS — I48.20 CHRONIC ATRIAL FIBRILLATION (H): ICD-10-CM

## 2018-07-30 LAB — INR POINT OF CARE: 2.2 (ref 0.86–1.14)

## 2018-07-30 PROCEDURE — 85610 PROTHROMBIN TIME: CPT | Mod: QW

## 2018-07-30 PROCEDURE — 36416 COLLJ CAPILLARY BLOOD SPEC: CPT

## 2018-07-30 PROCEDURE — 99207 ZZC NO CHARGE NURSE ONLY: CPT

## 2018-07-30 NOTE — PROGRESS NOTES
ANTICOAGULATION FOLLOW-UP CLINIC VISIT    Patient Name:  Cielo Aguillon  Date:  7/30/2018  Contact Type:  Face to Face    SUBJECTIVE:     Patient Findings     Positives No Problem Findings           OBJECTIVE    INR Protime   Date Value Ref Range Status   07/30/2018 2.2 (A) 0.86 - 1.14 Final       ASSESSMENT / PLAN  INR assessment THER    Recheck INR In: 3 WEEKS    INR Location Clinic      Anticoagulation Summary as of 7/30/2018     INR goal 2.0-3.0   Today's INR 2.2   Warfarin maintenance plan 7.5 mg (5 mg x 1.5) on Tue, Sat; 5 mg (5 mg x 1) all other days   Full warfarin instructions 7.5 mg on Tue, Sat; 5 mg all other days   Weekly warfarin total 40 mg   No change documented Cindi Preston RN   Plan last modified Cindi Preston RN (5/9/2017)   Next INR check 8/20/2018   Priority INR   Target end date     Indications   Chronic atrial fibrillation (H) [I48.2]  Long-term (current) use of anticoagulants [Z79.01] [Z79.01]         Anticoagulation Episode Summary     INR check location     Preferred lab     Send INR reminders to [facility]    Comments       Anticoagulation Care Providers     Provider Role Specialty Phone number    Simin Lopez MD Responsible Internal Medicine [phone]            See the Encounter Report to view Anticoagulation Flowsheet and Dosing Calendar (Go to Encounters tab in chart review, and find the Anticoagulation Therapy Visit)    Dosage adjustment made based on physician directed care plan.    Cindi Preston RN

## 2018-07-30 NOTE — MR AVS SNAPSHOT
Cielo Aguillon   7/30/2018 2:45 PM   Anticoagulation Therapy Visit    Description:  78 year old female   Provider:  [facility]   Department:  [facility]           INR as of 7/30/2018     Today's INR 2.2      Anticoagulation Summary as of 7/30/2018     INR goal 2.0-3.0   Today's INR 2.2   Full warfarin instructions 7.5 mg on Tue, Sat; 5 mg all other days   Next INR check 8/20/2018    Indications   Chronic atrial fibrillation (H) [I48.2]  Long-term (current) use of anticoagulants [Z79.01] [Z79.01]         Your next Anticoagulation Clinic appointment(s)     Aug 20, 2018  1:15 PM CDT   Anticoagulation Visit with [facility]   [facility]    [address] [facility] [address]  [facility] [address] [phone]              Contact Numbers     [facility] Phone Numbers:  Anticoagulation Clinic Appointments : [phone]  Anticoagulation Nurse: [phone]         July 2018 Details    Sun Mon Tue Wed Thu Fri Sat     1               2               3               4               5               6               7                 8               9               10               11               12               13               14                 15               16               17               18               19               20               21                 22               23               24               25               26               27               28                 29               30      5 mg   See details      31      7.5 mg              Date Details   07/30 This INR check               How to take your warfarin dose     To take:  5 mg Take 1 of the 5 mg tablets.    To take:  7.5 mg Take 1.5 of the 5 mg tablets.           August 2018 Details    Sun Mon Tue Wed Thu Fri Sat        1      5 mg         2      5 mg         3      5 mg         4      7.5 mg           5      5 mg         6      5 mg         7      7.5  mg         8      5 mg         9      5 mg         10      5 mg         11      7.5 mg           12      5 mg         13      5 mg         14      7.5 mg         15      5 mg         16      5 mg         17      5 mg         18      7.5 mg           19      5 mg         20            21               22               23               24               25                 26               27               28               29               30               31                 Date Details   No additional details    Date of next INR:  8/20/2018         How to take your warfarin dose     To take:  5 mg Take 1 of the 5 mg tablets.    To take:  7.5 mg Take 1.5 of the 5 mg tablets.

## 2018-08-01 ENCOUNTER — OFFICE VISIT (OUTPATIENT)
Dept: CARDIOLOGY | Facility: CLINIC | Age: 78
End: 2018-08-01
Attending: NURSE PRACTITIONER
Payer: MEDICARE

## 2018-08-01 VITALS
DIASTOLIC BLOOD PRESSURE: 74 MMHG | HEIGHT: 65 IN | WEIGHT: 168.9 LBS | SYSTOLIC BLOOD PRESSURE: 118 MMHG | BODY MASS INDEX: 28.14 KG/M2 | HEART RATE: 68 BPM

## 2018-08-01 DIAGNOSIS — E78.5 HYPERLIPIDEMIA LDL GOAL <70: ICD-10-CM

## 2018-08-01 DIAGNOSIS — I48.21 PERMANENT ATRIAL FIBRILLATION (H): ICD-10-CM

## 2018-08-01 DIAGNOSIS — R06.09 DOE (DYSPNEA ON EXERTION): ICD-10-CM

## 2018-08-01 DIAGNOSIS — I25.10 CORONARY ARTERY DISEASE INVOLVING NATIVE CORONARY ARTERY OF NATIVE HEART WITHOUT ANGINA PECTORIS: ICD-10-CM

## 2018-08-01 DIAGNOSIS — G47.33 OSA (OBSTRUCTIVE SLEEP APNEA): ICD-10-CM

## 2018-08-01 PROCEDURE — 99214 OFFICE O/P EST MOD 30 MIN: CPT | Performed by: INTERNAL MEDICINE

## 2018-08-01 NOTE — PROGRESS NOTES
HPI and Plan:   See dictation    Orders Placed This Encounter   Procedures     Follow-Up with Cardiac Advanced Practice Provider       No orders of the defined types were placed in this encounter.      Medications Discontinued During This Encounter   Medication Reason     zolpidem (AMBIEN) 5 MG tablet Medication Reconciliation Clean Up         Encounter Diagnoses   Name Primary?     Persistent atrial fibrillation (H)      Hyperlipidemia LDL goal <70      Coronary artery disease involving native coronary artery of native heart without angina pectoris      GREEN (dyspnea on exertion)      BETHANY (obstructive sleep apnea)        CURRENT MEDICATIONS:  Current Outpatient Prescriptions   Medication Sig Dispense Refill     amLODIPine (NORVASC) 5 MG tablet Take 1 tablet (5 mg) by mouth daily 90 tablet 1     citalopram (CELEXA) 10 MG tablet Take 2 tablets (20 mg) by mouth daily 180 tablet 1     diazepam (VALIUM) 5 MG tablet TAKE 1/2 TO 1 TABLET BY MOUTH EVERY 8 HOURS AS NEEDED 40 tablet 1     levothyroxine (SYNTHROID/LEVOTHROID) 50 MCG tablet TAKE 1 TABLET (50 MCG) BY MOUTH DAILY 90 tablet 2     metoprolol tartrate (LOPRESSOR) 100 MG tablet TAKE 1 TABLET (100MG) BY MOUTH 2 TIMES DAILY 180 tablet 2     order for DME Oxygen 3 Li/min at night bled into the PAP device 1 Bottle 99     rosuvastatin (CRESTOR) 10 MG tablet TAKE 1 TABLET (10 MG) BY MOUTH DAILY 90 tablet 1     traMADol (ULTRAM) 50 MG tablet TAKE ONE TABLET BY MOUTH EVERY 8 HOURS AS NEEDED FOR MODERATE PAIN 50 tablet 2     VITAMIN D, CHOLECALCIFEROL, PO Take 1,000 Units by mouth At Bedtime        warfarin (COUMADIN) 5 MG tablet TAKE 1&1/2 TABLETS (7.5MG) BY MOUTH ONCE DAILY ON TUESDAY & SATURDAY THEN 1 TABLET (5MG) ALL THE OTHER DAYS OF THE WEEK (Patient taking differently: TAKE 1&1/2 TABLETS (7.5MG) BY MOUTH ONCE DAILY ON TUESDAY & SATURDAY THEN 1 TABLET (5MG) ALL THE OTHER DAYS OF THE WEEK OR AS DIRECTED) 108 tablet 1       ALLERGIES     Allergies   Allergen Reactions      Ace Inhibitors      Cough (Zestril)     Ambien [Zolpidem] Other (See Comments)     Aspirin      hx bleeding ulcers     Hydrochlorothiazide [Hctz] Rash     Ibuprofen      hx bleeding ulcers     Amoxicillin Rash     Losartan Rash     Penicillins Rash       PAST MEDICAL HISTORY:  Past Medical History:   Diagnosis Date     Anxiety     related to travel     Arthritis      ASCVD (arteriosclerotic cardiovascular disease) 2/12    60-70% stenoses of OM2, D1, medical management     Atrial fibrillation (H)      Atrial flutter (H)      Coronary artery disease     2/2012- 50% mid Cfx, 60-70% prox OM2, 50-70% D1     Essential hypertension, benign      Fatty liver 5/10    mild fibrosis on biopsy     Hypertriglyceridemia      Major depression      BETHANY (obstructive sleep apnea)      Other ventral hernia without mention of obstruction or gangrene      Peptic ulcer 2002     Peptic ulcer, unspecified site, unspecified as acute or chronic, without mention of hemorrhage, perforation, or obstruction 1983     Shortness of breath      Thyroid disease      Tubular adenoma 6/08       PAST SURGICAL HISTORY:  Past Surgical History:   Procedure Laterality Date     APPENDECTOMY       C NONSPECIFIC PROCEDURE      Appendectomy     C NONSPECIFIC PROCEDURE      Tonsillectomy     C NONSPECIFIC PROCEDURE      Tubal ligation     C NONSPECIFIC PROCEDURE  11/01    Shave bone spurs from left foot     COLONOSCOPY  9/1/2016    Dr. Camejo [facility]     COLONOSCOPY N/A 9/1/2016    Procedure: COMBINED COLONOSCOPY, SINGLE OR MULTIPLE BIOPSY/POLYPECTOMY BY BIOPSY;  Surgeon: Pillo Camejo MD;  Location:  GI     CORONARY ANGIOGRAPHY ADULT ORDER  2/13/2012    50% mid Cfx, 60-70% prox OM2, 50-70% D1     ESOPHAGOSCOPY, GASTROSCOPY, DUODENOSCOPY (EGD), COMBINED  9/1/2016    Dr. Camejo [facility]     ESOPHAGOSCOPY, GASTROSCOPY, DUODENOSCOPY (EGD), COMBINED N/A 9/1/2016    Procedure: COMBINED ESOPHAGOSCOPY, GASTROSCOPY, DUODENOSCOPY (EGD), BIOPSY SINGLE OR MULTIPLE;   Surgeon: Pillo Camejo MD;  Location:  GI     GYN SURGERY       HEART CATH RIGHT AND LEFT HEART CATH  04/06/2015    Mid LAD 50-60%, 1st Diagonal 70%, 1st OM 30%, 2nd OM 40-50%, review report for right heart cath results.        FAMILY HISTORY:  Family History   Problem Relation Age of Onset     Respiratory Father      emphysema--secon. to smoking     Alzheimer Disease Mother      Congenital Anomalies Mother      Osteoperosis Mother      Diabetes Maternal Aunt      Diabetes Other      cousins     Diabetes Other      cousin on mothers side of family     Colon Cancer No family hx of        SOCIAL HISTORY:  Social History     Social History     Marital status:      Spouse name: N/A     Number of children: 6     Years of education: N/A     Occupational History      [employer]     Social History Main Topics     Smoking status: Never Smoker     Smokeless tobacco: Never Used     Alcohol use 0.0 oz/week     0 Standard drinks or equivalent per week      Comment: occ - wine     Drug use: No     Sexual activity: Yes     Partners: Male     Birth control/ protection: Surgical      Comment: tubal     Other Topics Concern      Service No     Blood Transfusions Yes     1983     Caffeine Concern No     none     Occupational 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     working in a pharmacy     Hobby Hazards No     Sleep Concern Yes     Stress Concern Yes     Weight Concern No     Special Diet Yes     pt on warfarin     Back Care No     Exercise No     some exercise at night, walking      Bike Helmet No     Seat Belt Yes     Self-Exams No     Social History Narrative       Review of Systems:  Skin:  Negative       Eyes:  Positive for glasses hx of cataract surgery 1/17 and 2/17  ENT:  Positive for hearing loss;earache hearing aids in both ears, frequent sinus issues; pain in left ear  Respiratory:  Positive for dyspnea on exertion;sleep apnea;CPAP     Cardiovascular:    Positive for;heaviness;lightheadedness;edema;fatigue  lightheadedness when getting up quickly  Gastroenterology: Positive for excessive gas or bloating;poor appetite hx of ulcers  Genitourinary:  Negative      Musculoskeletal:  Positive for back pain    Neurologic:  Positive for numbness or tingling of feet    Psychiatric:  Positive for sleep disturbances;depression depression controlled with medication  Heme/Lymph/Imm:  Positive for allergies RX allergies, seasonal allergies   Endocrine:  Positive for thyroid disorder      [id number]

## 2018-08-01 NOTE — LETTER
8/1/2018    Simin Lopez MD  [address]  [facility] [place] [address]    RE: Cielo Aguillon       Dear Colleague,    I had the pleasure of seeing Cielo Aguillon in the [facility].    HPI and Plan:   See dictation    Orders Placed This Encounter   Procedures     Follow-Up with Cardiac Advanced Practice Provider       No orders of the defined types were placed in this encounter.      Medications Discontinued During This Encounter   Medication Reason     zolpidem (AMBIEN) 5 MG tablet Medication Reconciliation Clean Up         Encounter Diagnoses   Name Primary?     Persistent atrial fibrillation (H)      Hyperlipidemia LDL goal <70      Coronary artery disease involving native coronary artery of native heart without angina pectoris      GREEN (dyspnea on exertion)      BETHANY (obstructive sleep apnea)        CURRENT MEDICATIONS:  Current Outpatient Prescriptions   Medication Sig Dispense Refill     amLODIPine (NORVASC) 5 MG tablet Take 1 tablet (5 mg) by mouth daily 90 tablet 1     citalopram (CELEXA) 10 MG tablet Take 2 tablets (20 mg) by mouth daily 180 tablet 1     diazepam (VALIUM) 5 MG tablet TAKE 1/2 TO 1 TABLET BY MOUTH EVERY 8 HOURS AS NEEDED 40 tablet 1     levothyroxine (SYNTHROID/LEVOTHROID) 50 MCG tablet TAKE 1 TABLET (50 MCG) BY MOUTH DAILY 90 tablet 2     metoprolol tartrate (LOPRESSOR) 100 MG tablet TAKE 1 TABLET (100MG) BY MOUTH 2 TIMES DAILY 180 tablet 2     order for DME Oxygen 3 Li/min at night bled into the PAP device 1 Bottle 99     rosuvastatin (CRESTOR) 10 MG tablet TAKE 1 TABLET (10 MG) BY MOUTH DAILY 90 tablet 1     traMADol (ULTRAM) 50 MG tablet TAKE ONE TABLET BY MOUTH EVERY 8 HOURS AS NEEDED FOR MODERATE PAIN 50 tablet 2     VITAMIN D, CHOLECALCIFEROL, PO Take 1,000 Units by mouth At Bedtime        warfarin (COUMADIN) 5 MG tablet TAKE 1&1/2 TABLETS (7.5MG) BY MOUTH ONCE DAILY ON TUESDAY & SATURDAY THEN 1 TABLET (5MG) ALL THE OTHER DAYS OF THE WEEK (Patient  taking differently: TAKE 1&1/2 TABLETS (7.5MG) BY MOUTH ONCE DAILY ON TUESDAY & SATURDAY THEN 1 TABLET (5MG) ALL THE OTHER DAYS OF THE WEEK OR AS DIRECTED) 108 tablet 1       ALLERGIES     Allergies   Allergen Reactions     Ace Inhibitors      Cough (Zestril)     Ambien [Zolpidem] Other (See Comments)     Aspirin      hx bleeding ulcers     Hydrochlorothiazide [Hctz] Rash     Ibuprofen      hx bleeding ulcers     Amoxicillin Rash     Losartan Rash     Penicillins Rash       PAST MEDICAL HISTORY:  Past Medical History:   Diagnosis Date     Anxiety     related to travel     Arthritis      ASCVD (arteriosclerotic cardiovascular disease) 2/12    60-70% stenoses of OM2, D1, medical management     Atrial fibrillation (H)      Atrial flutter (H)      Coronary artery disease     2/2012- 50% mid Cfx, 60-70% prox OM2, 50-70% D1     Essential hypertension, benign      Fatty liver 5/10    mild fibrosis on biopsy     Hypertriglyceridemia      Major depression      BETHANY (obstructive sleep apnea)      Other ventral hernia without mention of obstruction or gangrene      Peptic ulcer 2002     Peptic ulcer, unspecified site, unspecified as acute or chronic, without mention of hemorrhage, perforation, or obstruction 1983     Shortness of breath      Thyroid disease      Tubular adenoma 6/08       PAST SURGICAL HISTORY:  Past Surgical History:   Procedure Laterality Date     APPENDECTOMY       C NONSPECIFIC PROCEDURE      Appendectomy     C NONSPECIFIC PROCEDURE      Tonsillectomy     C NONSPECIFIC PROCEDURE      Tubal ligation     C NONSPECIFIC PROCEDURE  11/01    Shave bone spurs from left foot     COLONOSCOPY  9/1/2016    Dr. Camejo [facility]     COLONOSCOPY N/A 9/1/2016    Procedure: COMBINED COLONOSCOPY, SINGLE OR MULTIPLE BIOPSY/POLYPECTOMY BY BIOPSY;  Surgeon: Pillo Camejo MD;  Location:  GI     CORONARY ANGIOGRAPHY ADULT ORDER  2/13/2012    50% mid Cfx, 60-70% prox OM2, 50-70% D1     ESOPHAGOSCOPY, GASTROSCOPY, DUODENOSCOPY  (EGD), COMBINED  9/1/2016    Dr. Camejo [facility]     ESOPHAGOSCOPY, GASTROSCOPY, DUODENOSCOPY (EGD), COMBINED N/A 9/1/2016    Procedure: COMBINED ESOPHAGOSCOPY, GASTROSCOPY, DUODENOSCOPY (EGD), BIOPSY SINGLE OR MULTIPLE;  Surgeon: Pillo Camejo MD;  Location:  GI     GYN SURGERY       HEART CATH RIGHT AND LEFT HEART CATH  04/06/2015    Mid LAD 50-60%, 1st Diagonal 70%, 1st OM 30%, 2nd OM 40-50%, review report for right heart cath results.        FAMILY HISTORY:  Family History   Problem Relation Age of Onset     Respiratory Father      emphysema--secon. to smoking     Alzheimer Disease Mother      Congenital Anomalies Mother      Osteoperosis Mother      Diabetes Maternal Aunt      Diabetes Other      cousins     Diabetes Other      cousin on mothers side of family     Colon Cancer No family hx of        SOCIAL HISTORY:  Social History     Social History     Marital status:      Spouse name: N/A     Number of children: 6     Years of education: N/A     Occupational History      [employer]     Social History Main Topics     Smoking status: Never Smoker     Smokeless tobacco: Never Used     Alcohol use 0.0 oz/week     0 Standard drinks or equivalent per week      Comment: occ - wine     Drug use: No     Sexual activity: Yes     Partners: Male     Birth control/ protection: Surgical      Comment: tubal     Other Topics Concern      Service No     Blood Transfusions Yes     1983     Caffeine Concern No     none     Occupational 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     working in a pharmacy     Hobby Hazards No     Sleep Concern Yes     Stress Concern Yes     Weight Concern No     Special Diet Yes     pt on warfarin     Back Care No     Exercise No     some exercise at night, walking      Bike Helmet No     Seat Belt Yes     Self-Exams No     Social History Narrative       Review of Systems:  Skin:  Negative       Eyes:  Positive for glasses hx of cataract surgery 1/17 and 2/17  ENT:  Positive for hearing loss;earache  hearing aids in both ears, frequent sinus issues; pain in left ear  Respiratory:  Positive for dyspnea on exertion;sleep apnea;CPAP     Cardiovascular:    Positive for;heaviness;lightheadedness;edema;fatigue lightheadedness when getting up quickly  Gastroenterology: Positive for excessive gas or bloating;poor appetite hx of ulcers  Genitourinary:  Negative      Musculoskeletal:  Positive for back pain    Neurologic:  Positive for numbness or tingling of feet    Psychiatric:  Positive for sleep disturbances;depression depression controlled with medication  Heme/Lymph/Imm:  Positive for allergies RX allergies, seasonal allergies   Endocrine:  Positive for thyroid disorder      [id number]          Thank you for allowing me to participate in the care of your patient.      Sincerely,     Vinnie Bateman MD     [facility]    cc:   Lucía Lilly, APRN CNP  [address] [facility] [address]  [place] [address]

## 2018-08-01 NOTE — MR AVS SNAPSHOT
After Visit Summary   8/1/2018    Cielo Aguillon    MRN: [id number]           Patient Information     Date Of Birth          1940        Visit Information        Provider Department      8/1/2018 2:45 PM Vinnie Bateman MD [facility]        Today's Diagnoses     Persistent atrial fibrillation (H)        Hyperlipidemia LDL goal <70        Coronary artery disease involving native coronary artery of native heart without angina pectoris        GREEN (dyspnea on exertion)        BETHANY (obstructive sleep apnea)           Follow-ups after your visit        Additional Services     Follow-Up with Cardiac Advanced Practice Provider                 Your next 10 appointments already scheduled     Aug 17, 2018 11:00 AM CDT   [facility]    [address]  [facility] [id number] [phone]            Aug 17, 2018 11:30 AM CDT   Return Sleep Patient with Dennys Johnson MD   [facility]    [address]  [facility] [id number] [phone]              Future tests that were ordered for you today     Open Future Orders        Priority Expected Expires Ordered    Follow-Up with Cardiac Advanced Practice Provider Routine 1/29/2020 8/19/2020 8/1/2018            Who to contact     If you have questions or need follow up information about today's clinic visit or your schedule please contact [facility] directly at [phone].  Normal or non-critical lab and imaging results will be communicated to you by MyChart, letter or phone within 4 business days after the clinic has received the results. If you do not hear from us within 7 days, please contact the clinic through MyChart or phone. If you have a critical or abnormal  "lab result, we will notify you by phone as soon as possible.  Submit refill requests through [employer] or call your pharmacy and they will forward the refill request to us. Please allow 3 business days for your refill to be completed.          Additional Information About Your Visit        Care EveryWhere ID     This is your Care EveryWhere ID. This could be used by other organizations to access your [facility] medical records  [id number]        Your Vitals Were     Pulse Height BMI (Body Mass Index)             68 1.651 m (5' 5\") 28.11 kg/m2          Blood Pressure from Last 3 Encounters:   08/01/18 118/74   06/26/18 129/72   05/03/18 118/68    Weight from Last 3 Encounters:   08/01/18 76.6 kg (168 lb 14.4 oz)   06/26/18 78.9 kg (174 lb)   05/03/18 79.2 kg (174 lb 8 oz)              We Performed the Following     Follow-Up with Cardiologist          Today's Medication Changes          These changes are accurate as of 8/1/18  3:14 PM.  If you have any questions, ask your nurse or doctor.               These medicines have changed or have updated prescriptions.        Dose/Directions    warfarin 5 MG tablet   Commonly known as:  COUMADIN   This may have changed:  See the new instructions.   Used for:  Chronic atrial fibrillation (H), Long term current use of anticoagulant therapy        TAKE 1&1/2 TABLETS (7.5MG) BY MOUTH ONCE DAILY ON TUESDAY & SATURDAY THEN 1 TABLET (5MG) ALL THE OTHER DAYS OF THE WEEK   Quantity:  108 tablet   Refills:  1                Primary Care Provider Office Phone # Fax #    Simin Lopez [phone]       [address] [facility] [address]  [facility] [address]        Equal Access to Services     [facility] AH: Hadii jamari Jack, washemarda lujoyce, qaybta kaalyaneth esposito. So [facility] [phone].    ATENCIÓN: Si habla español, tiene a duran disposición servicios gratuitos de asistencia lingüística. Llame al [phone].    We comply with " applicable federal civil rights laws and [place] laws. We do not discriminate on the basis of race, color, national origin, age, disability, sex, sexual orientation, or gender identity.            Thank you!     Thank you for choosing [facility]  for your care. Our goal is always to provide you with excellent care. Hearing back from our patients is one way we can continue to improve our services. Please take a few minutes to complete the written survey that you may receive in the mail after your visit with us. Thank you!             Your Updated Medication List - Protect others around you: Learn how to safely use, store and throw away your medicines at www.disposemymeds.org.          This list is accurate as of 8/1/18  3:14 PM.  Always use your most recent med list.                   Brand Name Dispense Instructions for use Diagnosis    amLODIPine 5 MG tablet    NORVASC    90 tablet    Take 1 tablet (5 mg) by mouth daily    Essential hypertension, benign       citalopram 10 MG tablet    celeXA    180 tablet    Take 2 tablets (20 mg) by mouth daily    Major depressive disorder, recurrent episode, moderate (H), MARY (generalized anxiety disorder)       diazepam 5 MG tablet    VALIUM    40 tablet    TAKE 1/2 TO 1 TABLET BY MOUTH EVERY 8 HOURS AS NEEDED    MARY (generalized anxiety disorder)       levothyroxine 50 MCG tablet    SYNTHROID/LEVOTHROID    90 tablet    TAKE 1 TABLET (50 MCG) BY MOUTH DAILY    Acquired hypothyroidism       metoprolol tartrate 100 MG tablet    LOPRESSOR    180 tablet    TAKE 1 TABLET (100MG) BY MOUTH 2 TIMES DAILY    Essential hypertension, benign, ASCVD (arteriosclerotic cardiovascular disease)       order for DME     1 Bottle    Oxygen 3 Li/min at night bled into the PAP device    Nocturnal hypoxemia, GREEN (dyspnea on exertion), Pulmonary hypertension, Complex sleep apnea syndrome, Permanent atrial fibrillation (H)       rosuvastatin 10 MG tablet     CRESTOR    90 tablet    TAKE 1 TABLET (10 MG) BY MOUTH DAILY    Hyperlipidemia LDL goal <70, Coronary artery disease involving native coronary artery of native heart without angina pectoris       traMADol 50 MG tablet    ULTRAM    50 tablet    TAKE ONE TABLET BY MOUTH EVERY 8 HOURS AS NEEDED FOR MODERATE PAIN    Chronic left-sided low back pain with left-sided sciatica       VITAMIN D (CHOLECALCIFEROL) PO      Take 1,000 Units by mouth At Bedtime        warfarin 5 MG tablet    COUMADIN    108 tablet    TAKE 1&1/2 TABLETS (7.5MG) BY MOUTH ONCE DAILY ON TUESDAY & SATURDAY THEN 1 TABLET (5MG) ALL THE OTHER DAYS OF THE WEEK    Chronic atrial fibrillation (H), Long term current use of anticoagulant therapy

## 2018-08-01 NOTE — LETTER
8/1/2018      Simin Lopez MD  [address] [facility] [address]  [facility] [address]      RE: Cielo Aguillon       Dear Colleague,    I had the pleasure of seeing Cielo Aguillon in the [facility].    Service Date: 08/01/2018      HISTORY OF PRESENT ILLNESS:    I had the pleasure of seeing Ms. Cielo Aguillon, a delightful 78-year-old female with the following issues:   A.  Permanent atrial fibrillation treated with rate control (metoprolol 100 mg b.i.d.) and warfarin.   B.  Chronic dyspnea on exertion, multifactorial.   C.  Mild to moderate CAD, most recent cardiac catheterization in 03/2018.   D.  Normal LV function and mild to moderate pulmonary hypertension.   E.  Hypothyroidism.   F.  Sleep apnea, combination of obstructive and central.      Ms. Aguillon was hospitalized in 03/2018 with chest tightness going up her throat.  She underwent repeat coronary angiography.  Tightest lesion was a 50% mid-LAD stenosis with normal FFR.  She was given a trial of amlodipine, with the thought that this perhaps was CP from small-vessel disease.       She used to be on atorvastatin, which was stopped because of myalgias.  She is now on rosuvastatin 10 mg daily and has no muscle aches at the moment.      Her chronic dyspnea continues.  This is not worse or better than usual.  She has learned to live with it.  It is certainly not devastating.  She is planning to visit her son who lives in [place] at an altitude of 5000 feet.  She asked me if I think this would be okay with her heart condition.  Apparently her lung specialist/sleep doctor has approved it.      She has had no recent chest pain, syncope, near-syncope, orthopnea or PND.      PHYSICAL EXAMINATION:   VITAL SIGNS:  Blood pressure 118/74, pulse 68 and irregular, weight 76 kilos, height 165 cm.   GENERAL:  She is a pleasant, elderly woman in no distress.   HEAD:  Normocephalic.   NECK:  Supple without bruits.   LUNGS:  Clear.   CARDIOVASCULAR:   Normal JVP, irregularly irregular rhythm.  No gallop, murmur or rub.   ABDOMEN:  Soft, nontender.   EXTREMITIES:  No edema.      IMPRESSION:   1.  Permanent atrial fibrillation.  Continue metoprolol and warfarin.  It is well rate controlled.   2.  Chronic dyspnea on exertion.  Multifactorial and severity unchanged over time.   3.  Coronary artery disease.  Mild to moderate.  No chest pain at the moment.  Continue risk factor modification to try to decelerate progression of atherosclerosis.   4.  Hypertension under good control.      RECOMMENDATIONS:   A.  Continue current medications.   B.  I think it is okay to spend time in [place], as the altitude is not particularly high.   C.  Follow up in the clinic in 18 months.  We would see her sooner if she has problems in the interim.         FARNAZ RICHARDSON MD, [facility]         cc:   Simin Lopez MD    [facility]    [address]    [place]  [address]          D: 2018   T: 2018   MT: JAS      Name:     FELICITAS LITTLEJOHN   MRN:      [id number]        Account:      [id number]   :      1940           Service Date: 2018      Document: [id number]        Outpatient Encounter Prescriptions as of 2018   Medication Sig Dispense Refill     amLODIPine (NORVASC) 5 MG tablet Take 1 tablet (5 mg) by mouth daily 90 tablet 1     citalopram (CELEXA) 10 MG tablet Take 2 tablets (20 mg) by mouth daily 180 tablet 1     diazepam (VALIUM) 5 MG tablet TAKE 1/2 TO 1 TABLET BY MOUTH EVERY 8 HOURS AS NEEDED 40 tablet 1     levothyroxine (SYNTHROID/LEVOTHROID) 50 MCG tablet TAKE 1 TABLET (50 MCG) BY MOUTH DAILY 90 tablet 2     metoprolol tartrate (LOPRESSOR) 100 MG tablet TAKE 1 TABLET (100MG) BY MOUTH 2 TIMES DAILY 180 tablet 2     order for DME Oxygen 3 Li/min at night bled into the PAP device 1 Bottle 99     rosuvastatin (CRESTOR) 10 MG tablet TAKE 1 TABLET (10 MG) BY MOUTH DAILY 90 tablet 1     traMADol (ULTRAM) 50 MG tablet TAKE ONE  TABLET BY MOUTH EVERY 8 HOURS AS NEEDED FOR MODERATE PAIN 50 tablet 2     VITAMIN D, CHOLECALCIFEROL, PO Take 1,000 Units by mouth At Bedtime        warfarin (COUMADIN) 5 MG tablet TAKE 1&1/2 TABLETS (7.5MG) BY MOUTH ONCE DAILY ON TUESDAY & SATURDAY THEN 1 TABLET (5MG) ALL THE OTHER DAYS OF THE WEEK (Patient taking differently: TAKE 1&1/2 TABLETS (7.5MG) BY MOUTH ONCE DAILY ON TUESDAY & SATURDAY THEN 1 TABLET (5MG) ALL THE OTHER DAYS OF THE WEEK OR AS DIRECTED) 108 tablet 1     [DISCONTINUED] zolpidem (AMBIEN) 5 MG tablet Take 1/2 tablet by mouth 15 minutes prior to sleep, for Sleep Study 1 tablet 0     No facility-administered encounter medications on file as of 8/1/2018.      Again, thank you for allowing me to participate in the care of your patient.      Sincerely,    Vinnie Bateman MD     [facility]

## 2018-08-02 RX ORDER — CITALOPRAM HYDROBROMIDE 20 MG/1
TABLET ORAL
Qty: 90 TABLET | Refills: 1 | Status: SHIPPED | OUTPATIENT
Start: 2018-08-02 | End: 2018-12-20

## 2018-08-02 NOTE — PROGRESS NOTES
Service Date: 08/01/2018      HISTORY OF PRESENT ILLNESS:    I had the pleasure of seeing Ms. Cielo Aguillon, a delightful 78-year-old female with the following issues:   A.  Permanent atrial fibrillation treated with rate control (metoprolol 100 mg b.i.d.) and warfarin.   B.  Chronic dyspnea on exertion, multifactorial.   C.  Mild to moderate CAD, most recent cardiac catheterization in 03/2018.   D.  Normal LV function and mild to moderate pulmonary hypertension.   E.  Hypothyroidism.   F.  Sleep apnea, combination of obstructive and central.      Ms. Aguillon was hospitalized in 03/2018 with chest tightness going up her throat.  She underwent repeat coronary angiography.  Tightest lesion was a 50% mid-LAD stenosis with normal FFR.  She was given a trial of amlodipine, with the thought that this perhaps was CP from small-vessel disease.       She used to be on atorvastatin, which was stopped because of myalgias.  She is now on rosuvastatin 10 mg daily and has no muscle aches at the moment.      Her chronic dyspnea continues.  This is not worse or better than usual.  She has learned to live with it.  It is certainly not devastating.  She is planning to visit her son who lives in [place] at an altitude of 5000 feet.  She asked me if I think this would be okay with her heart condition.  Apparently her lung specialist/sleep doctor has approved it.      She has had no recent chest pain, syncope, near-syncope, orthopnea or PND.      PHYSICAL EXAMINATION:   VITAL SIGNS:  Blood pressure 118/74, pulse 68 and irregular, weight 76 kilos, height 165 cm.   GENERAL:  She is a pleasant, elderly woman in no distress.   HEAD:  Normocephalic.   NECK:  Supple without bruits.   LUNGS:  Clear.   CARDIOVASCULAR:  Normal JVP, irregularly irregular rhythm.  No gallop, murmur or rub.   ABDOMEN:  Soft, nontender.   EXTREMITIES:  No edema.      IMPRESSION:   1.  Permanent atrial fibrillation.  Continue metoprolol and warfarin.  It is well  rate controlled.   2.  Chronic dyspnea on exertion.  Multifactorial and severity unchanged over time.   3.  Coronary artery disease.  Mild to moderate.  No chest pain at the moment.  Continue risk factor modification to try to decelerate progression of atherosclerosis.   4.  Hypertension under good control.      RECOMMENDATIONS:   A.  Continue current medications.   B.  I think it is okay to spend time in [place], as the altitude is not particularly high.   C.  Follow up in the clinic in 18 months.  We would see her sooner if she has problems in the interim.         FANRAZ RICHARDSON MD, FACC         cc:   Simin Lopez MD    [facility]    [address]    [place]  [address]          D: 2018   T: 2018   MT: JAS      Name:     FELICITAS LITTLEJOHN   MRN:      [id number]        Account:      [id number]   :      1940           Service Date: 2018      Document: [id number]

## 2018-08-17 ENCOUNTER — DOCUMENTATION ONLY (OUTPATIENT)
Dept: SLEEP MEDICINE | Facility: CLINIC | Age: 78
End: 2018-08-17

## 2018-08-17 ENCOUNTER — OFFICE VISIT (OUTPATIENT)
Dept: SLEEP MEDICINE | Facility: CLINIC | Age: 78
End: 2018-08-17
Payer: MEDICARE

## 2018-08-17 ENCOUNTER — ANTICOAGULATION THERAPY VISIT (OUTPATIENT)
Dept: ANTICOAGULATION | Facility: CLINIC | Age: 78
End: 2018-08-17
Payer: MEDICARE

## 2018-08-17 VITALS
HEIGHT: 65 IN | WEIGHT: 168 LBS | BODY MASS INDEX: 27.99 KG/M2 | SYSTOLIC BLOOD PRESSURE: 120 MMHG | RESPIRATION RATE: 18 BRPM | DIASTOLIC BLOOD PRESSURE: 64 MMHG | HEART RATE: 66 BPM | OXYGEN SATURATION: 95 %

## 2018-08-17 DIAGNOSIS — Z79.01 LONG-TERM (CURRENT) USE OF ANTICOAGULANTS: ICD-10-CM

## 2018-08-17 DIAGNOSIS — I48.20 CHRONIC ATRIAL FIBRILLATION (H): ICD-10-CM

## 2018-08-17 DIAGNOSIS — I27.20 PULMONARY HTN (H): ICD-10-CM

## 2018-08-17 DIAGNOSIS — I48.21 PERMANENT ATRIAL FIBRILLATION (H): ICD-10-CM

## 2018-08-17 DIAGNOSIS — G47.39 COMPLEX SLEEP APNEA SYNDROME: Primary | ICD-10-CM

## 2018-08-17 LAB — INR POINT OF CARE: 3.9 (ref 0.86–1.14)

## 2018-08-17 PROCEDURE — 36416 COLLJ CAPILLARY BLOOD SPEC: CPT

## 2018-08-17 PROCEDURE — 99215 OFFICE O/P EST HI 40 MIN: CPT | Performed by: FAMILY MEDICINE

## 2018-08-17 PROCEDURE — 99207 ZZC NO CHARGE NURSE ONLY: CPT

## 2018-08-17 PROCEDURE — 85610 PROTHROMBIN TIME: CPT | Mod: QW

## 2018-08-17 NOTE — PROGRESS NOTES
ANTICOAGULATION FOLLOW-UP CLINIC VISIT    Patient Name:  Cielo Aguillon  Date:  8/17/2018  Contact Type:  Face to Face    SUBJECTIVE:     Patient Findings     Positives Change in diet/appetite (not eating as many green veggies recently due to having loose stools.  She will try to resume her ususal diet soon.), Other complaints (patient has been having some loose stools recently.  She is starting to feel better now.)           OBJECTIVE    INR Protime   Date Value Ref Range Status   08/17/2018 3.9 (A) 0.86 - 1.14 Final       ASSESSMENT / PLAN  INR assessment SUPRA    Recheck INR In: 10 DAYS    INR Location Clinic      Anticoagulation Summary as of 8/17/2018     INR goal 2.0-3.0   Today's INR 3.9!   Warfarin maintenance plan 7.5 mg (5 mg x 1.5) on Tue, Sat; 5 mg (5 mg x 1) all other days   Full warfarin instructions 8/17: Hold; 8/18: 5 mg; Otherwise 7.5 mg on Tue, Sat; 5 mg all other days   Weekly warfarin total 40 mg   Plan last modified Cindi Preston RN (5/9/2017)   Next INR check 8/27/2018   Priority INR   Target end date     Indications   Chronic atrial fibrillation (H) [I48.2]  Long-term (current) use of anticoagulants [Z79.01] [Z79.01]         Anticoagulation Episode Summary     INR check location     Preferred lab     Send INR reminders to [facility]    Comments       Anticoagulation Care Providers     Provider Role Specialty Phone number    Simin Lopez MD [facility] [phone]            See the Encounter Report to view Anticoagulation Flowsheet and Dosing Calendar (Go to Encounters tab in chart review, and find the Anticoagulation Therapy Visit)    Dosage adjustment made based on physician directed care plan.    Mckenna Robison RN

## 2018-08-17 NOTE — PROGRESS NOTES
This nurse changed ASV settings online in [facility] today to EPAP Min 8cmH20, EPAP Max 11 cmH20, PS Min 0, PS Max 17  Per Dr Johnson 8/17/18.        Damaris Guerra LPN/CMA

## 2018-08-17 NOTE — MR AVS SNAPSHOT
After Visit Summary   8/17/2018    Cielo Aguillon    MRN: [id number]           Patient Information     Date Of Birth          1940        Visit Information        Provider Department      8/17/2018 11:30 AM Dennys Johnson MD [facility]        Today's Diagnoses     Complex sleep apnea syndrome    -  1    Permanent atrial fibrillation (H)        Pulmonary HTN          Care Instructions        Your BMI is Body mass index is 27.96 kg/(m^2).  Weight management is a personal decision.  If you are interested in exploring weight loss strategies, the following discussion covers the approaches that may be successful. Body mass index (BMI) is one way to tell whether you are at a healthy weight, overweight, or obese. It measures your weight in relation to your height.  A BMI of 18.5 to 24.9 is in the healthy range. A person with a BMI of 25 to 29.9 is considered overweight, and someone with a BMI of 30 or greater is considered obese. More than two-thirds of American adults are considered overweight or obese.  Being overweight or obese increases the risk for further weight gain. Excess weight may lead to heart disease and diabetes.  Creating and following plans for healthy eating and physical activity may help you improve your health.  Weight control is part of healthy lifestyle and includes exercise, emotional health, and healthy eating habits. Careful eating habits lifelong are the mainstay of weight control. Though there are significant health benefits from weight loss, long-term weight loss with diet alone may be very difficult to achieve- studies show long-term success with dietary management in less than 10% of people. Attaining a healthy weight may be especially difficult to achieve in those with severe obesity. In some cases, medications, devices and surgical management might be considered.  What can you do?  If you are overweight or obese and are interested in methods for  weight loss, you should discuss this with your provider.     Consider reducing daily calorie intake by 500 calories.     Keep a food journal.     Avoiding skipping meals, consider cutting portions instead.    Diet combined with exercise helps maintain muscle while optimizing fat loss. Strength training is particularly important for building and maintaining muscle mass. Exercise helps reduce stress, increase energy, and improves fitness. Increasing exercise without diet control, however, may not burn enough calories to loose weight.       Start walking three days a week 10-20 minutes at a time    Work towards walking thirty minutes five days a week     Eventually, increase the speed of your walking for 1-2 minutes at time    In addition, we recommend that you review healthy lifestyles and methods for weight loss available through the National Institutes of Health patient information sites:  http://win.niddk.nih.gov/publications/index.htm    And look into health and wellness programs that may be available through your health insurance provider, employer, local community center, or omar club.    Weight management plan: Patient was referred to their PCP to discuss a diet and exercise plan.     Your blood pressure was checked while you were in clinic today.  Please read the guidelines below about what these numbers mean and what you should do about them.  Your systolic blood pressure is the top number.  This is the pressure when the heart is pumping.  Your diastolic blood pressure is the bottom number.  This is the pressure in between beats.  If your systolic blood pressure is less than 120 and your diastolic blood pressure is less than 80, then your blood pressure is normal. There is nothing more that you need to do about it  If your systolic blood pressure is 120-139 or your diastolic blood pressure is 80-89, your blood pressure may be higher than it should be.  You should have your blood pressure re-checked within a  year by a primary care provider.  If your systolic blood pressure is 140 or greater or your diastolic blood pressure is 90 or greater, you may have high blood pressure.  High blood pressure is treatable, but if left untreated over time it can put you at risk for heart attack, stroke, or kidney failure.  You should have your blood pressure re-checked by a primary care provider within the next four weeks.    Telephone visit within 4 weeks. Order for new mask placed.    Thanks!    Dennys Johnson MD, MPH  Clinical Sleep and Occupational / Environmental Medicine            Follow-ups after your visit        Follow-up notes from your care team     Return in about 4 weeks (around 9/14/2018), or if symptoms worsen or fail to improve.      Your next 10 appointments already scheduled     Aug 27, 2018  3:00 PM CDT   [facility] Visit with [facility]   [facility]    [address]  [facility] [id number] [phone]            Sep 18, 2018  2:00 PM CDT   Return Sleep Patient with Dennys Johnson MD   [facility] ([facility])    [address]  [facility] [address] [phone]              Who to contact     If you have questions or need follow up information about today's clinic visit or your schedule please contact [facility] directly at [phone].  Normal or non-critical lab and imaging results will be communicated to you by MyChart, letter or phone within 4 business days after the clinic has received the results. If you do not hear from us within 7 days, please contact the clinic through MyChart or phone. If you have a critical or abnormal lab result, we will notify you by phone as soon as possible.  Submit refill requests through [employer] or call your pharmacy and they will forward the refill request to us. Please allow 3 business days for  "your refill to be completed.          Additional Information About Your Visit        Care EveryWhere ID     This is your Care EveryWhere ID. This could be used by other organizations to access your [facility] medical records  [id number]        Your Vitals Were     Pulse Respirations Height Pulse Oximetry BMI (Body Mass Index)       66 18 1.651 m (5' 5\") 95% 27.96 kg/m2        Blood Pressure from Last 3 Encounters:   08/17/18 120/64   08/01/18 118/74   06/26/18 129/72    Weight from Last 3 Encounters:   08/17/18 76.2 kg (168 lb)   08/01/18 76.6 kg (168 lb 14.4 oz)   06/26/18 78.9 kg (174 lb)              We Performed the Following     Comprehensive DME          Today's Medication Changes          These changes are accurate as of 8/17/18 12:03 PM.  If you have any questions, ask your nurse or doctor.               These medicines have changed or have updated prescriptions.        Dose/Directions    warfarin 5 MG tablet   Commonly known as:  COUMADIN   This may have changed:  See the new instructions.   Used for:  Chronic atrial fibrillation (H), Long term current use of anticoagulant therapy        TAKE 1&1/2 TABLETS (7.5MG) BY MOUTH ONCE DAILY ON TUESDAY & SATURDAY THEN 1 TABLET (5MG) ALL THE OTHER DAYS OF THE WEEK   Quantity:  108 tablet   Refills:  1                Primary Care Provider Office Phone # Fax #    Simin Lopez [phone]       [address] [facility] [address]  [facility] [address]        Equal Access to Services     [facility]: Hadii jamari parekho Sosue, waaxda luqadaha, qaybta kaalmada adeegyada, yaneth chow adeangelic martin. So [facility] [phone].    ATENCIÓN: Si habla español, tiene a duran disposición servicios gratuitos de asistencia lingüística. Llame al [phone].    We comply with applicable federal civil rights laws and [place] laws. We do not discriminate on the basis of race, color, national origin, age, disability, sex, sexual orientation, or gender " identity.            Thank you!     Thank you for choosing [facility]  for your care. Our goal is always to provide you with excellent care. Hearing back from our patients is one way we can continue to improve our services. Please take a few minutes to complete the written survey that you may receive in the mail after your visit with us. Thank you!             Your Updated Medication List - Protect others around you: Learn how to safely use, store and throw away your medicines at www.disposemymeds.org.          This list is accurate as of 8/17/18 12:03 PM.  Always use your most recent med list.                   Brand Name Dispense Instructions for use Diagnosis    amLODIPine 5 MG tablet    NORVASC    90 tablet    Take 1 tablet (5 mg) by mouth daily    Essential hypertension, benign       * citalopram 10 MG tablet    celeXA    180 tablet    Take 2 tablets (20 mg) by mouth daily    Major depressive disorder, recurrent episode, moderate (H), MARY (generalized anxiety disorder)       * citalopram 20 MG tablet    celeXA    90 tablet    TAKE 1 TABLET (20MG) BY MOUTH EVERY DAY. DIRECTION CHANGE 1/26/18    MARY (generalized anxiety disorder), Major depressive disorder, recurrent episode, moderate (H)       diazepam 5 MG tablet    VALIUM    40 tablet    TAKE 1/2 TO 1 TABLET BY MOUTH EVERY 8 HOURS AS NEEDED    MARY (generalized anxiety disorder)       levothyroxine 50 MCG tablet    SYNTHROID/LEVOTHROID    90 tablet    TAKE 1 TABLET (50 MCG) BY MOUTH DAILY    Acquired hypothyroidism       metoprolol tartrate 100 MG tablet    LOPRESSOR    180 tablet    TAKE 1 TABLET (100MG) BY MOUTH 2 TIMES DAILY    Essential hypertension, benign, ASCVD (arteriosclerotic cardiovascular disease)       order for DME     1 Bottle    Oxygen 3 Li/min at night bled into the PAP device    Nocturnal hypoxemia, GREEN (dyspnea on exertion), Pulmonary hypertension, Complex sleep apnea syndrome, Permanent atrial fibrillation (H)        rosuvastatin 10 MG tablet    CRESTOR    90 tablet    TAKE 1 TABLET (10 MG) BY MOUTH DAILY    Hyperlipidemia LDL goal <70, Coronary artery disease involving native coronary artery of native heart without angina pectoris       traMADol 50 MG tablet    ULTRAM    50 tablet    TAKE ONE TABLET BY MOUTH EVERY 8 HOURS AS NEEDED FOR MODERATE PAIN    Chronic left-sided low back pain with left-sided sciatica       VITAMIN D (CHOLECALCIFEROL) PO      Take 1,000 Units by mouth At Bedtime        warfarin 5 MG tablet    COUMADIN    108 tablet    TAKE 1&1/2 TABLETS (7.5MG) BY MOUTH ONCE DAILY ON TUESDAY & SATURDAY THEN 1 TABLET (5MG) ALL THE OTHER DAYS OF THE WEEK    Chronic atrial fibrillation (H), Long term current use of anticoagulant therapy       * Notice:  This list has 2 medication(s) that are the same as other medications prescribed for you. Read the directions carefully, and ask your doctor or other care provider to review them with you.

## 2018-08-17 NOTE — PATIENT INSTRUCTIONS
Your BMI is Body mass index is 27.96 kg/(m^2).  Weight management is a personal decision.  If you are interested in exploring weight loss strategies, the following discussion covers the approaches that may be successful. Body mass index (BMI) is one way to tell whether you are at a healthy weight, overweight, or obese. It measures your weight in relation to your height.  A BMI of 18.5 to 24.9 is in the healthy range. A person with a BMI of 25 to 29.9 is considered overweight, and someone with a BMI of 30 or greater is considered obese. More than two-thirds of American adults are considered overweight or obese.  Being overweight or obese increases the risk for further weight gain. Excess weight may lead to heart disease and diabetes.  Creating and following plans for healthy eating and physical activity may help you improve your health.  Weight control is part of healthy lifestyle and includes exercise, emotional health, and healthy eating habits. Careful eating habits lifelong are the mainstay of weight control. Though there are significant health benefits from weight loss, long-term weight loss with diet alone may be very difficult to achieve- studies show long-term success with dietary management in less than 10% of people. Attaining a healthy weight may be especially difficult to achieve in those with severe obesity. In some cases, medications, devices and surgical management might be considered.  What can you do?  If you are overweight or obese and are interested in methods for weight loss, you should discuss this with your provider.     Consider reducing daily calorie intake by 500 calories.     Keep a food journal.     Avoiding skipping meals, consider cutting portions instead.    Diet combined with exercise helps maintain muscle while optimizing fat loss. Strength training is particularly important for building and maintaining muscle mass. Exercise helps reduce stress, increase energy, and improves  fitness. Increasing exercise without diet control, however, may not burn enough calories to loose weight.       Start walking three days a week 10-20 minutes at a time    Work towards walking thirty minutes five days a week     Eventually, increase the speed of your walking for 1-2 minutes at time    In addition, we recommend that you review healthy lifestyles and methods for weight loss available through the National Institutes of Health patient information sites:  http://win.niddk.nih.gov/publications/index.htm    And look into health and wellness programs that may be available through your health insurance provider, employer, local community center, or [employer] club.    Weight management plan: Patient was referred to their PCP to discuss a diet and exercise plan.     Your blood pressure was checked while you were in clinic today.  Please read the guidelines below about what these numbers mean and what you should do about them.  Your systolic blood pressure is the top number.  This is the pressure when the heart is pumping.  Your diastolic blood pressure is the bottom number.  This is the pressure in between beats.  If your systolic blood pressure is less than 120 and your diastolic blood pressure is less than 80, then your blood pressure is normal. There is nothing more that you need to do about it  If your systolic blood pressure is 120-139 or your diastolic blood pressure is 80-89, your blood pressure may be higher than it should be.  You should have your blood pressure re-checked within a year by a primary care provider.  If your systolic blood pressure is 140 or greater or your diastolic blood pressure is 90 or greater, you may have high blood pressure.  High blood pressure is treatable, but if left untreated over time it can put you at risk for heart attack, stroke, or kidney failure.  You should have your blood pressure re-checked by a primary care provider within the next four weeks.    Telephone visit within  4 weeks. Order for new mask placed.    Thanks!    Dennys Johnson MD, MPH  Clinical Sleep and Occupational / Environmental Medicine

## 2018-08-17 NOTE — PROGRESS NOTES
Sleep Center [facility]  Outpatient Sleep Medicine Followed Up Visit  August 17, 2018    Name: Cielo Aguillon MRN# [id number]   Age: 78 year old YOB: 1940     Date of Follow Up Visit: August 17, 2018  Consultation is requested by: Simin Lopez MD  [address]  [place] [address]  Primary care provider: Simin Lopez    Patient is accompanied by: Patient presents alone today.       Reason for Sleep Consult:     Cielo Aguillon is a 78 year old female patient that presents here for a follow up evaluation after completing a PSG titration sleep study.         Assessment and Plan:     Summary Sleep Diagnoses:    (1) Permanent Atrial Fibrillation  (2) Severe BETHANY / Complex Sleep Apnea (AHI of 39 events per hour)  (3) Pulmonary Hypertension  (4) GREEN    Summary Recommendations / Discussion:    During the initial visit, this patient was diagnosed with severe BETHANY with an AHI of 39 events per hour. The patient was successfully treated with a CPAP at fixed pressure of 10 cmH2O. During the last visit, the PAP's download showed that the device was effective when used with a residual AHI of only 3.6 events per hour. Since the patient's health was stable and recent echocardiogram showed preserved LVEF, a new PSG was not needed. Instead, the patient had an overnight oximetry with the PAP in place. This showed prolonged sleep associated hypoxemia with 230.7 minutes below the SpO2 of 88%. As such, this patient underwent an in-lab PSG titration study with TCM and ABG. During the study, the patient was adequately titrated to an ASV at 4/0/20 cmH2O. Some Cheyne-Stoke breathing was noted during the PSG sleep study while on CPAP therapy. Given the preserved LVEF at 55 to 60%, ASV may be utilized in this patient. Sleep associated hypoxemia improved with ASV therapy. As such, the patient was started on auto-ASV with EPAP min of 6 cmH2O, EPAP max of 11 cmH2O, PS min of 0 cmH2O, PS max of 19 cmH2O. The patient  was complaint with PAP therapy and the device was effective at treating the condition with a residual AHI of 1.8 events per hour. Recent overnight oximetry with ASV, however, showed nocturnal hypoxemia with the patient spending 100.4 minutes under the SpO2 of 88%. Most of the night, the PAP was in place. Given the significant shortness of breath during exertion, a complete PFT and 6 minute walk were ordered. Moderate restrictive / mixed lung disease was noted. However, the 6 minute walk was normal with no obvious hypoxemia. Recent ABG showed only mild hypoxemia. Today, the nature and pathophysiology of BETHANY were discussed once again. Lifestyle recommendations including healthy dietary and exercising habits were discussed. Given the presentation and the nocturnal hypoxemia noted on oximetry testing, the patient completed a PSG titration sleep study and the sleep associated hypoxemia resolved with ASV at final pressures. As such, the ASV settings will be changed to EPAP min of 8 cmH2O, EPAP max of 10 cmH2O, PS min of 0 cmH2O, and PS max of 20 cmH2O. In addition, an order for a new interface will be placed ([employer] full face small size). Techniques to minimize removal of the mask discussed and encouraged once again. PAP compliance was discussed, explained, and encouraged once gain. Pt will follow up with me after completing 4 weeks of PAP therapy (telephone visit).    Coding:  (G47.31) Complex sleep apnea syndrome  (primary encounter diagnosis)  (I48.2) Permanent atrial fibrillation (H)  (I27.20) Pulmonary hypertension    Counseling included a comprehensive review of diagnostic and therapeutic strategies as well as risks of inadequate therapy.    Educational materials provided in instructions. The patient was instructed to avoid driving or operating any heavy machinery when experiencing drowsiness.    All questions and concerns were addressed today. Pt agrees and understands the assessment and plan.         History of  Present Illness:   Cielo Aguillon is a 77 year old  RHD female pt with PMH of cystocele, rectocele, hypertension, peptic ulcer disease, chornic rhinitis, Lichenification and Lichen Simplex Chronicus, fatty liver, hyperlipidemia, chronic back pain, non-obstructive coronary artery disease, mild to moderate pulmonary hypertension, generalized anxiety disorder, major depressive disorder, chronic atrial fibrillation (daignosed 5 yrs ago), hypothyroidism, and previously diagnosed BETHANY presents for a follow up evaluation after completing a PSG titration sleep study.     As previously explained, this medically complex patient underwent a PSG sleep stud in 2015 at [facility]. The study showed severe BETHANY with an AHI of 54 events per hour with a RDI of 58 events per hour scored at 4% for Medicare criteria, the AHI was 39 events per hour). Most events were obstructive in nature. The PAP download during the initial visit showed that the patient was using a CPAP at fixed pressure of 10 cmH2O. This was effective at treating the condition with a residual AHI of 3.6 events per hour. No significant leakage noted.    During the initial visit, the patient explained to me that she needed new CPAP supplies. The patient explained that she was not able to use the PAP device because the mask needed to be replaced and she was not able to get new supplies without repeating the PSG sleep study. She was using a CPAP with a nasal mask. The  explained that with the PAP device the patient would not snore, have witnessed apneas, or gasping / choking for air. Without the PAP device, the patient reports snoring and witnessed apneas. Some non-restorative sleep and sleep inertia reported. Pt denied EDS, but admits having physical fatigue. No inadvertent naps reported. Pt is not currently driving due to vision problems.    The patient was continue on CPAP at fixed pressure of 10 cmH2O and an overnight oximetry test was performed with the PAP  therapy in place. This showed prolonged sleep associated hypoxemia with 230.7 minutes spent under the SpO2 of 88%. The study also demonstrated a sawtooth pattern suggestive of poorly controlled BETHANY. Therefore, the patient underwent a PSG titration study on 02/08/2018. During the study the patient had some central hypopneas with a Cheyne-Stoke pattern. The patient was adequately titrated to an ASV at 4/0/20 with a residual AHI of 6.9 events per hour. The patient had atrial fibrillation during the PSG sleep study.    As previously explained, the patient recently underwent a coronary angiogram on 03/14/2018. The study showed small caliber vessels with left dominant circulation. Some disease was noted. The LV end-diastolic pressure was mildly elevated at 15 mmHg and the LVEF was preserved at 65%.    During the last visit, the patient was continued on auto-ASV with EPAP min of 6 cmH2O, EPAP max of 11 cmH2O, PS min of 0 cmH2O, and PS max of 19 cmH2O. Nocturnal O2 supplementation was ordered during the last visit, but this was denied.     Pt reported GREEN and SOB. She explained that she is unable to walk much due to significant GREEN.    Give the reported sxs, the patient completed another PFT study and this demonstrated FVC at 68%, FEV at 72%, FEV1/FVC at 74%, VC at 57%, TCL at 70%, DLCO at 54%. This findings were consistent with mild worsening of respiratory function with moderate mixed ventilatory defect, moderate restriction in volumes, and preserved gas transfer for the lung volumes.    The patient also completed a 6 minutes walk and this showed no obvious hypoxemia on exertion at room air with saturations ranging in the ~96%.    In addition, the patient completed another overnight oximetry with the PAP device in place. This showed prolonged sleep associated hypoxemia (pt spent 100.4 minutes under the SpO2 of 88%). Although the device was removed at 4 AM, when most of the desaturation can be seen, hypoxemia was also  present during the early hours of the night.    Given the noted hypoxemia, the patient recently completed a PSG titration sleep study and this demonstrated an adequate PAP titration with ASV at EVV of 8, PS min of 1, and PS max of 17 cmH2O. The residual AHI was 0.5 events per hour. No REM sleep or REM supine was captured at this final PAP pressure. Sleep associated hypoxemia as well as snoring resolved at this ASV setting. Increased PLMs were noted during the study. In addition, atrial fibrillation with frequent PVCs were noted.    Today, the patient explains that she has not been able to use the ASV due to interface discomfort. The patient states that the interface is giving her sores and areas of discomfort as well as high leaks. Pt denies any snoring, gasping / choking for air, or witnessed apneas.    PREVIOUS IN- LAB or HOME SLEEP STUDIES: The patient reports the study was conducted in [facility]   Date: 2015   TYPE: PSG   AHI: 58 events per hour   BMI: 28.7 kg/m2   Intervention: CPAP    SLEEP-WAKE SCHEDULE:     Cielo KARSON Aguillon      -Describes herself as a night person; prefers to go to sleep at 10:30 PM and wakes up at 9:00 AM.      -The patient takes no naps during the week; takes no inadvertent naps.      -ON WEEKDAYS, goes to sleep at 10:30 PM during the week; awakens 9:00 AM with an alarm; falls asleep in 10 minutes; denies difficulty falling asleep.      -ON WEEKENDS, goes to sleep at 10:30 PM and wakes up at 9:00 AM with an alarm; falls asleep in 10 minutes.        -Awakens 1-2 times a night for 5 minutes before falling back to sleep; awakens to go to the bathroom and external stimuli.      BEDTIME ACTIVITIES AND SHIFT WORK:    Cielo Aguillon     -Bedtime Activities and Other Sleeping Information: Pt lives . Pt sleeps alone on a twin size bed ( sleeps in a different room due to snoring). Pt sleeps on her sides. Pt does not use any electronics in bed and uses bedroom only for sleeping.       -Occupation: Retired (Pharmacy Tech)     SCALES        -SLEEPINESS: [place] sleepiness scale (ESS):  2 / 24 (initial visit)              2 / 24 (04/27/2018)    Drowsy driving / near accidents: Denies any near accidents    Consequences: Non-restorative sleep    SLEEP COMPLAINTS:  Cardio-respiratory     -Snoring: None with PAP device in place   -Dyspnea: None with PAP device in place   -Morning headaches or confusion: Denies any morning cephalgia   -Coexisting Lung disease: Pulmonary HTN     -Coexisting Heart disease: Atria fibrillation with non-obstructive CAD     -Does patient have a bed partner: Patient sleeps alone   -Has bed partner been sleeping separately because of snoring:  No            RLS Screen:    -When you try to relax in the evening or sleep at night, do you ever have unpleasant, restless feelings in your legs that can be relieved by walking or movement? None Reported     -Periodic limb movement: None Reported    Narcolepsy:     - Denies sudden urges of sleep attacks   - Denies cataplexy   - Denies sleep paralysis    - Admits hallucinations. Some hypnagogic hallucinations a few times a month consistent of little kids. This is not scary to her.     - Denies feeling refreshed after a nap.    Sleep Behaviors:   - Denies leg symptoms/movements   - Denies motor restlessness   - Denies night terrors   - Denies bruxism   - Denies automatic behaviors    Other Subjective Complaints:   - Denies anxiety or rumination    - Denies pain and discomfort at night   - Denies waking up with heart pounding or racing   - Denies GERD or aspiration         Parasomnia:    -NREM - Denies recurrent persistent confusional arousal, night eating, sleep walking or sleep terrors.      -REM - Denies dream enactment or injuries.     -Driving Accident or Near Accidents: Pt does not drive         Medications:     Current Outpatient Prescriptions   Medication Sig     amLODIPine (NORVASC) 5 MG tablet Take 1 tablet (5 mg) by mouth  daily     citalopram (CELEXA) 10 MG tablet Take 2 tablets (20 mg) by mouth daily     citalopram (CELEXA) 20 MG tablet TAKE 1 TABLET (20MG) BY MOUTH EVERY DAY. DIRECTION CHANGE 1/26/18     diazepam (VALIUM) 5 MG tablet TAKE 1/2 TO 1 TABLET BY MOUTH EVERY 8 HOURS AS NEEDED     levothyroxine (SYNTHROID/LEVOTHROID) 50 MCG tablet TAKE 1 TABLET (50 MCG) BY MOUTH DAILY     metoprolol tartrate (LOPRESSOR) 100 MG tablet TAKE 1 TABLET (100MG) BY MOUTH 2 TIMES DAILY     order for DME Oxygen 3 Li/min at night bled into the PAP device     rosuvastatin (CRESTOR) 10 MG tablet TAKE 1 TABLET (10 MG) BY MOUTH DAILY     traMADol (ULTRAM) 50 MG tablet TAKE ONE TABLET BY MOUTH EVERY 8 HOURS AS NEEDED FOR MODERATE PAIN     VITAMIN D, CHOLECALCIFEROL, PO Take 1,000 Units by mouth At Bedtime      warfarin (COUMADIN) 5 MG tablet TAKE 1&1/2 TABLETS (7.5MG) BY MOUTH ONCE DAILY ON TUESDAY & SATURDAY THEN 1 TABLET (5MG) ALL THE OTHER DAYS OF THE WEEK (Patient taking differently: TAKE 1&1/2 TABLETS (7.5MG) BY MOUTH ONCE DAILY ON TUESDAY & SATURDAY THEN 1 TABLET (5MG) ALL THE OTHER DAYS OF THE WEEK OR AS DIRECTED)     No current facility-administered medications for this visit.       Allergies   Allergen Reactions     Ace Inhibitors      Cough (Zestril)     Ambien [Zolpidem] Other (See Comments)     Aspirin      hx bleeding ulcers     Hydrochlorothiazide [Hctz] Rash     Ibuprofen      hx bleeding ulcers     Amoxicillin Rash     Losartan Rash     Penicillins Rash          Past Medical History:     Denies needing any 02 supplement at night.    Past Medical History:   Diagnosis Date     Anxiety     related to travel     Arthritis      ASCVD (arteriosclerotic cardiovascular disease) 2/12    60-70% stenoses of OM2, D1, medical management     Atrial fibrillation (H)      Atrial flutter (H)      Coronary artery disease     2/2012- 50% mid Cfx, 60-70% prox OM2, 50-70% D1     Essential hypertension, benign      Fatty liver 5/10    mild fibrosis on biopsy      Hypertriglyceridemia      Major depression      BETHANY (obstructive sleep apnea)      Other ventral hernia without mention of obstruction or gangrene      Peptic ulcer 2002     Peptic ulcer, unspecified site, unspecified as acute or chronic, without mention of hemorrhage, perforation, or obstruction 1983     Shortness of breath      Thyroid disease      Tubular adenoma 6/08           Past Surgical History:    Admits previous upper airway surgery.     Past Surgical History:   Procedure Laterality Date     APPENDECTOMY       C NONSPECIFIC PROCEDURE      Appendectomy     C NONSPECIFIC PROCEDURE      Tonsillectomy     C NONSPECIFIC PROCEDURE      Tubal ligation     C NONSPECIFIC PROCEDURE  11/01    Shave bone spurs from left foot     COLONOSCOPY  9/1/2016    Dr. Camejo [facility]     COLONOSCOPY N/A 9/1/2016    Procedure: COMBINED COLONOSCOPY, SINGLE OR MULTIPLE BIOPSY/POLYPECTOMY BY BIOPSY;  Surgeon: Pillo Camejo MD;  Location: [facility]     CORONARY ANGIOGRAPHY ADULT ORDER  2/13/2012    50% mid Cfx, 60-70% prox OM2, 50-70% D1     ESOPHAGOSCOPY, GASTROSCOPY, DUODENOSCOPY (EGD), COMBINED  9/1/2016    Dr. Camejo [facility]     ESOPHAGOSCOPY, GASTROSCOPY, DUODENOSCOPY (EGD), COMBINED N/A 9/1/2016    Procedure: COMBINED ESOPHAGOSCOPY, GASTROSCOPY, DUODENOSCOPY (EGD), BIOPSY SINGLE OR MULTIPLE;  Surgeon: Pillo Camejo MD;  Location:  GI     GYN SURGERY       HEART CATH RIGHT AND LEFT HEART CATH  04/06/2015    Mid LAD 50-60%, 1st Diagonal 70%, 1st OM 30%, 2nd OM 40-50%, review report for right heart cath results.           Social History:     Social History   Substance Use Topics     Smoking status: Never Smoker     Smokeless tobacco: Never Used     Alcohol use 0.0 oz/week     0 Standard drinks or equivalent per week      Comment: occ - wine     Chemical History:     Tobacco: Never smoked     Uses no caffeine.   Supplements for wakefulness: Patient does not use any supplements to stay awake    EtOH: 1 to 2 drinks a  week  Recreational Drugs: Patient denies using any recreational drugs     Psych Hx:   Current dangers to self or others: No. Pt denies any SI / HI, hallucinations, or delusions         Family History:     Family History   Problem Relation Age of Onset     Respiratory Father      emphysema--secon. to smoking     Alzheimer Disease Mother      Congenital Anomalies Mother      Osteoperosis Mother      Diabetes Maternal Aunt      Diabetes Other      cousins     Diabetes Other      cousin on mothers side of family     Colon Cancer No family hx of       Sleep Family Hx:       RLS - None reported   BETHANY - None reported  Insomnia - None reported  Parasomnia - None reported         Review of Systems:     A complete 10 point review of systems was negative other than HPI or as commented below:     CONSTITUTIONAL: NEGATIVE for weight gain/loss. Pt admits some chills.  EYES: NEGATIVE for changes in vision, blind spots, double vision.  ENT: NEGATIVE for ear pain. Pt admits having some sore throat, sinus pain, post-nasal drip, runny nose, and bloody nose.  CARDIAC: Positive for fast heartbeats or fluttering in chest, chest pain or pressure, breathlessness when lying flat, swollen legs / swollen feet.  NEUROLOGIC: Pt admits having headaches and leg weakness.  DERMATOLOGIC: NEGATIVE for rashes, new moles or change in mole(s)  PULMONARY: NEGATIVE coughing up blood, wheezing or whistling when breathing. Pt reports SOB at rest, SOB with activity, dry cough, and productive cough.     GASTROINTESTINAL: NEGATIVE for nausea or vomitting, loose or watery stools, fat or grease in stools, constipation, abdominal pain, bowel movements black in color or blood noted.  GENITOURINARY: NEGATIVE for pain during urination, blood in urine, urinating more frequently than usual, irregular menstrual periods.  MUSCULOSKELETAL: Positive for muscle pain, bone or joint pain, and swollen joints.  ENDOCRINE: NEGATIVE for increased thirst or urination,  "diabetes.  LYMPHATIC: NEGATIVE for swollen lymph nodes, lumps or bumps in the breasts or nipple discharge.         Physical Examination:   /64  Pulse 66  Resp 18  Ht 1.651 m (5' 5\")  Wt 76.2 kg (168 lb)  SpO2 95%  BMI 27.96 kg/m2     VS: Reviewed and normal.   General: Alert, oriented, not in distress. Dressed casually; Good eye contact; Comfortably sitting in a chair; in no apparent distress  Lungs: Both hemithoraces are symmetrical, normal to palpation, no dullness to percussion, auscultation of lungs revealed normal breath sounds with no expirium prolongation, wheezing, rhonci and crackles.  CVS: Normal S1 and S2 heart sounds with no extra heart sounds. No murmur, rubs, or clicks. Normal peripheral pulses throughout with no obvious peripheral edema. Irregularly irregular rhythm noted.  Psychiatry: Mood and affect are appropriate. Euthymic with affect congruent with full range and intensity. No SI/HI with adequate insight and judgement.          Data: All pertinent previous laboratory data reviewed     Lab Results   Component Value Date    PH 7.41 02/08/2018    PH 7.34 (L) 04/06/2015    PO2 76 (L) 02/08/2018    PO2 62 (L) 04/06/2015    PCO2 39 02/08/2018    PCO2 40 04/06/2015    HCO3 25 02/08/2018    HCO3 22 04/06/2015    SRIDHAR 0.1 02/08/2018     Lab Results   Component Value Date    TSH 3.34 05/03/2018    TSH 3.32 11/14/2017     Lab Results   Component Value Date    GLC 91 03/14/2018    GLC 96 08/30/2017     Lab Results   Component Value Date    HGB 12.8 03/14/2018    HGB 13.8 08/30/2017     Lab Results   Component Value Date    BUN 20 03/14/2018    BUN 20 08/30/2017    CR 0.90 03/14/2018    CR 1.03 08/30/2017     Echocardiology:    -Most recent echocardiogram obtained on 07/10/2017 showed LV or normal size with moderate concentric left ventricular hypertrophy. LV systolic function was normal with LVEF estimated at 55 - 60%. The RV was normal in side, structure, and function. There was mod-severe " biatrial enlargement noted. No obvious atrial shunt observed. Mild to moderate mitral annular calcification was noted. There was also mild mitral regurgitation present. Mild pulmonic valve regurgitation noted. The rhythm was atrial fibrillation during the study.    Chest x-ray: None Recent Studies Available    PFT:   -PFT obtained on 04/03/2018 showed FVC at 68%, FEV at 72%, FEV1/FVC at 74%, VC at 57%, TCL at 70%, DLCO at 54%. This findings were consistent with mild worsening of respiratory function with moderate mixed ventilatory defect, moderate restriction in volumes, and preserved gas transfer for the lung volumes.    Laboratory Studies:   Component Value Flag Ref Range Units Status Collected Lab   Sodium 140  133 - 144 mmol/L Final 08/30/2017 12:39 PM 65   Potassium 4.3  3.4 - 5.3 mmol/L Final 08/30/2017 12:39 PM 65   Chloride 106  94 - 109 mmol/L Final 08/30/2017 12:39 PM 65   Carbon Dioxide 26  20 - 32 mmol/L Final 08/30/2017 12:39 PM 65   Anion Gap 8  3 - 14 mmol/L Final 08/30/2017 12:39 PM 65   Glucose 96  70 - 99 mg/dL Final 08/30/2017 12:39 PM 65   Urea Nitrogen 20  7 - 30 mg/dL Final 08/30/2017 12:39 PM 65   Creatinine 1.03  0.52 - 1.04 mg/dL Final 08/30/2017 12:39 PM 65   GFR Estimate 52 (L) >60 mL/min/1.7m2 Final 08/30/2017 12:39 PM 65   Comment:   Non  GFR Calc   GFR Estimate If Black 63  >60 mL/min/1.7m2 Final 08/30/2017 12:39 PM 65   Comment:   African American GFR Calc   Calcium 9.3  8.5 - 10.1 mg/dL Final 08/30/2017 12:39 PM 65   Bilirubin Total 0.6  0.2 - 1.3 mg/dL Final 08/30/2017 12:39 PM 65   Albumin 3.6  3.4 - 5.0 g/dL Final 08/30/2017 12:39 PM 65   Protein Total 7.6  6.8 - 8.8 g/dL Final 08/30/2017 12:39 PM 65   Alkaline Phosphatase 68  40 - 150 U/L Final 08/30/2017 12:39 PM 65   ALT 32  0 - 50 U/L Final 08/30/2017 12:39 PM 65   AST 44  0 - 45 U/L Final 08/30/2017 12:39 PM 65     Component Value Flag Ref Range Units Status Collected Lab   WBC 7.4  4.0 - 11.0 10e9/L Final  08/30/2017 12:39 PM 79   RBC Count 4.01  3.8 - 5.2 10e12/L Final 08/30/2017 12:39 PM 79   Hemoglobin 13.8  11.7 - 15.7 g/dL Final 08/30/2017 12:39 PM 79   Hematocrit 41.3  35.0 - 47.0 % Final 08/30/2017 12:39 PM 79    (H) 78 - 100 fl Final 08/30/2017 12:39 PM 79   MCH 34.4 (H) 26.5 - 33.0 pg Final 08/30/2017 12:39 PM 79   Comment:   Reviewed: OK with previous   MCHC 33.4  31.5 - 36.5 g/dL Final 08/30/2017 12:39 PM 79   RDW 13.3  10.0 - 15.0 % Final 08/30/2017 12:39 PM 79   Platelet Count 161  150 - 450 10e9/L Final 08/30/2017 12:39 PM 79   Diff Method     Final 08/30/2017 12:39 PM 79   Automated Method   % Neutrophils 67.4   % Final 08/30/2017 12:39 PM 79   % Lymphocytes 22.6   % Final 08/30/2017 12:39 PM 79   % Monocytes 8.8   % Final 08/30/2017 12:39 PM 79   % Eosinophils 1.1   % Final 08/30/2017 12:39 PM 79   % Basophils 0.1   % Final 08/30/2017 12:39 PM 79   Absolute Neutrophil 5.0  1.6 - 8.3 10e9/L Final 08/30/2017 12:39 PM 79   Absolute Lymphocytes 1.7  0.8 - 5.3 10e9/L Final 08/30/2017 12:39 PM 79   Absolute Monocytes 0.7  0.0 - 1.3 10e9/L Final 08/30/2017 12:39 PM 79   Absolute Eosinophils 0.1  0.0 - 0.7 10e9/L Final 08/30/2017 12:39 PM 79   Absolute Basophils 0.0  0.0 - 0.2 10e9/L Final 08/30/2017 12:39 PM 79     Component Value Flag Ref Range Units Status Collected Lab   TSH 4.42 (H) 0.40 - 4.00 mU/L Final 08/30/2017 12:39 PM 65     Component Value Flag Ref Range Units Status Collected Lab   T4 Free 1.10  0.76 - 1.46 ng/dL Final 08/30/2017 12:39 PM 65     Dennys Johnson MD, MPH  Clinical Sleep Medicine    Total time spent with patient: 40 min. Over >50% of the time was spent for face to face counseling, education, and evaluation.

## 2018-08-17 NOTE — MR AVS SNAPSHOT
Cielo Aguillon   8/17/2018 11:00 AM   Anticoagulation Therapy Visit    Description:  78 year old female   Provider:  [facility]   Department:  [facility]           INR as of 8/17/2018     Today's INR 3.9!      Anticoagulation Summary as of 8/17/2018     INR goal 2.0-3.0   Today's INR 3.9!   Full warfarin instructions 8/17: Hold; 8/18: 5 mg; Otherwise 7.5 mg on Tue, Sat; 5 mg all other days   Next INR check 8/27/2018    Indications   Chronic atrial fibrillation (H) [I48.2]  Long-term (current) use of anticoagulants [Z79.01] [Z79.01]         Your next Anticoagulation Clinic appointment(s)     Aug 27, 2018  3:00 PM CDT   Anticoagulation Visit with [facility]   [facility] ([facility])    [address] [facility] [address]  [facility] [id number] [phone]              Contact Numbers     [facility] Phone Numbers:  Anticoagulation Clinic Appointments : [phone]  Anticoagulation Nurse: [phone]         August 2018 Details    Sun Mon Tue Wed Thu Fri Sat        1               2               3               4                 5               6               7               8               9               10               11                 12               13               14               15               16               17      Hold   See details      18      5 mg           19      5 mg         20      5 mg         21      7.5 mg         22      5 mg         23      5 mg         24      5 mg         25      7.5 mg           26      5 mg         27            28               29               30               31                 Date Details   08/17 This INR check       Date of next INR:  8/27/2018         How to take your warfarin dose     To take:  5 mg Take 1 of the 5 mg tablets.    To take:  7.5 mg Take 1.5 of the 5 mg tablets.    Hold Do not take your warfarin dose. See the Details table to the right for additional instructions.

## 2018-08-17 NOTE — NURSING NOTE
"Chief Complaint   Patient presents with     RECHECK     psg split with TCM  7/24       Initial /64  Pulse 66  Resp 18  Ht 1.651 m (5' 5\")  Wt 76.2 kg (168 lb)  SpO2 95%  BMI 27.96 kg/m2 Estimated body mass index is 27.96 kg/(m^2) as calculated from the following:    Height as of this encounter: 1.651 m (5' 5\").    Weight as of this encounter: 76.2 kg (168 lb).    Medication Reconciliation: complete    Damaris Guerra LPN/HALIMA    DME: Y  "

## 2018-08-21 ENCOUNTER — DOCUMENTATION ONLY (OUTPATIENT)
Dept: SLEEP MEDICINE | Facility: CLINIC | Age: 78
End: 2018-08-21

## 2018-08-21 NOTE — PROGRESS NOTES
6 month [facility] Recheck Visit     Subjective measures:   Pt states that things are ok.  She is going to get a new mask on 10/9/18 and hopefully then things will get better.    Assessment: Pt not meeting objective benchmarks for compliance  Patient failing following subjective benchmarks: mask discomfort   Action plan:  pt to follow up per provider request (1-2 yrs)    Device type: ASV  PAP settings: EPAP Min 8cmH20, EPAP Max 11 cmH20, PS Min 0, PS Max 17  Objective measures: 14 day rolling measures      Compliance  57 %      Leak  18.6 lpm  last  upload      AHI 0.85   last  upload      Average number of minutes 230      Objective measure goal  Compliance   Goal >70%  Leak   Goal < 24 lpm  AHI  Goal < 5  Usage  Goal >240

## 2018-08-28 DIAGNOSIS — F41.1 GAD (GENERALIZED ANXIETY DISORDER): ICD-10-CM

## 2018-08-29 NOTE — TELEPHONE ENCOUNTER
Requested Prescriptions   Pending Prescriptions Disp Refills     diazepam (VALIUM) 5 MG tablet [Pharmacy Med Name: DIAZEPAM 5MG TABLET 5 TAB] 40 tablet 1     Sig: TAKE 1/2 TO 1 TABLET BY MOUTH EVERY 8 HOURS AS NEEDED    There is no refill protocol information for this order      Last Written Prescription Date:  04/26/2018  Last Fill Quantity: 40,  # refills: 1   Last office visit: 5/3/2018 with prescribing provider:     Future Office Visit:

## 2018-08-30 RX ORDER — DIAZEPAM 5 MG
TABLET ORAL
Qty: 40 TABLET | Refills: 1 | Status: SHIPPED | OUTPATIENT
Start: 2018-08-30 | End: 2019-02-07

## 2018-08-30 NOTE — TELEPHONE ENCOUNTER
Signed CSA form in chart.    RX [place] reviewed:  reviewed- no concerns    [place]:  [email or web address]    Please advise, thanks.

## 2018-09-07 ENCOUNTER — TELEPHONE (OUTPATIENT)
Dept: SLEEP MEDICINE | Facility: CLINIC | Age: 78
End: 2018-09-07

## 2018-09-07 NOTE — TELEPHONE ENCOUNTER
"Cielo lvm asking when she was going to get her oxygen and that she had an appointment with Dr ZALDIVAR on 9/18/18.  She had a visit with DR ZALDIVAR on 8/7/18 to discuss her titration study, and was told no oxygen was needed, as the ASV machine took care of the hypoxemia.  Attempted three times to speak with Cielo to explain to her and ask her to  oximeter to use with aSV before her phone visit with DR ZALDIVAR on 9/18/18.  Each time she answered the phone who is it?  Upon asking to speak with Cielo the first time I was told no Cielo here, second attempt, phone picked up and then hung up.  Third attempt she again answered who is this, I said it was the sleep doctor's office, and she whispered \"its the doctors office\" then giggles with another person in the background, and hung up. Phone number is the listed phone number for her.  "

## 2018-09-10 ENCOUNTER — ANTICOAGULATION THERAPY VISIT (OUTPATIENT)
Dept: NURSING | Facility: CLINIC | Age: 78
End: 2018-09-10
Payer: MEDICARE

## 2018-09-10 ENCOUNTER — OFFICE VISIT (OUTPATIENT)
Dept: URGENT CARE | Facility: URGENT CARE | Age: 78
End: 2018-09-10
Payer: MEDICARE

## 2018-09-10 VITALS
TEMPERATURE: 97.4 F | DIASTOLIC BLOOD PRESSURE: 80 MMHG | HEART RATE: 86 BPM | SYSTOLIC BLOOD PRESSURE: 112 MMHG | OXYGEN SATURATION: 97 %

## 2018-09-10 DIAGNOSIS — I48.20 CHRONIC ATRIAL FIBRILLATION (H): ICD-10-CM

## 2018-09-10 DIAGNOSIS — Z79.01 LONG-TERM (CURRENT) USE OF ANTICOAGULANTS: ICD-10-CM

## 2018-09-10 DIAGNOSIS — H69.93 DYSFUNCTION OF BOTH EUSTACHIAN TUBES: Primary | ICD-10-CM

## 2018-09-10 LAB — INR POINT OF CARE: 4.2 (ref 0.86–1.14)

## 2018-09-10 PROCEDURE — 36416 COLLJ CAPILLARY BLOOD SPEC: CPT

## 2018-09-10 PROCEDURE — 99213 OFFICE O/P EST LOW 20 MIN: CPT | Performed by: FAMILY MEDICINE

## 2018-09-10 PROCEDURE — 85610 PROTHROMBIN TIME: CPT | Mod: QW

## 2018-09-10 PROCEDURE — 99207 ZZC NO CHARGE NURSE ONLY: CPT

## 2018-09-10 NOTE — PROGRESS NOTES
SUBJECTIVE:  Chief Complaint   Patient presents with     Urgent Care     Cerumen (impacted)     Possible impacted ear x3 weeks- fells like both ears are full, like in high altitude     Cielo Aguillon is a 78 year old female who presents with bilateral ear fullness, pressure and blockage for 3 week(s).   Severity: mild   Timing:gradual onset, still present and constant-  Concern that her bilateral hearing aids have caused wax build-up  Additional symptoms include sneezing occasionally       Past Medical History:   Diagnosis Date     Anxiety     related to travel     Arthritis      ASCVD (arteriosclerotic cardiovascular disease) 2/12    60-70% stenoses of OM2, D1, medical management     Atrial fibrillation (H)      Atrial flutter (H)      Coronary artery disease     2/2012- 50% mid Cfx, 60-70% prox OM2, 50-70% D1     Essential hypertension, benign      Fatty liver 5/10    mild fibrosis on biopsy     Hypertriglyceridemia      Major depression      BETHANY (obstructive sleep apnea)      Other ventral hernia without mention of obstruction or gangrene      Peptic ulcer 2002     Peptic ulcer, unspecified site, unspecified as acute or chronic, without mention of hemorrhage, perforation, or obstruction 1983     Shortness of breath      Thyroid disease      Tubular adenoma 6/08     Patient Active Problem List   Diagnosis     Essential hypertension, benign     Chronic rhinitis     Other ventral hernia without mention of obstruction or gangrene     Lichenification and lichen simplex chronicus     Fatty liver     Advanced directives, counseling/discussion     Hyperlipidemia LDL goal <70     Chronic right-sided back pain     Coronary artery disease     Cystocele, midline     Rectocele     BETHANY (obstructive sleep apnea)     Long-term (current) use of anticoagulants [Z79.01]     MARY (generalized anxiety disorder)     Major depressive disorder, recurrent episode, moderate (H)     Controlled substance agreement signed     Chronic atrial  fibrillation (H)     Facet arthropathy, lumbar     Acquired hypothyroidism       ALLERGIES:  Ace inhibitors; Ambien [zolpidem]; Aspirin; Hydrochlorothiazide [hctz]; Ibuprofen; Amoxicillin; Losartan; and Penicillins        Current Outpatient Prescriptions on File Prior to Visit:  amLODIPine (NORVASC) 5 MG tablet Take 1 tablet (5 mg) by mouth daily   citalopram (CELEXA) 10 MG tablet Take 2 tablets (20 mg) by mouth daily   citalopram (CELEXA) 20 MG tablet TAKE 1 TABLET (20MG) BY MOUTH EVERY DAY. DIRECTION CHANGE 1/26/18   diazepam (VALIUM) 5 MG tablet TAKE 1/2 TO 1 TABLET BY MOUTH EVERY 8 HOURS AS NEEDED   levothyroxine (SYNTHROID/LEVOTHROID) 50 MCG tablet TAKE 1 TABLET (50 MCG) BY MOUTH DAILY   metoprolol tartrate (LOPRESSOR) 100 MG tablet TAKE 1 TABLET (100MG) BY MOUTH 2 TIMES DAILY   rosuvastatin (CRESTOR) 10 MG tablet TAKE 1 TABLET (10 MG) BY MOUTH DAILY   traMADol (ULTRAM) 50 MG tablet TAKE ONE TABLET BY MOUTH EVERY 8 HOURS AS NEEDED FOR MODERATE PAIN   VITAMIN D, CHOLECALCIFEROL, PO Take 1,000 Units by mouth At Bedtime    warfarin (COUMADIN) 5 MG tablet TAKE 1&1/2 TABLETS (7.5MG) BY MOUTH ONCE DAILY ON TUESDAY & SATURDAY THEN 1 TABLET (5MG) ALL THE OTHER DAYS OF THE WEEK (Patient taking differently: TAKE 1&1/2 TABLETS (7.5MG) BY MOUTH ONCE DAILY ON TUESDAY & SATURDAY THEN 1 TABLET (5MG) ALL THE OTHER DAYS OF THE WEEK OR AS DIRECTED)   order for DME Oxygen 3 Li/min at night bled into the PAP device     No current facility-administered medications on file prior to visit.     Social History   Substance Use Topics     Smoking status: Never Smoker     Smokeless tobacco: Never Used     Alcohol use 0.0 oz/week     0 Standard drinks or equivalent per week      Comment: occ - wine       Family History   Problem Relation Age of Onset     Respiratory Father      emphysema--secon. to smoking     Alzheimer Disease Mother      Congenital Anomalies Mother      Osteoporosis Mother      Diabetes Maternal Aunt      Diabetes Other       cousins     Diabetes Other      cousin on mothers side of family     Colon Cancer No family hx of        ROS:   CONSTITUTIONAL:NEGATIVE for fever, chills, change in weight  INTEGUMENTARY/SKIN: NEGATIVE for worrisome rashes, moles or lesions  EYES: NEGATIVE for vision changes or irritation  RESP:NEGATIVE for significant cough or SOB    OBJECTIVE:  /80 (BP Location: Right arm, Patient Position: Chair, Cuff Size: Adult Regular)  Pulse 86  Temp 97.4  F (36.3  C) (Oral)  SpO2 97%   EXAM:  The right TM is normal: no effusions, no erythema, and normal landmarks     The right auditory canal is normal and without drainage, edema or erythema  The left TM is normal: no effusions, no erythema, and normal landmarks  The left auditory canal is normal and without drainage, edema or erythema  Oropharynx exam is normal: no lesions, erythema, adenopathy or exudate.  GENERAL: no acute distress  EYES: EOMI,  PERRL, conjunctiva clear  NECK: supple, non-tender to palpation, no adenopathy noted  RESP: lungs clear to auscultation - no rales, rhonchi or wheezes  CV: regular rates and rhythm, normal S1 S2, no murmur noted  SKIN: no suspicious lesions or rashes     ASSESSMENT/ PLAN  Dysfunction of both eustachian tubes        We discussed that swelling or mucous blockage of the eustachian tube can cause an inability to equalize pressure in the ear.  The eustachian tube blockage may be improved with a steroid nasal spray to reduce inflammation  Also remedies to liquify mucous like drinking ample fluids and OTC guaifenicin may be helpful  Reassured that there are no signs of infection that would respond to antibiotics  Symptomatic relief of pain and fever with acetaminophen and/or ibuprofen   May apply a warm pack to the region of the ear for symptomatic relief

## 2018-09-10 NOTE — PATIENT INSTRUCTIONS
Earache, No Infection (Adult)  Earaches can happen without an infection. This occurs when air and fluid build up behind the eardrum causing a feeling of fullness and discomfort and reduced hearing. This is called otitis media with effusion (OME) or serous otitis media. It means there is fluid in the middle ear. It is not the same as acute otitis media, which is typically from infection.  OME can happen when you have a cold if congestion blocks the passage that drains the middle ear. This passage is called the eustachian tube. OME may also occur with nasal allergies or after a bacterial middle ear infection.    The pain or discomfort may come and go. You may hear clicking or popping sounds when you chew or swallow. You may feel that your balance is off. Or you may hear ringing in the ear.  It often takes from several weeks up to 3 months for the fluid to clear on its own. Oral pain relievers and ear drops help if there is pain. Decongestants and antihistamines sometimes help. Antibiotics don't help since there is no infection. Your doctor may prescribe a nasal spray to help reduce swelling in the nose and eustachian tube. This can allow the ear to drain.  If your OME doesn't improve after 3 months, surgery may be used to drain the fluid and insert a small tube in the eardrum to allow continued drainage.  Because the middle ear fluid can become infected, it is important to watch for signs of an ear infection which may develop later. These signs include increased ear pain, fever, or drainage from the ear.  Home care  The following guidelines will help you care for yourself at home:    You may use over-the-counter medicine as directed to control pain, unless another medicine was prescribed. If you have chronic liver or kidney disease or ever had a stomach ulcer or GI bleeding, talk with your doctor before using these medicines. Aspirin should never be used in anyone under 18 years of age who is ill with a fever. It  may cause severe liver damage.    You may use over-the-counter decongestants such as phenylephrine or pseudoephedrine. But they are not always helpful. Don't use nasal spray decongestants more than 3 days. Longer use can make congestion worse. Prescription nasal sprays from your doctor don't typically have those restrictions.    Antihistamines may help if you are also having allergy symptoms.    You may use medicines such as guaifenesin to thin mucus and promote drainage.  Follow-up care  Follow up with your healthcare provider or as advised if you are not feeling better after 3 days.  When to seek medical advice  Call your healthcare provider right away if any of the following occur:    Your ear pain gets worse or does not start to improve     Fever of 100.4 F (38 C) or higher, or as directed by your healthcare provider    Fluid or blood draining from the ear    Headache or sinus pain    Stiff neck    Unusual drowsiness or confusion  Date Last Reviewed: 10/1/2016    9707-9933 [employer]. [address] [place] [address], [place] [address]. All rights reserved. This information is not intended as a substitute for professional medical care. Always follow your healthcare professional's instructions.

## 2018-09-10 NOTE — MR AVS SNAPSHOT
After Visit Summary   9/10/2018    Cielo Aguillon    MRN: [id number]           Patient Information     Date Of Birth          1940        Visit Information        Provider Department      9/10/2018 2:00 PM Apryl Liang MD [facility] [place] [facility]        Care Instructions      Earache, No Infection (Adult)  Earaches can happen without an infection. This occurs when air and fluid build up behind the eardrum causing a feeling of fullness and discomfort and reduced hearing. This is called otitis media with effusion (OME) or serous otitis media. It means there is fluid in the middle ear. It is not the same as acute otitis media, which is typically from infection.  OME can happen when you have a cold if congestion blocks the passage that drains the middle ear. This passage is called the eustachian tube. OME may also occur with nasal allergies or after a bacterial middle ear infection.    The pain or discomfort may come and go. You may hear clicking or popping sounds when you chew or swallow. You may feel that your balance is off. Or you may hear ringing in the ear.  It often takes from several weeks up to 3 months for the fluid to clear on its own. Oral pain relievers and ear drops help if there is pain. Decongestants and antihistamines sometimes help. Antibiotics don't help since there is no infection. Your doctor may prescribe a nasal spray to help reduce swelling in the nose and eustachian tube. This can allow the ear to drain.  If your OME doesn't improve after 3 months, surgery may be used to drain the fluid and insert a small tube in the eardrum to allow continued drainage.  Because the middle ear fluid can become infected, it is important to watch for signs of an ear infection which may develop later. These signs include increased ear pain, fever, or drainage from the ear.  Home care  The following guidelines will help you care for yourself at home:    You may use over-the-counter  medicine as directed to control pain, unless another medicine was prescribed. If you have chronic liver or kidney disease or ever had a stomach ulcer or GI bleeding, talk with your doctor before using these medicines. Aspirin should never be used in anyone under 18 years of age who is ill with a fever. It may cause severe liver damage.    You may use over-the-counter decongestants such as phenylephrine or pseudoephedrine. But they are not always helpful. Don't use nasal spray decongestants more than 3 days. Longer use can make congestion worse. Prescription nasal sprays from your doctor don't typically have those restrictions.    Antihistamines may help if you are also having allergy symptoms.    You may use medicines such as guaifenesin to thin mucus and promote drainage.  Follow-up care  Follow up with your healthcare provider or as advised if you are not feeling better after 3 days.  When to seek medical advice  Call your healthcare provider right away if any of the following occur:    Your ear pain gets worse or does not start to improve     Fever of 100.4 F (38 C) or higher, or as directed by your healthcare provider    Fluid or blood draining from the ear    Headache or sinus pain    Stiff neck    Unusual drowsiness or confusion  Date Last Reviewed: 10/1/2016    8831-1691 [employer]. [address] [place] [address], [place] [address]. All rights reserved. This information is not intended as a substitute for professional medical care. Always follow your healthcare professional's instructions.                Follow-ups after your visit        Your next 10 appointments already scheduled     Sep 18, 2018  2:00 PM CDT   Return Sleep Patient with Dennys Johnson MD   [facility] [place]    [address] [address] [address] [phone]              Who to contact     If you have questions or need follow up information about today's  clinic visit or your schedule please contact [facility] directly at [phone].  Normal or non-critical lab and imaging results will be communicated to you by MyChart, letter or phone within 4 business days after the clinic has received the results. If you do not hear from us within 7 days, please contact the clinic through MyChart or phone. If you have a critical or abnormal lab result, we will notify you by phone as soon as possible.  Submit refill requests through [employer] or call your pharmacy and they will forward the refill request to us. Please allow 3 business days for your refill to be completed.          Additional Information About Your Visit        Care EveryWhere ID     This is your Care EveryWhere ID. This could be used by other organizations to access your [facility] medical records  [id number]        Your Vitals Were     Pulse Temperature Pulse Oximetry             86 97.4  F (36.3  C) (Oral) 97%          Blood Pressure from Last 3 Encounters:   09/10/18 112/80   08/17/18 120/64   08/01/18 118/74    Weight from Last 3 Encounters:   08/17/18 168 lb (76.2 kg)   08/01/18 168 lb 14.4 oz (76.6 kg)   06/26/18 174 lb (78.9 kg)              Today, you had the following     No orders found for display         Today's Medication Changes          These changes are accurate as of 9/10/18  2:26 PM.  If you have any questions, ask your nurse or doctor.               These medicines have changed or have updated prescriptions.        Dose/Directions    warfarin 5 MG tablet   Commonly known as:  COUMADIN   This may have changed:  See the new instructions.   Used for:  Chronic atrial fibrillation (H), Long term current use of anticoagulant therapy        TAKE 1&1/2 TABLETS (7.5MG) BY MOUTH ONCE DAILY ON TUESDAY & SATURDAY THEN 1 TABLET (5MG) ALL THE OTHER DAYS OF THE WEEK   Quantity:  108 tablet   Refills:  1                Primary Care Provider Office Phone # Fax #    Simin Lopez [phone]  [phone]       [address] [facility] [address]  [facility] [address]        Equal Access to Services     JOSSELINEWILBERT SCOTT : Hadii aad ku haddougieo Soparveenali, waaxda luqadaha, qaybta kaalmada adekushda, yaneth faribain hayaarodolfo arguetaangelic zelaya yanick martin. So [facility] [phone].    ATENCIÓN: Si habla español, tiene a duran disposición servicios gratuitos de asistencia lingüística. Llame al [phone].    We comply with applicable federal civil rights laws and [place] laws. We do not discriminate on the basis of race, color, national origin, age, disability, sex, sexual orientation, or gender identity.            Thank you!     Thank you for choosing [facility]  for your care. Our goal is always to provide you with excellent care. Hearing back from our patients is one way we can continue to improve our services. Please take a few minutes to complete the written survey that you may receive in the mail after your visit with us. Thank you!             Your Updated Medication List - Protect others around you: Learn how to safely use, store and throw away your medicines at www.disposemymeds.org.          This list is accurate as of 9/10/18  2:26 PM.  Always use your most recent med list.                   Brand Name Dispense Instructions for use Diagnosis    amLODIPine 5 MG tablet    NORVASC    90 tablet    Take 1 tablet (5 mg) by mouth daily    Essential hypertension, benign       * citalopram 10 MG tablet    celeXA    180 tablet    Take 2 tablets (20 mg) by mouth daily    Major depressive disorder, recurrent episode, moderate (H), MARY (generalized anxiety disorder)       * citalopram 20 MG tablet    celeXA    90 tablet    TAKE 1 TABLET (20MG) BY MOUTH EVERY DAY. DIRECTION CHANGE 1/26/18    MARY (generalized anxiety disorder), Major depressive disorder, recurrent episode, moderate (H)       diazepam 5 MG tablet    VALIUM    40 tablet    TAKE 1/2 TO 1 TABLET BY MOUTH EVERY 8 HOURS AS NEEDED    MARY (generalized anxiety  disorder)       levothyroxine 50 MCG tablet    SYNTHROID/LEVOTHROID    90 tablet    TAKE 1 TABLET (50 MCG) BY MOUTH DAILY    Acquired hypothyroidism       metoprolol tartrate 100 MG tablet    LOPRESSOR    180 tablet    TAKE 1 TABLET (100MG) BY MOUTH 2 TIMES DAILY    Essential hypertension, benign, ASCVD (arteriosclerotic cardiovascular disease)       order for DME     1 Bottle    Oxygen 3 Li/min at night bled into the PAP device    Nocturnal hypoxemia, GREEN (dyspnea on exertion), Pulmonary hypertension, Complex sleep apnea syndrome, Permanent atrial fibrillation (H)       rosuvastatin 10 MG tablet    CRESTOR    90 tablet    TAKE 1 TABLET (10 MG) BY MOUTH DAILY    Hyperlipidemia LDL goal <70, Coronary artery disease involving native coronary artery of native heart without angina pectoris       traMADol 50 MG tablet    ULTRAM    50 tablet    TAKE ONE TABLET BY MOUTH EVERY 8 HOURS AS NEEDED FOR MODERATE PAIN    Chronic left-sided low back pain with left-sided sciatica       VITAMIN D (CHOLECALCIFEROL) PO      Take 1,000 Units by mouth At Bedtime        warfarin 5 MG tablet    COUMADIN    108 tablet    TAKE 1&1/2 TABLETS (7.5MG) BY MOUTH ONCE DAILY ON TUESDAY & SATURDAY THEN 1 TABLET (5MG) ALL THE OTHER DAYS OF THE WEEK    Chronic atrial fibrillation (H), Long term current use of anticoagulant therapy       * Notice:  This list has 2 medication(s) that are the same as other medications prescribed for you. Read the directions carefully, and ask your doctor or other care provider to review them with you.

## 2018-09-10 NOTE — MR AVS SNAPSHOT
Cielo Aguillon   9/10/2018 1:30 PM   Anticoagulation Therapy Visit    Description:  78 year old female   Provider:   ANTICOAGULATION CLINIC   Department:   Nurse           INR as of 9/10/2018     Today's INR 4.2!      Anticoagulation Summary as of 9/10/2018     INR goal 2.0-3.0   Today's INR 4.2!   Full warfarin instructions 9/10: Hold; 9/11: 5 mg; 9/15: 5 mg; 9/18: 5 mg; 9/22: 5 mg; 9/25: 5 mg; 9/29: 5 mg; Otherwise 7.5 mg on Tue, Sat; 5 mg all other days   Next INR check 9/28/2018    Indications   Chronic atrial fibrillation (H) [I48.2]  Long-term (current) use of anticoagulants [Z79.01] [Z79.01]         Contact Numbers     [facility]  Please call [phone] with any problems or questions regarding your therapy, or to cancel or reschedule your appointment.          September 2018 Details    Sun Mon Tue Wed Thu Fri Sat           1                 2               3               4               5               6               7               8                 9               10      Hold   See details      11      5 mg         12      5 mg         13      5 mg         14      5 mg         15      5 mg           16      5 mg         17      5 mg         18      5 mg         19      5 mg         20      5 mg         21      5 mg         22      5 mg           23      5 mg         24      5 mg         25      5 mg         26      5 mg         27      5 mg         28            29                 30                      Date Details   09/10 This INR check       Date of next INR:  9/28/2018         How to take your warfarin dose     To take:  5 mg Take 1 of the 5 mg tablets.    Hold Do not take your warfarin dose. See the Details table to the right for additional instructions.

## 2018-09-10 NOTE — PROGRESS NOTES
ANTICOAGULATION FOLLOW-UP CLINIC VISIT    Patient Name:  Cielo Aguillon  Date:  9/10/2018  Contact Type:  Face to Face    SUBJECTIVE:     Patient Findings     Positives Change in diet/appetite    Comments Pt states she has not been eating as she normally does.  Decrease in green intake.                OBJECTIVE    INR Protime   Date Value Ref Range Status   09/10/2018 4.2 (A) 0.86 - 1.14 Final       ASSESSMENT / PLAN    Pt advised to UC/ER if any bruising or bleeding.  Pt expressed understanding and acceptance of the plan.  Pt had no further questions at this time.  Advised can call back to clinic at any time with concerns.     INR assessment SUPRA    Recheck INR In: 3 WEEKS    INR Location Clinic      Anticoagulation Summary as of 9/10/2018     INR goal 2.0-3.0   Today's INR 4.2!   Warfarin maintenance plan 7.5 mg (5 mg x 1.5) on Tue, Sat; 5 mg (5 mg x 1) all other days   Full warfarin instructions 9/10: Hold; 9/11: 5 mg; 9/15: 5 mg; 9/18: 5 mg; 9/22: 5 mg; 9/25: 5 mg; 9/29: 5 mg; Otherwise 7.5 mg on Tue, Sat; 5 mg all other days   Weekly warfarin total 40 mg   Plan last modified Cindi Preston RN (5/9/2017)   Next INR check 9/28/2018   Priority INR   Target end date     Indications   Chronic atrial fibrillation (H) [I48.2]  Long-term (current) use of anticoagulants [Z79.01] [Z79.01]         Anticoagulation Episode Summary     INR check location     Preferred lab     Send INR reminders to [facility]    Comments       Anticoagulation Care Providers     Provider Role Specialty Phone number    Simin Lopez MD [facility] Internal Medicine [phone]            See the Encounter Report to view Anticoagulation Flowsheet and Dosing Calendar (Go to Encounters tab in chart review, and find the Anticoagulation Therapy Visit)    Dosage adjustment made based on physician directed care plan.    Ivanna Fernandez, RN

## 2018-09-27 ENCOUNTER — ANTICOAGULATION THERAPY VISIT (OUTPATIENT)
Dept: ANTICOAGULATION | Facility: CLINIC | Age: 78
End: 2018-09-27
Payer: MEDICARE

## 2018-09-27 DIAGNOSIS — Z79.01 LONG-TERM (CURRENT) USE OF ANTICOAGULANTS: ICD-10-CM

## 2018-09-27 DIAGNOSIS — I48.20 CHRONIC ATRIAL FIBRILLATION (H): ICD-10-CM

## 2018-09-27 LAB — INR POINT OF CARE: 3.7 (ref 0.86–1.14)

## 2018-09-27 PROCEDURE — 99207 ZZC NO CHARGE NURSE ONLY: CPT

## 2018-09-27 PROCEDURE — 85610 PROTHROMBIN TIME: CPT | Mod: QW

## 2018-09-27 PROCEDURE — 36416 COLLJ CAPILLARY BLOOD SPEC: CPT

## 2018-09-27 NOTE — MR AVS SNAPSHOT
Cielo Aguillon   9/27/2018 1:45 PM   Anticoagulation Therapy Visit    Description:  78 year old female   Provider[facility]  [facility]   Department:  [facility]           INR as of 9/27/2018     Today's INR 3.7!      Anticoagulation Summary as of 9/27/2018     INR goal 2.0-3.0   Today's INR 3.7!   Full warfarin instructions 9/27: 2.5 mg; 9/29: 5 mg; 10/2: 5 mg; 10/6: 5 mg; Otherwise 7.5 mg on Tue, Sat; 5 mg all other days   Next INR check 10/4/2018    Indications   Chronic atrial fibrillation (H) [I48.2]  Long-term (current) use of anticoagulants [Z79.01] [Z79.01]         Your next Anticoagulation Clinic appointment(s)     Oct 04, 2018  1:00 PM CDT   [facility]    [address] [facility] [address]  [facility] [address] [phone]              Contact Numbers     [facility] Phone Numbers:  Anticoagulation Clinic Appointments : [phone]  Anticoagulation Nurse: [phone]         September 2018 Details    Sun Mon Tue Wed Thu Fri Sat           1                 2               3               4               5               6               7               8                 9               10               11               12               13               14               15                 16               17               18               19               20               21               22                 23               24               25               26               27      2.5 mg   See details      28      5 mg         29      5 mg           30      5 mg                Date Details   09/27 This INR check               How to take your warfarin dose     To take:  2.5 mg Take 0.5 of a 5 mg tablet.    To take:  5 mg Take 1 of the 5 mg tablets.           October 2018 Details    Sun Mon Tue Wed Thu Fri Sat      1      5 mg         2      5 mg         3      5 mg         4             5               6                 7               8               9               10               11               12               13                 14               15               16               17               18               19               20                 21               22               23               24               25               26               27                 28               29               30               31                   Date Details   No additional details    Date of next INR:  10/4/2018         How to take your warfarin dose     To take:  5 mg Take 1 of the 5 mg tablets.

## 2018-09-27 NOTE — PROGRESS NOTES
ANTICOAGULATION FOLLOW-UP CLINIC VISIT    Patient Name:  Cielo Aguillon  Date:  9/27/2018  Contact Type:  Face to Face    SUBJECTIVE:     Patient Findings     Comments Pt reports has not had any diarrhea the last few days.           OBJECTIVE    INR Protime   Date Value Ref Range Status   09/27/2018 3.7 (A) 0.86 - 1.14 Final       ASSESSMENT / PLAN  INR assessment SUPRA    Recheck INR In: 1 WEEK    INR Location Clinic      Anticoagulation Summary as of 9/27/2018     INR goal 2.0-3.0   Today's INR 3.7!   Warfarin maintenance plan 7.5 mg (5 mg x 1.5) on Tue, Sat; 5 mg (5 mg x 1) all other days   Full warfarin instructions 9/27: 2.5 mg; 9/29: 5 mg; 10/2: 5 mg; 10/6: 5 mg; Otherwise 7.5 mg on Tue, Sat; 5 mg all other days   Weekly warfarin total 40 mg   Plan last modified Cindi Preston RN (5/9/2017)   Next INR check 10/4/2018   Priority INR   Target end date     Indications   Chronic atrial fibrillation (H) [I48.2]  Long-term (current) use of anticoagulants [Z79.01] [Z79.01]         Anticoagulation Episode Summary     INR check location     Preferred lab     Send INR reminders to [facility]    Comments       Anticoagulation Care Providers     Provider Role Specialty Phone number    Simin Lopez MD [facility] Internal Medicine [phone]            See the Encounter Report to view Anticoagulation Flowsheet and Dosing Calendar (Go to Encounters tab in chart review, and find the Anticoagulation Therapy Visit)    Dosage adjustment made based on physician directed care plan.    Cindi Preston, RN

## 2018-10-04 ENCOUNTER — ANTICOAGULATION THERAPY VISIT (OUTPATIENT)
Dept: ANTICOAGULATION | Facility: CLINIC | Age: 78
End: 2018-10-04
Payer: MEDICARE

## 2018-10-04 DIAGNOSIS — I48.20 CHRONIC ATRIAL FIBRILLATION (H): ICD-10-CM

## 2018-10-04 LAB — INR POINT OF CARE: 2.6 (ref 0.86–1.14)

## 2018-10-04 PROCEDURE — 36416 COLLJ CAPILLARY BLOOD SPEC: CPT

## 2018-10-04 PROCEDURE — 99207 ZZC NO CHARGE NURSE ONLY: CPT

## 2018-10-04 PROCEDURE — 85610 PROTHROMBIN TIME: CPT | Mod: QW

## 2018-10-04 NOTE — MR AVS SNAPSHOT
Cielo Aguillon   10/4/2018 1:00 PM   Anticoagulation Therapy Visit    Description:  78 year old female   Provider[facility]  [facility]   Department:  [facility]           INR as of 10/4/2018     Today's INR 2.6      Anticoagulation Summary as of 10/4/2018     INR goal 2.0-3.0   Today's INR 2.6   Full warfarin instructions 10/6: 5 mg; 10/9: 5 mg; 10/13: 5 mg; 10/16: 5 mg; Otherwise 7.5 mg on Tue, Sat; 5 mg all other days   Next INR check 10/18/2018    Indications   Chronic atrial fibrillation (H) [I48.2]  Long-term (current) use of anticoagulants [Z79.01] [Z79.01]         Your next Anticoagulation Clinic appointment(s)     Oct 18, 2018  1:45 PM CDT   [facility]ticoagulation Visit with [facility]   [facility]    [address] [facility] [address]  [facility] [id number] [phone]              Contact Numbers     [facility] Phone Numbers:  Anticoagulation Clinic Appointments : [phone]  Anticoagulation Nurse: [phone]         October 2018 Details    Sun Mon Tue Wed Thu Fri Sat      1               2               3               4      5 mg   See details      5      5 mg         6      5 mg           7      5 mg         8      5 mg         9      5 mg         10      5 mg         11      5 mg         12      5 mg         13      5 mg           14      5 mg         15      5 mg         16      5 mg         17      5 mg         18            19               20                 21               22               23               24               25               26               27                 28               29               30               31                   Date Details   10/04 This INR check       Date of next INR:  10/18/2018         How to take your warfarin dose     To take:  5 mg Take 1 of the 5 mg tablets.

## 2018-10-04 NOTE — PROGRESS NOTES
ANTICOAGULATION FOLLOW-UP CLINIC VISIT    Patient Name:  Cielo Aguillon  Date:  10/4/2018  Contact Type:  Face to Face    SUBJECTIVE:     Patient Findings     Positives No Problem Findings    Comments Pt reports no changes or missed warfarin doses since last INR visit. Pt denies bleeding or bruising problems.            OBJECTIVE    INR Protime   Date Value Ref Range Status   10/04/2018 2.6 (A) 0.86 - 1.14 Final       ASSESSMENT / PLAN  INR assessment THER    Recheck INR In: 2 WEEKS    INR Location Clinic      Anticoagulation Summary as of 10/4/2018     INR goal 2.0-3.0   Today's INR 2.6   Warfarin maintenance plan 7.5 mg (5 mg x 1.5) on Tue, Sat; 5 mg (5 mg x 1) all other days   Full warfarin instructions 10/6: 5 mg; 10/9: 5 mg; 10/13: 5 mg; 10/16: 5 mg; Otherwise 7.5 mg on Tue, Sat; 5 mg all other days   Weekly warfarin total 40 mg   Plan last modified Cindi Preston RN (5/9/2017)   Next INR check 10/18/2018   Priority INR   Target end date     Indications   Chronic atrial fibrillation (H) [I48.2]  Long-term (current) use of anticoagulants [Z79.01] [Z79.01]         Anticoagulation Episode Summary     INR check location     Preferred lab     Send INR reminders to [facility]    Comments       Anticoagulation Care Providers     Provider Role Specialty Phone number    Simin Lopez MD [facility] Internal Medicine [phone]            See the Encounter Report to view Anticoagulation Flowsheet and Dosing Calendar (Go to Encounters tab in chart review, and find the Anticoagulation Therapy Visit)    Dosage adjustment made based on physician directed care plan.    Cindi Preston, [place]

## 2018-10-26 ENCOUNTER — ANTICOAGULATION THERAPY VISIT (OUTPATIENT)
Dept: ANTICOAGULATION | Facility: CLINIC | Age: 78
End: 2018-10-26
Payer: MEDICARE

## 2018-10-26 ENCOUNTER — HOSPITAL ENCOUNTER (OUTPATIENT)
Dept: MAMMOGRAPHY | Facility: CLINIC | Age: 78
Discharge: HOME | End: 2018-10-26
Attending: INTERNAL MEDICINE | Admitting: INTERNAL MEDICINE
Payer: MEDICARE

## 2018-10-26 DIAGNOSIS — Z12.31 VISIT FOR SCREENING MAMMOGRAM: ICD-10-CM

## 2018-10-26 DIAGNOSIS — I48.20 CHRONIC ATRIAL FIBRILLATION (H): ICD-10-CM

## 2018-10-26 LAB — INR POINT OF CARE: 2.1 (ref 0.86–1.14)

## 2018-10-26 PROCEDURE — 85610 PROTHROMBIN TIME: CPT | Mod: QW

## 2018-10-26 PROCEDURE — 36416 COLLJ CAPILLARY BLOOD SPEC: CPT

## 2018-10-26 PROCEDURE — 77063 BREAST TOMOSYNTHESIS BI: CPT

## 2018-10-26 PROCEDURE — 99207 ZZC NO CHARGE NURSE ONLY: CPT

## 2018-10-26 NOTE — MR AVS SNAPSHOT
Cielo Aguillon   10/26/2018 1:00 PM   Anticoagulation Therapy Visit    Description:  78 year old female   Provider[facility]  [facility]   Department:  [facility]agulation           INR as of 10/26/2018     Today's INR 2.1      Anticoagulation Summary as of 10/26/2018     INR goal 2.0-3.0   Today's INR 2.1   Full warfarin instructions 5 mg every day   Next INR check 11/20/2018    Indications   Chronic atrial fibrillation (H) [I48.2]  Long-term (current) use of anticoagulants [Z79.01] [Z79.01]         Your next Anticoagulation Clinic appointment(s)     Nov 20, 2018  1:15 PM CST   [facility]    [address] [facility] [address]  [facility] [address] [phone]              Contact Numbers     [facility] Phone Numbers:  Anticoagulation Clinic Appointments : [phone]  Anticoagulation Nurse: [phone]         October 2018 Details    Sun Mon Tue Wed Thu Fri Sat      1               2               3               4               5               6                 7               8               9               10               11               12               13                 14               15               16               17               18               19               20                 21               22               23               24               25               26      5 mg   See details      27      5 mg           28      5 mg         29      5 mg         30      5 mg         31      5 mg             Date Details   10/26 This INR check               How to take your warfarin dose     To take:  5 mg Take 1 of the 5 mg tablets.           November 2018 Details    Sun Mon Tue Wed Thu Fri Sat         1      5 mg         2      5 mg         3      5 mg           4      5 mg         5      5 mg         6      5 mg         7      5 mg         8      5 mg         9      5 mg         10       5 mg           11      5 mg         12      5 mg         13      5 mg         14      5 mg         15      5 mg         16      5 mg         17      5 mg           18      5 mg         19      5 mg         20            21               22               23               24                 25               26               27               28               29               30                 Date Details   No additional details    Date of next INR:  11/20/2018         How to take your warfarin dose     To take:  5 mg Take 1 of the 5 mg tablets.

## 2018-10-26 NOTE — PROGRESS NOTES
ANTICOAGULATION FOLLOW-UP CLINIC VISIT    Patient Name:  Cielo Aguillon  Date:  10/26/2018  Contact Type:  Face to Face    SUBJECTIVE:     Patient Findings     Positives No Problem Findings           OBJECTIVE    INR Protime   Date Value Ref Range Status   10/26/2018 2.1 (A) 0.86 - 1.14 Final       ASSESSMENT / PLAN  INR assessment THER    Recheck INR In: 4 WEEKS    INR Location Clinic      Anticoagulation Summary as of 10/26/2018     INR goal 2.0-3.0   Today's INR 2.1   Warfarin maintenance plan 5 mg (5 mg x 1) every day   Full warfarin instructions 5 mg every day   Weekly warfarin total 35 mg   Plan last modified Mckenna Robison RN (10/26/2018)   Next INR check 11/20/2018   Priority INR   Target end date     Indications   Chronic atrial fibrillation (H) [I48.2]  Long-term (current) use of anticoagulants [Z79.01] [Z79.01]         Anticoagulation Episode Summary     INR check location     Preferred lab     Send INR reminders to [facility]    Comments       Anticoagulation Care Providers     Provider Role Specialty Phone number    Simin Lopez MD [facility] Internal Medicine [phone]            See the Encounter Report to view Anticoagulation Flowsheet and Dosing Calendar (Go to Encounters tab in chart review, and find the Anticoagulation Therapy Visit)    Dosage adjustment made based on physician directed care plan.    Mckenna Robison RN

## 2018-10-31 DIAGNOSIS — Z79.01 LONG TERM CURRENT USE OF ANTICOAGULANT THERAPY: ICD-10-CM

## 2018-10-31 DIAGNOSIS — E78.5 HYPERLIPIDEMIA LDL GOAL <70: ICD-10-CM

## 2018-10-31 DIAGNOSIS — I25.10 ASCVD (ARTERIOSCLEROTIC CARDIOVASCULAR DISEASE): ICD-10-CM

## 2018-10-31 DIAGNOSIS — E03.9 ACQUIRED HYPOTHYROIDISM: ICD-10-CM

## 2018-10-31 DIAGNOSIS — I48.20 CHRONIC ATRIAL FIBRILLATION (H): ICD-10-CM

## 2018-10-31 DIAGNOSIS — I25.10 CORONARY ARTERY DISEASE INVOLVING NATIVE CORONARY ARTERY OF NATIVE HEART WITHOUT ANGINA PECTORIS: ICD-10-CM

## 2018-10-31 DIAGNOSIS — I10 ESSENTIAL HYPERTENSION, BENIGN: ICD-10-CM

## 2018-10-31 RX ORDER — WARFARIN SODIUM 5 MG/1
TABLET ORAL
Qty: 90 TABLET | Refills: 1 | Status: SHIPPED | OUTPATIENT
Start: 2018-10-31 | End: 2019-04-26

## 2018-10-31 NOTE — TELEPHONE ENCOUNTER
"Requested Prescriptions   Pending Prescriptions Disp Refills     amLODIPine (NORVASC) 5 MG tablet  Last Written Prescription Date:  03/24/18  Last Fill Quantity: 90,  # refills: 1   Last office visit: 5/3/2018 with prescribing provider:  05/03/18   Future Office Visit:      1     Sig: Take 1 tablet (5 mg) by mouth daily    Calcium Channel Blockers Protocol  Passed    10/31/2018  7:22 AM       Passed - Blood pressure under 140/90 in past 12 months    BP Readings from Last 3 Encounters:   09/10/18 112/80   08/17/18 120/64   08/01/18 118/74                Passed - Recent (12 mo) or future (30 days) visit within the authorizing provider's specialty    Patient had office visit in the last 12 months or has a visit in the next 30 days with authorizing provider or within the authorizing provider's specialty.  See \"Patient Info\" tab in inbasket, or \"Choose Columns\" in Meds & Orders section of the refill encounter.             Passed - Patient is age 18 or older       Passed - No active pregnancy on record       Passed - Normal serum creatinine on file in past 12 months    Recent Labs   Lab Test  03/14/18   1831   08/18/16   1407   CR  0.90   < >   --    CREAT   --    --   0.9    < > = values in this interval not displayed.            Passed - No positive pregnancy test in past 12 months        levothyroxine (SYNTHROID/LEVOTHROID) 50 MCG tablet  Last Written Prescription Date:  02/01/18  Last Fill Quantity: 90,  # refills: 2   Last office visit: 5/3/2018 with prescribing provider:  05/03/18   Future Office Visit:     90 tablet 2    Thyroid Protocol Passed    10/31/2018  7:22 AM       Passed - Patient is 12 years or older       Passed - Recent (12 mo) or future (30 days) visit within the authorizing provider's specialty    Patient had office visit in the last 12 months or has a visit in the next 30 days with authorizing provider or within the authorizing provider's specialty.  See \"Patient Info\" tab in inbasket, or \"Choose " "Columns\" in Meds & Orders section of the refill encounter.             Passed - Normal TSH on file in past 12 months    Recent Labs   Lab Test  05/03/18   1549   TSH  3.34             Passed - No active pregnancy on record    If patient is pregnant or has had a positive pregnancy test, please check TSH.         Passed - No positive pregnancy test in past 12 months    If patient is pregnant or has had a positive pregnancy test, please check TSH.          rosuvastatin (CRESTOR) 10 MG tablet  Last Written Prescription Date:  04/30/18  Last Fill Quantity: 90,  # refills: 1   Last office visit: 5/3/2018 with prescribing provider:  05/03/18   Future Office Visit:     90 tablet 1    Statins Protocol Passed    10/31/2018  7:22 AM       Passed - LDL on file in past 12 months    Recent Labs   Lab Test  03/15/18   0554   LDL  33            Passed - No abnormal creatine kinase in past 12 months    Recent Labs   Lab Test  02/09/15   1250   CKT  37               Passed - Recent (12 mo) or future (30 days) visit within the authorizing provider's specialty    Patient had office visit in the last 12 months or has a visit in the next 30 days with authorizing provider or within the authorizing provider's specialty.  See \"Patient Info\" tab in inbasket, or \"Choose Columns\" in Meds & Orders section of the refill encounter.             Passed - Patient is age 18 or older       Passed - No active pregnancy on record       Passed - No positive pregnancy test in past 12 months        metoprolol tartrate (LOPRESSOR) 100 MG tablet  Last Written Prescription Date:  02/01/18  Last Fill Quantity: 180,  # refills: 2   Last office visit: 5/3/2018 with prescribing provider:  05/03/18   Future Office Visit:     180 tablet 2    Beta-Blockers Protocol Passed    10/31/2018  7:22 AM       Passed - Blood pressure under 140/90 in past 12 months    BP Readings from Last 3 Encounters:   09/10/18 112/80   08/17/18 120/64   08/01/18 118/74                " "Passed - Patient is age 6 or older       Passed - Recent (12 mo) or future (30 days) visit within the authorizing provider's specialty    Patient had office visit in the last 12 months or has a visit in the next 30 days with authorizing provider or within the authorizing provider's specialty.  See \"Patient Info\" tab in inbasket, or \"Choose Columns\" in Meds & Orders section of the refill encounter.                "

## 2018-10-31 NOTE — TELEPHONE ENCOUNTER
"Requested Prescriptions   Pending Prescriptions Disp Refills     warfarin (COUMADIN) 5 MG tablet 108 tablet 1    Vitamin K Antagonists Failed    10/31/2018  3:21 PM       Failed - INR is within goal in the past 6 weeks    Confirm INR is within goal in the past 6 weeks.     Recent Labs   Lab Test 10/26/18   INR  2.1*                      Passed - Recent (12 mo) or future (30 days) visit within the authorizing provider's specialty    Patient had office visit in the last 12 months or has a visit in the next 30 days with authorizing provider or within the authorizing provider's specialty.  See \"Patient Info\" tab in inbasket, or \"Choose Columns\" in Meds & Orders section of the refill encounter.             Passed - Patient is 18 years of age or older       Passed - Patient is not pregnant       Passed - No positive pregnancy on file in past 12 months        Last office visit 5/3/18.  Warfarin dosing is managed by INR Clinic.  Prescription approved per [facility] Refill Protocol.  Mckenna Robison RN    "

## 2018-11-01 DIAGNOSIS — M54.42 CHRONIC LEFT-SIDED LOW BACK PAIN WITH LEFT-SIDED SCIATICA: ICD-10-CM

## 2018-11-01 DIAGNOSIS — G89.29 CHRONIC LEFT-SIDED LOW BACK PAIN WITH LEFT-SIDED SCIATICA: ICD-10-CM

## 2018-11-01 RX ORDER — LEVOTHYROXINE SODIUM 50 UG/1
TABLET ORAL
Qty: 90 TABLET | Refills: 1 | Status: SHIPPED | OUTPATIENT
Start: 2018-11-01 | End: 2019-03-04

## 2018-11-01 RX ORDER — AMLODIPINE BESYLATE 5 MG/1
5 TABLET ORAL DAILY
Qty: 90 TABLET | Refills: 1 | Status: SHIPPED | OUTPATIENT
Start: 2018-11-01 | End: 2019-03-04

## 2018-11-01 RX ORDER — METOPROLOL TARTRATE 100 MG
TABLET ORAL
Qty: 180 TABLET | Refills: 1 | Status: SHIPPED | OUTPATIENT
Start: 2018-11-01 | End: 2019-04-26

## 2018-11-01 RX ORDER — ROSUVASTATIN CALCIUM 10 MG/1
TABLET, COATED ORAL
Qty: 90 TABLET | Refills: 1 | Status: SHIPPED | OUTPATIENT
Start: 2018-11-01 | End: 2019-03-04

## 2018-11-01 NOTE — TELEPHONE ENCOUNTER
Requested Prescriptions   Pending Prescriptions Disp Refills     traMADol (ULTRAM) 50 MG tablet [Pharmacy Med Name: TRAMADOL HCL 50 MG  Last Written Prescription Date:  4/26/2018  Last Fill Quantity: 50,  # refills: 2   Last office visit: 5/3/2018 with prescribing provider:     Future Office Visit:   TABLET 50 TAB] 50 tablet 2     Sig: TAKE ONE TABLET BY MOUTH EVERY 8 HOURS AS NEEDED FOR MODERATE PAIN    There is no refill protocol information for this order

## 2018-11-08 RX ORDER — TRAMADOL HYDROCHLORIDE 50 MG/1
TABLET ORAL
Qty: 50 TABLET | Refills: 2 | Status: SHIPPED | OUTPATIENT
Start: 2018-11-08 | End: 2019-03-04

## 2018-11-08 NOTE — TELEPHONE ENCOUNTER
Rx (Ultram) 50 mg was faxed to [facility] at [phone].

## 2018-11-20 ENCOUNTER — TELEPHONE (OUTPATIENT)
Dept: ANTICOAGULATION | Facility: CLINIC | Age: 78
End: 2018-11-20

## 2018-11-20 ENCOUNTER — ANTICOAGULATION THERAPY VISIT (OUTPATIENT)
Dept: ANTICOAGULATION | Facility: CLINIC | Age: 78
End: 2018-11-20
Payer: MEDICARE

## 2018-11-20 DIAGNOSIS — I48.20 CHRONIC ATRIAL FIBRILLATION (H): ICD-10-CM

## 2018-11-20 DIAGNOSIS — I48.20 CHRONIC ATRIAL FIBRILLATION (H): Primary | ICD-10-CM

## 2018-11-20 DIAGNOSIS — Z79.01 LONG TERM CURRENT USE OF ANTICOAGULANTS WITH INR GOAL OF 2.0-3.0: ICD-10-CM

## 2018-11-20 LAB — INR POINT OF CARE: 3.1 (ref 0.86–1.14)

## 2018-11-20 PROCEDURE — 85610 PROTHROMBIN TIME: CPT | Mod: QW

## 2018-11-20 PROCEDURE — 36416 COLLJ CAPILLARY BLOOD SPEC: CPT

## 2018-11-20 PROCEDURE — 99207 ZZC NO CHARGE NURSE ONLY: CPT

## 2018-11-20 NOTE — PROGRESS NOTES
ANTICOAGULATION FOLLOW-UP CLINIC VISIT    Patient Name:  Cielo Aguillon  Date:  11/20/2018  Contact Type:  Face to Face    SUBJECTIVE:     Patient Findings     Positives No Problem Findings    Comments Pt denies any changes since last INR visit. Pt reports a missed warfarin dose about 2 weeks ago. Pt denies any bleeding or bruising problems.              OBJECTIVE    INR Protime   Date Value Ref Range Status   11/20/2018 3.1 (A) 0.86 - 1.14 Final       ASSESSMENT / PLAN  INR assessment THER    Recheck INR In: 4 WEEKS    INR Location Clinic      Anticoagulation Summary as of 11/20/2018     INR goal 2.0-3.0   Today's INR 3.1!   Warfarin maintenance plan 5 mg (5 mg x 1) every day   Full warfarin instructions 5 mg every day   Weekly warfarin total 35 mg   No change documented Cindi Preston RN   Plan last modified Mckenna Robison RN (10/26/2018)   Next INR check 12/18/2018   Priority INR   Target end date     Indications   Chronic atrial fibrillation (H) [I48.2]  Long-term (current) use of anticoagulants [Z79.01] [Z79.01]         Anticoagulation Episode Summary     INR check location     Preferred lab     Send INR reminders to [facility]    Comments       Anticoagulation Care Providers     Provider Role Specialty Phone number    Simin Lopez MD Responsible Internal Medicine [phone]            See the Encounter Report to view Anticoagulation Flowsheet and Dosing Calendar (Go to Encounters tab in chart review, and find the Anticoagulation Therapy Visit)    Dosage adjustment made based on physician directed care plan.    Cindi Preston, RN

## 2018-11-20 NOTE — TELEPHONE ENCOUNTER
For insurance purposes, an annual INR referral is required. Please complete and sign the order then route back to [facility]. Cindi Preston RN

## 2018-11-20 NOTE — MR AVS SNAPSHOT
Cielo Aguillon   11/20/2018 1:15 PM   Anticoagulation Therapy Visit    Description:  78 year old female   Provider:  [facility]   Department:  [facility]           INR as of 11/20/2018     Today's INR 3.1!      Anticoagulation Summary as of 11/20/2018     INR goal 2.0-3.0   Today's INR 3.1!   Full warfarin instructions 5 mg every day   Next INR check 12/18/2018    Indications   Chronic atrial fibrillation (H) [I48.2]  Long-term (current) use of anticoagulants [Z79.01] [Z79.01]         Your next Anticoagulation Clinic appointment(s)     Dec 18, 2018  1:15 PM CST   Anticoagulation Visit with [facility]   [facility]    [address] [facility] [address]  [facility] [address] [phone]              Contact Numbers     [facility] Phone Numbers:  Anticoagulation Clinic Appointments : [phone]  Anticoagulation Nurse: [phone]         November 2018 Details    Sun Mon Tue Wed Thu Fri Sat         1               2               3                 4               5               6               7               8               9               10                 11               12               13               14               15               16               17                 18               19               20      5 mg   See details      21      5 mg         22      5 mg         23      5 mg         24      5 mg           25      5 mg         26      5 mg         27      5 mg         28      5 mg         29      5 mg         30      5 mg           Date Details   11/20 This INR check               How to take your warfarin dose     To take:  5 mg Take 1 of the 5 mg tablets.           December 2018 Details    Sun Mon Tue Wed Thu Fri Sat           1      5 mg           2      5 mg         3      5 mg         4      5 mg         5      5 mg         6      5 mg         7      5 mg         8      5 mg           9      5 mg          10      5 mg         11      5 mg         12      5 mg         13      5 mg         14      5 mg         15      5 mg           16      5 mg         17      5 mg         18            19               20               21               22                 23               24               25               26               27               28               29                 30               31                     Date Details   No additional details    Date of next INR:  12/18/2018         How to take your warfarin dose     To take:  5 mg Take 1 of the 5 mg tablets.

## 2018-12-20 ENCOUNTER — OFFICE VISIT (OUTPATIENT)
Dept: INTERNAL MEDICINE | Facility: CLINIC | Age: 78
End: 2018-12-20
Payer: MEDICARE

## 2018-12-20 ENCOUNTER — ANTICOAGULATION THERAPY VISIT (OUTPATIENT)
Dept: ANTICOAGULATION | Facility: CLINIC | Age: 78
End: 2018-12-20
Payer: MEDICARE

## 2018-12-20 VITALS
RESPIRATION RATE: 16 BRPM | OXYGEN SATURATION: 93 % | DIASTOLIC BLOOD PRESSURE: 52 MMHG | SYSTOLIC BLOOD PRESSURE: 96 MMHG | WEIGHT: 162.5 LBS | HEART RATE: 65 BPM | BODY MASS INDEX: 27.04 KG/M2

## 2018-12-20 DIAGNOSIS — F41.1 GAD (GENERALIZED ANXIETY DISORDER): ICD-10-CM

## 2018-12-20 DIAGNOSIS — Z79.01 LONG TERM CURRENT USE OF ANTICOAGULANTS WITH INR GOAL OF 2.0-3.0: ICD-10-CM

## 2018-12-20 DIAGNOSIS — G89.29 CHRONIC RIGHT-SIDED LOW BACK PAIN WITHOUT SCIATICA: ICD-10-CM

## 2018-12-20 DIAGNOSIS — M54.50 CHRONIC RIGHT-SIDED LOW BACK PAIN WITHOUT SCIATICA: ICD-10-CM

## 2018-12-20 DIAGNOSIS — M47.816 FACET ARTHROPATHY, LUMBAR: ICD-10-CM

## 2018-12-20 DIAGNOSIS — F33.1 MAJOR DEPRESSIVE DISORDER, RECURRENT EPISODE, MODERATE (H): ICD-10-CM

## 2018-12-20 DIAGNOSIS — R06.00 DYSPNEA, UNSPECIFIED TYPE: Primary | ICD-10-CM

## 2018-12-20 DIAGNOSIS — E03.9 ACQUIRED HYPOTHYROIDISM: ICD-10-CM

## 2018-12-20 DIAGNOSIS — I48.20 CHRONIC ATRIAL FIBRILLATION (H): ICD-10-CM

## 2018-12-20 LAB — INR POINT OF CARE: 3 (ref 0.86–1.14)

## 2018-12-20 PROCEDURE — 36416 COLLJ CAPILLARY BLOOD SPEC: CPT

## 2018-12-20 PROCEDURE — 99214 OFFICE O/P EST MOD 30 MIN: CPT | Performed by: INTERNAL MEDICINE

## 2018-12-20 PROCEDURE — 99207 ZZC NO CHARGE NURSE ONLY: CPT

## 2018-12-20 PROCEDURE — 85610 PROTHROMBIN TIME: CPT | Mod: QW

## 2018-12-20 RX ORDER — CITALOPRAM HYDROBROMIDE 20 MG/1
TABLET ORAL
Qty: 135 TABLET | Refills: 1 | Status: SHIPPED | OUTPATIENT
Start: 2018-12-20 | End: 2019-03-04

## 2018-12-20 NOTE — PROGRESS NOTES
"  SUBJECTIVE:   Cielo Aguillon is a 78 year old female who presents to clinic today for the following health issues:      1.  Dyspnea: She has been having gradually worsening shortness of breath for well over a year.  It is very slowly worsening.  Cardiology had not felt that this was of cardiac origin though she has chronic atrial fibrillation.  She is followed with sleep clinic and has complex sleep apnea, also found to have pulmonary hypertension, pulmonary function tests suggest mixed restrictive and obstructive issues.  Her sleep clinic had referred her to see pulmonary last April however she reports when she called the number provided to her they said they only see cancer at the [place] office and apparently did not give her the number for the office that she recalls anyway.  She never called us back or the sleep clinic back to get more information or new referral.    2.  She stopped her thyroid a few months ago she thought it was causing more hair loss.    3.  She complains of some very brief lightheadedness when she stands up quickly.  She is not checked her blood pressure at home at all.    4.  She complains that her left leg intermittently feels \"weak\".  This is not really a loss of strength but more that it can be somewhat numb or decreased sensation.  It can happen when sitting too long or walking a long time, goes away with rest.  It is not actually pain.  Sometimes though she feels her like her \"feet are not there\".  She has chronic back pain which is steadily worsening.  The back does bother her more than the legs.  She has been evaluated in the past to spine clinic, had some facet injections that were particularly helpful.  There is been no loss of control of bowel or bladder.    5.  She reports she has been under a lot of stress recently, multiple losses in the past year,  had legal issues.  She feels her mood is doing okay though and she is coping with these issues but she is wondering " about increasing her citalopram slightly.    Patient Active Problem List   Diagnosis     Essential hypertension, benign     Chronic rhinitis     Other ventral hernia without mention of obstruction or gangrene     Lichenification and lichen simplex chronicus     Fatty liver     Advanced directives, counseling/discussion     Hyperlipidemia LDL goal <70     Chronic right-sided back pain     Coronary artery disease     Cystocele, midline     Rectocele     BETHANY (obstructive sleep apnea)     Long term current use of anticoagulants with INR goal of 2.0-3.0     MARY (generalized anxiety disorder)     Major depressive disorder, recurrent episode, moderate (H)     Controlled substance agreement signed     Chronic atrial fibrillation (H)     Facet arthropathy, lumbar     Acquired hypothyroidism     Current Outpatient Medications   Medication Sig Dispense Refill     amLODIPine (NORVASC) 5 MG tablet Take 1 tablet (5 mg) by mouth daily 90 tablet 1     citalopram (CELEXA) 20 MG tablet TAKE 1.5 tablet (30 mg) BY MOUTH EVERY  tablet 1     diazepam (VALIUM) 5 MG tablet TAKE 1/2 TO 1 TABLET BY MOUTH EVERY 8 HOURS AS NEEDED 40 tablet 1     levothyroxine (SYNTHROID/LEVOTHROID) 50 MCG tablet TAKE 1 TABLET (50 MCG) BY MOUTH DAILY 90 tablet 1     metoprolol tartrate (LOPRESSOR) 100 MG tablet TAKE 1 TABLET (100MG) BY MOUTH 2 TIMES DAILY 180 tablet 1     rosuvastatin (CRESTOR) 10 MG tablet TAKE 1 TABLET (10 MG) BY MOUTH DAILY 90 tablet 1     traMADol (ULTRAM) 50 MG tablet TAKE ONE TABLET BY MOUTH EVERY 8 HOURS AS NEEDED FOR MODERATE PAIN 50 tablet 2     VITAMIN D, CHOLECALCIFEROL, PO Take 1,000 Units by mouth At Bedtime        warfarin (COUMADIN) 5 MG tablet Take one tablet (5 mg) daily or as directed by INR clinic. 90 tablet 1      Social History     Tobacco Use     Smoking status: Never Smoker     Smokeless tobacco: Never Used   Substance Use Topics     Alcohol use: Yes     Alcohol/week: 0.0 oz     Comment: occ - wine     Drug use: No         ROS:  No fever, chills, loss of control of bowel or bladder, persistent numbness in the legs, cough, chest pain, racing heartbeats, syncope    OBJECTIVE:     BP 96/52   Pulse 65   Resp 16   Wt 73.7 kg (162 lb 8 oz)   SpO2 93%   BMI 27.04 kg/m    Body mass index is 27.04 kg/m .    Lungs: Clear without wheezes or crackles  CV: Regular S1, S2 with soft ejection murmur  Spine with tenderness at the lumbar spine, tender muscles in the lower back  Strength of the legs is 5/5  .  Sensation is grossly intact    PHQ-9 SCORE 8/30/2017 11/19/2017 2/16/2018   PHQ-9 Total Score - - -   PHQ-9 Total Score 12 6 5           ASSESSMENT/PLAN:             1. Dyspnea, unspecified type  Likely this is related to pulmonary hypertension, lung issues, we did the referral to pulmonary and advise if she has any issues when she calls that she should contact us right away to help make sure this appointment gets scheduled  - PULMONARY MEDICINE REFERRAL    2. Major depressive disorder, recurrent episode, moderate (H)  Increase to 30 mg to help with acute stresses  - citalopram (CELEXA) 20 MG tablet; TAKE 1.5 tablet (30 mg) BY MOUTH EVERY DAY  Dispense: 135 tablet; Refill: 1    3. MARY (generalized anxiety disorder)  stable  - citalopram (CELEXA) 20 MG tablet; TAKE 1.5 tablet (30 mg) BY MOUTH EVERY DAY  Dispense: 135 tablet; Refill: 1    4. Facet arthropathy, lumbar  Advised that likely her leg symptoms are related to her back, no true loss of motor function, refer to pain management  - PAIN MANAGEMENT REFERRAL    5. Chronic right-sided low back pain without sciatica    - PAIN MANAGEMENT REFERRAL    6.  Hypothyroidism: Encouraged her to continue on her medication and she does need it, her loss could be because of her stresses or that her dose was adequate though it was good in August.  Recheck in 2 months.    Simin Lopez MD  [facility]

## 2018-12-20 NOTE — PROGRESS NOTES
ANTICOAGULATION FOLLOW-UP CLINIC VISIT    Patient Name:  Cielo Aguillon  Date:  12/20/2018  Contact Type:  Face to Face    SUBJECTIVE:     Patient Findings     Positives:   No Problem Findings    Comments:   Pt denies any changes or missed warfarin doses since last INR visit. Pt denies any bleeding or bruising problems.              OBJECTIVE    INR Protime   Date Value Ref Range Status   12/20/2018 3.0 (A) 0.86 - 1.14 Final       ASSESSMENT / PLAN  INR assessment THER    Recheck INR In: 4 WEEKS    INR Location Clinic      Anticoagulation Summary  As of 12/20/2018    INR goal:   2.0-3.0   TTR:   54.5 % (2.7 y)   INR used for dosing:   3.0 (12/20/2018)   Warfarin maintenance plan:   5 mg (5 mg x 1) every day   Full warfarin instructions:   5 mg every day   Weekly warfarin total:   35 mg   No change documented:   Cindi Preston RN   Plan last modified:   Mckenna Robison RN (10/26/2018)   Next INR check:   1/17/2019   Priority:   INR   Target end date:       Indications    Chronic atrial fibrillation (H) [I48.2]  Long term current use of anticoagulants with INR goal of 2.0-3.0 [Z79.01]             Anticoagulation Episode Summary     INR check location:       Preferred lab:       Send INR reminders to:   [facility]    Comments:         Anticoagulation Care Providers     Provider Role Specialty Phone number    Simin Lopez MD [facility] [phone]            See the Encounter Report to view Anticoagulation Flowsheet and Dosing Calendar (Go to Encounters tab in chart review, and find the Anticoagulation Therapy Visit)    Dosage adjustment made based on physician directed care plan.    Cindi Preston RN

## 2018-12-27 ENCOUNTER — TELEPHONE (OUTPATIENT)
Dept: PALLIATIVE MEDICINE | Facility: CLINIC | Age: 78
End: 2018-12-27

## 2018-12-27 NOTE — LETTER
January 3, 2019    Cielo Aguillon  [address] [address]    Dear Cielo                                                                   Welcome to the [facility] at the [facility]. We are located at [address], [address], [place] [address]. Your appointment has been scheduled on Monday January 14, 2019 at 3:00pm with Qiana Maldonado MD .    At your first visit, you will meet your team of caregivers who will help you to develop pain management strategies that will last a lifetime. You will meet with our support staff to review your insurance information and collect your co-payment if required by your insurance company. You will meet with a medical pain specialist and care coordinator who will assess your pain and develop a plan of care for your successful pain rehabilitation. You should expect to spend 1-2 hours at your first visit with us. Usually, patients work with us for a period of 6-12 months, and eventually return to their primary doctor once their pain management has stabilized.      To help us make your visit go as smoothly as possible, please bring the following items with you on your visit:   Completed Pain Questionnaire enclosed in this packet.  If you do not bring the completed questionnaire, we may have to reschedule your appointment.  List of any medicines that you are currently taking or have been prescribed  Pertinent NON-[place] medical information such as medical records or tests results (X-rays, or laboratory tests)  Your health insurance card  Financial resources to cover your co-payment or balance due at the time of service (cash, personal check, Visa, and MasterCard are acceptable methods of payment)     Due to the high demand for new patient evaluations, you must notify the scheduling department 48 hours in advance if you are not able to keep this appointment.  Failure to do so could affect your ability to  reschedule with our clinic. Please do not assume that you will receive any prescription medications at your first visit.    Please call [phone] with any questions regarding your appointment. We look forward to meeting you and working to address your health care needs.     Sincerely,    [facility]

## 2018-12-27 NOTE — TELEPHONE ENCOUNTER
LM for patient to schedule new patient evaluation      Nae Hodges    [facility]

## 2019-01-02 NOTE — TELEPHONE ENCOUNTER
LM for patient to schedule new patient evaluation.       Nae Hodges    [facility]

## 2019-01-03 ENCOUNTER — TELEPHONE (OUTPATIENT)
Dept: INTERNAL MEDICINE | Facility: CLINIC | Age: 79
End: 2019-01-03

## 2019-01-03 NOTE — TELEPHONE ENCOUNTER
Patient is calling to report bp readings taken the last two weeks in December. No specific dates. Highest was 134/77 (son was taken to [place] that day) and lowest was 112/62.    Best call back number is [phone] ok to leave a detailed message.

## 2019-01-03 NOTE — TELEPHONE ENCOUNTER
Pain Management Center Referral      1. Confirmed address with patient? Yes  2. Confirmed phone number with patient? Yes  3. Confirmed referring provider? Yes  4. Is the PCP the same as the referring provider? Yes  5. Has the patient been to any previous pain clinics? Yes  (If yes, send SHAZIA with welcome letter)  6. Which insurance are we to bill for this appointment?  Medicare/BC    7. Informed pt of cancellation (48 hour) policy? Yes    REGARDING OPIOID MEDICATIONS: We will always address appropriateness of opioid pain medications, but we generally will not automatically take on a prescribing role. When we do take on prescribing of opioids for chronic pain, it is in collaboration with the referring physician for an intermediate period of time (months), with an expectation that the primary physician or provider will assume the prescribing role if medications are effective at stable doses with demonstrated compliance. Therefore, please do not assume that your prescribing responsibilities end on the day of pain clinic consultation.  7. Informed pt of prescribing policy? Yes      8. Referring Provider: Simin Lopez     9. Criteria for Triage Eval:   -Missed/Failed 1st DUAL appointment? N/A   -Medication Focused? N/A   -Mental Health Concerns? (e.g. Recent psych hospitalization/snap shot)? N/A   -Active substance abuse? N/A   -Patient behaviors (e.g. Offensive language/raised voice)? N/A    Doris CHEN    [facility]

## 2019-01-07 NOTE — TELEPHONE ENCOUNTER
Call to patient to get more readings. Patient states she threw the list away and would need to redo it. Please review and advise if more information is needed.

## 2019-01-14 ENCOUNTER — ANTICOAGULATION THERAPY VISIT (OUTPATIENT)
Dept: ANTICOAGULATION | Facility: CLINIC | Age: 79
End: 2019-01-14
Payer: MEDICARE

## 2019-01-14 ENCOUNTER — OFFICE VISIT (OUTPATIENT)
Dept: PALLIATIVE MEDICINE | Facility: CLINIC | Age: 79
End: 2019-01-14
Payer: MEDICARE

## 2019-01-14 VITALS
SYSTOLIC BLOOD PRESSURE: 124 MMHG | DIASTOLIC BLOOD PRESSURE: 78 MMHG | OXYGEN SATURATION: 97 % | WEIGHT: 162 LBS | HEART RATE: 82 BPM | BODY MASS INDEX: 26.96 KG/M2

## 2019-01-14 DIAGNOSIS — Z79.01 LONG TERM CURRENT USE OF ANTICOAGULANTS WITH INR GOAL OF 2.0-3.0: ICD-10-CM

## 2019-01-14 DIAGNOSIS — M47.26 OSTEOARTHRITIS OF SPINE WITH RADICULOPATHY, LUMBAR REGION: Primary | ICD-10-CM

## 2019-01-14 DIAGNOSIS — I48.20 CHRONIC ATRIAL FIBRILLATION (H): ICD-10-CM

## 2019-01-14 LAB — INR POINT OF CARE: 1.1 (ref 0.86–1.14)

## 2019-01-14 PROCEDURE — 99215 OFFICE O/P EST HI 40 MIN: CPT | Performed by: PHYSICAL MEDICINE & REHABILITATION

## 2019-01-14 PROCEDURE — 99207 ZZC NO CHARGE NURSE ONLY: CPT

## 2019-01-14 PROCEDURE — 36416 COLLJ CAPILLARY BLOOD SPEC: CPT

## 2019-01-14 PROCEDURE — 85610 PROTHROMBIN TIME: CPT | Mod: QW

## 2019-01-14 ASSESSMENT — PAIN SCALES - GENERAL: PAINLEVEL: EXTREME PAIN (8)

## 2019-01-14 NOTE — PROGRESS NOTES
"                      [facility] Consultation    Date of visit: 1/14/2019    Reason for consultation:    Cielo Aguillon is a 78 year old female who is seen in consultation today at the request of her primary care physician, Simin Lopez.     Consultation and Evaluation for: \"Spinal degenerative disease, facet arthropathy\"    Please see the [facility] health questionnaire which the patient completed and reviewed with me in detail.    Review of [place] Prescription Monitoring Program (): No concern for abuse or misuse of controlled medications based on this report.     Pain medications are being prescribed by PCP.     Cielo Aguillon has been seen at a pain clinic in the past.  She was seen at [facility] for consultation in March 2017.     Chief Complaint:    Chief Complaint   Patient presents with     Pain     Pain history:  Cielo Aguillon is a 78 year old female who presents for initial evaluation of chief pain complaint of bilateral low back pain .     She first started having problems with back pain ~ 6 years ago. Pain is dull and aching in quality. Right side has always been more bothersome. 70% of the pain is located in the low back and 30% is in the legs. She does report having some tingling sensations going into the anterior thigh and into the lateral leg, nothing in the foot. This can last for a few minutes when it occurs. Last time this occurred was a couple weeks ago. Tends to occur when she is more active. She reports that both feet feel heavy and this has been going on for about 6 months. The tingling sensation in the left leg has been for 6 months as well. She also has cramping in both legs that has been going on for years.  Pain is aggravated by walking. Relieved with sitting. Severity/Intensity: 4/10 at best, 9/10 at worst, 6/10 on average    She endorses having intermittent weakness in the left leg. No bowel/bladder incontinence. No " unintentional weight loss, or fever/chills/sweats.     She reports she has been under a lot of stress recently, multiple losses in the past year,  had legal issues.        Pain Treatments:  (H--helped; HI--Helped initially; SWH--Somewhat helpful; NH--No help; W--worse; SE--side effects; ?--Unsure if helpful)   1. Medications:       Current pain medications:   Tramadol 50 mg q8 hours prn - most she uses is once per day, sometimes uses none - H   Tylenol 650 mg 1 tab couple times per week - NH for back pain      Celexa - for anxiety and mood  Valium - only uses when she travels due to anxiety  Warfarin  Melatonin for sleep    Current calculated MME: up to 5    1. Previous Pain Relevant Medications:  NOTE: This medication information taken from patient's intake form, not medical records.    Opiates: NA   NSAIDS: cannot use    Muscle Relaxants: cyclobenzaprine - H but has SE of sleepiness   Anti-migraine mediations: NA   Anti-depressants:  Lexapro   Sleep aids: Ambien   Anxiolytics: NA   Neuropathics: NA    Topicals: Lidocaine patches - NH   Other medications not covered above: Tylenol    2. Physical Therapy: Yes, ~5 years ago - It was painful to do the exercises  3. Pain Psychology: No  4. Surgery: No  5. Injections:   - Bilateral L4-5 and L5-S1 facet joint injections on 1/22/2015 at [facility]   - Left L3,4,5 medial branch RFA on 11/11/2014 - unsure how helpful.   - she has had other injections but does not remember specifics  6. Chiropractic: Yes, 2 years ago - [facility]. Went twice a week.  7. Acupuncture: No  8. TENS Unit: No  9. Other: heating pad - H    Diagnostic tests:  No lumbar imaging available.    EMG/Testing:  None    Past Medical History:  Past Medical History:   Diagnosis Date     Anxiety     related to travel     Arthritis      ASCVD (arteriosclerotic cardiovascular disease) 2/12    60-70% stenoses of OM2, D1, medical management     Atrial fibrillation (H)      Atrial flutter (H)      Coronary artery disease      2/2012- 50% mid Cfx, 60-70% prox OM2, 50-70% D1     Essential hypertension, benign      Fatty liver 5/10    mild fibrosis on biopsy     Hypertriglyceridemia      Major depression      BETHANY (obstructive sleep apnea)      Other ventral hernia without mention of obstruction or gangrene      Peptic ulcer 2002     Peptic ulcer, unspecified site, unspecified as acute or chronic, without mention of hemorrhage, perforation, or obstruction 1983     Shortness of breath      Thyroid disease      Tubular adenoma 6/08       Past Surgical History:  Past Surgical History:   Procedure Laterality Date     APPENDECTOMY       C NONSPECIFIC PROCEDURE      Appendectomy     C NONSPECIFIC PROCEDURE      Tonsillectomy     C NONSPECIFIC PROCEDURE      Tubal ligation     C NONSPECIFIC PROCEDURE  11/01    Shave bone spurs from left foot     COLONOSCOPY  9/1/2016    Dr. Camejo [facility]     COLONOSCOPY N/A 9/1/2016    Procedure: COMBINED COLONOSCOPY, SINGLE OR MULTIPLE BIOPSY/POLYPECTOMY BY BIOPSY;  Surgeon: Pillo Camejo MD;  Location: [facility]     CORONARY ANGIOGRAPHY ADULT ORDER  2/13/2012    50% mid Cfx, 60-70% prox OM2, 50-70% D1     ESOPHAGOSCOPY, GASTROSCOPY, DUODENOSCOPY (EGD), COMBINED  9/1/2016    Dr. Camejo [facility]     ESOPHAGOSCOPY, GASTROSCOPY, DUODENOSCOPY (EGD), COMBINED N/A 9/1/2016    Procedure: COMBINED ESOPHAGOSCOPY, GASTROSCOPY, DUODENOSCOPY (EGD), BIOPSY SINGLE OR MULTIPLE;  Surgeon: Pillo Camejo MD;  Location:  GI     GYN SURGERY       HEART CATH RIGHT AND LEFT HEART CATH  04/06/2015    Mid LAD 50-60%, 1st Diagonal 70%, 1st OM 30%, 2nd OM 40-50%, review report for right heart cath results.        Medications:  Current Outpatient Medications   Medication Sig Dispense Refill     amLODIPine (NORVASC) 5 MG tablet Take 1 tablet (5 mg) by mouth daily 90 tablet 1     citalopram (CELEXA) 20 MG tablet TAKE 1.5 tablet (30 mg) BY MOUTH EVERY  tablet 1     diazepam (VALIUM) 5 MG tablet TAKE 1/2 TO 1 TABLET BY MOUTH EVERY  8 HOURS AS NEEDED 40 tablet 1     levothyroxine (SYNTHROID/LEVOTHROID) 50 MCG tablet TAKE 1 TABLET (50 MCG) BY MOUTH DAILY 90 tablet 1     metoprolol tartrate (LOPRESSOR) 100 MG tablet TAKE 1 TABLET (100MG) BY MOUTH 2 TIMES DAILY 180 tablet 1     rosuvastatin (CRESTOR) 10 MG tablet TAKE 1 TABLET (10 MG) BY MOUTH DAILY 90 tablet 1     traMADol (ULTRAM) 50 MG tablet TAKE ONE TABLET BY MOUTH EVERY 8 HOURS AS NEEDED FOR MODERATE PAIN 50 tablet 2     VITAMIN D, CHOLECALCIFEROL, PO Take 1,000 Units by mouth At Bedtime        warfarin (COUMADIN) 5 MG tablet Take one tablet (5 mg) daily or as directed by INR clinic. 90 tablet 1       Allergies:     Allergies   Allergen Reactions     Ace Inhibitors      Cough (Zestril)     Ambien [Zolpidem] Other (See Comments)     Aspirin      hx bleeding ulcers     Hydrochlorothiazide [Hctz] Rash     Ibuprofen      hx bleeding ulcers     Amoxicillin Rash     Losartan Rash     Penicillins Rash       Social History:  Home situation: Lives in [place], [place]. Lives with .   Occupation/Schooling: Not working.   Tobacco use: Never smoker  Drug use: none  Alcohol use: 1 drink per week  History of chemical dependency treatment: None  Mental health admissions: None    Family history:  Family History   Problem Relation Age of Onset     Respiratory Father         emphysema--secon. to smoking     Alzheimer Disease Mother      Congenital Anomalies Mother      Osteoporosis Mother      Diabetes Maternal Aunt      Diabetes Other         cousins     Diabetes Other         cousin on mothers side of family     Colon Cancer No family hx of        Review of Systems:    POSTIVE IN BOLD  GENERAL: fever/chills, fatigue, general unwell feeling, weight gain/loss.  HEAD/EYES:  headache, dizziness, or vision changes.    EARS/NOSE/THROAT:  Nosebleeds, hearing loss, sinus infection, earache, tinnitus.  IMMUNE:  Allergies, cancer, immune deficiency, or infections.  SKIN:  Urticaria, rash, hives  HEME/Lymphatic:    anemia, easy bruising, easy bleeding.  RESPIRATORY:  cough, wheezing, or shortness of breath  CARDIOVASCULAR/Circulation:  Extremity edema, syncope, hypertension, tachycardia, or angina.  GASTROINTESTINAL:  abdominal pain, nausea/emesis, diarrhea, constipation,  hematochezia, or melena.  ENDOCRINE:  Diabetes, steroid use,  thyroid disease or osteoporosis.  MUSCULOSKELETAL: neck pain, back pain, arthralgia, arthritis, or gout.  GENITOURINARY:  frequency, urgency, dysuria, difficulty voiding, hematuria or incontinence.  NEUROLOGIC:  weakness, numbness, paresthesias, seizure, tremor, stroke or memory loss.  PSYCHIATRIC:  depression, anxiety, stress, suicidal thoughts or mood swings.     Physical Exam:  Vitals:    01/14/19 1454   BP: 124/78   Pulse: 82   SpO2: 97%   Weight: 73.5 kg (162 lb)     Exam:  Constitutional: Well developed, well nourished, appears stated age.  HEENT: Head atraumatic, normocephalic. Eyes without conjunctival injection or jaundice. Oropharynx clear. Neck supple. No obvious neck masses.  Respiratory: Breathing unlabored on room air at rest  Gastrointestinal:  Abdomen soft, non-tender, without guarding/rebound.  Skin: No rash, lesions, or petechiae of exposed skin.   Extremities: No clubbing, cyanosis, or edema.  Psychiatric/mental status: Alert, without lethargy or stupor. Speech fluent. Appropriate affect. Mood normal. Able to follow commands without difficulty.     Musculoskeletal exam:  Gait/Station/Posture: Stands with forward flexed posture. Difficult for her to extend to neutral posture. Gait is slow and antalgic. Uses a walker for ambulating longer distances.   Normal bulk and tone. Unremarkable spinal curvature.     Lumbar spine:  Range of motion severely restricted in flexion and extension. Mild restriction with lateral rotation.   Rotation/ext to right: painful    Rotation/ext to left: painful   Myofascial tenderness:  Significant myofascial tenderness in bilateral lumbar paraspinals,  gluteal musculature and IT band.   Seated slump test negative.  Unable to do SI joint provocative testing because Cielo has difficulty laying down on the examination table.     Hip exam:   normal internal and external range of motion bilaterally. (checked in seated position)    Neurologic exam:  CN:  Cranial nerves 2-12 are grossly intact  Motor Strength:  5/5 symmetric UE and LE strength      Reflexes:     Patella L4:  R:  2/4 L: 2/4   Achilles S1:  R:  1/4 L: 1/4    No ankle clonus bilaterally    Sensory:  (upper and lower extremities):   Light touch: normal    Allodynia: absent    Hyperalgesia: absent       DIRE Score for ongoing opioid management is calculated as follows:   Diagnosis = 1 pt (benign chronic illness; minimal objective findings; no definite diagnosis)   Intractability = 2 pts (most treatments tried; patient not fully engaged/barriers)   Risk    Psych = 2 pts (personality dysfunction/mental illness that moderately interferes with care)    Chem Hlth = 3 pts (no history of chemical dependency; not drug-focused)   Reliability = 3 pts (highly reliable with meds, appointments, treatments)   Social = 2 pts (reduction in some relationships/life rolls)   (Psych + Chem hlth + Reliability + Social) = 13     Efficacy = 2 pts (moderate benefit/function; low med dose; too early/not tried meds)         DIRE Score = 15        7-13: likely NOT suitable candidate for long-term opioid analgesia       14-21: may be a suitable candidate for long-term opioid analgesia     Assessment:  Cielo Aguillon is a 78 year old female with a past medical history significant for BETHANY, HLD, hypothyroidism, CAD, chronic atrial fibrillation on anticoagulation, MARY, depression,  who presents with complaints of chronic low back pain.     1. Chronic low back pain: Etiology most likely secondary to lumbar spondylosis and facet arthropathy with overlying myofascial pain. However, cannot exclude possibility of radiculopathy given 6 months  of paresthesias into the left lower extremity along with subjective weakness. She has had good relief of back pain per her report with lumbar RFA and facet joint injections. She was also using muscle relaxants in the past with good benefit.     2. Chronic opioid use: Has been on tramadol for number of years. Uses 1 tab/day at most. It is helpful for her pain.    3.  Mental Health - the patient's mental health concerns, specifically anxiety and depression, affect her experience of pain and contribute to her clinically significant distress.    Plan:  The following recommendations were given to the patient. Diagnosis, treatment options, risks, benefits, and alternatives were discussed, and all questions were answered. The patient expressed understanding of the plan for management.     I am recommending a multidisciplinary treatment plan to help this patient better manage her pain.  This includes:     1. Therapy: Order placed for pain PT.  2. Clinical Health Pain Psychologist: Not needed at this time. Can consider in the future but she seems to be coping well at this time.   3. Diagnostic Studies: Order placed for lumbar MRI. She would prefer to have this done at [facility].  4. Medication Management:   1. Medication options are limited. She cannot use NSIADs. Am hesitant to use muscle relaxants due to risk of sedation, impairment with balance and dizziness which could contribute to a fall. Cymbalta could be an option for chronic musculoskeletal back pain but this would need to replace Celexa.   2. Can continue with Tramadol 50 mg as she is using now. She uses daily prn.   3. Can use tylenol up to 3,000 mg daily.  5. Further procedures recommended: Will wait on making recommendations until I have records from [facility], where she had prior injections done. Will also need results of lumbar MRI.   6. Complementary Treatments: Discussed that acupuncture could beneficial. She will check to see if her insurance will cover  it.   7. Other: Will discuss TENS unit at next visit to see if it will provide benefit.   8. Release of information: SHAZIA signed for [employer].  9. Follow up: 6-8 weeks with me in clinic    Review of Electronic Chart: Today I have also reviewed available medical information in the patient's medical record at [facility] ([facility]), including relevant provider notes, laboratory work, and imaging.     I spent 60 minutes of time face to face with the patient.  Greater than 50% of this time was spent in patient counseling and/or coordination of care regarding principles of multidisciplinary care, medication management, and treatment options as discussed above.     Qiana Maldonado MD  [facility]

## 2019-01-14 NOTE — PATIENT INSTRUCTIONS
Thank you for coming to [facility] for your care. It is my goal to partner with you to help you reach your optimal state of health.     You were seen today for your back pain. As discussed during your visit these are the recommendations:    1. Medications: No medication changes today. Ok for you to continue using Tramadol as you have been.  2. Therapies: An order was placed for pain PT.  3. Procedures: I will wait to make further recommendations on injections until I have the records from [facility] and the MRI results.  4. Imaging/Diagnostic Studies: An order was placed for a lumbar MRI. You will be called to schedule.   6. Other: check with your insurance company if they cover acupuncture. This is a good option to try.   7. Release of Information: I will have you sign a release of information for [facility].   8. Follow up: 6-8 weeks        ----------------------------------------------------------------  Clinic Number:  [phone]   Call this number with any questions about your care and for scheduling assistance. Calls are returned Monday through Friday between 8 AM and 4:30 PM. We usually get back to you within 2 business days depending on the issue/request.       Medication refills:    For non-narcotic medications, call your pharmacy directly to request a refill. The pharmacy will contact the [facility] for authorization. Please allow 3-4 days for these refills to be processed.     For narcotic refills, call the clinic number or send a [employer] message. Please contact us 7-10 days before your refill is due. The message MUST include the name of the specific medication(s) requested and how you would like to receive the prescription(s). The options are as follows:    Pain Clinic staff can mail the prescription to your pharmacy. Please tell us the name of the pharmacy.    You may pick the prescription up at the Pain Clinic (tell us the location) or during a clinic visit with your pain  provider    Pain Clinic staff can deliver the prescription to the [facility] pharmacy in the clinic building. Please tell us the location.      We believe regular attendance is key to your success in our program.    Any time you are unable to keep your appointment we ask that you call us at least 24 hours in advance to let us know. This will allow us to offer the appointment time to another patient.

## 2019-01-14 NOTE — PROGRESS NOTES
ANTICOAGULATION FOLLOW-UP CLINIC VISIT    Patient Name:  Cielo Aguillon  Date:  2019  Contact Type:  Face to Face    SUBJECTIVE:     Patient Findings     Positives:   Change in diet/appetite (Pt states may have had more greens), Unexplained INR or factor level change    Comments:   Pt denies any changes or missed warfarin doses since last INR visit. Pt denies any bleeding or bruising problems. Pt states has not been feeling well, unable to eat in the morning.              OBJECTIVE    INR Protime   Date Value Ref Range Status   2019 1.1 0.86 - 1.14 Final       ASSESSMENT / PLAN  INR assessment SUB    Recheck INR In: 1 WEEK    INR Location Clinic      Anticoagulation Summary  As of 2019    INR goal:   2.0-3.0   TTR:   54.5 % (2.8 y)   INR used for dosin.1! (2019)   Warfarin maintenance plan:   5 mg (5 mg x 1) every day   Full warfarin instructions:   : 10 mg; 1/15: 10 mg; : 7.5 mg; Otherwise 5 mg every day   Weekly warfarin total:   35 mg   Plan last modified:   Mckenna Robison RN (10/26/2018)   Next INR check:   2019   Priority:   INR   Target end date:       Indications    Chronic atrial fibrillation (H) [I48.2]  Long term current use of anticoagulants with INR goal of 2.0-3.0 [Z79.01]             Anticoagulation Episode Summary     INR check location:       Preferred lab:       Send INR reminders to:   [facility]    Comments:         Anticoagulation Care Providers     Provider Role Specialty Phone number    Simin Lopez MD [facility] Internal Medicine [phone]            See the Encounter Report to view Anticoagulation Flowsheet and Dosing Calendar (Go to Encounters tab in chart review, and find the Anticoagulation Therapy Visit)    Dosage adjustment made based on physician directed care plan.    Cindi Preston RN

## 2019-01-15 ENCOUNTER — TELEPHONE (OUTPATIENT)
Dept: PALLIATIVE MEDICINE | Facility: CLINIC | Age: 79
End: 2019-01-15

## 2019-01-15 NOTE — TELEPHONE ENCOUNTER
MRI order faxed to [facility]  Mckenna CELIS LPN  Pain Management   [id number]  [place] [address]

## 2019-01-15 NOTE — TELEPHONE ENCOUNTER
Please fax order for MRI to [facility]    Myah MULLENN, RN Care Coordinator  [facility]

## 2019-01-21 ENCOUNTER — ANTICOAGULATION THERAPY VISIT (OUTPATIENT)
Dept: ANTICOAGULATION | Facility: CLINIC | Age: 79
End: 2019-01-21
Payer: MEDICARE

## 2019-01-21 ENCOUNTER — OFFICE VISIT (OUTPATIENT)
Dept: PALLIATIVE MEDICINE | Facility: CLINIC | Age: 79
End: 2019-01-21
Payer: MEDICARE

## 2019-01-21 DIAGNOSIS — M47.26 OSTEOARTHRITIS OF SPINE WITH RADICULOPATHY, LUMBAR REGION: Primary | ICD-10-CM

## 2019-01-21 DIAGNOSIS — Z79.01 LONG TERM CURRENT USE OF ANTICOAGULANTS WITH INR GOAL OF 2.0-3.0: ICD-10-CM

## 2019-01-21 DIAGNOSIS — I48.20 CHRONIC ATRIAL FIBRILLATION (H): ICD-10-CM

## 2019-01-21 LAB — INR POINT OF CARE: 2.8 (ref 0.86–1.14)

## 2019-01-21 PROCEDURE — 85610 PROTHROMBIN TIME: CPT | Mod: QW

## 2019-01-21 PROCEDURE — 97110 THERAPEUTIC EXERCISES: CPT | Mod: GP | Performed by: PHYSICAL THERAPIST

## 2019-01-21 PROCEDURE — 97162 PT EVAL MOD COMPLEX 30 MIN: CPT | Mod: GP | Performed by: PHYSICAL THERAPIST

## 2019-01-21 PROCEDURE — G8978 MOBILITY CURRENT STATUS: HCPCS | Mod: CL | Performed by: PHYSICAL THERAPIST

## 2019-01-21 PROCEDURE — 36416 COLLJ CAPILLARY BLOOD SPEC: CPT

## 2019-01-21 PROCEDURE — 99207 ZZC NO CHARGE NURSE ONLY: CPT

## 2019-01-21 PROCEDURE — G8979 MOBILITY GOAL STATUS: HCPCS | Mod: CK | Performed by: PHYSICAL THERAPIST

## 2019-01-21 NOTE — PROGRESS NOTES
PHYSICAL THERAPY MEDICARE INITIAL EVALUATION and PLAN OF CARE    Patient Name: Cielo Aguillon     : 1940    MRN: [id number]   Pain Management Provider:  Qiana Maldonado MD  Diagnosis: Osteoarthritis of spine with radiculopathy, lumbar region    SUBJECTIVE:    PRESENTATION AND ETIOLOGY    Chief Complaint: Bilateral low back pain, right > left; feet feel heavy and cold     Onset / Etiology: 6 yrs ago    Pattern Since Onset: Unchanged    Pain is described as cramping, ache, sharp     Frequency: Constant     Intensity: Best 4/10, Worst 9/10;  Current 0/10 ; Average 6/10    Fatigue Level: (scale 0-10)  5/10 average due to not sleeping well    LEVEL OF FUNCTION AT START OF CARE    Walking tolerance: 5' with SOB, 15' with walker  Sitting tolerance: 60 minutes  Standing tolerance: less than 5', feels increased back pain and off balance  Housework tolerance: 5-10' active/5' rest  Sleep: not sleeping well due to sleep apnea; grief and worry due to 5 family deaths    Current Aggrevating Activities / Functional Limitations: walking      CURRENT / PREVIOUS INTERVENTION(S):     Relieving Activities / Self Care: sitting       DEMOGRAPHICS  Employment Status: Retired    Social Support: lives in a house with  and dog Jj; [place]    Home or Community Barriers: limited driving around town; Stairs: very slowly    Pertinent Medical  / Surgical History: Epic Snapshot Reviewed, See provider's note.    Patient's goals for physical therapy: to get rid of SOB    ===============================================================  OBJECTIVE:  POSTURE:  Observation: Patient demonstrates forward head, protracted shoulders and thoracic kyphosis in standing and sitting posture.  Difficulty with getting out of chair, using arms to push up from sitting    GAIT, LOCOMOTION, and BALANCE:  Gait and Locomotion: slow, trunk is FB, antalgic WB on left, using 4WW; SOB after 1-2' walking  Balance: next    MUSCLE PERFORMANCE:   Strength:  Demonstrates core instability    Flexibility:  Tightness noted in lumbar psps, bilateral piriformis    FUNCTIONAL TESTING/OBSERVATION: Pt demonstrated HEP including seated trunk rotation and flexion, supine SKTC, DKTC, piriformis, and LTR with fair technique due to increased pain lying on treatment table  Appears to have restricted left hip mobility with seated trunk rotation to the left and seated piriformis stretch    Pain behaviors: None    ===============================================================  Today's Treatment:  Initial evaluation  Therapeutic Procedures/Exercises: for 30 minutes including instruction in modified seated trunk rotation and piriformis stretch; encouraged patient to continue with walking throughout the day at slower pace and monitor SOB  Focus next session:  Walking program, HEP  ===============================================================  ASSESSMENT:  Physical Therapy Diagnosis:Impaired Posture and Impaired Muscle Performance    Patient requires PT intervention for the following impairments: Impaired functional mobility, Impaired gait / weight bearing tolerance, Impaired posture / muscle imbalance, Pain, ROM limitations and Deconditioning    Anticipated Goals and Expected Outcomes:   8 weeks  Patient will report the use of 2 self care practices during their day.  Patient will report the participation in 5-10 minutes of aerobic activity daily and practicing stretching daily.  Patient will demonstrate the ability to find core strength in neutral posture.  Patient will demonstrate the ability to relax muscle group before stretching.    16 weeks  Patient will be independent with a home exercise program.  Patient will be independent with posture correction.  Patient will report independence with a self care/flare management program.  Patient will demonstrate improved functional strength and endurance as reports by increased tolerance for IADLs and more consistent participation in daily  activity.       Rehab potential for achieving goals: fair.    ===============================================================  PLAN:   Patient will benefit from skilled physical therapy consisting of: neuromuscular reeducation of: kinesthetic sense and posture for sitting and/or standing activities, education in self care / home management training to include instruction in: symptom control techniques, therapeutic activities to achieve improved functional performance in: daily actvities and therapeutic exercise to develop: strength and endurance, flexibility and core stability    Assessment will be ongoing with changes in treatment as indicated.  Benefits/risks/alternatives to treatment have been reviewed and the patient has been instructed to contact this office if they have any questions or concerns.  This plan of care has been discussed with the patient and the patient is in agreement.     Frequency / Duration:  Patient will be seen for a total of 2-3 visits; 8 weeks    Total Visit Time: 50  minutes            Jana Faye, PT                                      Date:  1/21/2019    G Code 1/10  Mobility Status  G 8978 Current Status CL 60-79% impaired  G 8979 Goal Status CK 40-59% impaired    G Codes established based on subjective and objective measures.    =====================================================  **  Referring Provider Certification: Referring Provider reviewing certifies that the above treatment / plan of care is required and authorized, and that the patient's plan will be reviewed every thirty (30) days **.   ======================================================     PRESENT:  NA    MULTIDISCIPLINARY PATIENT / FAMILY EDUCATION RECORD  Department:  Physical Therapy    Readiness to Learn: Ability to understand verbal instructions, Ability to understand written instructions, Knowledge of educational needs / treatment plan  Specific Barriers to Learning: None  Referrals:  None  Learning Needs: Rehabilitation techniques to improve functional independence Pain management education to improve daily activity tolerance.  Who: Patient  How: Demonstration, Verbal instructions, Written instructions  Response: Appropriate verbal response, Asked questions, Demonstrated ability, Verbalized recall / understanding

## 2019-01-21 NOTE — PROGRESS NOTES
ANTICOAGULATION FOLLOW-UP CLINIC VISIT    Patient Name:  Cielo Aguillon  Date:  2019  Contact Type:  Face to Face    SUBJECTIVE:     Patient Findings     Positives:   No Problem Findings    Comments:   Pt denies any changes or missed warfarin doses since last INR visit. Pt denies any bleeding or bruising problems.              OBJECTIVE    INR Protime   Date Value Ref Range Status   2019 2.8 (A) 0.86 - 1.14 Final       ASSESSMENT / PLAN  INR assessment THER    Recheck INR In: 2 WEEKS    INR Location Clinic      Anticoagulation Summary  As of 2019    INR goal:   2.0-3.0   TTR:   54.4 % (2.8 y)   INR used for dosin.8 (2019)   Warfarin maintenance plan:   5 mg (5 mg x 1) every day   Full warfarin instructions:   5 mg every day   Weekly warfarin total:   35 mg   No change documented:   Cindi Preston RN   Plan last modified:   Mckenna Robison RN (10/26/2018)   Next INR check:   2019   Priority:   INR   Target end date:       Indications    Chronic atrial fibrillation (H) [I48.2]  Long term current use of anticoagulants with INR goal of 2.0-3.0 [Z79.01]             Anticoagulation Episode Summary     INR check location:       Preferred lab:       Send INR reminders to:   [facility]    Comments:         Anticoagulation Care Providers     Provider Role Specialty Phone number    Simin Lopez MD [facility] Internal Medicine [phone]            See the Encounter Report to view Anticoagulation Flowsheet and Dosing Calendar (Go to Encounters tab in chart review, and find the Anticoagulation Therapy Visit)    Dosage adjustment made based on physician directed care plan.    Cindi Preston RN

## 2019-01-24 ENCOUNTER — TRANSFERRED RECORDS (OUTPATIENT)
Dept: HEALTH INFORMATION MANAGEMENT | Facility: CLINIC | Age: 79
End: 2019-01-24

## 2019-02-04 ENCOUNTER — ANTICOAGULATION THERAPY VISIT (OUTPATIENT)
Dept: ANTICOAGULATION | Facility: CLINIC | Age: 79
End: 2019-02-04
Payer: MEDICARE

## 2019-02-04 DIAGNOSIS — I48.20 CHRONIC ATRIAL FIBRILLATION (H): ICD-10-CM

## 2019-02-04 DIAGNOSIS — Z79.01 LONG TERM CURRENT USE OF ANTICOAGULANTS WITH INR GOAL OF 2.0-3.0: ICD-10-CM

## 2019-02-04 LAB — INR POINT OF CARE: 3.7 (ref 0.86–1.14)

## 2019-02-04 PROCEDURE — 85610 PROTHROMBIN TIME: CPT | Mod: QW

## 2019-02-04 PROCEDURE — 36416 COLLJ CAPILLARY BLOOD SPEC: CPT

## 2019-02-04 PROCEDURE — 99207 ZZC NO CHARGE NURSE ONLY: CPT

## 2019-02-04 NOTE — PROGRESS NOTES
ANTICOAGULATION FOLLOW-UP CLINIC VISIT    Patient Name:  Cielo Aguillon  Date:  2/4/2019  Contact Type:  Face to Face    SUBJECTIVE:     Patient Findings     Positives:   Unexplained INR or factor level change    Comments:   Pt denies any changes or missed warfarin doses since last INR visit. Pt denies any bleeding or bruising problems. Pt reports had a few beverages containing alcohol yesterday.              OBJECTIVE    INR Protime   Date Value Ref Range Status   02/04/2019 3.7 (A) 0.86 - 1.14 Final       ASSESSMENT / PLAN  INR assessment SUPRA    Recheck INR In: 2 WEEKS    INR Location Clinic      Anticoagulation Summary  As of 2/4/2019    INR goal:   2.0-3.0   TTR:   54.0 % (2.8 y)   INR used for dosing:   3.7! (2/4/2019)   Warfarin maintenance plan:   5 mg (5 mg x 1) every day   Full warfarin instructions:   2/4: 2.5 mg; Otherwise 5 mg every day   Weekly warfarin total:   35 mg   Plan last modified:   Mckenna Robison RN (10/26/2018)   Next INR check:   2/18/2019   Priority:   INR   Target end date:       Indications    Chronic atrial fibrillation (H) [I48.2]  Long term current use of anticoagulants with INR goal of 2.0-3.0 [Z79.01]             Anticoagulation Episode Summary     INR check location:       Preferred lab:       Send INR reminders to:   [facility]    Comments:         Anticoagulation Care Providers     Provider Role Specialty Phone number    Simin Lopez MD [facility] Internal Medicine [phone]            See the Encounter Report to view Anticoagulation Flowsheet and Dosing Calendar (Go to Encounters tab in chart review, and find the Anticoagulation Therapy Visit)    Dosage adjustment made based on physician directed care plan.    Cindi Preston RN

## 2019-02-07 DIAGNOSIS — F41.1 GAD (GENERALIZED ANXIETY DISORDER): ICD-10-CM

## 2019-02-07 RX ORDER — DIAZEPAM 5 MG
TABLET ORAL
Qty: 40 TABLET | Refills: 1 | Status: SHIPPED | OUTPATIENT
Start: 2019-02-07 | End: 2019-03-04

## 2019-02-07 NOTE — TELEPHONE ENCOUNTER
Diazepam      Last Written Prescription Date:  8/30/18  Last Fill Quantity: 40,   # refills: 1  Last Office Visit: 12/20/18  Future Office visit:       Routing refill request to provider for review/approval because:  Drug not on the G, P or [facility] refill protocol or controlled substance

## 2019-02-18 ENCOUNTER — ANTICOAGULATION THERAPY VISIT (OUTPATIENT)
Dept: ANTICOAGULATION | Facility: CLINIC | Age: 79
End: 2019-02-18
Payer: MEDICARE

## 2019-02-18 DIAGNOSIS — I48.20 CHRONIC ATRIAL FIBRILLATION (H): ICD-10-CM

## 2019-02-18 DIAGNOSIS — Z79.01 LONG TERM CURRENT USE OF ANTICOAGULANTS WITH INR GOAL OF 2.0-3.0: ICD-10-CM

## 2019-02-18 LAB — INR POINT OF CARE: 2.3 (ref 0.86–1.14)

## 2019-02-18 PROCEDURE — 99207 ZZC NO CHARGE NURSE ONLY: CPT

## 2019-02-18 PROCEDURE — 36416 COLLJ CAPILLARY BLOOD SPEC: CPT

## 2019-02-18 PROCEDURE — 85610 PROTHROMBIN TIME: CPT | Mod: QW

## 2019-02-18 NOTE — PROGRESS NOTES
ANTICOAGULATION FOLLOW-UP CLINIC VISIT    Patient Name:  Cielo Aguillon  Date:  2019  Contact Type:  Face to Face    SUBJECTIVE:     Patient Findings     Positives:   OTC meds (Pt reports has been using Melatonin on and off. Melatonin can increase INR. ), No Problem Findings    Comments:   Pt denies any changes or missed warfarin doses since last INR visit. Pt denies any bleeding or bruising problems.              OBJECTIVE    INR Protime   Date Value Ref Range Status   2019 2.3 (A) 0.86 - 1.14 Final       ASSESSMENT / PLAN  INR assessment THER    Recheck INR In: 4 WEEKS    INR Location Clinic      Anticoagulation Summary  As of 2019    INR goal:   2.0-3.0   TTR:   53.9 % (2.8 y)   INR used for dosin.3 (2019)   Warfarin maintenance plan:   5 mg (5 mg x 1) every day   Full warfarin instructions:   5 mg every day   Weekly warfarin total:   35 mg   No change documented:   Cindi Preston RN   Plan last modified:   Mckenna Robison RN (10/26/2018)   Next INR check:   3/18/2019   Priority:   INR   Target end date:       Indications    Chronic atrial fibrillation (H) [I48.2]  Long term current use of anticoagulants with INR goal of 2.0-3.0 [Z79.01]             Anticoagulation Episode Summary     INR check location:       Preferred lab:       Send INR reminders to:   [facility]    Comments:         Anticoagulation Care Providers     Provider Role Specialty Phone number    Simin Lopez MD [facility] Internal Medicine [phone]            See the Encounter Report to view Anticoagulation Flowsheet and Dosing Calendar (Go to Encounters tab in chart review, and find the Anticoagulation Therapy Visit)    Dosage adjustment made based on physician directed care plan.    Cindi Preston, RN

## 2019-02-22 NOTE — TELEPHONE ENCOUNTER
Received 2-    MRI results faxed back to [facility] today to be faxed to provider's [facility], not [facility]. [facility] forwarded copy of MRI results to [facility].        Received 2- via fax    Reason for call:  Results   Name of test or procedure: MRI lumbar without contrast  Date of test or procedure: 1-  Location of test or procedure: [facility] ([address] [address])      Additional comments: results were received at [place]. Results were faxed onto Dr. Qiana Maldonado's [facility] and also placed in [place] in-box for [facility]        Naye Eubank  Patient Representative  [facility]

## 2019-02-27 ENCOUNTER — OFFICE VISIT (OUTPATIENT)
Dept: SLEEP MEDICINE | Facility: CLINIC | Age: 79
End: 2019-02-27
Payer: MEDICARE

## 2019-02-27 VITALS
BODY MASS INDEX: 26.99 KG/M2 | SYSTOLIC BLOOD PRESSURE: 104 MMHG | HEART RATE: 55 BPM | WEIGHT: 162 LBS | OXYGEN SATURATION: 95 % | RESPIRATION RATE: 18 BRPM | HEIGHT: 65 IN | DIASTOLIC BLOOD PRESSURE: 63 MMHG

## 2019-02-27 DIAGNOSIS — G47.34 SLEEP RELATED HYPOXIA: ICD-10-CM

## 2019-02-27 DIAGNOSIS — G47.33 OSA (OBSTRUCTIVE SLEEP APNEA): Primary | ICD-10-CM

## 2019-02-27 PROCEDURE — 99213 OFFICE O/P EST LOW 20 MIN: CPT | Performed by: INTERNAL MEDICINE

## 2019-02-27 ASSESSMENT — MIFFLIN-ST. JEOR: SCORE: 1215.71

## 2019-02-27 NOTE — PATIENT INSTRUCTIONS
MY TREATMENT INFORMATION FOR SLEEP APNEA-  Cielo Aguillon    MY CONTACT NUMBERS ARE:  [phone]  DOCTOR : Jett OGDEN [facility]  SLEEP CENTER :  [place]    Your sleep apnea is controlled with ASV.   Mask and supplies are ordered. Mask leak related to big mask, small dreamwear mask ordered  Continue use of CPAP:  - Recommend using CPAP every night for at least 7-8 hours each night  - Daytime naps are not advised, but use CPAP if taking naps.    - Do not remove mask headgear when you go to bathroom at night, but just unplug the hose.    Objective measure goal  Compliance  Goal >70% (preferrably 100%)  Leak   Goal < 10% (less than 24 L/min)  AHI  Goal < 5 events per hour   Usage  Goal 7-8 hours.      Patient was advised not to drive if drowsy or sleepy.      Follow up in 4 months.    Your BMI is Body mass index is 26.96 kg/m .  Weight management is a personal decision.  If you are interested in exploring weight loss strategies, the following discussion covers the approaches that may be successful. Body mass index (BMI) is one way to tell whether you are at a healthy weight, overweight, or obese. It measures your weight in relation to your height.  A BMI of 18.5 to 24.9 is in the healthy range. A person with a BMI of 25 to 29.9 is considered overweight, and someone with a BMI of 30 or greater is considered obese. More than two-thirds of American adults are considered overweight or obese.  Being overweight or obese increases the risk for further weight gain. Excess weight may lead to heart disease and diabetes.  Creating and following plans for healthy eating and physical activity may help you improve your health.  Weight control is part of healthy lifestyle and includes exercise, emotional health, and healthy eating habits. Careful eating habits lifelong are the mainstay of weight control. Though there are significant health benefits from weight loss, long-term weight loss with diet alone may be very difficult to  achieve- studies show long-term success with dietary management in less than 10% of people. Attaining a healthy weight may be especially difficult to achieve in those with severe obesity. In some cases, medications, devices and surgical management might be considered.  What can you do?  If you are overweight or obese and are interested in methods for weight loss, you should discuss this with your provider.     Consider reducing daily calorie intake by 500 calories.     Keep a food journal.     Avoiding skipping meals, consider cutting portions instead.    Diet combined with exercise helps maintain muscle while optimizing fat loss. Strength training is particularly important for building and maintaining muscle mass. Exercise helps reduce stress, increase energy, and improves fitness. Increasing exercise without diet control, however, may not burn enough calories to loose weight.       Start walking three days a week 10-20 minutes at a time    Work towards walking thirty minutes five days a week     Eventually, increase the speed of your walking for 1-2 minutes at time    In addition, we recommend that you review healthy lifestyles and methods for weight loss available through the National Institutes of Health patient information sites:  http://win.niddk.nih.gov/publications/index.htm    And look into health and wellness programs that may be available through your health insurance provider, employer, local community center, or omar club.      Your blood pressure was checked while you were in clinic today.  Please read the guidelines below about what these numbers mean and what you should do about them.  Your systolic blood pressure is the top number.  This is the pressure when the heart is pumping.  Your diastolic blood pressure is the bottom number.  This is the pressure in between beats.  If your systolic blood pressure is less than 120 and your diastolic blood pressure is less than 80, then your blood pressure is  normal. There is nothing more that you need to do about it  If your systolic blood pressure is 120-139 or your diastolic blood pressure is 80-89, your blood pressure may be higher than it should be.  You should have your blood pressure re-checked within a year by a primary care provider.  If your systolic blood pressure is 140 or greater or your diastolic blood pressure is 90 or greater, you may have high blood pressure.  High blood pressure is treatable, but if left untreated over time it can put you at risk for heart attack, stroke, or kidney failure.  You should have your blood pressure re-checked by a primary care provider within the next four weeks.

## 2019-02-27 NOTE — NURSING NOTE
"Chief Complaint   Patient presents with     RECHECK     F/u SS and cpap       Initial /63   Pulse 55   Resp 18   Ht 1.651 m (5' 5\")   Wt 73.5 kg (162 lb)   SpO2 95%   BMI 26.96 kg/m   Estimated body mass index is 26.96 kg/m  as calculated from the following:    Height as of this encounter: 1.651 m (5' 5\").    Weight as of this encounter: 73.5 kg (162 lb).    Medication Reconciliation: complete    Damaris Guerra LPN      "

## 2019-02-27 NOTE — PROGRESS NOTES
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  - PAP Machine download is reviewed as below, not used her ASV since 9/2018 except few days in 12/18 and 2/19.  - Mask and supplies are renewed. Order a small dreamwear full face mask as large mask is the problem.  -  Narcotics and sedative can trigger complex sleep apnea and nocturnal hypoxemia.  - Clinical response: poor  - Recommend using CPAP every night for at least 7-8 hours each night  - Daytime naps are not advised, but use CPAP if taking naps  - Patient was advised not to drive if drowsy or sleepy.  - Follow up in sleep clinic in 4 months.      Orders Placed This Encounter   Procedures     Comprehensive DME       Summary Counseling:  New sleep schedule recommendation: given    Check out [email or web address]    All questions were answered.  The patient indicates understanding of the above issues and agrees with the plan set forth.           Chief Complaint:    Routine Follow up            History of Present Illness:     Cielo Aguillon is a 78 year old female with history of hypertension, chronic atrial fibrillation, peptic ulcer disease, hyperlipidemia, chronic back pain, non-obstructive coronary artery disease, mild to moderate pulmonary hypertension, generalized anxiety disorder, major depressive disorder, andhypothyroidism who comes to [facility] for follow up. Please see H&P for his presenting sleep symptoms for additional details.   Patient is diagnosed complex sleep apnea on 2015 and was prescribed CPAP pressure of 10 cmH2O. An overnight oximetry test was performed with the PAP therapy in  place. This showed prolonged sleep associated hypoxemia with 230.7 minutes spent under the SpO2 of 88%. The study also demonstrated a sawtooth pattern suggestive of poorly controlled BETHANY with CPAP. Therefore, the patient underwent a PSG titration study on 07/24/2018. During the study the patient had some central hypopneas with a Cheyne-Stoke breathing pattern. The patient was adequately titrated to an ASV at 4/0/20 with a residual AHI of 6.9 events per hour. She was prescribed ASV which was setup on 2/22/18.  Last visit on 8/20/2018, she was not using her ASV due to mask interface issues as well as high leaks except few days. Her mask moves at night and causes a leak. Patient is confused whether she is supposed to use machine or not. She sleeps her side, water forms in corner of her mouth and moves and she can not sleep at night.  She is not using her PAP therapy since 9/2018 except few nights in 12/18 and 2/19 due to mask interface issues. She was titrated small dreamwear full face but now uses medium size.  She has chronic lower back pain and managed by pain specialist Dr. Maldonado, and takes Tradmadol prn and Valium which can trigger complex sleep apnea.      PREVIOUS SLEEP STUDIES:  Date: 2015  TYPE: PSG   AHI: 58 events per hour   BMI: 28.7 kg/m2  Intervention: CPAP     CPAP TITRATION 2/2018  Titrated ASV.    CPAP Compliance:  Dates:  3/1/2018- 2/1/2019       28 % >4hour use   Days used: 159/363  Average daily use (days used): 4.46 hrs  Leak 95 percentile: 47 L/min  Residual AHI: 1.9/hr  Pressure:  ASV pressures EPAP 6-11, PS 0-19 cmH2O    [place] Sleepiness Scale score today: /24  [place] Sleepiness Scale score last visit: 2/24     PAP machine: ResMed ASV    Interface:  Mask: Full facemask  Chin strap: No  Leak: yES  Humidity: Yes    Difficulties with therapy:  [-] Difficulty tolerating the pressure  [-] Epistaxis:   [-] Nasal congestion   [-] Dry mouth:  [x] Mouth breathing:   [-] Pain/skin breakdown:      Improvements noted with CPAP:   [+] waking up more refreshed  [+] sleeping better with less arousals  [+] nocturia improved   [-] improved energy level during the day    SLEEP-WAKE SCHEDULE: unchanged    Other sleep problems: None     Drowsy driving / near incidents: No, only in town    Medications that affect sleep: Celexa,  Valium and Tramadol            Medications:     Current Outpatient Medications   Medication Sig     amLODIPine (NORVASC) 5 MG tablet Take 1 tablet (5 mg) by mouth daily     citalopram (CELEXA) 20 MG tablet TAKE 1.5 tablet (30 mg) BY MOUTH EVERY DAY     diazepam (VALIUM) 5 MG tablet TAKE 1/2 TO 1 TABLET BY MOUTH EVERY 8 HOURS AS NEEDED     levothyroxine (SYNTHROID/LEVOTHROID) 50 MCG tablet TAKE 1 TABLET (50 MCG) BY MOUTH DAILY     metoprolol tartrate (LOPRESSOR) 100 MG tablet TAKE 1 TABLET (100MG) BY MOUTH 2 TIMES DAILY     rosuvastatin (CRESTOR) 10 MG tablet TAKE 1 TABLET (10 MG) BY MOUTH DAILY     traMADol (ULTRAM) 50 MG tablet TAKE ONE TABLET BY MOUTH EVERY 8 HOURS AS NEEDED FOR MODERATE PAIN     VITAMIN D, CHOLECALCIFEROL, PO Take 1,000 Units by mouth At Bedtime      warfarin (COUMADIN) 5 MG tablet Take one tablet (5 mg) daily or as directed by INR clinic.     No current facility-administered medications for this visit.         Allergies   Allergen Reactions     Ace Inhibitors      Cough (Zestril)     Ambien [Zolpidem] Other (See Comments)     Aspirin      hx bleeding ulcers     Hydrochlorothiazide [Hctz] Rash     Ibuprofen      hx bleeding ulcers     Amoxicillin Rash     Losartan Rash     Penicillins Rash            Past Medical History:     Past Medical History:   Diagnosis Date     Anxiety     related to travel     Arthritis      ASCVD (arteriosclerotic cardiovascular disease) 2/12    60-70% stenoses of OM2, D1, medical management     Atrial fibrillation (H)      Atrial flutter (H)      Coronary artery disease     2/2012- 50% mid Cfx, 60-70% prox OM2, 50-70% D1     Essential  hypertension, benign      Fatty liver 5/10    mild fibrosis on biopsy     Hypertriglyceridemia      Major depression      BETHANY (obstructive sleep apnea)      Other ventral hernia without mention of obstruction or gangrene      Peptic ulcer 2002     Peptic ulcer, unspecified site, unspecified as acute or chronic, without mention of hemorrhage, perforation, or obstruction 1983     Shortness of breath      Thyroid disease      Tubular adenoma 6/08             Past Surgical History:      Past Surgical History:   Procedure Laterality Date     APPENDECTOMY       C NONSPECIFIC PROCEDURE      Appendectomy     C NONSPECIFIC PROCEDURE      Tonsillectomy     C NONSPECIFIC PROCEDURE      Tubal ligation     C NONSPECIFIC PROCEDURE  11/01    Shave bone spurs from left foot     COLONOSCOPY  9/1/2016    Dr. Camejo [facility]     COLONOSCOPY N/A 9/1/2016    Procedure: COMBINED COLONOSCOPY, SINGLE OR MULTIPLE BIOPSY/POLYPECTOMY BY BIOPSY;  Surgeon: Pillo Camejo MD;  Location: [facility]     CORONARY ANGIOGRAPHY ADULT ORDER  2/13/2012    50% mid Cfx, 60-70% prox OM2, 50-70% D1     ESOPHAGOSCOPY, GASTROSCOPY, DUODENOSCOPY (EGD), COMBINED  9/1/2016    Dr. Camejo [facility]     ESOPHAGOSCOPY, GASTROSCOPY, DUODENOSCOPY (EGD), COMBINED N/A 9/1/2016    Procedure: COMBINED ESOPHAGOSCOPY, GASTROSCOPY, DUODENOSCOPY (EGD), BIOPSY SINGLE OR MULTIPLE;  Surgeon: Pillo Camejo MD;  Location:  GI     GYN SURGERY       HEART CATH RIGHT AND LEFT HEART CATH  04/06/2015    Mid LAD 50-60%, 1st Diagonal 70%, 1st OM 30%, 2nd OM 40-50%, review report for right heart cath results.        Social History     Tobacco Use     Smoking status: Never Smoker     Smokeless tobacco: Never Used   Substance Use Topics     Alcohol use: Yes     Alcohol/week: 0.0 oz     Comment: occ - wine       Family History   Problem Relation Age of Onset     Respiratory Father         emphysema--secon. to smoking     Alzheimer Disease Mother      Congenital Anomalies Mother       Osteoporosis Mother      Diabetes Maternal Aunt      Diabetes Other         cousins     Diabetes Other         cousin on mothers side of family     Colon Cancer No family hx of             Physical Examination:   There were no vitals taken for this visit.            Data: All pertinent previous laboratory data reviewed     Lab Results   Component Value Date    PH 7.41 02/08/2018    PH 7.34 (L) 04/06/2015    PO2 76 (L) 02/08/2018    PO2 62 (L) 04/06/2015    PCO2 39 02/08/2018    PCO2 40 04/06/2015    HCO3 25 02/08/2018    HCO3 22 04/06/2015    SRIDHAR 0.1 02/08/2018     Lab Results   Component Value Date    TSH 3.34 05/03/2018    TSH 3.32 11/14/2017     Lab Results   Component Value Date    GLC 91 03/14/2018    GLC 96 08/30/2017     Lab Results   Component Value Date    HGB 12.8 03/14/2018    HGB 13.8 08/30/2017     Lab Results   Component Value Date    BUN 20 03/14/2018    BUN 20 08/30/2017    CR 0.90 03/14/2018    CR 1.03 08/30/2017     Lab Results   Component Value Date    JESÚS 402 (H) 12/01/2009         She will follow up with me in about 4 month(s).         Copy to: Simin Lopez MD 2/27/2019     [facility]  [id number] [place] [id number]       Fifteen minutes spent with patient, all of which were spent face-to-face counseling, consulting, coordinating plan of care and going over PAP download/sleep study and chart review.

## 2019-02-28 NOTE — PROGRESS NOTES
[facility]    Date of visit: 3/1/2019    Chief complaint:   Chief Complaint   Patient presents with     Pain     Interval history:  Cielo Aguillon is a 78 year old female last seen by me on 1/14/2019.      Recommendations/plan at the last visit included:  1. Therapy: Order placed for pain PT.  2. Clinical Health Pain Psychologist: Not needed at this time. Can consider in the future but she seems to be coping well at this time.   3. Diagnostic Studies: Order placed for lumbar MRI. She would prefer to have this done at [facility].  4. Medication Management:   1. Medication options are limited. She cannot use NSIADs. Am hesitant to use muscle relaxants due to risk of sedation, impairment with balance and dizziness which could contribute to a fall. Cymbalta could be an option for chronic musculoskeletal back pain but this would need to replace Celexa.   2. Can continue with Tramadol 50 mg as she is using now. She uses daily prn.   3. Can use tylenol up to 3,000 mg daily.  5. Further procedures recommended: Will wait on making recommendations until I have records from [facility], where she had prior injections done. Will also need results of lumbar MRI.   6. Complementary Treatments: Discussed that acupuncture could beneficial. She will check to see if her insurance will cover it.   7. Other: Will discuss TENS unit at next visit to see if it will provide benefit.   8. Release of information: SHAZIA signed for [facility].  9. Follow up: 6-8 weeks with me in clinic    Since her last visit, Cielo Aguillon reports:  -her pain is a little worse than it was at last visit.   -Pain is still located in the low back, right > left. 70% of the pain is still in the back vs 30% in the leg. She endorses having some paresthesias into the left lower extremity but nothing into the right.  -She reports that she feels weak in general. She thinks it might be due to not eating very much.   -she checked with insurance and  acupuncture is not covered   - She went to 1 visit with [facility] for PT but has not gone back.   - She feels that her mood is slightly worse.     Pain Information:   Pain quality: Miserable    Pain timing: Mornings     Pain rating: intensity ranges from 6/10 to 8/10, and averages 7/10 on a 0-10 scale. Currently, it is 8/10.   Aggravating factors include: walking too much   Relieving factors include: rest    Current pain treatments:    Current pain medications:              Tramadol 50 mg q8 hours prn - most she uses is once per day, sometimes uses none - H              Tylenol 650 mg prn - NH                Celexa - for anxiety and mood  Valium - only uses when she travels due to anxiety  Warfarin  Melatonin for sleep    Current MME: up to 5    Review of [place] Prescription Monitoring Program (): No concern for abuse or misuse of controlled medications based on this report.     Annual Controlled Substance Agreement/UDS due date: NA    Past pain treatments:  1. Previous Pain Relevant Medications:  NOTE: This medication information taken from patient's intake form, not medical records.               Opiates: NA              NSAIDS: cannot use              Muscle Relaxants: cyclobenzaprine - H but has SE of sleepiness              Anti-migraine mediations: NA              Anti-depressants:  Lexapro              Sleep aids: Ambien              Anxiolytics: NA              Neuropathics: NA                      Topicals: Lidocaine patches - NH              Other medications not covered above: Tylenol     2. Physical Therapy: Yes, ~5 years ago - It was painful to do the exercises  3. Pain Psychology: No  4. Surgery: No  5. Injections:   - Bilateral L4-5 and L5-S1 facet joint injections on 1/22/2015 at [facility]   - Left and Right L3,4,5 medial branch RFA on 11/11/2014 - unsure how helpful.   - she has had other injections but does not remember specifics  6. Chiropractic: Yes, 2 years ago - [facility]. Went twice a week.  7.  "Acupuncture: No  8. TENS Unit: No  9. Other: heating pad - H    Medications:  Current Outpatient Medications   Medication Sig Dispense Refill     amLODIPine (NORVASC) 5 MG tablet Take 1 tablet (5 mg) by mouth daily 90 tablet 1     citalopram (CELEXA) 20 MG tablet TAKE 1.5 tablet (30 mg) BY MOUTH EVERY  tablet 1     diazepam (VALIUM) 5 MG tablet TAKE 1/2 TO 1 TABLET BY MOUTH EVERY 8 HOURS AS NEEDED 40 tablet 1     levothyroxine (SYNTHROID/LEVOTHROID) 50 MCG tablet TAKE 1 TABLET (50 MCG) BY MOUTH DAILY 90 tablet 1     metoprolol tartrate (LOPRESSOR) 100 MG tablet TAKE 1 TABLET (100MG) BY MOUTH 2 TIMES DAILY 180 tablet 1     rosuvastatin (CRESTOR) 10 MG tablet TAKE 1 TABLET (10 MG) BY MOUTH DAILY 90 tablet 1     traMADol (ULTRAM) 50 MG tablet TAKE ONE TABLET BY MOUTH EVERY 8 HOURS AS NEEDED FOR MODERATE PAIN 50 tablet 2     VITAMIN D, CHOLECALCIFEROL, PO Take 1,000 Units by mouth At Bedtime        warfarin (COUMADIN) 5 MG tablet Take one tablet (5 mg) daily or as directed by INR clinic. 90 tablet 1       Medical History: any changes in medical history since they were last seen? No    Review of Systems:  The 14 system ROS was reviewed from the intake questionnaire, and is positive for: nosebleeds, hearing loss, ringing in ears, shortness of breath (chronic issue), abdominal pain, nausea, joint pain, stiffness, back pain, weakness (generalized), numbness/tingling.  Any bowel or bladder problems: none  Mood: depression, anxiety, stress    Physical Exam:  Blood pressure 119/74, pulse 72, weight 73 kg (161 lb), SpO2 95 %, not currently breastfeeding.  General: A&Ox3, in no distress  Gait: ambulates with assist of a walker. Gait is slow. Stands with forward flexed posture.  Neuro: 5/5 strength in BLE.    Imaging:  MRI of Lumbar Spine was completed on 1/24/2019 which showed:  \"Impression:   1. L1 superior endplate large broad based Schmorl's node versus acute/subacute endplate compression injury with mild adjacent " "marrow edema. This is new since 2012.  2. Multilevel degenerative disc disease with mild discogenic endplate reactive marrow edema at L2-3.  3. Multilevel foraminal stenosis, greatest on the right L4-5 and L3-4.\"    Assessment:   Cielo Aguillon is a 78 year old female with a past medical history significant for BETHANY, HLD, hypothyroidism, CAD, chronic atrial fibrillation on anticoagulation, MARY, depression,  who presents with complaints of chronic low back pain.      1. Chronic low back pain: Etiology most likely secondary to lumbar spondylosis and facet arthropathy with overlying myofascial pain. MRI of lumbar spine shows no evidence of nerve root impingement. There is some mild to moderate right foraminal stenosis at L3-4 and L4-5 and mild left foraminal stenosis at L4-5. No significant stenosis of the central canal. She has had good relief of back pain per her report with lumbar RFA more than the facet joint injections. She thinks it helped about 50% but is unsure of how long the benefit lasted.     2. Chronic opioid use: Has been on tramadol for number of years. Uses 1 tab/day at most. It is helpful for her pain.     3.  Mental Health - the patient's mental health concerns, specifically anxiety and depression, affect her experience of pain and contribute to her clinically significant distress.    Plan:  I am recommending a multidisciplinary treatment plan to help this patient better manage her pain.  This includes:      1. Therapy: Discussed that she can make further appointments with Jana for pain PT if she feels like there is more that she can work on.   2. Clinical Health Pain Psychologist: Discussed pain psychology but she is not interested in it.  3. Diagnostic Studies: Results of Lumbar MRI reviewed with Cielo today.  4. Medication Management:   1. Medication options are limited. She cannot use NSIADs. Am hesitant to use muscle relaxants due to risk of sedation, impairment with balance and dizziness " which could contribute to a fall. Cymbalta could be an option for chronic musculoskeletal back pain but this would need to replace Celexa.   2. Can continue with Tramadol 50 mg as she is using now. She uses daily prn.   3. Can use tylenol up to 3,000 mg daily.  5. Further procedures recommended: Order placed for repeat L3,4,5 bilateral lumbar RFA. May have to repeat MBB.   6. Complementary Treatments: Discussed acupuncture but her insurance will not cover it.  7. Other: Can consider TENS unit at next visit to see if it will provide benefit.   8. Follow up: for injection    I spent 30 minutes of time face to face with the patient.  Greater than 50% of this time was spent in patient counseling and/or coordination of care.    Qiana Maldonado MD  [facility]

## 2019-03-01 ENCOUNTER — OFFICE VISIT (OUTPATIENT)
Dept: PALLIATIVE MEDICINE | Facility: CLINIC | Age: 79
End: 2019-03-01
Payer: MEDICARE

## 2019-03-01 VITALS
WEIGHT: 161 LBS | HEART RATE: 72 BPM | OXYGEN SATURATION: 95 % | BODY MASS INDEX: 26.79 KG/M2 | SYSTOLIC BLOOD PRESSURE: 119 MMHG | DIASTOLIC BLOOD PRESSURE: 74 MMHG

## 2019-03-01 DIAGNOSIS — M47.816 FACET ARTHROPATHY, LUMBAR: Primary | ICD-10-CM

## 2019-03-01 PROCEDURE — 99214 OFFICE O/P EST MOD 30 MIN: CPT | Performed by: PHYSICAL MEDICINE & REHABILITATION

## 2019-03-01 ASSESSMENT — PAIN SCALES - GENERAL: PAINLEVEL: SEVERE PAIN (7)

## 2019-03-01 NOTE — PATIENT INSTRUCTIONS
1. I will put in an order for you to repeat the lumbar L3,4,5 radiofrequency ablation. We will have to check with insurance if we need to repeat the test blocks first.  2. You can follow up with Jana in PT if you think there is more that you can work on in terms of having exercises to do at home.     3. Follow up for injection.  ----------------------------------------------------------------  Clinic Number:  [phone]   Call this number with any questions about your care and for scheduling assistance. Calls are returned Monday through Friday between 8 AM and 4:30 PM. We usually get back to you within 2 business days depending on the issue/request.       Medication refills:    For non-narcotic medications, call your pharmacy directly to request a refill. The pharmacy will contact the [facility] for authorization. Please allow 3-4 days for these refills to be processed.     For narcotic refills, call the clinic number or send a [email or web address] message. Please contact us 7-10 days before your refill is due. The message MUST include the name of the specific medication(s) requested and how you would like to receive the prescription(s). The options are as follows:    Pain Clinic staff can mail the prescription to your pharmacy. Please tell us the name of the pharmacy.    You may pick the prescription up at the Pain Clinic (tell us the location) or during a clinic visit with your pain provider    Pain Clinic staff can deliver the prescription to the [facility] pharmacy in the clinic building. Please tell us the location.      We believe regular attendance is key to your success in our program.    Any time you are unable to keep your appointment we ask that you call us at least 24 hours in advance to let us know. This will allow us to offer the appointment time to another patient.

## 2019-03-02 ENCOUNTER — TELEPHONE (OUTPATIENT)
Dept: PALLIATIVE MEDICINE | Facility: CLINIC | Age: 79
End: 2019-03-02

## 2019-03-02 DIAGNOSIS — Z79.01 LONG TERM CURRENT USE OF ANTICOAGULANT THERAPY: Primary | ICD-10-CM

## 2019-03-04 ENCOUNTER — OFFICE VISIT (OUTPATIENT)
Dept: INTERNAL MEDICINE | Facility: CLINIC | Age: 79
End: 2019-03-04
Payer: MEDICARE

## 2019-03-04 VITALS
WEIGHT: 164 LBS | TEMPERATURE: 97.8 F | OXYGEN SATURATION: 94 % | RESPIRATION RATE: 12 BRPM | SYSTOLIC BLOOD PRESSURE: 120 MMHG | HEIGHT: 65 IN | DIASTOLIC BLOOD PRESSURE: 72 MMHG | BODY MASS INDEX: 27.32 KG/M2 | HEART RATE: 52 BPM

## 2019-03-04 DIAGNOSIS — I10 ESSENTIAL HYPERTENSION, BENIGN: Primary | ICD-10-CM

## 2019-03-04 DIAGNOSIS — G89.29 CHRONIC LEFT-SIDED LOW BACK PAIN WITH LEFT-SIDED SCIATICA: ICD-10-CM

## 2019-03-04 DIAGNOSIS — F41.1 GAD (GENERALIZED ANXIETY DISORDER): ICD-10-CM

## 2019-03-04 DIAGNOSIS — E03.9 ACQUIRED HYPOTHYROIDISM: ICD-10-CM

## 2019-03-04 DIAGNOSIS — I25.10 CORONARY ARTERY DISEASE INVOLVING NATIVE CORONARY ARTERY OF NATIVE HEART WITHOUT ANGINA PECTORIS: ICD-10-CM

## 2019-03-04 DIAGNOSIS — E78.5 HYPERLIPIDEMIA LDL GOAL <70: ICD-10-CM

## 2019-03-04 DIAGNOSIS — R10.13 EPIGASTRIC PAIN: ICD-10-CM

## 2019-03-04 DIAGNOSIS — M54.42 CHRONIC LEFT-SIDED LOW BACK PAIN WITH LEFT-SIDED SCIATICA: ICD-10-CM

## 2019-03-04 DIAGNOSIS — F33.1 MAJOR DEPRESSIVE DISORDER, RECURRENT EPISODE, MODERATE (H): ICD-10-CM

## 2019-03-04 LAB
BASOPHILS # BLD AUTO: 0 10E9/L (ref 0–0.2)
BASOPHILS NFR BLD AUTO: 0.3 %
DIFFERENTIAL METHOD BLD: ABNORMAL
EOSINOPHIL # BLD AUTO: 0.1 10E9/L (ref 0–0.7)
EOSINOPHIL NFR BLD AUTO: 1.2 %
ERYTHROCYTE [DISTWIDTH] IN BLOOD BY AUTOMATED COUNT: 14.5 % (ref 10–15)
HCT VFR BLD AUTO: 41.5 % (ref 35–47)
HGB BLD-MCNC: 13.6 G/DL (ref 11.7–15.7)
LYMPHOCYTES # BLD AUTO: 1.6 10E9/L (ref 0.8–5.3)
LYMPHOCYTES NFR BLD AUTO: 27.2 %
MCH RBC QN AUTO: 32.4 PG (ref 26.5–33)
MCHC RBC AUTO-ENTMCNC: 32.8 G/DL (ref 31.5–36.5)
MCV RBC AUTO: 99 FL (ref 78–100)
MONOCYTES # BLD AUTO: 0.5 10E9/L (ref 0–1.3)
MONOCYTES NFR BLD AUTO: 8.9 %
NEUTROPHILS # BLD AUTO: 3.7 10E9/L (ref 1.6–8.3)
NEUTROPHILS NFR BLD AUTO: 62.4 %
PLATELET # BLD AUTO: 148 10E9/L (ref 150–450)
RBC # BLD AUTO: 4.2 10E12/L (ref 3.8–5.2)
WBC # BLD AUTO: 5.9 10E9/L (ref 4–11)

## 2019-03-04 PROCEDURE — 99214 OFFICE O/P EST MOD 30 MIN: CPT | Performed by: INTERNAL MEDICINE

## 2019-03-04 PROCEDURE — 36415 COLL VENOUS BLD VENIPUNCTURE: CPT | Performed by: INTERNAL MEDICINE

## 2019-03-04 PROCEDURE — 84443 ASSAY THYROID STIM HORMONE: CPT | Performed by: INTERNAL MEDICINE

## 2019-03-04 PROCEDURE — 85025 COMPLETE CBC W/AUTO DIFF WBC: CPT | Performed by: INTERNAL MEDICINE

## 2019-03-04 PROCEDURE — 80053 COMPREHEN METABOLIC PANEL: CPT | Performed by: INTERNAL MEDICINE

## 2019-03-04 RX ORDER — AMLODIPINE BESYLATE 5 MG/1
5 TABLET ORAL DAILY
Qty: 90 TABLET | Refills: 1 | Status: SHIPPED | OUTPATIENT
Start: 2019-03-04 | End: 2019-10-25

## 2019-03-04 RX ORDER — TRAMADOL HYDROCHLORIDE 50 MG/1
TABLET ORAL
Qty: 90 TABLET | Refills: 0 | Status: SHIPPED | OUTPATIENT
Start: 2019-03-04 | End: 2019-05-10

## 2019-03-04 RX ORDER — CITALOPRAM HYDROBROMIDE 20 MG/1
TABLET ORAL
Qty: 135 TABLET | Refills: 1 | Status: SHIPPED | OUTPATIENT
Start: 2019-03-04 | End: 2019-10-25

## 2019-03-04 RX ORDER — DIAZEPAM 5 MG
TABLET ORAL
Qty: 40 TABLET | Refills: 1 | Status: SHIPPED | OUTPATIENT
Start: 2019-03-04 | End: 2019-07-18

## 2019-03-04 RX ORDER — ROSUVASTATIN CALCIUM 10 MG/1
TABLET, COATED ORAL
Qty: 90 TABLET | Refills: 1 | Status: SHIPPED | OUTPATIENT
Start: 2019-03-04 | End: 2019-10-25

## 2019-03-04 RX ORDER — LEVOTHYROXINE SODIUM 50 UG/1
TABLET ORAL
Qty: 90 TABLET | Refills: 1 | Status: SHIPPED | OUTPATIENT
Start: 2019-03-04 | End: 2019-10-25

## 2019-03-04 ASSESSMENT — ANXIETY QUESTIONNAIRES
1. FEELING NERVOUS, ANXIOUS, OR ON EDGE: MORE THAN HALF THE DAYS
7. FEELING AFRAID AS IF SOMETHING AWFUL MIGHT HAPPEN: SEVERAL DAYS
3. WORRYING TOO MUCH ABOUT DIFFERENT THINGS: SEVERAL DAYS
6. BECOMING EASILY ANNOYED OR IRRITABLE: SEVERAL DAYS
GAD7 TOTAL SCORE: 8
2. NOT BEING ABLE TO STOP OR CONTROL WORRYING: MORE THAN HALF THE DAYS
IF YOU CHECKED OFF ANY PROBLEMS ON THIS QUESTIONNAIRE, HOW DIFFICULT HAVE THESE PROBLEMS MADE IT FOR YOU TO DO YOUR WORK, TAKE CARE OF THINGS AT HOME, OR GET ALONG WITH OTHER PEOPLE: SOMEWHAT DIFFICULT
5. BEING SO RESTLESS THAT IT IS HARD TO SIT STILL: NOT AT ALL

## 2019-03-04 ASSESSMENT — PATIENT HEALTH QUESTIONNAIRE - PHQ9
SUM OF ALL RESPONSES TO PHQ QUESTIONS 1-9: 10
5. POOR APPETITE OR OVEREATING: SEVERAL DAYS

## 2019-03-04 ASSESSMENT — MIFFLIN-ST. JEOR: SCORE: 1224.78

## 2019-03-04 NOTE — NURSING NOTE
"/72   Pulse 52   Temp 97.8  F (36.6  C) (Oral)   Resp 12   Ht 1.651 m (5' 5\")   Wt 74.4 kg (164 lb)   SpO2 94%   Breastfeeding? No   BMI 27.29 kg/m      "

## 2019-03-04 NOTE — LETTER
March 6, 2019      Cielochris Aguillon  [address] [address]        Dear ,    We are writing to inform you of your test results.    The blood counts are within acceptable limits.   The electrolytes are normal.  The kidney function (GFR) is stable.  The liver function tests (AST and ALT) are within normal limits.   The thyroid is within normal limits.     Resulted Orders   TSH with free T4 reflex   Result Value Ref Range    TSH 3.39 0.40 - 4.00 mU/L   Comprehensive metabolic panel   Result Value Ref Range    Sodium 142 133 - 144 mmol/L    Potassium 4.3 3.4 - 5.3 mmol/L    Chloride 107 94 - 109 mmol/L    Carbon Dioxide 30 20 - 32 mmol/L    Anion Gap 5 3 - 14 mmol/L    Glucose 73 70 - 99 mg/dL      Comment:      Non Fasting    Urea Nitrogen 18 7 - 30 mg/dL    Creatinine 0.92 0.52 - 1.04 mg/dL    GFR Estimate 59 (L) >60 mL/min/[1.73_m2]      Comment:      Non  GFR Calc  Starting 12/18/2018, serum creatinine based estimated GFR (eGFR) will be   calculated using the Chronic Kidney Disease Epidemiology Collaboration   (CKD-EPI) equation.      GFR Estimate If Black 68 >60 mL/min/[1.73_m2]      Comment:       GFR Calc  Starting 12/18/2018, serum creatinine based estimated GFR (eGFR) will be   calculated using the Chronic Kidney Disease Epidemiology Collaboration   (CKD-EPI) equation.      Calcium 8.8 8.5 - 10.1 mg/dL    Bilirubin Total 0.5 0.2 - 1.3 mg/dL    Albumin 3.5 3.4 - 5.0 g/dL    Protein Total 7.4 6.8 - 8.8 g/dL    Alkaline Phosphatase 65 40 - 150 U/L    ALT 35 0 - 50 U/L    AST 33 0 - 45 U/L   CBC with platelets differential   Result Value Ref Range    WBC 5.9 4.0 - 11.0 10e9/L    RBC Count 4.20 3.8 - 5.2 10e12/L    Hemoglobin 13.6 11.7 - 15.7 g/dL    Hematocrit 41.5 35.0 - 47.0 %    MCV 99 78 - 100 fl    MCH 32.4 26.5 - 33.0 pg    MCHC 32.8 31.5 - 36.5 g/dL    RDW 14.5 10.0 - 15.0 %    Platelet Count 148 (L) 150 - 450 10e9/L    % Neutrophils 62.4 %    %  Lymphocytes 27.2 %    % Monocytes 8.9 %    % Eosinophils 1.2 %    % Basophils 0.3 %    Absolute Neutrophil 3.7 1.6 - 8.3 10e9/L    Absolute Lymphocytes 1.6 0.8 - 5.3 10e9/L    Absolute Monocytes 0.5 0.0 - 1.3 10e9/L    Absolute Eosinophils 0.1 0.0 - 0.7 10e9/L    Absolute Basophils 0.0 0.0 - 0.2 10e9/L    Diff Method Automated Method        If you have any questions or concerns, please call the clinic at the number listed above.       Sincerely,        Gladys Corbin MD

## 2019-03-04 NOTE — TELEPHONE ENCOUNTER
This message should go to provider covering for Dr Lopez with last name starting with S-  Dr. Griffin

## 2019-03-04 NOTE — PROGRESS NOTES
SUBJECTIVE:   Cielo Aguillon is a 78 year old female who presents to clinic today for the following health issues:      Hyperlipidemia Follow-Up      Rate your low fat/cholesterol diet?: fair    Taking statin?  Yes, no muscle aches from statin    Other lipid medications/supplements?:  none    Hypertension Follow-up      Outpatient blood pressures are being checked at home.  Results are low.    Low Salt Diet: low salt    Vascular Disease Follow-up:  Coronary Artery Disease (CAD)      Chest pain or pressure, left side neck or arm pain: No    Shortness of breath/increased sweats/nausea with exertion: yes, pt has a fib    Pain in calves walking 1-2 blocks: No    Worsened or new symptoms since last visit: No    Nitroglycerin use: no    Daily aspirin use: No    Depression and Anxiety Follow-Up    Status since last visit: No change    Other associated symptoms:None    Complicating factors:     Significant life event: No     Current substance abuse: None    PHQ 8/30/2017 11/19/2017 2/16/2018   PHQ-9 Total Score 12 6 5   Q9: Suicide Ideation Not at all Not at all Not at all     MARY-7 SCORE 3/31/2016 8/30/2017 11/19/2017   Total Score - - -   Total Score 10 10 9       PHQ-9  English  PHQ-9   Any Language  MARY-7  Suicide Assessment Five-step Evaluation and Treatment (SAFE-T)  Hypothyroidism Follow-up      Since last visit, patient describes the following symptoms: Weight stable, no hair loss, no skin changes, no constipation, no loose stools    Chronic back pain.  Lumbar spondylosis and arthropathy.  She is seeing pain clinic.  She will be undergoing spinal injections.     Anxiety.  The patient uses valium to sleep.  She also sees a counselor.  She also needs this when she goes to [place].  Her  and 2 sons have committed suicide.      Amount of exercise or physical activity: None    Problems taking medications regularly: No    Medication side effects: none    Diet: regular (no restrictions)          Problem list and  "histories reviewed & adjusted, as indicated.    Reviewed and updated as needed this visit by clinical staff  Tobacco  Allergies  Meds  Med Hx  Surg Hx  Fam Hx  Soc Hx      Reviewed and updated as needed this visit by Provider         ROS:  CONSTITUTIONAL: NEGATIVE for fever, chills, change in weight  RESP: NEGATIVE for significant cough or SOB  CV: NEGATIVE for chest pain, palpitations or peripheral edema  Psych: h/o anxiety and depression  MSK: chronic back pain    OBJECTIVE:     /72   Pulse 52   Temp 97.8  F (36.6  C) (Oral)   Resp 12   Ht 1.651 m (5' 5\")   Wt 74.4 kg (164 lb)   SpO2 94%   Breastfeeding? No   BMI 27.29 kg/m    Body mass index is 27.29 kg/m .     GENERAL: healthy, alert and no distress  RESP: lungs clear to auscultation - no rales, rhonchi or wheezes  CV: regular rate and rhythm, normal S1 S2, no S3 or S4, no murmur, click or rub,  Psych: normal affect    ASSESSMENT/PLAN:     (I10) Essential hypertension, benign  (primary encounter diagnosis)  Comment: at goal  Plan: amLODIPine (NORVASC) 5 MG tablet, CBC with         platelets differential            (F33.1) Major depressive disorder, recurrent episode, moderate (H)  Comment: stable  Plan: citalopram (CELEXA) 20 MG tablet            (F41.1) MARY (generalized anxiety disorder)  Comment: stable  Plan: citalopram (CELEXA) 20 MG tablet, TSH with free        T4 reflex, diazepam (VALIUM) 5 MG tablet            (E78.5) Hyperlipidemia LDL goal <70  Comment: assess  Plan: rosuvastatin (CRESTOR) 10 MG tablet,         Comprehensive metabolic panel, Lipid panel         reflex to direct LDL Fasting            (E03.9) Acquired hypothyroidism  Comment: asssess  Plan: levothyroxine (SYNTHROID/LEVOTHROID) 50 MCG         tablet            (I25.10) Coronary artery disease involving native coronary artery of native heart without angina pectoris  Comment:   Plan: rosuvastatin (CRESTOR) 10 MG tablet            (R10.13) Epigastric pain  Comment: " assess  Plan: H Pylori antigen, stool            (M54.42,  G89.29) Chronic left-sided low back pain with left-sided sciatica  Comment:   Plan: traMADol (ULTRAM) 50 MG tablet                Gladys Corbin MD  [facility]

## 2019-03-04 NOTE — TELEPHONE ENCOUNTER
Dr. Lopez,   Patient is requesting to schedule an injection with the [facility].     This would require the patient to hold:                 Coumadin (Warfarin)         Hold 5 days prior to procedure.  Check INR prior to procedure.  Ok to resume night of procedure, unless directed otherwise by provider or anticoagulation clinic.      We are requesting your approval to hold the medication for this time frame.    Please keep call open and route back to the PAIN NURSE [[id number]] pool.     If hold approved, we will contact the patient for scheduling.    Thank you.

## 2019-03-05 LAB
ALBUMIN SERPL-MCNC: 3.5 G/DL (ref 3.4–5)
ALP SERPL-CCNC: 65 U/L (ref 40–150)
ALT SERPL W P-5'-P-CCNC: 35 U/L (ref 0–50)
ANION GAP SERPL CALCULATED.3IONS-SCNC: 5 MMOL/L (ref 3–14)
AST SERPL W P-5'-P-CCNC: 33 U/L (ref 0–45)
BILIRUB SERPL-MCNC: 0.5 MG/DL (ref 0.2–1.3)
BUN SERPL-MCNC: 18 MG/DL (ref 7–30)
CALCIUM SERPL-MCNC: 8.8 MG/DL (ref 8.5–10.1)
CHLORIDE SERPL-SCNC: 107 MMOL/L (ref 94–109)
CO2 SERPL-SCNC: 30 MMOL/L (ref 20–32)
CREAT SERPL-MCNC: 0.92 MG/DL (ref 0.52–1.04)
GFR SERPL CREATININE-BSD FRML MDRD: 59 ML/MIN/{1.73_M2}
GLUCOSE SERPL-MCNC: 73 MG/DL (ref 70–99)
POTASSIUM SERPL-SCNC: 4.3 MMOL/L (ref 3.4–5.3)
PROT SERPL-MCNC: 7.4 G/DL (ref 6.8–8.8)
SODIUM SERPL-SCNC: 142 MMOL/L (ref 133–144)
TSH SERPL DL<=0.005 MIU/L-ACNC: 3.39 MU/L (ref 0.4–4)

## 2019-03-05 ASSESSMENT — ANXIETY QUESTIONNAIRES: GAD7 TOTAL SCORE: 8

## 2019-03-05 NOTE — TELEPHONE ENCOUNTER
Called patient. She was at a restaurant and unable to schedule. She will call back later or tomorrow.     Myah PATINO, RN Care Coordinator  [facility]

## 2019-03-05 NOTE — TELEPHONE ENCOUNTER
Called patient. LM to call back to discuss/schedule    Myah MULLENN, RN Care Coordinator  [facility]

## 2019-03-05 NOTE — TELEPHONE ENCOUNTER
Patient is returning call, please call her to schedule      She is available anytime      Kay LAUREN    [facility]

## 2019-03-07 NOTE — TELEPHONE ENCOUNTER
Pt calling back to schedule her Injection.      Doris CHEN    [facility]

## 2019-03-07 NOTE — TELEPHONE ENCOUNTER
Advised on requirements for holding Coumadin. Discussed need for INR one hour prior at [facility] lab 1st floor, INR order placed. Discussed need for . If becomes sick/ill/infection or started on ABX or steroids that we will need to be contacted for rescheduling.States no further questions.      Reminded of NPO status and to come early for IV.      Myah MULLENN, RN Care Coordinator  [facility]

## 2019-03-16 PROCEDURE — 87338 HPYLORI STOOL AG IA: CPT | Performed by: INTERNAL MEDICINE

## 2019-03-18 NOTE — PROGRESS NOTES
"[facility] - Procedure Note    Date of Visit: 3/19/2019    Pre procedure Diagnosis: facet arthropathy   Post procedure Diagnosis: Same  Procedure performed: Right L3,4,5 medial branch radiofrequency ablation (Scheduled for bilateral but Cielo reports most of the pain is on the right side and minimal on left)  Anesthesia: moderate sedation with 1 mg versed & 50mcg fentanyl  Complications: None  Operators: Qiana Maldonado MD    Indications:   Cielo Aguillon is a 79 year old female who is seen by me in clinic presents today for repeat lumbar radiofrequency ablation. She previously had this done in 2014 with about 50% improvement for about 6 months.  She has a history of chronic low back pain, right > left.  Exam shows increased discomfort with extension and rotation and they have tried conservative treatment including medications.    This is a repeat RFA.    MRI of Lumbar Spine was completed on 1/24/2019 which showed:  \"Impression:   1. L1 superior endplate large broad based Schmorl's node versus acute/subacute endplate compression injury with mild adjacent marrow edema. This is new since 2012.  2. Multilevel degenerative disc disease with mild discogenic endplate reactive marrow edema at L2-3.  3. Multilevel foraminal stenosis, greatest on the right L4-5 and L3-4.\"    Allergies:      Allergies   Allergen Reactions     Ace Inhibitors      Cough (Zestril)     Ambien [Zolpidem] Other (See Comments)     Aspirin      hx bleeding ulcers     Hydrochlorothiazide [Hctz] Rash     Ibuprofen      hx bleeding ulcers     Amoxicillin Rash     Losartan Rash     Penicillins Rash        Vitals:  /58   Pulse 59   Resp 24   SpO2 93%     Review of Systems: The patient denies recent fever, chills, illness, use of antibiotics or anticoagulants. All other 10-point review of systems negative.     Physical Exam:   Resp: CTA bilaterally  CV: RRR no murmurs, rubs or gallops  Airway:  Mallampati Class II    INR " 1.09    Options/alternatives, benefits and risks were discussed with the patient including bleeding, infection, no pain relief, tissue trauma, exposure to radiation, reaction to medications including seizure, spinal cord injury,increased pain after the procedure, weakness, numbness or sensory changes and headache.   We also discussed risks of sedation, including reaction to medications and cardiovascular collapse.    Questions were answered to her satisfaction and she agrees to proceed. Voluntary informed consent was obtained and signed.     Medications were reviewed:  Yes   Pre-procedure pain score: 7/10    Procedure:  After getting informed consent, patient was brought into the procedure suite and was placed in a prone position on the procedure table.   A Pause for the Cause was performed.  Patient was prepped and draped in sterile fashion.     Cielo Aguillon had an IV line placed prior the procedure.  The C-arm was positioned in the right oblique view to afford optimal view of the L4-L5 vertebral bodies. Lidocaine 1% was used to anesthetize the skin at each level.  At each level, a 100mm, 20G curved radiofrequency cannula with a 10mm active tip was positioned, overlying the intersection of the transverse process and pedicle at L4 & L5, and was advanced under intermittent fluoroscopy until it contacted the transverse process and notch, and the tip slightly overran that process, just lateral to the mamillary process.  The position of each cannula was verified and optimized in the oblique view and AP views.    In the AP view, another cannula was placed at the sacral alar notch.      Each position was tested for motor and sensory stimulation, and was positioned so that stimulation was negative for stimuli outside the immediate area of the desired lesion.  Sensory stimulation was completed at 50 Hz, with max stimulation up to 0.8V.  Motor stimulation was completed at 2Hz, up to 2.5V. 1 ml of a combination of  Bupivicaine 0.25% and Lidocaine 2% was injected at each level, and a 90 second, 80 degree Centigrade lesion was generated.    The needles were then rotated within the pathway of the medial branch, and locations were evaluated with repeat imaging.  Motor testing was again completed, and showed appropriate stimulation.  A second lesion was then generated at each location.    The procedure was NOT repeated on the opposite side.     The needles were withdrawn. Hemostasis was achieved, the area was cleaned, and bandaids were placed when appropriate.  The patient tolerated the procedure well, and was taken to the recovery room.    Images were saved to PACS.    Start sedation time: 1113  End sedation time: 1157    Post-procedure pain score: 0/10    Assessment/Plan: Cielo Aguillon is a 79 year old female s/p right lumbar RFA for chronic low back pain.     1. Following today's procedure, the patient was advised to contact the [facility] for any of the following:   Fever, chills, or night sweats   New onset of pain, numbness, or weakness   Any questions/concerns regarding the procedure  If unable to contact the Pain Center, the patient was instructed to go to a local Emergency Room for any complications.     2. The patient should follow-up with the referring provider for post-procedure evaluation.    3. We will follow up with a phone call in one week.    4. post-procedure pain medications: Percocet 5/325 1 tab q4hrs PRN pain - max 4/day #20 tablets no refills    Qiana Maldonado MD  [facility]

## 2019-03-19 ENCOUNTER — HOSPITAL ENCOUNTER (OUTPATIENT)
Dept: LAB | Facility: CLINIC | Age: 79
Discharge: HOME | End: 2019-03-19
Attending: PHYSICAL MEDICINE & REHABILITATION | Admitting: PHYSICAL MEDICINE & REHABILITATION
Payer: MEDICARE

## 2019-03-19 ENCOUNTER — RADIOLOGY INJECTION OFFICE VISIT (OUTPATIENT)
Dept: PALLIATIVE MEDICINE | Facility: CLINIC | Age: 79
End: 2019-03-19
Payer: MEDICARE

## 2019-03-19 ENCOUNTER — ANCILLARY PROCEDURE (OUTPATIENT)
Dept: GENERAL RADIOLOGY | Facility: CLINIC | Age: 79
End: 2019-03-19
Attending: PHYSICAL MEDICINE & REHABILITATION
Payer: MEDICARE

## 2019-03-19 VITALS
RESPIRATION RATE: 24 BRPM | DIASTOLIC BLOOD PRESSURE: 58 MMHG | SYSTOLIC BLOOD PRESSURE: 118 MMHG | HEART RATE: 59 BPM | OXYGEN SATURATION: 93 %

## 2019-03-19 DIAGNOSIS — M47.816 FACET ARTHROPATHY, LUMBAR: Primary | ICD-10-CM

## 2019-03-19 DIAGNOSIS — G89.18 POST PROCEDURE DISCOMFORT: ICD-10-CM

## 2019-03-19 DIAGNOSIS — R10.13 EPIGASTRIC PAIN: ICD-10-CM

## 2019-03-19 DIAGNOSIS — M47.816 FACET ARTHROPATHY, LUMBAR: ICD-10-CM

## 2019-03-19 DIAGNOSIS — Z79.01 LONG TERM CURRENT USE OF ANTICOAGULANT THERAPY: ICD-10-CM

## 2019-03-19 LAB — INR PPP: 1.09 (ref 0.86–1.14)

## 2019-03-19 PROCEDURE — 99153 MOD SED SAME PHYS/QHP EA: CPT | Performed by: PHYSICAL MEDICINE & REHABILITATION

## 2019-03-19 PROCEDURE — 64636 DESTROY L/S FACET JNT ADDL: CPT | Mod: RT | Performed by: PHYSICAL MEDICINE & REHABILITATION

## 2019-03-19 PROCEDURE — 85610 PROTHROMBIN TIME: CPT | Performed by: PHYSICAL MEDICINE & REHABILITATION

## 2019-03-19 PROCEDURE — 64635 DESTROY LUMB/SAC FACET JNT: CPT | Mod: RT | Performed by: PHYSICAL MEDICINE & REHABILITATION

## 2019-03-19 PROCEDURE — 36415 COLL VENOUS BLD VENIPUNCTURE: CPT | Performed by: PHYSICAL MEDICINE & REHABILITATION

## 2019-03-19 PROCEDURE — 99152 MOD SED SAME PHYS/QHP 5/>YRS: CPT | Performed by: PHYSICAL MEDICINE & REHABILITATION

## 2019-03-19 RX ORDER — OXYCODONE AND ACETAMINOPHEN 5; 325 MG/1; MG/1
1 TABLET ORAL EVERY 4 HOURS PRN
Qty: 20 TABLET | Refills: 0 | Status: SHIPPED | OUTPATIENT
Start: 2019-03-19 | End: 2019-05-03

## 2019-03-19 NOTE — NURSING NOTE
Pre-procedure Intake    Have you been fasting? Yes    If yes, for how long? More than 6 hours    Are you taking a prescribed blood thinner such as coumadin, Plavix, Xarelto?    Yes -   Coumadin    If yes, when did you take your last dose? wednesday    Do you take aspirin?  No    If cervical procedure, have you held aspirin for 6 days?   No NA    Do you have any allergies to contrast dye, iodine, steroid and/or numbing medications?  NO    Are you currently taking antibiotics or have an active infection?  NO    Have you had a fever/elevated temperature within the past week? NO    Are you currently taking oral steroids? NO    Do you have a ? Yes       Are you pregnant or breastfeeding?  NO    Are the vital signs normal?  Yes

## 2019-03-19 NOTE — NURSING NOTE
Discharge Information    IV Discontiued Time:  1210  Amount of Fluid Infused:  50    Discharge Criteria = When patient returns to baseline or as per MD order    Consciousness:  Pt is fully awake    Circulation:  BP +/- 20% of pre-procedure level    Respiration:  Patient is able to breathe deeply    O2 Sat:  Patient is able to maintain O2 Sat >92% on room air    Activity:  Moves 4 extremities on command    Ambulation:  Patient is able to stand and walk or stand and pivot into wheelchair    Dressing:  Clean/dry or No Dressing    Notes:   Discharge instructions and AVS given to patient    Patient meets criteria for discharge?  YES    Admitted to PCU?  No    Responsible adult present to accompany patient home?  Yes    Signature/Title:    Myah Crenshaw RN Care Coordinator  [facility]

## 2019-03-19 NOTE — NURSING NOTE
MD Time IN: 1107  Sedation start time:  1113  MD Time OUT:  1157    Medications given: fentanyl 50 mcg IV; versed 1 mg IV  Intravenous fluids were administered, normal saline 70 cc's.  Sedation Level Achieved:  Minimal sedation

## 2019-03-19 NOTE — PATIENT INSTRUCTIONS
[facility] Procedure Discharge Instructions       Today you saw:   Dr. Qiana Maldonado    Your procedure:  Radiofrequency Nerve Ablation     Procedural Medications:  Lidocaine (anesthetic)      Sedation medications:  Fentanyl - pain.     Versed - relaxation      You have received sedation during your procedure; for the next 24 hours you should not:   -Drive   -Operate machinery   -Drink alcohol   -Sign any legal documents     If you were holding your blood thinning medication, please restart taking it: resume your coumadin this evening  You may resume your normal diet and medications.   Avoid strenuous activity for the first 24 hours and resume regular activities after that.   Be cautious with walking as numbness and/or weakness in the lower extremities up to 6-8 hours may occur due to effect of local anesthetic   You may shower, however no swimming or tub baths or hot tubs for 24 hours following your procedure   Anticipate pain for up to 2 weeks after this procedure.  You may use ice packs 10-15 minutes three to four times a day at the injection site for comfort   You may use anti-inflammatory medications (such as Ibuprofen or Aleve or Advil) or Tylenol for pain control if necessary           It may take up to 8 weeks to receive relief from the RFA    If you experience any of the following, call the pain center line during work hours at [phone] or on call physician after hours at [phone]:  -Fever over 100 degree F   -Swelling, bleeding, redness, drainage, warmth at the injection site   -Progressive weakness or numbness in your legs or arms   -Loss of bowel or bladder function   -Unusual headache that is not relieved by Tylenol   -Unusual new onset of pain that is not improving

## 2019-03-20 LAB
H PYLORI AG STL QL IA: NORMAL
SPECIMEN SOURCE: NORMAL

## 2019-03-26 ENCOUNTER — ANTICOAGULATION THERAPY VISIT (OUTPATIENT)
Dept: ANTICOAGULATION | Facility: CLINIC | Age: 79
End: 2019-03-26
Payer: MEDICARE

## 2019-03-26 DIAGNOSIS — I48.20 CHRONIC ATRIAL FIBRILLATION (H): ICD-10-CM

## 2019-03-26 DIAGNOSIS — Z79.01 LONG TERM CURRENT USE OF ANTICOAGULANTS WITH INR GOAL OF 2.0-3.0: ICD-10-CM

## 2019-03-26 LAB — INR POINT OF CARE: 2 (ref 0.86–1.14)

## 2019-03-26 PROCEDURE — 99207 ZZC NO CHARGE NURSE ONLY: CPT

## 2019-03-26 PROCEDURE — 36416 COLLJ CAPILLARY BLOOD SPEC: CPT

## 2019-03-26 PROCEDURE — 85610 PROTHROMBIN TIME: CPT | Mod: QW

## 2019-03-26 NOTE — PROGRESS NOTES
ANTICOAGULATION FOLLOW-UP CLINIC VISIT    Patient Name:  Cielo Aguillon  Date:  3/26/2019  Contact Type:  Face to Face    SUBJECTIVE:     Patient Findings     Positives:   Missed doses (Pt had been holding warfarin to have have radio frequency inserted , resumed warfarin 3/19/19. ), Extra doses (Had pt take 10 mg of warfarin on 3/21/19 instead of 5 mg d/t holding warfarin. )    Comments:   The patient was assessed for diet, medication, and activity level changes, missed or extra doses, bruising or bleeding, with no problem findings.  Pt denies any changes or missed warfarin doses since last INR visit. Pt denies any bleeding or bruising problems.              OBJECTIVE    INR Protime   Date Value Ref Range Status   2019 2.0 (A) 0.86 - 1.14 Final       ASSESSMENT / PLAN  INR assessment THER    Recheck INR In: 2 WEEKS    INR Location Clinic      Anticoagulation Summary  As of 3/26/2019    INR goal:   2.0-3.0   TTR:   52.8 % (2.9 y)   INR used for dosin.0 (3/26/2019)   Warfarin maintenance plan:   5 mg (5 mg x 1) every day   Full warfarin instructions:   5 mg every day   Weekly warfarin total:   35 mg   No change documented:   Cindi Preston RN   Plan last modified:   Mckenna Robison RN (10/26/2018)   Next INR check:   4/10/2019   Priority:   INR   Target end date:       Indications    Chronic atrial fibrillation (H) [I48.2]  Long term current use of anticoagulants with INR goal of 2.0-3.0 [Z79.01]             Anticoagulation Episode Summary     INR check location:       Preferred lab:       Send INR reminders to:   [facility]    Comments:         Anticoagulation Care Providers     Provider Role Specialty Phone number    Simin Lopez MD [facility] Internal Medicine [phone]            See the Encounter Report to view Anticoagulation Flowsheet and Dosing Calendar (Go to Encounters tab in chart review, and find the Anticoagulation Therapy Visit)    Dosage adjustment made based on physician directed care  plan.    Cindi Preston RN

## 2019-04-18 ENCOUNTER — TRANSFERRED RECORDS (OUTPATIENT)
Dept: HEALTH INFORMATION MANAGEMENT | Facility: CLINIC | Age: 79
End: 2019-04-18

## 2019-04-19 ENCOUNTER — ANTICOAGULATION THERAPY VISIT (OUTPATIENT)
Dept: ANTICOAGULATION | Facility: CLINIC | Age: 79
End: 2019-04-19
Payer: MEDICARE

## 2019-04-19 DIAGNOSIS — I48.20 CHRONIC ATRIAL FIBRILLATION (H): ICD-10-CM

## 2019-04-19 DIAGNOSIS — Z79.01 LONG TERM CURRENT USE OF ANTICOAGULANTS WITH INR GOAL OF 2.0-3.0: ICD-10-CM

## 2019-04-19 LAB — INR POINT OF CARE: 3.7 (ref 0.86–1.14)

## 2019-04-19 PROCEDURE — 36416 COLLJ CAPILLARY BLOOD SPEC: CPT

## 2019-04-19 PROCEDURE — 99207 ZZC NO CHARGE NURSE ONLY: CPT

## 2019-04-19 PROCEDURE — 85610 PROTHROMBIN TIME: CPT | Mod: QW

## 2019-04-19 NOTE — PROGRESS NOTES
ANTICOAGULATION FOLLOW-UP CLINIC VISIT    Patient Name:  Cielo Aguillon  Date:  4/19/2019  Contact Type:  Face to Face    SUBJECTIVE:     Patient Findings     Positives:   Change in diet/appetite (Patient reports decreased appetite.), Bruising (Patient reports falling 4/13/19, has bruising on right thigh, sore shoulder. Pateint reports was bending over to pick something up and she fell. Patient reported that she reported to ENT that she saw colors prior to falling, will have MRI of head. )    Comments:   The patient was assessed for diet, medication, and activity level changes, missed or extra doses, bleeding, with no problem findings.  Patient denies any changes or missed warfarin doses since last INR check. Patient denies any bleeding problems.   Patient denies any identifiable changes that caused the supratherapeutic INR. Patient was advised to see provider for her thigh and shoulder, patient states she will think about it.              OBJECTIVE    INR Protime   Date Value Ref Range Status   04/19/2019 3.7 (A) 0.86 - 1.14 Final       ASSESSMENT / PLAN  INR assessment SUPRA    Recheck INR In: 10 DAYS    INR Location Clinic      Anticoagulation Summary  As of 4/19/2019    INR goal:   2.0-3.0   TTR:   52.9 % (3 y)   INR used for dosing:   3.7! (4/19/2019)   Warfarin maintenance plan:   5 mg (5 mg x 1) every day   Full warfarin instructions:   4/19: 2.5 mg; Otherwise 5 mg every day   Weekly warfarin total:   35 mg   Plan last modified:   Mckenna Robison RN (10/26/2018)   Next INR check:   4/29/2019   Priority:   INR   Target end date:       Indications    Chronic atrial fibrillation (H) [I48.2]  Long term current use of anticoagulants with INR goal of 2.0-3.0 [Z79.01]             Anticoagulation Episode Summary     INR check location:       Preferred lab:       Send INR reminders to:   [facility]    Comments:         Anticoagulation Care Providers     Provider Role Specialty Phone number    Simin Lopez MD Responsible  Internal Medicine [phone]            See the Encounter Report to view Anticoagulation Flowsheet and Dosing Calendar (Go to Encounters tab in chart review, and find the Anticoagulation Therapy Visit)    Dosage adjustment made based on physician directed care plan.    Cindi Preston RN

## 2019-04-26 DIAGNOSIS — I25.10 ASCVD (ARTERIOSCLEROTIC CARDIOVASCULAR DISEASE): ICD-10-CM

## 2019-04-26 DIAGNOSIS — I48.20 CHRONIC ATRIAL FIBRILLATION (H): ICD-10-CM

## 2019-04-26 DIAGNOSIS — Z79.01 LONG TERM CURRENT USE OF ANTICOAGULANT THERAPY: ICD-10-CM

## 2019-04-26 DIAGNOSIS — I10 ESSENTIAL HYPERTENSION, BENIGN: ICD-10-CM

## 2019-04-26 RX ORDER — WARFARIN SODIUM 5 MG/1
TABLET ORAL
Qty: 90 TABLET | Refills: 1 | Status: SHIPPED | OUTPATIENT
Start: 2019-04-26 | End: 2019-10-25

## 2019-04-26 RX ORDER — METOPROLOL TARTRATE 100 MG
TABLET ORAL
Qty: 180 TABLET | Refills: 2 | Status: SHIPPED | OUTPATIENT
Start: 2019-04-26 | End: 2020-01-26

## 2019-04-26 NOTE — TELEPHONE ENCOUNTER
Prescription approved per [facility] Refill Protocol.  Mckenna Robison RN

## 2019-04-26 NOTE — TELEPHONE ENCOUNTER
"Requested Prescriptions   Pending Prescriptions Disp Refills     metoprolol tartrate (LOPRESSOR) 100 MG tablet [Pharmacy Med Name: METOPROLOL TARTRATE 100  TAB] 180 tablet 1     Sig: TAKE 1 TABLET (100MG) BY MOUTH 2 TIMES DAILY   Last Written Prescription Date:  11/01/2018  Last Fill Quantity: 180,  # refills: 1   Last office visit: 3/4/2019 with prescribing provider:     Future Office Visit:      Beta-Blockers Protocol Passed - 4/26/2019  1:15 PM        Passed - Blood pressure under 140/90 in past 12 months     BP Readings from Last 3 Encounters:   03/19/19 118/58   03/04/19 120/72   03/01/19 119/74                 Passed - Patient is age 6 or older        Passed - Recent (12 mo) or future (30 days) visit within the authorizing provider's specialty     Patient had office visit in the last 12 months or has a visit in the next 30 days with authorizing provider or within the authorizing provider's specialty.  See \"Patient Info\" tab in inbasket, or \"Choose Columns\" in Meds & Orders section of the refill encounter.              Passed - Medication is active on med list        warfarin (COUMADIN) 5 MG tablet [Pharmacy Med Name: WARFARIN SODIUM 5 MG TABLET 5 TAB] 90 tablet 1     Sig: TAKE 1 TABLET (5MG) BY MOUTH DAILY OR AS DIRECTED BY INR CLINIC   Last Written Prescription Date:  10/31/2018  Last Fill Quantity: 90,  # refills: 1   Last office visit: 3/4/2019 with prescribing provider:     Future Office Visit:      Vitamin K Antagonists Failed - 4/26/2019  1:15 PM        Failed - INR is within goal in the past 6 weeks     Confirm INR is within goal in the past 6 weeks.     Recent Labs   Lab Test 04/19/19   INR 3.7*                       Passed - Recent (12 mo) or future (30 days) visit within the authorizing provider's specialty     Patient had office visit in the last 12 months or has a visit in the next 30 days with authorizing provider or within the authorizing provider's specialty.  See \"Patient Info\" tab in " "inbasket, or \"Choose Columns\" in Meds & Orders section of the refill encounter.              Passed - Medication is active on med list        Passed - Patient is 18 years of age or older        Passed - Patient is not pregnant        Passed - No positive pregnancy on file in past 12 months        "

## 2019-04-29 ENCOUNTER — ANTICOAGULATION THERAPY VISIT (OUTPATIENT)
Dept: ANTICOAGULATION | Facility: CLINIC | Age: 79
End: 2019-04-29
Payer: MEDICARE

## 2019-04-29 DIAGNOSIS — I48.20 CHRONIC ATRIAL FIBRILLATION (H): ICD-10-CM

## 2019-04-29 DIAGNOSIS — Z79.01 LONG TERM CURRENT USE OF ANTICOAGULANTS WITH INR GOAL OF 2.0-3.0: ICD-10-CM

## 2019-04-29 LAB — INR POINT OF CARE: 4.6 (ref 0.86–1.14)

## 2019-04-29 PROCEDURE — 36416 COLLJ CAPILLARY BLOOD SPEC: CPT

## 2019-04-29 PROCEDURE — 85610 PROTHROMBIN TIME: CPT | Mod: QW

## 2019-04-29 PROCEDURE — 99207 ZZC NO CHARGE NURSE ONLY: CPT

## 2019-04-29 NOTE — PROGRESS NOTES
ANTICOAGULATION FOLLOW-UP CLINIC VISIT    Patient Name:  Cielo Aguillon  Date:  2019  Contact Type:  Face to Face    SUBJECTIVE:     Patient Findings     Positives:   Change in medications (Patient reports taking melatonin last night. Melatonin can increase INR. Patient reports has been taking Tramadol daily sometimes 2 times a day. Tramadol can increase INR. ), Other complaints (Patient reports back continues to be painful since fall. )    Comments:   The patient was assessed for diet and activity level changes, missed or extra doses, bruising or bleeding, with no problem findings.               OBJECTIVE    INR Protime   Date Value Ref Range Status   2019 4.6 (A) 0.86 - 1.14 Final       ASSESSMENT / PLAN  INR assessment SUPRA    Recheck INR In: 4 DAYS    INR Location Clinic      Anticoagulation Summary  As of 2019    INR goal:   2.0-3.0   TTR:   52.5 % (3 y)   INR used for dosin.6! (2019)   Warfarin maintenance plan:   5 mg (5 mg x 1) every day   Full warfarin instructions:   : Hold; : Hold; Otherwise 5 mg every day   Weekly warfarin total:   35 mg   Plan last modified:   Mckenna Robison RN (10/26/2018)   Next INR check:   5/3/2019   Priority:   INR   Target end date:       Indications    Chronic atrial fibrillation (H) [I48.2]  Long term current use of anticoagulants with INR goal of 2.0-3.0 [Z79.01]             Anticoagulation Episode Summary     INR check location:       Preferred lab:       Send INR reminders to:   [facility]    Comments:         Anticoagulation Care Providers     Provider Role Specialty Phone number    Simin Lopez MD [facility] Internal Medicine [phone]            See the Encounter Report to view Anticoagulation Flowsheet and Dosing Calendar (Go to Encounters tab in chart review, and find the Anticoagulation Therapy Visit)    Dosage adjustment made based on physician directed care plan.    Cindi Preston RN

## 2019-05-03 ENCOUNTER — ANTICOAGULATION THERAPY VISIT (OUTPATIENT)
Dept: ANTICOAGULATION | Facility: CLINIC | Age: 79
End: 2019-05-03
Payer: MEDICARE

## 2019-05-03 DIAGNOSIS — I48.20 CHRONIC ATRIAL FIBRILLATION (H): ICD-10-CM

## 2019-05-03 DIAGNOSIS — Z79.01 LONG TERM CURRENT USE OF ANTICOAGULANTS WITH INR GOAL OF 2.0-3.0: ICD-10-CM

## 2019-05-03 LAB — INR POINT OF CARE: 1.6 (ref 0.86–1.14)

## 2019-05-03 PROCEDURE — 99207 ZZC NO CHARGE NURSE ONLY: CPT

## 2019-05-03 PROCEDURE — 85610 PROTHROMBIN TIME: CPT | Mod: QW

## 2019-05-03 PROCEDURE — 36416 COLLJ CAPILLARY BLOOD SPEC: CPT

## 2019-05-03 NOTE — PROGRESS NOTES
ANTICOAGULATION FOLLOW-UP CLINIC VISIT    Patient Name:  Cielo Aguillon  Date:  5/3/2019  Contact Type:  Face to Face    SUBJECTIVE:     Patient Findings     Positives:   Change in medications (stopped tramadol and melatonin the last couple days.  Both can raise INR.  She may take the tramadol occasionally.  She also may take Tylenol 650 mg for pain prn)    Comments:   The patient was assessed for diet, and activity level changes, missed or extra doses, bruising or bleeding, with no problem findings.             OBJECTIVE    INR Protime   Date Value Ref Range Status   2019 1.6 (A) 0.86 - 1.14 Final       ASSESSMENT / PLAN  INR assessment SUB    Recheck INR In: 1 WEEK    INR Location Clinic      Anticoagulation Summary  As of 5/3/2019    INR goal:   2.0-3.0   TTR:   52.4 % (3.1 y)   INR used for dosin.6! (5/3/2019)   Warfarin maintenance plan:   5 mg (5 mg x 1) every day   Full warfarin instructions:   5/5: 2.5 mg; 5/8: 2.5 mg; Otherwise 5 mg every day   Weekly warfarin total:   35 mg   Plan last modified:   Mckenna Robison RN (10/26/2018)   Next INR check:   5/10/2019   Priority:   INR   Target end date:       Indications    Chronic atrial fibrillation (H) [I48.2]  Long term current use of anticoagulants with INR goal of 2.0-3.0 [Z79.01]             Anticoagulation Episode Summary     INR check location:       Preferred lab:       Send INR reminders to:   [facility]    Comments:         Anticoagulation Care Providers     Provider Role Specialty Phone number    Simin Lopez MD [facility] Internal Medicine [phone]            See the Encounter Report to view Anticoagulation Flowsheet and Dosing Calendar (Go to Encounters tab in chart review, and find the Anticoagulation Therapy Visit)    Dosage adjustment made based on physician directed care plan.    Mckenna Robison RN

## 2019-05-10 ENCOUNTER — OFFICE VISIT (OUTPATIENT)
Dept: INTERNAL MEDICINE | Facility: CLINIC | Age: 79
End: 2019-05-10
Payer: MEDICARE

## 2019-05-10 ENCOUNTER — ANTICOAGULATION THERAPY VISIT (OUTPATIENT)
Dept: ANTICOAGULATION | Facility: CLINIC | Age: 79
End: 2019-05-10
Payer: MEDICARE

## 2019-05-10 VITALS
RESPIRATION RATE: 16 BRPM | HEART RATE: 70 BPM | BODY MASS INDEX: 27.41 KG/M2 | SYSTOLIC BLOOD PRESSURE: 108 MMHG | OXYGEN SATURATION: 97 % | DIASTOLIC BLOOD PRESSURE: 72 MMHG | WEIGHT: 164.52 LBS | HEIGHT: 65 IN

## 2019-05-10 DIAGNOSIS — H91.91 HEARING LOSS OF RIGHT EAR, UNSPECIFIED HEARING LOSS TYPE: Primary | ICD-10-CM

## 2019-05-10 DIAGNOSIS — M54.42 CHRONIC LEFT-SIDED LOW BACK PAIN WITH LEFT-SIDED SCIATICA: ICD-10-CM

## 2019-05-10 DIAGNOSIS — R42 VERTIGO: ICD-10-CM

## 2019-05-10 DIAGNOSIS — G89.29 CHRONIC LEFT-SIDED LOW BACK PAIN WITH LEFT-SIDED SCIATICA: ICD-10-CM

## 2019-05-10 DIAGNOSIS — Z79.01 LONG TERM CURRENT USE OF ANTICOAGULANTS WITH INR GOAL OF 2.0-3.0: ICD-10-CM

## 2019-05-10 DIAGNOSIS — I48.20 CHRONIC ATRIAL FIBRILLATION (H): ICD-10-CM

## 2019-05-10 LAB — INR POINT OF CARE: 2.3 (ref 0.86–1.14)

## 2019-05-10 PROCEDURE — 99213 OFFICE O/P EST LOW 20 MIN: CPT | Performed by: INTERNAL MEDICINE

## 2019-05-10 PROCEDURE — 99207 ZZC NO CHARGE NURSE ONLY: CPT

## 2019-05-10 PROCEDURE — 36416 COLLJ CAPILLARY BLOOD SPEC: CPT

## 2019-05-10 PROCEDURE — 85610 PROTHROMBIN TIME: CPT | Mod: QW

## 2019-05-10 RX ORDER — TRAMADOL HYDROCHLORIDE 50 MG/1
TABLET ORAL
Qty: 90 TABLET | Refills: 3 | Status: SHIPPED | OUTPATIENT
Start: 2019-05-10 | End: 2019-12-26

## 2019-05-10 ASSESSMENT — MIFFLIN-ST. JEOR: SCORE: 1222.14

## 2019-05-10 NOTE — PROGRESS NOTES
ANTICOAGULATION FOLLOW-UP CLINIC VISIT    Patient Name:  Cielo Aguillon  Date:  5/10/2019  Contact Type:  Face to Face    SUBJECTIVE:  Patient Findings     Comments:   The patient was assessed for diet, medication, and activity level changes, missed or extra doses, bruising or bleeding, with no problem findings.  Continues to take Tramadol minimally.          Clinical Outcomes     Negatives:   Major bleeding event, Thromboembolic event, Anticoagulation-related hospital admission, Anticoagulation-related ED visit, Anticoagulation-related fatality    Comments:   The patient was assessed for diet, medication, and activity level changes, missed or extra doses, bruising or bleeding, with no problem findings.  Continues to take Tramadol minimally.             OBJECTIVE    INR Protime   Date Value Ref Range Status   05/10/2019 2.3 (A) 0.86 - 1.14 Final       ASSESSMENT / PLAN  INR assessment THER    Recheck INR In: 2 WEEKS    INR Location Clinic      Anticoagulation Summary  As of 5/10/2019    INR goal:   2.0-3.0   TTR:   52.3 % (3.1 y)   INR used for dosin.3 (5/10/2019)   Warfarin maintenance plan:   2.5 mg (5 mg x 0.5) every Sun, Wed; 5 mg (5 mg x 1) all other days   Full warfarin instructions:   2.5 mg every Sun, Wed; 5 mg all other days   Weekly warfarin total:   30 mg   Plan last modified:   Mckenna Robison RN (5/10/2019)   Next INR check:   2019   Priority:   INR   Target end date:       Indications    Chronic atrial fibrillation (H) [I48.2]  Long term current use of anticoagulants with INR goal of 2.0-3.0 [Z79.01]             Anticoagulation Episode Summary     INR check location:       Preferred lab:       Send INR reminders to:   [facility]    Comments:         Anticoagulation Care Providers     Provider Role Specialty Phone number    Simin Lopez MD Responsible Internal Medicine [phone]            See the Encounter Report to view Anticoagulation Flowsheet and Dosing Calendar (Go to Encounters tab in  chart review, and find the Anticoagulation Therapy Visit)    Dosage adjustment made based on physician directed care plan.    Mckenna Robison RN

## 2019-05-10 NOTE — NURSING NOTE
"/72   Pulse 70   Resp 16   Ht 1.651 m (5' 5\")   Wt 74.6 kg (164 lb 8.3 oz)   SpO2 97%   BMI 27.38 kg/m      "

## 2019-05-10 NOTE — PROGRESS NOTES
SUBJECTIVE:   Cielo Aguillon is a 79 year old female who presents to clinic today for the following   health issues:      She is here to discuss the ENTs recommendation of MRI.  It apparently noticed that she had more hearing loss on her right ear in the mid range compared with previous testing and recommended MRI.  She is not certain what they are looking for with the MRI.  She also questions whether or not she could have Ménière's disease.  Family member had suggested that she had all the symptoms related to this.  She does not describe episodes of vertigo lasting for days or a week associated with episodes of tinnitus in relation to her hearing loss over the years.  Hearing loss has been in general mostly very gradual and had been felt to be consistent with sensorineural hearing loss in the past.  She has had 2 brief episodes of vertigo in the past few months.       Reviewed  and updated as needed this visit by clinical staff  Tobacco  Allergies  Meds  Med Hx  Surg Hx  Fam Hx  Soc Hx        Reviewed and updated as needed this visit by Provider         Patient Active Problem List   Diagnosis     Essential hypertension, benign     Chronic rhinitis     Other ventral hernia without mention of obstruction or gangrene     Lichenification and lichen simplex chronicus     Fatty liver     Advanced directives, counseling/discussion     Hyperlipidemia LDL goal <70     Chronic right-sided back pain     Coronary artery disease     Cystocele, midline     Rectocele     BETHANY (obstructive sleep apnea)     Long term current use of anticoagulants with INR goal of 2.0-3.0     MARY (generalized anxiety disorder)     Major depressive disorder, recurrent episode, moderate (H)     Controlled substance agreement signed     Chronic atrial fibrillation (H)     Facet arthropathy, lumbar     Acquired hypothyroidism     Current Outpatient Medications   Medication Sig Dispense Refill     amLODIPine (NORVASC) 5 MG tablet Take 1 tablet  "(5 mg) by mouth daily 90 tablet 1     citalopram (CELEXA) 20 MG tablet TAKE 1.5 tablet (30 mg) BY MOUTH EVERY  tablet 1     diazepam (VALIUM) 5 MG tablet TAKE 1/2 TO 1 TABLET BY MOUTH EVERY 8 HOURS AS NEEDED 40 tablet 1     levothyroxine (SYNTHROID/LEVOTHROID) 50 MCG tablet TAKE 1 TABLET (50 MCG) BY MOUTH DAILY 90 tablet 1     metoprolol tartrate (LOPRESSOR) 100 MG tablet TAKE 1 TABLET (100MG) BY MOUTH 2 TIMES DAILY 180 tablet 2     rosuvastatin (CRESTOR) 10 MG tablet TAKE 1 TABLET (10 MG) BY MOUTH DAILY 90 tablet 1     traMADol (ULTRAM) 50 MG tablet TAKE ONE TABLET BY MOUTH EVERY 8 HOURS AS NEEDED FOR MODERATE PAIN 90 tablet 3     VITAMIN D, CHOLECALCIFEROL, PO Take 1,000 Units by mouth At Bedtime        warfarin (COUMADIN) 5 MG tablet TAKE 1 TABLET (5MG) BY MOUTH DAILY OR AS DIRECTED BY INR CLINIC 90 tablet 1      Social History     Tobacco Use     Smoking status: Never Smoker     Smokeless tobacco: Never Used   Substance Use Topics     Alcohol use: Yes     Alcohol/week: 0.0 oz     Comment: occ - wine     Drug use: No        ROS:  No headache, no focal neurologic symptoms    OBJECTIVE:     /72   Pulse 70   Resp 16   Ht 1.651 m (5' 5\")   Wt 74.6 kg (164 lb 8.3 oz)   SpO2 97%   BMI 27.38 kg/m    Body mass index is 27.38 kg/m .      Not examined.     ASSESSMENT/PLAN:             1. Hearing loss of right ear, unspecified hearing loss type  I reviewed the ENT notes and explained to her that because of this asymmetric hearing loss in a different range that they are probably trying to rule out acoustic neuroma.  Advised that this is a fairly unusual pattern and is not really expected age-related hearing loss.  Any further questions should be directed to the ENT provider.    2. Vertigo  ENT notes suggested it could possibly be vestibular migraine.  Advised the symptoms are not consistent with Ménière's disease, the ENT most likely would have told her if they had thought that was a possibility but " probably discounted it and so did not even mention it.  Suggested that they had considered possibility of referring to her neurology because of the dizziness but she states she has not had any more episodes since she saw them.  Advised that she can then ask further questions when they call her with her MRI report.    3. Chronic left-sided low back pain with left-sided sciatica  She requested refill of her medication.  - traMADol (ULTRAM) 50 MG tablet; TAKE ONE TABLET BY MOUTH EVERY 8 HOURS AS NEEDED FOR MODERATE PAIN  Dispense: 90 tablet; Refill: 3        Simin Lopez MD  [facility]    ]

## 2019-05-23 ENCOUNTER — ANTICOAGULATION THERAPY VISIT (OUTPATIENT)
Dept: ANTICOAGULATION | Facility: CLINIC | Age: 79
End: 2019-05-23
Payer: MEDICARE

## 2019-05-23 DIAGNOSIS — I48.20 CHRONIC ATRIAL FIBRILLATION (H): ICD-10-CM

## 2019-05-23 DIAGNOSIS — Z79.01 LONG TERM CURRENT USE OF ANTICOAGULANTS WITH INR GOAL OF 2.0-3.0: ICD-10-CM

## 2019-05-23 LAB — INR POINT OF CARE: 1.7 (ref 0.86–1.14)

## 2019-05-23 PROCEDURE — 99207 ZZC NO CHARGE NURSE ONLY: CPT

## 2019-05-23 PROCEDURE — 85610 PROTHROMBIN TIME: CPT | Mod: QW

## 2019-05-23 PROCEDURE — 36416 COLLJ CAPILLARY BLOOD SPEC: CPT

## 2019-05-23 NOTE — PROGRESS NOTES
ANTICOAGULATION FOLLOW-UP CLINIC VISIT    Patient Name:  Cielo Aguillon  Date:  2019  Contact Type:  Face to Face    SUBJECTIVE:  Patient Findings     Positives:   Missed doses (Patient reports missed warfarin dose 19), Change in medications (Patient reports getting a rash after being at [facility], has been taking 1 benadryl daily)    Comments:   The patient was assessed for diet and activity level changes, extra doses, bruising or bleeding, with no problem findings.          Clinical Outcomes     Comments:   The patient was assessed for diet and activity level changes, extra doses, bruising or bleeding, with no problem findings.             OBJECTIVE    INR Protime   Date Value Ref Range Status   2019 1.7 (A) 0.86 - 1.14 Final       ASSESSMENT / PLAN  INR assessment SUB    Recheck INR In: 2 WEEKS    INR Location Clinic      Anticoagulation Summary  As of 2019    INR goal:   2.0-3.0   TTR:   52.3 % (3.1 y)   INR used for dosin.7! (2019)   Warfarin maintenance plan:   2.5 mg (5 mg x 0.5) every Sun, Wed; 5 mg (5 mg x 1) all other days   Full warfarin instructions:   : 7.5 mg; Otherwise 2.5 mg every Sun, Wed; 5 mg all other days   Weekly warfarin total:   30 mg   Plan last modified:   Mckenna Robison RN (5/10/2019)   Next INR check:   2019   Priority:   INR   Target end date:       Indications    Chronic atrial fibrillation (H) [I48.2]  Long term current use of anticoagulants with INR goal of 2.0-3.0 [Z79.01]             Anticoagulation Episode Summary     INR check location:       Preferred lab:       Send INR reminders to:   [facility]    Comments:         Anticoagulation Care Providers     Provider Role Specialty Phone number    Simin Lopez MD Responsible Internal Medicine [phone]            See the Encounter Report to view Anticoagulation Flowsheet and Dosing Calendar (Go to Encounters tab in chart review, and find the Anticoagulation Therapy Visit)    Dosage adjustment made  based on physician directed care plan.    Cindi Preston RN

## 2019-05-24 ENCOUNTER — TRANSFERRED RECORDS (OUTPATIENT)
Dept: HEALTH INFORMATION MANAGEMENT | Facility: CLINIC | Age: 79
End: 2019-05-24

## 2019-05-25 ENCOUNTER — TRANSFERRED RECORDS (OUTPATIENT)
Dept: HEALTH INFORMATION MANAGEMENT | Facility: CLINIC | Age: 79
End: 2019-05-25

## 2019-06-06 ENCOUNTER — ANTICOAGULATION THERAPY VISIT (OUTPATIENT)
Dept: ANTICOAGULATION | Facility: CLINIC | Age: 79
End: 2019-06-06
Payer: MEDICARE

## 2019-06-06 DIAGNOSIS — I48.20 CHRONIC ATRIAL FIBRILLATION (H): ICD-10-CM

## 2019-06-06 DIAGNOSIS — Z79.01 LONG TERM CURRENT USE OF ANTICOAGULANTS WITH INR GOAL OF 2.0-3.0: ICD-10-CM

## 2019-06-06 LAB — INR POINT OF CARE: 2.9 (ref 0.86–1.14)

## 2019-06-06 PROCEDURE — 36416 COLLJ CAPILLARY BLOOD SPEC: CPT

## 2019-06-06 PROCEDURE — 99207 ZZC NO CHARGE NURSE ONLY: CPT

## 2019-06-06 PROCEDURE — 85610 PROTHROMBIN TIME: CPT | Mod: QW

## 2019-06-06 NOTE — PROGRESS NOTES
ANTICOAGULATION FOLLOW-UP CLINIC VISIT    Patient Name:  Cielo Aguillon  Date:  2019  Contact Type:  Face to Face    SUBJECTIVE:  Patient Findings     Comments:   The patient was assessed for diet, medication, and activity level changes, missed or extra doses, bruising or bleeding, with no problem findings.          Clinical Outcomes     Negatives:   Major bleeding event, Thromboembolic event, Anticoagulation-related hospital admission, Anticoagulation-related ED visit, Anticoagulation-related fatality    Comments:   The patient was assessed for diet, medication, and activity level changes, missed or extra doses, bruising or bleeding, with no problem findings.             OBJECTIVE    INR Protime   Date Value Ref Range Status   2019 2.9 (A) 0.86 - 1.14 Final       ASSESSMENT / PLAN  INR assessment THER    Recheck INR In: 3 WEEKS    INR Location Clinic      Anticoagulation Summary  As of 2019    INR goal:   2.0-3.0   TTR:   52.6 % (3.1 y)   INR used for dosin.9 (2019)   Warfarin maintenance plan:   2.5 mg (5 mg x 0.5) every Sun, Wed; 5 mg (5 mg x 1) all other days   Full warfarin instructions:   2.5 mg every Sun, Wed; 5 mg all other days   Weekly warfarin total:   30 mg   No change documented:   Cindi Preston RN   Plan last modified:   Mckenna Robison RN (5/10/2019)   Next INR check:   2019   Priority:   INR   Target end date:       Indications    Chronic atrial fibrillation (H) [I48.2]  Long term current use of anticoagulants with INR goal of 2.0-3.0 [Z79.01]             Anticoagulation Episode Summary     INR check location:       Preferred lab:       Send INR reminders to:   [facility]    Comments:         Anticoagulation Care Providers     Provider Role Specialty Phone number    Simin Lopez MD [facility] Internal Medicine [phone]            See the Encounter Report to view Anticoagulation Flowsheet and Dosing Calendar (Go to Encounters tab in chart review, and find the  Anticoagulation Therapy Visit)    Dosage adjustment made based on physician directed care plan.    Cindi Preston RN

## 2019-06-27 ENCOUNTER — ANTICOAGULATION THERAPY VISIT (OUTPATIENT)
Dept: ANTICOAGULATION | Facility: CLINIC | Age: 79
End: 2019-06-27
Payer: MEDICARE

## 2019-06-27 DIAGNOSIS — I48.20 CHRONIC ATRIAL FIBRILLATION (H): ICD-10-CM

## 2019-06-27 DIAGNOSIS — Z79.01 LONG TERM CURRENT USE OF ANTICOAGULANTS WITH INR GOAL OF 2.0-3.0: ICD-10-CM

## 2019-06-27 LAB — INR POINT OF CARE: 1.9 (ref 0.86–1.14)

## 2019-06-27 PROCEDURE — 36416 COLLJ CAPILLARY BLOOD SPEC: CPT

## 2019-06-27 PROCEDURE — 99207 ZZC NO CHARGE NURSE ONLY: CPT

## 2019-06-27 PROCEDURE — 85610 PROTHROMBIN TIME: CPT | Mod: QW

## 2019-06-27 NOTE — PROGRESS NOTES
ANTICOAGULATION FOLLOW-UP CLINIC VISIT    Patient Name:  Cielo Aguillon  Date:  2019  Contact Type:  Face to Face    SUBJECTIVE:  Patient Findings     Comments:   The patient was assessed for diet, medication, and activity level changes, missed or extra doses, bruising or bleeding, with no problem findings.          Clinical Outcomes     Negatives:   Major bleeding event, Thromboembolic event, Anticoagulation-related hospital admission, Anticoagulation-related ED visit, Anticoagulation-related fatality    Comments:   The patient was assessed for diet, medication, and activity level changes, missed or extra doses, bruising or bleeding, with no problem findings.             OBJECTIVE    INR Protime   Date Value Ref Range Status   2019 1.9 (A) 0.86 - 1.14 Final       ASSESSMENT / PLAN  INR assessment SUB    Recheck INR In: 3 WEEKS    INR Location Clinic      Anticoagulation Summary  As of 2019    INR goal:   2.0-3.0   TTR:   53.3 % (3.2 y)   INR used for dosin.9! (2019)   Warfarin maintenance plan:   2.5 mg (5 mg x 0.5) every Sun, Wed; 5 mg (5 mg x 1) all other days   Full warfarin instructions:   2.5 mg every Sun, Wed; 5 mg all other days   Weekly warfarin total:   30 mg   No change documented:   Cindi Preston RN   Plan last modified:   Mckenna Robison RN (5/10/2019)   Next INR check:   2019   Priority:   INR   Target end date:       Indications    Chronic atrial fibrillation (H) [I48.2]  Long term current use of anticoagulants with INR goal of 2.0-3.0 [Z79.01]             Anticoagulation Episode Summary     INR check location:       Preferred lab:       Send INR reminders to:   [facility]    Comments:         Anticoagulation Care Providers     Provider Role Specialty Phone number    Simin Lopez MD Responsible Internal Medicine [phone]            See the Encounter Report to view Anticoagulation Flowsheet and Dosing Calendar (Go to Encounters tab in chart  review, and find the Anticoagulation Therapy Visit)    Dosage adjustment made based on physician directed care plan.    Cindi Preston RN

## 2019-07-02 ENCOUNTER — APPOINTMENT (OUTPATIENT)
Dept: ULTRASOUND IMAGING | Facility: CLINIC | Age: 79
End: 2019-07-02
Attending: PHYSICIAN ASSISTANT
Payer: MEDICARE

## 2019-07-02 ENCOUNTER — HOSPITAL ENCOUNTER (EMERGENCY)
Facility: CLINIC | Age: 79
Discharge: HOME | End: 2019-07-02
Admitting: PHYSICIAN ASSISTANT
Payer: MEDICARE

## 2019-07-02 ENCOUNTER — NURSE TRIAGE (OUTPATIENT)
Dept: INTERNAL MEDICINE | Facility: CLINIC | Age: 79
End: 2019-07-02

## 2019-07-02 VITALS
RESPIRATION RATE: 16 BRPM | HEIGHT: 65 IN | SYSTOLIC BLOOD PRESSURE: 141 MMHG | BODY MASS INDEX: 27.16 KG/M2 | HEART RATE: 97 BPM | DIASTOLIC BLOOD PRESSURE: 102 MMHG | OXYGEN SATURATION: 94 % | TEMPERATURE: 96.9 F | WEIGHT: 163 LBS

## 2019-07-02 DIAGNOSIS — N39.0 URINARY TRACT INFECTION: ICD-10-CM

## 2019-07-02 DIAGNOSIS — N95.0 POSTMENOPAUSAL VAGINAL BLEEDING: ICD-10-CM

## 2019-07-02 LAB
ALBUMIN UR-MCNC: 200 MG/DL
ANION GAP SERPL CALCULATED.3IONS-SCNC: 9 MMOL/L (ref 3–14)
APPEARANCE UR: ABNORMAL
BACTERIA #/AREA URNS HPF: ABNORMAL /HPF
BASOPHILS # BLD AUTO: 0 10E9/L (ref 0–0.2)
BASOPHILS NFR BLD AUTO: 0.4 %
BILIRUB UR QL STRIP: NEGATIVE
BUN SERPL-MCNC: 16 MG/DL (ref 7–30)
CALCIUM SERPL-MCNC: 9.2 MG/DL (ref 8.5–10.1)
CHLORIDE SERPL-SCNC: 106 MMOL/L (ref 94–109)
CO2 SERPL-SCNC: 25 MMOL/L (ref 20–32)
COLOR UR AUTO: ABNORMAL
CREAT SERPL-MCNC: 1.16 MG/DL (ref 0.52–1.04)
DIFFERENTIAL METHOD BLD: ABNORMAL
EOSINOPHIL # BLD AUTO: 0.1 10E9/L (ref 0–0.7)
EOSINOPHIL NFR BLD AUTO: 0.6 %
ERYTHROCYTE [DISTWIDTH] IN BLOOD BY AUTOMATED COUNT: 14.2 % (ref 10–15)
GFR SERPL CREATININE-BSD FRML MDRD: 45 ML/MIN/{1.73_M2}
GLUCOSE SERPL-MCNC: 109 MG/DL (ref 70–99)
GLUCOSE UR STRIP-MCNC: NEGATIVE MG/DL
HCT VFR BLD AUTO: 46 % (ref 35–47)
HGB BLD-MCNC: 15.1 G/DL (ref 11.7–15.7)
HGB UR QL STRIP: ABNORMAL
IMM GRANULOCYTES # BLD: 0 10E9/L (ref 0–0.4)
IMM GRANULOCYTES NFR BLD: 0.3 %
INR PPP: 1.84 (ref 0.86–1.14)
KETONES UR STRIP-MCNC: NEGATIVE MG/DL
LEUKOCYTE ESTERASE UR QL STRIP: ABNORMAL
LYMPHOCYTES # BLD AUTO: 1.5 10E9/L (ref 0.8–5.3)
LYMPHOCYTES NFR BLD AUTO: 15.6 %
MCH RBC QN AUTO: 33.3 PG (ref 26.5–33)
MCHC RBC AUTO-ENTMCNC: 32.8 G/DL (ref 31.5–36.5)
MCV RBC AUTO: 102 FL (ref 78–100)
MONOCYTES # BLD AUTO: 0.5 10E9/L (ref 0–1.3)
MONOCYTES NFR BLD AUTO: 5.1 %
NEUTROPHILS # BLD AUTO: 7.4 10E9/L (ref 1.6–8.3)
NEUTROPHILS NFR BLD AUTO: 78 %
NITRATE UR QL: NEGATIVE
NRBC # BLD AUTO: 0 10*3/UL
NRBC BLD AUTO-RTO: 0 /100
PH UR STRIP: 6 PH (ref 5–7)
PLATELET # BLD AUTO: 176 10E9/L (ref 150–450)
POTASSIUM SERPL-SCNC: 3.8 MMOL/L (ref 3.4–5.3)
RBC # BLD AUTO: 4.53 10E12/L (ref 3.8–5.2)
RBC #/AREA URNS AUTO: >182 /HPF (ref 0–2)
SODIUM SERPL-SCNC: 140 MMOL/L (ref 133–144)
SOURCE: ABNORMAL
SP GR UR STRIP: 1.02 (ref 1–1.03)
SQUAMOUS #/AREA URNS AUTO: 11 /HPF (ref 0–1)
T4 FREE SERPL-MCNC: 0.92 NG/DL (ref 0.76–1.46)
TSH SERPL DL<=0.005 MIU/L-ACNC: 5.89 MU/L (ref 0.4–4)
UROBILINOGEN UR STRIP-MCNC: NORMAL MG/DL (ref 0–2)
WBC # BLD AUTO: 9.4 10E9/L (ref 4–11)
WBC #/AREA URNS AUTO: >182 /HPF (ref 0–5)

## 2019-07-02 PROCEDURE — 99284 EMERGENCY DEPT VISIT MOD MDM: CPT | Mod: 25

## 2019-07-02 PROCEDURE — 84443 ASSAY THYROID STIM HORMONE: CPT | Performed by: PHYSICIAN ASSISTANT

## 2019-07-02 PROCEDURE — 84439 ASSAY OF FREE THYROXINE: CPT | Performed by: PHYSICIAN ASSISTANT

## 2019-07-02 PROCEDURE — 80048 BASIC METABOLIC PNL TOTAL CA: CPT | Performed by: PHYSICIAN ASSISTANT

## 2019-07-02 PROCEDURE — 85025 COMPLETE CBC W/AUTO DIFF WBC: CPT | Performed by: PHYSICIAN ASSISTANT

## 2019-07-02 PROCEDURE — 81001 URINALYSIS AUTO W/SCOPE: CPT | Performed by: PHYSICIAN ASSISTANT

## 2019-07-02 PROCEDURE — 25000128 H RX IP 250 OP 636: Performed by: PHYSICIAN ASSISTANT

## 2019-07-02 PROCEDURE — 96360 HYDRATION IV INFUSION INIT: CPT

## 2019-07-02 PROCEDURE — 76830 TRANSVAGINAL US NON-OB: CPT

## 2019-07-02 PROCEDURE — 96361 HYDRATE IV INFUSION ADD-ON: CPT

## 2019-07-02 PROCEDURE — 85610 PROTHROMBIN TIME: CPT | Performed by: PHYSICIAN ASSISTANT

## 2019-07-02 RX ORDER — CEPHALEXIN 500 MG/1
500 CAPSULE ORAL 2 TIMES DAILY
Qty: 14 CAPSULE | Refills: 0 | Status: SHIPPED | OUTPATIENT
Start: 2019-07-02 | End: 2019-07-18

## 2019-07-02 RX ADMIN — SODIUM CHLORIDE 1000 ML: 9 INJECTION, SOLUTION INTRAVENOUS at 20:33

## 2019-07-02 ASSESSMENT — ENCOUNTER SYMPTOMS: BLOOD IN STOOL: 0

## 2019-07-02 ASSESSMENT — MIFFLIN-ST. JEOR: SCORE: 1215.24

## 2019-07-02 NOTE — TELEPHONE ENCOUNTER
"Patient calling stating she has been having difficulty urinating and was able to go a few drops and noticed bright red blood coming out.  Denied clots.  Patient on coumadin.  Patient stated this happened to her a couple weeks ago and \"it just went away\"  Patient states her bladder feels full and is not able to get any urine out.  Advised patient to go to ED.  Patient stated \"ok, we are in [place]  and we will come right down there.\"  Advised patient to go to an ER in [place] and patient stated she doesn't see physicians there, only here.  Advised that this is an emergency and that any ER can treat her issue.  Patient verbalized understanding.      Reason for Disposition    Unable to urinate (or only a few drops) > 4 hours and bladder feels very full (e.g., palpable bladder or strong urge to urinate)    Additional Information    Negative: Shock suspected (e.g., cold/pale/clammy skin, too weak to stand, low BP, rapid pulse)    Negative: Sounds like a life-threatening emergency to the triager    Negative: Urinary catheter, questions about    Answer Assessment - Initial Assessment Questions  1. COLOR of URINE: \"Describe the color of the urine.\"  (e.g., tea-colored, pink, red, blood clots, bloody)      Bright red with no clots  2. ONSET: \"When did the bleeding start?\"       5 minutes ago  3. EPISODES: \"How many times has there been blood in the urine?\" or \"How many times today?\"      Once and it happened a couple weeks ago.  Was having dysuria then too.   4. PAIN with URINATION: \"Is there any pain with passing your urine?\" If so, ask: \"How bad is the pain?\"  (Scale 1-10; or mild, moderate, severe)     - MILD - complains slightly about urination hurting     - MODERATE - interferes with normal activities       - SEVERE - excruciating, unwilling or unable to urinate because of the pain       no  5. FEVER: \"Do you have a fever?\" If so, ask: \"What is your temperature, how was it measured, and when did it start?\"      " "no  6. ASSOCIATED SYMPTOMS: \"Are you passing urine more frequently than usual?\"      no  7. OTHER SYMPTOMS: \"Do you have any other symptoms?\" (e.g., back/flank pain, abdominal pain, vomiting)      Abdominal pain. Has ulcers and gi problems  8. PREGNANCY: \"Is there any chance you are pregnant?\" \"When was your last menstrual period?\"      no    Protocols used: URINE - BLOOD IN-A-OH      "

## 2019-07-02 NOTE — ED AVS SNAPSHOT
[facility] Emergency Department  [address]  [facility] [address]  Phone:  [phone]  Fax:  [phone]                                    Cielo Aguillon   MRN: [id number]    Department:  [facility] Emergency Department   Date of Visit:  7/2/2019           After Visit Summary Signature Page    I have received my discharge instructions, and my questions have been answered. I have discussed any challenges I see with this plan with the nurse or doctor.    ..........................................................................................................................................  Patient/Patient Representative Signature      ..........................................................................................................................................  Patient Representative Print Name and Relationship to Patient    ..................................................               ................................................  Date                                   Time    ..........................................................................................................................................  Reviewed by Signature/Title    ...................................................              ..............................................  Date                                               Time          22EPIC Rev 08/18

## 2019-07-03 NOTE — PROGRESS NOTES
"Pt also reports the urge to urinate, but unable when she tries to go. \"It feels like something is blocking it from coming out.\" Attempted to go in ED, had about 5 ml out of red urine.   "

## 2019-07-03 NOTE — ED PROVIDER NOTES
History     Chief Complaint:  Vaginal bleeding    HPI   Cielo Aguillon is a 79 year old female with a history of hypothyroidism atrial fibrillation on Warfarin who presents with vaginal bleeding. The patient notes she has been having vaginal bleeding for the past week. The patient state she is seeing bright red blood and a pressure sensation in the suprapubic area. She denies pain with palpation. The patient reports increased bleeding today. She notes she has gone through 2 pads today. She denies syncope or light-headedness. The patient denies blood in her stool. The patient states her INR was 1.9 5 days ago. The patient denies a history of hysterectomy and has not had vaginal bleeding since menopause.   No dysuria, fever.  No vomiting.    Allergies:  Ace inhibitors  Ambien   Aspirin  Hydrochlorothiazide  Ibuprofen  Amoxicillin  Losartan  Penicillins    Medications:    Norvasc  Celexa  Valium  Levothyroxine  Lopressor  Crestor  Tramadol   Vitamin D  Warfarin      Past Medical History:    Hypothyroidism  Facet arthropathy  A-fib  MARY  Major depressive disorder  BETHANY  Cystocele  Rectocele  CAD  Chronic right-sided back pain   Hyperlipidemia  Fatty liver  Lichenification and lichen simple chronicus   Chronic rhinitis   hypertension  ASCVD  Atrial flutter  Hypertriglyceridemia   BETHANY   Peptic ulcer   Tubular adenoma    Past Surgical History:    Appendectomy  Tonsillectomy  Tubal ligation  Shave bone spurs from left foot  Colonoscopy x2  Coronary angiography  EGD x2  Gyn surgery  Heart catheterization     Family History:    Emphysema  Alzheimer's disease  Congenital anomalies   Osteoporosis    Social History:  Negative for tobacco use.  Positive for alcohol use.   Negative for drug use.  Marital Status:  .    Review of Systems   Gastrointestinal: Negative for blood in stool.   Genitourinary: Positive for vaginal bleeding (with pressure sensation). Negative for vaginal pain.   All other systems reviewed and are  "negative.    Physical Exam     Patient Vitals for the past 24 hrs:   BP Temp Temp src Pulse Resp SpO2 Height Weight   07/02/19 2245 -- -- -- -- -- 94 % -- --   07/02/19 1900 (!) 141/102 -- -- 97 16 95 % -- --   07/02/19 1858 -- 96.9  F (36.1  C) Temporal 101 18 95 % 1.651 m (5' 5\") 73.9 kg (163 lb)     Physical Exam  General: Alert and cooperative with exam. Resting comfortably on gurney  Head:  Scalp is NC/AT  Eyes:  No scleral icterus, PERRL  ENT:  The external nose and ears are normal.   Neck:  Normal range of motion without rigidity.  CV:  Regular rate and rhythm    No pathologic murmur, rubs, or gallops.  Resp:  Breath sounds are clear bilaterally.  No crackles, wheezes, rhonchi.    Non-labored, no retractions or accessory muscle use  GI:  Abdomen is soft, no distension, no tenderness, no masses. No peritoneal signs.  Bowel sounds present in all quadrants  :  No suprapubic or flank tenderness  MS:  No lower extremity edema or asymmetric calf swelling.  Skin:  Warm and dry, No rash or lesions noted.  Neuro: Oriented. No gross motor deficits.  Psych: Awake. Alert. Normal affect. Appropriate interactions.  Emergency Department Course   Imaging:  Radiology findings were communicated with the patient who voiced understanding of the findings.    US Pelvic Complete with Transvaginal  1. No acute findings.  2. Endometrium is 0.5 cm in thickness, borderline prominent for a  postmenopausal woman with bleeding. Recommend clinical correlation.  Consider short-term follow-up or D&C.  Reading per radiology    Laboratory:  Laboratory findings were communicated with the patient who voiced understanding of the findings.    UA with microscopic: blood large(A), protein total 200(H), leukocyte esterase large(A), WBC >182(H), RBC >182(H), bacteria few(A), squamous epithelial 11(H) o/w WNL    CBC: WBC 9.4, HGB 15.1,   BMP: glucose 109(H), GFR estimate 45(L) o/w WNL (Creatinine 1.16(H))  INR: 1.84(H)  TSH with free T4 " reflex: 5.89(H)  T4 free: 0.92  T4 free pending    Interventions:  2033 NS 1000 mL IV    Emergency Department Course:  Nursing notes and vitals reviewed.  1916 The patient provided a urine sample here in the emergency department. This was sent for laboratory testing, findings above.    1919 IV was inserted and blood was drawn for laboratory testing, results above.    1940 I performed an exam of the patient as documented above.     2254 The patient was sent for a US Pelvic Complete w Transvaginal while in the emergency department, results above.     2327 Prior to discharge, I personally reviewed the laboratory and imaging results with the patient and answered all related questions . Patient voiced understanding.     Impression & Plan      Medical Decision Making:  Cielo Aguillon is a 79 year old female who presents to the emergency department today for evaluation of vaginal bleeding.  Patient history and records reviewed.  On examination, the patient is well-appearing with stable vitals.  Her work-up here demonstrates normal hemoglobin, mildly subtherapeutic INR, normal platelet count.  The ultrasound does show some mild thickening of the and endometrial lining, but is otherwise unremarkable.  She has a benign abdominal exam with no tenderness to palpation, and my suspicion for other intra-abdominal emergency is exceedingly low.  A urinalysis was obtained which did show some contamination along with possible UTI.  No leukocytosis or fever.  After discussion of risks versus benefits we will initiate a short course of Keflex and sent for culture.    Regarding the patient's postmenopausal vaginal bleeding, I am concerned that this could represent the development of malignancy.  I discussed this with the patient.  She will follow-up closely with OB/GYN to undergo biopsy and will call them tomorrow to schedule this.  She will return to the emergency department if she develops any new or worsening symptoms such as  increased bleeding, syncope, lightheadedness, increased pain etc.    Diagnosis:    ICD-10-CM    1. Postmenopausal vaginal bleeding N95.0    2. Urinary tract infection N39.0      Disposition:   The patient is discharged to home.    Discharge Medications:     START taking      Dose / Directions   cephALEXin 500 MG capsule  Commonly known as:  KEFLEX      Dose:  500 mg  Take 1 capsule (500 mg) by mouth 2 times daily for 7 days  Quantity:  14 capsule  Refills:  0           Where to get your medicines      Some of these will need a paper prescription and others can be bought over the counter. Ask your nurse if you have questions.    Bring a paper prescription for each of these medications    cephALEXin 500 MG capsule         Scribe Disclosure:  I, Jessica House, am serving as a scribe at 7:47 PM on 7/2/2019 to document services personally performed by Alejandro Mohr PA-C  based on my observations and the provider's statements to me.  [facility] EMERGENCY DEPARTMENT       Alejandro Mohr PA-C  07/03/19 0239

## 2019-07-03 NOTE — DISCHARGE INSTRUCTIONS
Discharge Instructions  Vaginal Bleeding    You were seen today for unusual vaginal bleeding. Heavy or irregular bleeding may be caused by many different things such as hormone changes, infection, birth control, or cancer. At this time your provider does not find that your bleeding is a sign of anything dangerous or life-threatening, and you have not lost enough blood to be dangerous.  However, sometimes the signs of serious illness do not show up right away.  If you have new or worse symptoms, you may need to be seen again in the Emergency Department or by your primary provider.      Generally, every Emergency Department visit should have a follow-up clinic visit with either a primary or a specialty clinic/provider. Please follow-up as instructed by your emergency provider today.    Return to the Emergency Department if:  You feel lightheaded or faint.  Your bleeding becomes much heavier than it is now.  You have severe cramping or abdominal (belly) pain.  You have any new or different symptoms.    Treatment:  Motrin  or Advil  (ibuprofen) can help relieve cramps and can also decrease bleeding. You may use this according to the directions on the package. Do not use a medicine that you are allergic to, or if your provider has told you not to use it.  Hormone pills or birth control pills are occasionally prescribed to help control the bleeding but often this is left to an OB/GYN provider. These can cause problems if you have a history of blood clots or stroke, so tell your provider if you have these problems before you leave.  Iron tablets may be recommended if you have anemia (low blood count.) Iron can cause constipation, so be sure to have plenty of fiber in your diet and let your provider know if you have problems.  Drinking plenty of fluid is important. Be sure to drink extra water or other healthy drinks.  If you were given a prescription for medicine here today, be sure to read all of the information  (including the package insert) that comes with your prescription.  This will include important information about the medicine, its side effects, and any warnings that you need to know about.  The pharmacist who fills the prescription can provide more information and answer questions you may have about the medicine.  If you have questions or concerns that the pharmacist cannot address, please call or return to the Emergency Department.     Remember that you can always come back to the Emergency Department if you are not able to see your regular provider in the amount of time listed above, if you get any new symptoms, or if there is anything that worries you.  Discharge Instructions  Urinary Tract Infection  You or your child have been diagnosed with a urinary tract infection, or UTI. The urinary tract includes the kidneys (which make urine/pee), ureters (the tubes that carry urine/pee from the kidneys to the bladder), the bladder (which stores urine/pee), and urethra (the tube that carries urine/pee out of the bladder). Urinary tract infections occur when bacteria travel up the urethra into the bladder (bladder infection) and, in some cases, from there into the kidneys (kidney infection).  Generally, every Emergency Department visit should have a follow-up clinic visit with either a primary or a specialty clinic/provider. Please follow-up as instructed by your emergency provider today.  Return to the Emergency Department if:  You or your child have severe back pain.  You or your child are vomiting (throwing up) so that you cannot take your medicine.  You or your child have a new fever (had not previously had a fever) over 101 F.  You or your child have confusion or are very weak, or feel very ill.  Your child seems much more ill, will not wake up, will not respond right, or is crying for a long time and will not calm down.  You or your child are showing signs of dehydration. These signs may include decreased  urination (pee), dry mouth/gums/tongue, or decreased activity.    Follow-up with your provider:   Children under 24 months need to be seen by their regular provider within one week after a diagnosis of a UTI. It may be necessary to do some more tests to look at the child s kidney or bladder.  You should begin to feel better within 24 - 48 hours of starting your antibiotic; follow-up with your regular clinic/doctor/provider if this is not the case.    Treatment:   You will be treated with an antibiotic to kill the bacteria. We have to make an educated guess, based on what we know about common bacteria and antibiotics, as to which antibiotic will work for your infection. We will be correct most times but there will be some cases where the antibiotic chosen is not correct (see urine cultures below).  Take a pain medication such as acetaminophen (Tylenol ) or ibuprofen (Advil , Motrin , Nuprin ).  Phenazopyridine (Pyridium , Uristat ) is a prescription medication that numbs the bladder to reduce the burning pain of some UTIs.  The same medication is available in a non-prescription version (Azo-Standard , Urodol ). This medication will change the color of the urine and tears (usually blue or orange). If you wear contacts, do not wear them while taking this medication as they may be stained by the medication.    Urine Cultures:  If indicated, a urine culture may have been performed today. This test generally takes 24-48 hours to complete so the results are not known at this time. The results can confirm that an infection is present but also determine which antibiotic is effective for the specific bacteria that is causing the infection. If your urine culture shows that the antibiotic you were given today will not work to treat your infection, we will attempt to contact you to make arrangements to change the antibiotic. If the culture confirms that the antibiotic is effective for your infection, you will not be contacted.  "We often recommend follow-up with your regular physician/provider on the culture results regardless of this process.    Antibiotic Warning:   If you have been placed on antibiotics - watch for signs of allergic reaction.  These include rash, lip swelling, difficulty breathing, wheezing, and dizziness.  If you develop any of these symptoms, stop the antibiotic immediately and go to an emergency room or urgent care for evaluation.    Probiotics: If you have been given an antibiotic, you may want to also take a probiotic pill or eat yogurt with live cultures. Probiotics have \"good bacteria\" to help your intestines stay healthy. Studies have shown that probiotics help prevent diarrhea and other intestine problems (including C. diff infection) when you take antibiotics. You can buy these without a prescription in the pharmacy section of the store.   If you were given a prescription for medicine here today, be sure to read all of the information (including the package insert) that comes with your prescription.  This will include important information about the medicine, its side effects, and any warnings that you need to know about.  The pharmacist who fills the prescription can provide more information and answer questions you may have about the medicine.  If you have questions or concerns that the pharmacist cannot address, please call or return to the Emergency Department.   Remember that you can always come back to the Emergency Department if you are not able to see your regular provider in the amount of time listed above, if you get any new symptoms, or if there is anything that worries you.    "

## 2019-07-18 ENCOUNTER — ANTICOAGULATION THERAPY VISIT (OUTPATIENT)
Dept: ANTICOAGULATION | Facility: CLINIC | Age: 79
End: 2019-07-18
Payer: MEDICARE

## 2019-07-18 ENCOUNTER — OFFICE VISIT (OUTPATIENT)
Dept: INTERNAL MEDICINE | Facility: CLINIC | Age: 79
End: 2019-07-18
Payer: MEDICARE

## 2019-07-18 VITALS
SYSTOLIC BLOOD PRESSURE: 112 MMHG | WEIGHT: 164 LBS | DIASTOLIC BLOOD PRESSURE: 70 MMHG | RESPIRATION RATE: 16 BRPM | TEMPERATURE: 97.4 F | OXYGEN SATURATION: 96 % | BODY MASS INDEX: 27.29 KG/M2 | HEART RATE: 71 BPM

## 2019-07-18 DIAGNOSIS — R35.0 URINARY FREQUENCY: ICD-10-CM

## 2019-07-18 DIAGNOSIS — S00.03XD CONTUSION OF FACE, SCALP AND NECK, SUBSEQUENT ENCOUNTER: Primary | ICD-10-CM

## 2019-07-18 DIAGNOSIS — Z79.01 LONG TERM CURRENT USE OF ANTICOAGULANTS WITH INR GOAL OF 2.0-3.0: ICD-10-CM

## 2019-07-18 DIAGNOSIS — S10.93XD CONTUSION OF FACE, SCALP AND NECK, SUBSEQUENT ENCOUNTER: Primary | ICD-10-CM

## 2019-07-18 DIAGNOSIS — S00.83XD CONTUSION OF FACE, SCALP AND NECK, SUBSEQUENT ENCOUNTER: Primary | ICD-10-CM

## 2019-07-18 DIAGNOSIS — I48.20 CHRONIC ATRIAL FIBRILLATION (H): ICD-10-CM

## 2019-07-18 DIAGNOSIS — F41.1 GAD (GENERALIZED ANXIETY DISORDER): ICD-10-CM

## 2019-07-18 LAB
ALBUMIN UR-MCNC: ABNORMAL MG/DL
APPEARANCE UR: CLEAR
BACTERIA #/AREA URNS HPF: ABNORMAL /HPF
BILIRUB UR QL STRIP: NEGATIVE
COLOR UR AUTO: YELLOW
GLUCOSE UR STRIP-MCNC: NEGATIVE MG/DL
HGB UR QL STRIP: NEGATIVE
INR POINT OF CARE: 2.7 (ref 0.86–1.14)
KETONES UR STRIP-MCNC: NEGATIVE MG/DL
LEUKOCYTE ESTERASE UR QL STRIP: ABNORMAL
NITRATE UR QL: NEGATIVE
NON-SQ EPI CELLS #/AREA URNS LPF: ABNORMAL /LPF
PH UR STRIP: 6.5 PH (ref 5–7)
RBC #/AREA URNS AUTO: ABNORMAL /HPF
SOURCE: ABNORMAL
SP GR UR STRIP: 1.01 (ref 1–1.03)
UROBILINOGEN UR STRIP-ACNC: 0.2 EU/DL (ref 0.2–1)
WBC #/AREA URNS AUTO: ABNORMAL /HPF

## 2019-07-18 PROCEDURE — 87086 URINE CULTURE/COLONY COUNT: CPT | Performed by: INTERNAL MEDICINE

## 2019-07-18 PROCEDURE — 99207 ZZC NO CHARGE NURSE ONLY: CPT

## 2019-07-18 PROCEDURE — 85610 PROTHROMBIN TIME: CPT | Mod: QW

## 2019-07-18 PROCEDURE — 36416 COLLJ CAPILLARY BLOOD SPEC: CPT

## 2019-07-18 PROCEDURE — 99213 OFFICE O/P EST LOW 20 MIN: CPT | Performed by: INTERNAL MEDICINE

## 2019-07-18 PROCEDURE — 81001 URINALYSIS AUTO W/SCOPE: CPT | Performed by: INTERNAL MEDICINE

## 2019-07-18 RX ORDER — DIAZEPAM 5 MG
TABLET ORAL
Qty: 40 TABLET | Refills: 1 | Status: SHIPPED | OUTPATIENT
Start: 2019-07-18 | End: 2019-12-26

## 2019-07-18 NOTE — PROGRESS NOTES
ANTICOAGULATION FOLLOW-UP CLINIC VISIT    Patient Name:  Cielo Aguillon  Date:  2019  Contact Type:  Face to Face    SUBJECTIVE:  Patient Findings     Positives:   Emergency department visit (Patient reports has had a couple visits to the ED, 2 for falls, seen 19 in [facility] and 19 in [place]. ), Change in medications (Patient had Rx for Keflex on 19 x7days)    Comments:   The patient was assessed for diet, medication, and activity level changes, missed or extra doses, bruising or bleeding, with no problem findings.          Clinical Outcomes     Comments:   The patient was assessed for diet, medication, and activity level changes, missed or extra doses, bruising or bleeding, with no problem findings.             OBJECTIVE    INR Protime   Date Value Ref Range Status   2019 2.7 (A) 0.86 - 1.14 Final       ASSESSMENT / PLAN  INR assessment THER    Recheck INR In: 3 WEEKS    INR Location Clinic      Anticoagulation Summary  As of 2019    INR goal:   2.0-3.0   TTR:   53.4 % (3.3 y)   INR used for dosin.7 (2019)   Warfarin maintenance plan:   2.5 mg (5 mg x 0.5) every Sun, Wed; 5 mg (5 mg x 1) all other days   Full warfarin instructions:   2.5 mg every Sun, Wed; 5 mg all other days   Weekly warfarin total:   30 mg   No change documented:   Cindi Preston RN   Plan last modified:   Mckenna Robison RN (5/10/2019)   Next INR check:   2019   Priority:   INR   Target end date:       Indications    Chronic atrial fibrillation (H) [I48.2]  Long term current use of anticoagulants with INR goal of 2.0-3.0 [Z79.01]             Anticoagulation Episode Summary     INR check location:       Preferred lab:       Send INR reminders to:   [facility]    Comments:         Anticoagulation Care Providers     Provider Role Specialty Phone number    Simin Lopez MD [facility] Internal Medicine [phone]            See the Encounter Report to view Anticoagulation  Flowsheet and Dosing Calendar (Go to Encounters tab in chart review, and find the Anticoagulation Therapy Visit)    Dosage adjustment made based on physician directed care plan.    Cindi Preston RN

## 2019-07-18 NOTE — PROGRESS NOTES
Subjective     Cielo Aguillon is a 79 year old female who presents to clinic today for the following health issues:      HPI   ED/UC Followup:    Facility:  [facility]  Date of visit: 6/29/19 and 7/2  Reason for visit:fall  Current Status: stable      She actually went in 3 days after she slipped on a rug and fell forward, hitting her head.  She had black and blue around her eyes, had a possible nasal fracture.  She first went to urgent care and they referred her to the ED.  She had a head CT which was negative.  She did have another fall when she had an issue with her shoe, no injury with that one.  She did not have any loss of consciousness, lightheadedness or syncope, dizziness.  She was given some oxycodone but that actually caused some vertigo so she stopped it.  Her pain is reasonably controlled.    She also had been seen for some vaginal bleeding, had an ultrasound and is to have OB/GYN follow-up.     She complains she has had issues with urinary frequency for a few months.  She thinks she may be having a little bit of bladder prolapse as she has had this in the past.  She does not really have dysuria but is concerned about making sure she does not have a bladder infection.          Patient Active Problem List   Diagnosis     Essential hypertension, benign     Chronic rhinitis     Other ventral hernia without mention of obstruction or gangrene     Lichenification and lichen simplex chronicus     Fatty liver     Advanced directives, counseling/discussion     Hyperlipidemia LDL goal <70     Chronic right-sided back pain     Coronary artery disease     Cystocele, midline     Rectocele     BETHANY (obstructive sleep apnea)     Long term current use of anticoagulants with INR goal of 2.0-3.0     MARY (generalized anxiety disorder)     Major depressive disorder, recurrent episode, moderate (H)     Controlled substance agreement signed     Chronic atrial fibrillation (H)     Facet arthropathy, lumbar     Acquired  hypothyroidism     Current Outpatient Medications   Medication Sig Dispense Refill     amLODIPine (NORVASC) 5 MG tablet Take 1 tablet (5 mg) by mouth daily 90 tablet 1     citalopram (CELEXA) 20 MG tablet TAKE 1.5 tablet (30 mg) BY MOUTH EVERY  tablet 1     diazepam (VALIUM) 5 MG tablet TAKE 1/2 TO 1 TABLET BY MOUTH EVERY 8 HOURS AS NEEDED 40 tablet 1     levothyroxine (SYNTHROID/LEVOTHROID) 50 MCG tablet TAKE 1 TABLET (50 MCG) BY MOUTH DAILY 90 tablet 1     metoprolol tartrate (LOPRESSOR) 100 MG tablet TAKE 1 TABLET (100MG) BY MOUTH 2 TIMES DAILY 180 tablet 2     rosuvastatin (CRESTOR) 10 MG tablet TAKE 1 TABLET (10 MG) BY MOUTH DAILY 90 tablet 1     traMADol (ULTRAM) 50 MG tablet TAKE ONE TABLET BY MOUTH EVERY 8 HOURS AS NEEDED FOR MODERATE PAIN 90 tablet 3     VITAMIN D, CHOLECALCIFEROL, PO Take 1,000 Units by mouth At Bedtime        warfarin (COUMADIN) 5 MG tablet TAKE 1 TABLET (5MG) BY MOUTH DAILY OR AS DIRECTED BY INR CLINIC 90 tablet 1      Social History     Tobacco Use     Smoking status: Never Smoker     Smokeless tobacco: Never Used   Substance Use Topics     Alcohol use: Yes     Alcohol/week: 0.0 oz     Comment: occ - wine     Drug use: No      Reviewed and updated as needed this visit by Provider         Review of Systems   No fever, chills, syncope, headache      Objective    /70   Pulse 71   Temp 97.4  F (36.3  C) (Oral)   Resp 16   Wt 74.4 kg (164 lb)   SpO2 96%   BMI 27.29 kg/m    Body mass index is 27.29 kg/m .  Physical Exam     Resolving contusions of the face    UA: 10-25 white cells, moderate leukocyte esterase, few squamous epithelial cells          Assessment & Plan     1. Contusion of face, scalp and neck, subsequent encounter  Contusion is resolving, she is not having significant ongoing nasal pain so will not need ENT follow-up.  Reason for her fall was related to rug and there does not appear to be any other significant underlying medical problems causing falls.   Encouraged her to get rid of any throw rugs, be cautious about moving from room to room if there is any change in the david.  Continue over-the-counter pain medications    2. Urinary frequency  Advised her urine is equivocal, especially since she is only having some chronic frequency.  Will await the urine culture before deciding whether to treat, consider following up with urology          No follow-ups on file.    Simin Lopez MD  [facility]

## 2019-07-19 LAB
BACTERIA SPEC CULT: NORMAL
SPECIMEN SOURCE: NORMAL

## 2019-08-05 ENCOUNTER — ANTICOAGULATION THERAPY VISIT (OUTPATIENT)
Dept: ANTICOAGULATION | Facility: CLINIC | Age: 79
End: 2019-08-05
Payer: MEDICARE

## 2019-08-05 DIAGNOSIS — I48.20 CHRONIC ATRIAL FIBRILLATION (H): ICD-10-CM

## 2019-08-05 DIAGNOSIS — Z79.01 LONG TERM CURRENT USE OF ANTICOAGULANTS WITH INR GOAL OF 2.0-3.0: ICD-10-CM

## 2019-08-05 LAB — INR POINT OF CARE: 2.9 (ref 0.86–1.14)

## 2019-08-05 PROCEDURE — 99207 ZZC NO CHARGE NURSE ONLY: CPT

## 2019-08-05 PROCEDURE — 36416 COLLJ CAPILLARY BLOOD SPEC: CPT

## 2019-08-05 PROCEDURE — 85610 PROTHROMBIN TIME: CPT | Mod: QW

## 2019-08-05 NOTE — PROGRESS NOTES
ANTICOAGULATION FOLLOW-UP CLINIC VISIT    Patient Name:  Cielo Aguillon  Date:  2019  Contact Type:  Face to Face    SUBJECTIVE:  Patient Findings     Comments:   The patient was assessed for diet, medication, and activity level changes, missed or extra doses, bruising or bleeding, with no problem findings.          Clinical Outcomes     Negatives:   Major bleeding event, Thromboembolic event, Anticoagulation-related hospital admission, Anticoagulation-related ED visit, Anticoagulation-related fatality    Comments:   The patient was assessed for diet, medication, and activity level changes, missed or extra doses, bruising or bleeding, with no problem findings.             OBJECTIVE    INR Protime   Date Value Ref Range Status   2019 2.9 (A) 0.86 - 1.14 Final       ASSESSMENT / PLAN  INR assessment THER    Recheck INR In: 4 WEEKS    INR Location Clinic      Anticoagulation Summary  As of 2019    INR goal:   2.0-3.0   TTR:   54.1 % (3.3 y)   INR used for dosin.9 (2019)   Warfarin maintenance plan:   2.5 mg (5 mg x 0.5) every Sun, Wed; 5 mg (5 mg x 1) all other days   Full warfarin instructions:   2.5 mg every Sun, Wed; 5 mg all other days   Weekly warfarin total:   30 mg   No change documented:   Mckenna Robison RN   Plan last modified:   Mckenna Robison RN (5/10/2019)   Next INR check:   9/3/2019   Priority:   INR   Target end date:       Indications    Chronic atrial fibrillation (H) [I48.2]  Long term current use of anticoagulants with INR goal of 2.0-3.0 [Z79.01]             Anticoagulation Episode Summary     INR check location:       Preferred lab:       Send INR reminders to:   [facility]    Comments:         Anticoagulation Care Providers     Provider Role Specialty Phone number    Simin Lopez MD Responsible Internal Medicine [phone]            See the Encounter Report to view Anticoagulation Flowsheet and Dosing Calendar (Go to Encounters tab in chart review, and  find the Anticoagulation Therapy Visit)    Dosage adjustment made based on physician directed care plan.    Mckenna Robison RN

## 2019-09-03 ENCOUNTER — ANTICOAGULATION THERAPY VISIT (OUTPATIENT)
Dept: ANTICOAGULATION | Facility: CLINIC | Age: 79
End: 2019-09-03
Payer: MEDICARE

## 2019-09-03 DIAGNOSIS — I48.20 CHRONIC ATRIAL FIBRILLATION (H): ICD-10-CM

## 2019-09-03 DIAGNOSIS — Z79.01 LONG TERM CURRENT USE OF ANTICOAGULANTS WITH INR GOAL OF 2.0-3.0: ICD-10-CM

## 2019-09-03 LAB — INR POINT OF CARE: 2.6 (ref 0.86–1.14)

## 2019-09-03 PROCEDURE — 36416 COLLJ CAPILLARY BLOOD SPEC: CPT

## 2019-09-03 PROCEDURE — 99207 ZZC NO CHARGE NURSE ONLY: CPT

## 2019-09-03 PROCEDURE — 85610 PROTHROMBIN TIME: CPT | Mod: QW

## 2019-09-03 NOTE — PROGRESS NOTES
ANTICOAGULATION FOLLOW-UP CLINIC VISIT    Patient Name:  Cielo Aguillon  Date:  9/3/2019  Contact Type:  Face to Face    SUBJECTIVE:  Patient Findings     Comments:   The patient was assessed for diet, medication, and activity level changes, missed or extra doses, bruising or bleeding, with no problem findings.          Clinical Outcomes     Negatives:   Major bleeding event, Thromboembolic event, Anticoagulation-related hospital admission, Anticoagulation-related ED visit, Anticoagulation-related fatality    Comments:   The patient was assessed for diet, medication, and activity level changes, missed or extra doses, bruising or bleeding, with no problem findings.             OBJECTIVE    INR Protime   Date Value Ref Range Status   2019 2.6 (A) 0.86 - 1.14 Final       ASSESSMENT / PLAN  INR assessment THER    Recheck INR In: 4 WEEKS    INR Location Clinic      Anticoagulation Summary  As of 9/3/2019    INR goal:   2.0-3.0   TTR:   55.1 % (3.4 y)   INR used for dosin.6 (9/3/2019)   Warfarin maintenance plan:   2.5 mg (5 mg x 0.5) every Sun, Wed; 5 mg (5 mg x 1) all other days   Full warfarin instructions:   2.5 mg every Sun, Wed; 5 mg all other days   Weekly warfarin total:   30 mg   No change documented:   Cindi Preston RN   Plan last modified:   Mckenna Robison RN (5/10/2019)   Next INR check:   10/1/2019   Priority:   INR   Target end date:       Indications    Chronic atrial fibrillation (H) [I48.2]  Long term current use of anticoagulants with INR goal of 2.0-3.0 [Z79.01]             Anticoagulation Episode Summary     INR check location:       Preferred lab:       Send INR reminders to:   [facility]    Comments:         Anticoagulation Care Providers     Provider Role Specialty Phone number    Simin Lopez MD Responsible Internal Medicine [phone]            See the Encounter Report to view Anticoagulation Flowsheet and Dosing Calendar (Go to Encounters tab in chart review,  and find the Anticoagulation Therapy Visit)    Dosage adjustment made based on physician directed care plan.    Cindi Preston RN

## 2019-09-06 NOTE — NURSING NOTE
22g PIV placed to left wrist, 2nd attempt    Myah MULLENN, RN Care Coordinator  [facility]     Patient can return to [facility] for rehab when medically stable.

## 2019-10-01 ENCOUNTER — ANTICOAGULATION THERAPY VISIT (OUTPATIENT)
Dept: ANTICOAGULATION | Facility: CLINIC | Age: 79
End: 2019-10-01
Payer: MEDICARE

## 2019-10-01 DIAGNOSIS — Z79.01 LONG TERM CURRENT USE OF ANTICOAGULANTS WITH INR GOAL OF 2.0-3.0: ICD-10-CM

## 2019-10-01 DIAGNOSIS — I48.20 CHRONIC ATRIAL FIBRILLATION (H): ICD-10-CM

## 2019-10-01 LAB — INR POINT OF CARE: 2.4 (ref 0.86–1.14)

## 2019-10-01 PROCEDURE — 99207 ZZC NO CHARGE NURSE ONLY: CPT

## 2019-10-01 PROCEDURE — 36416 COLLJ CAPILLARY BLOOD SPEC: CPT

## 2019-10-01 PROCEDURE — 85610 PROTHROMBIN TIME: CPT | Mod: QW

## 2019-10-01 NOTE — PROGRESS NOTES
ANTICOAGULATION FOLLOW-UP CLINIC VISIT    Patient Name:  Cielo Aguillon  Date:  10/1/2019  Contact Type:  Face to Face    SUBJECTIVE:  Patient Findings     Comments:   The patient was assessed for diet, medication, and activity level changes, missed or extra doses, bruising or bleeding, with no problem findings.          Clinical Outcomes     Negatives:   Major bleeding event, Thromboembolic event, Anticoagulation-related hospital admission, Anticoagulation-related ED visit, Anticoagulation-related fatality    Comments:   The patient was assessed for diet, medication, and activity level changes, missed or extra doses, bruising or bleeding, with no problem findings.             OBJECTIVE    INR Protime   Date Value Ref Range Status   10/01/2019 2.4 (A) 0.86 - 1.14 Final       ASSESSMENT / PLAN  INR assessment THER    Recheck INR In: 4 WEEKS    INR Location Clinic      Anticoagulation Summary  As of 10/1/2019    INR goal:   2.0-3.0   TTR:   56.1 % (3.5 y)   INR used for dosin.4 (10/1/2019)   Warfarin maintenance plan:   2.5 mg (5 mg x 0.5) every Sun, Wed; 5 mg (5 mg x 1) all other days   Full warfarin instructions:   2.5 mg every Sun, Wed; 5 mg all other days   Weekly warfarin total:   30 mg   No change documented:   Cindi Preston RN   Plan last modified:   Mckenna Robison RN (5/10/2019)   Next INR check:   10/29/2019   Priority:   INR   Target end date:       Indications    Chronic atrial fibrillation [I48.20]  Long term current use of anticoagulants with INR goal of 2.0-3.0 [Z79.01]             Anticoagulation Episode Summary     INR check location:       Preferred lab:       Send INR reminders to:   [facility]    Comments:         Anticoagulation Care Providers     Provider Role Specialty Phone number    Simin Lopez MD [facility] Internal Medicine [phone]            See the Encounter Report to view Anticoagulation Flowsheet and Dosing Calendar (Go to Encounters tab in chart review,  and find the Anticoagulation Therapy Visit)    Dosage adjustment made based on physician directed care plan.    Cindi Preston RN

## 2019-10-10 NOTE — PROGRESS NOTES
[facility]    Date of visit: 10/11/2019    Chief complaint:   Chief Complaint   Patient presents with     Pain     Interval history:  Cielo Aguillon is a 78 year old female last seen by me on 3/1/2019.      Recommendations/plan at the last visit included:  1. Therapy: Discussed that she can make further appointments with [facility] for pain PT if she feels like there is more that she can work on.   2. Clinical Health Pain Psychologist: Discussed pain psychology but she is not interested in it.  3. Diagnostic Studies: Results of Lumbar MRI reviewed with Cielo today.  4. Medication Management:   1. Medication options are limited. She cannot use NSIADs. Am hesitant to use muscle relaxants due to risk of sedation, impairment with balance and dizziness which could contribute to a fall. Cymbalta could be an option for chronic musculoskeletal back pain but this would need to replace Celexa.   2. Can continue with Tramadol 50 mg as she is using now. She uses daily prn.   3. Can use tylenol up to 3,000 mg daily.  5. Further procedures recommended: Order placed for repeat L3,4,5 bilateral lumbar RFA. May have to repeat MBB.   6. Complementary Treatments: Discussed acupuncture but her insurance will not cover it.  7. Other: Can consider TENS unit at next visit to see if it will provide benefit.   8. Follow up: for injection    Since her last visit, Cielo Aguillon reports:  - Cielo had a right lumbar RFA in March 2019. She was starting to note benefit from it when she fell twice a couple weeks apart in May. After that time back pain has gone back to baseline.   - Pain is located in similar area as before. No radicular symptoms.   - No progressive leg weakness or bowel/bladder changes  - She is interested in repeating the RFA since it was helping.     Pain Information:   Pain quality: Aching, Exhausting and Miserable    Pain timing: fairly constant   Pain rating: intensity ranges from 5/10 to 9/10, and  averages 8/10 on a 0-10 scale. Currently, it is 7/10.   Aggravating factors include: walking too much   Relieving factors include: rest    Current pain treatments:    Current pain medications:              Tramadol 50 mg q8 hours prn - most she uses is once per day, sometimes uses none - H              Tylenol 650 mg prn - [facility]                Celexa - for anxiety and mood  Valium - only uses when she travels due to anxiety  Warfarin  Melatonin for sleep    Current MME: up to 5    Review of [place] Prescription Monitoring Program (): No concern for abuse or misuse of controlled medications based on this report.     Annual Controlled Substance Agreement/UDS due date: NA    Past pain treatments:  1. Previous Pain Relevant Medications:  NOTE: This medication information taken from patient's intake form, not medical records.               Opiates: NA              NSAIDS: cannot use              Muscle Relaxants: cyclobenzaprine - H but has SE of sleepiness              Anti-migraine mediations: NA              Anti-depressants:  Lexapro              Sleep aids: Ambien              Anxiolytics: NA              Neuropathics: NA                      Topicals: Lidocaine patches - [place]              Other medications not covered above: Tylenol     2. Physical Therapy: Yes, ~5 years ago - It was painful to do the exercises  3. Pain Psychology: No  4. Surgery: No  5. Injections:   - Bilateral L4-5 and L5-S1 facet joint injections on 1/22/2015 at [facility]   - Left and Right L3,4,5 medial branch RFA on 11/11/2014 - unsure how helpful.   - Right L3,4,5 medial branch RFA on 3/19/2019 - was starting to note benefit prior to fall  - she has had other injections but does not remember specifics  6. Chiropractic: Yes, 2 years ago - [facility]. Went twice a week.  7. Acupuncture: No  8. TENS Unit: No  9. Other: heating pad - H    Medications:  Current Outpatient Medications   Medication Sig Dispense Refill     amLODIPine (NORVASC) 5 MG tablet Take  "1 tablet (5 mg) by mouth daily 90 tablet 1     citalopram (CELEXA) 20 MG tablet TAKE 1.5 tablet (30 mg) BY MOUTH EVERY  tablet 1     diazepam (VALIUM) 5 MG tablet TAKE 1/2 TO 1 TABLET BY MOUTH EVERY 8 HOURS AS NEEDED 40 tablet 1     levothyroxine (SYNTHROID/LEVOTHROID) 50 MCG tablet TAKE 1 TABLET (50 MCG) BY MOUTH DAILY 90 tablet 1     metoprolol tartrate (LOPRESSOR) 100 MG tablet TAKE 1 TABLET (100MG) BY MOUTH 2 TIMES DAILY 180 tablet 2     rosuvastatin (CRESTOR) 10 MG tablet TAKE 1 TABLET (10 MG) BY MOUTH DAILY 90 tablet 1     traMADol (ULTRAM) 50 MG tablet TAKE ONE TABLET BY MOUTH EVERY 8 HOURS AS NEEDED FOR MODERATE PAIN 90 tablet 3     VITAMIN D, CHOLECALCIFEROL, PO Take 1,000 Units by mouth At Bedtime        warfarin (COUMADIN) 5 MG tablet TAKE 1 TABLET (5MG) BY MOUTH DAILY OR AS DIRECTED BY INR CLINIC 90 tablet 1       Medical History: any changes in medical history since they were last seen? Yes She has had 2 falls in May. One resulted in a broken nose     Review of Systems:  The 14 system ROS was reviewed from the intake questionnaire, and is positive for: fatigue, earache, ringing in ears, shortness of breath, fainting, chest pain (not actively), thyroid disease, joint pain, arthritis, stiffness, back pain, weakness   Any bowel or bladder problems: none  Mood: depression, anxiety    Physical Exam:  Blood pressure 135/75, pulse 60, weight 74.4 kg (164 lb 1.6 oz), SpO2 95 %, not currently breastfeeding.  General: A&Ox3, in no distress  Gait:  Gait is slow. Stands with forward flexed posture.  Neuro: 5/5 strength in BLE.    Imaging:  MRI of Lumbar Spine was completed on 1/24/2019 which showed:  \"Impression:   1. L1 superior endplate large broad based Schmorl's node versus acute/subacute endplate compression injury with mild adjacent marrow edema. This is new since 2012.  2. Multilevel degenerative disc disease with mild discogenic endplate reactive marrow edema at L2-3.  3. Multilevel foraminal " "stenosis, greatest on the right L4-5 and L3-4.\"    Assessment:   Cielo Aguillon is a 78 year old female with a past medical history significant for BETHANY, HLD, hypothyroidism, CAD, chronic atrial fibrillation on anticoagulation, MARY, depression,  who presents with complaints of chronic low back pain.      1. Chronic low back pain: Etiology most likely secondary to lumbar spondylosis and facet arthropathy with overlying myofascial pain. MRI of lumbar spine shows no evidence of nerve root impingement. There is some mild to moderate right foraminal stenosis at L3-4 and L4-5 and mild left foraminal stenosis at L4-5. No significant stenosis of the central canal. She has had good relief of back pain per her report with lumbar RFA more than the facet joint injections. She thinks it helped about 50% but is unsure of how long the benefit lasted.     2. Chronic opioid use: Has been on tramadol for number of years. Uses 1 tab/day at most. It is helpful for her pain.     3.  Mental Health - the patient's mental health concerns, specifically anxiety and depression, affect her experience of pain and contribute to her clinically significant distress.    4. Hx of 2 falls in May 2019    Plan:  I am recommending a multidisciplinary treatment plan to help this patient better manage her pain.  This includes:      1. Therapy: She is currently not doing any therapy. Will discuss again after pain becomes more manageable.   2. Clinical Health Pain Psychologist: Discussed pain psychology at last visit but she was not interested.   3. Diagnostic Studies: Order placed for a lumbar x-ray given hx of fall x 2.   4. Medication Management:   1. Medication options are limited. She cannot use NSIADs. Am hesitant to use muscle relaxants due to risk of sedation, impairment with balance and dizziness which could contribute to a fall. Cymbalta could be an option for chronic musculoskeletal back pain but this would need to replace Celexa.   2. Can " continue with Tramadol 50 mg as she is using now. She uses daily prn.   3. Can use tylenol up to 3,000 mg daily.  5. Further procedures recommended: Order placed for repeat L3,4,5 bilateral lumbar RFA.   6. Complementary Treatments: Discussed acupuncture but her insurance will not cover it.  7. Other: Can consider TENS unit at next visit to see if it will provide benefit.   8. Follow up: Will call Cielo with result of lumbar x-ray.     I spent 30 minutes of time face to face with the patient.  Greater than 50% of this time was spent in patient counseling and/or coordination of care.    Qiana Maldonado MD  [facility]

## 2019-10-11 ENCOUNTER — TELEPHONE (OUTPATIENT)
Dept: PALLIATIVE MEDICINE | Facility: CLINIC | Age: 79
End: 2019-10-11

## 2019-10-11 ENCOUNTER — ANCILLARY PROCEDURE (OUTPATIENT)
Dept: GENERAL RADIOLOGY | Facility: CLINIC | Age: 79
End: 2019-10-11
Attending: PHYSICAL MEDICINE & REHABILITATION
Payer: MEDICARE

## 2019-10-11 ENCOUNTER — OFFICE VISIT (OUTPATIENT)
Dept: PALLIATIVE MEDICINE | Facility: CLINIC | Age: 79
End: 2019-10-11
Payer: MEDICARE

## 2019-10-11 VITALS
SYSTOLIC BLOOD PRESSURE: 135 MMHG | HEART RATE: 60 BPM | BODY MASS INDEX: 27.31 KG/M2 | WEIGHT: 164.1 LBS | DIASTOLIC BLOOD PRESSURE: 75 MMHG | OXYGEN SATURATION: 95 %

## 2019-10-11 DIAGNOSIS — M47.816 FACET ARTHROPATHY, LUMBAR: ICD-10-CM

## 2019-10-11 DIAGNOSIS — M47.816 FACET ARTHROPATHY, LUMBAR: Primary | ICD-10-CM

## 2019-10-11 PROCEDURE — 72100 X-RAY EXAM L-S SPINE 2/3 VWS: CPT

## 2019-10-11 PROCEDURE — 99214 OFFICE O/P EST MOD 30 MIN: CPT | Performed by: PHYSICAL MEDICINE & REHABILITATION

## 2019-10-11 ASSESSMENT — PAIN SCALES - GENERAL: PAINLEVEL: SEVERE PAIN (7)

## 2019-10-11 NOTE — PATIENT INSTRUCTIONS
1. We will get an x-ray of your low back today. I will call you on Monday to go over the results.  2. Order placed for repeat lumbar radiofrequency ablation.     ----------------------------------------------------------------  Clinic Number:  [phone]     Call with any questions about your care and for scheduling assistance.     Calls are returned Monday through Friday between 8 AM and 4:30 PM. We usually get back to you within 2 business days depending on the issue/request.    If we are prescribing your medications:    For opioid medication refills, call the clinic or send a [employer] message 7 days in advance.  Please include:    Name of requested medication    Name of the pharmacy.    For non-opioid medications, call your pharmacy directly to request a refill. Please allow 3-4 days to be processed.     Per [place] State Law:    All controlled substance prescriptions must be filled within 30 days of being written.      For those controlled substances allowing refills, pickup must occur within 30 days of last fill.      We believe regular attendance is key to your success in our program!      Any time you are unable to keep your appointment we ask that you call us at least 24 hours in advance to cancel.This will allow us to offer the appointment time to another patient.     Multiple missed appointments may lead to dismissal from the clinic.

## 2019-10-11 NOTE — TELEPHONE ENCOUNTER
Order received from Dr. Maldonado for Repeat Lumbar RFA - bilateral L3,4,5. Routing for GRACE Hodges    [facility]

## 2019-10-14 NOTE — TELEPHONE ENCOUNTER
Called and spoke to Cielo.  She states she did get greater then 50% of pain relief from her previous RFA despite her recent falls.  If 10 is the worst pain she has ever felt, she states she was at a  4 on pain scale. She states she was  sleeping better.  She knows she was greater then 50 % of pain relief  because mornings are her worst with her pain and she was able to move more in the mornings with less pain till the fall.  Last RFA was March 19 2019.  She meets the criteria set by insurance.     Will route to . Please schedule for RFA.      Genoveva Subramanian RN  Care Coordinator  [facility]

## 2019-10-14 NOTE — TELEPHONE ENCOUNTER
No PA required    BCBS RFA REQUIREMENTS- ALL REQUIRE PA    REPEAT RFA REQUIREMENTS FOR ALL BCBS  Must wait at least 6 months and >50% pain relief following previous RFA.  Prior auth is required.    Routing to review, if patient meets criteria, Please route to  VIRGINIE LAUREN    [facility]

## 2019-10-21 ENCOUNTER — TELEPHONE (OUTPATIENT)
Dept: INTERNAL MEDICINE | Facility: CLINIC | Age: 79
End: 2019-10-21

## 2019-10-21 DIAGNOSIS — I48.20 CHRONIC ATRIAL FIBRILLATION (H): ICD-10-CM

## 2019-10-21 DIAGNOSIS — Z79.01 LONG TERM CURRENT USE OF ANTICOAGULANTS WITH INR GOAL OF 2.0-3.0: ICD-10-CM

## 2019-10-21 NOTE — TELEPHONE ENCOUNTER
Please reach out for scheduling of Lumbar RFA, thanks    Myah PATINO, RN Care Coordinator  [facility]  Pain [facility]

## 2019-10-21 NOTE — TELEPHONE ENCOUNTER
Normally the provider who is doing the procedure makes recommendations regarding the warfarin and whether to stop it or not.  They are expected to know better the risks of the procedure.  It does look like the last time she had this procedure they had her stop it and even checked her INR before the procedure, so I would recommend that she contact them again and clarify why they are not having her hold it this time but they had her hold it last time.

## 2019-10-21 NOTE — TELEPHONE ENCOUNTER
TO PCP:  Patient is having a Bilateral Lumbar RFA on Friday with Pain Clinic with Qiana Maldonado MD.  She was told by Dr. Maldonado's office that she does NOT need to hold warfarin for procedure.    Patient checking to see if this is accurate.  Explained to patient that procedure's with low risk of bleeding usually don't require warfarin hold.    Patient would like opinion from PCP. Please advise.    Thank you.  Cheryl Krishnan RN  Anticoagulation Nurse - [facility]

## 2019-10-21 NOTE — TELEPHONE ENCOUNTER
Pre-screening Questions for RFA Procedure      Procedure ordered? LRFA    What insurance are we billing for this procedure?  Medicare/BC    Has patient had this injection before? No  Any chance of pregnancy? NO   If YES, do NOT schedule and route to RN pool    Is  Needed?: No  Will patient have a ?  Yes   If pt is given sedation meds, no driving for 24 hours.  Is pt taking a cab or transportation service? NO        If so will need to be accompanied by an adult too (friend/family member) in order for IV sedation to be given.      Per [place] Policy:  Outpatients are to have responsible adult or family member to accompany them at discharge and drive them home. A service providing medically trained drivers or attendants would be acceptable. Public transportation would not be acceptable unless the patient is accompanied by a responsible adult or family member.  Is patient taking any blood thinners (plavix, coumadin, jantoven, warfarin, heparin, pradaxa or dabigatran )? YES Warfarin- NO Hold  If YES, do NOT schedule, and route to RN pool    Is patient taking any aspirin products? No     If more than 325mg/day do NOT schedule and route to RN pool     For CERVICAL procedures, hold all aspirin products for 6 days.     Does the patient have a bleeding or clotting disorder? No     If YES, it it OKAY to schedule AND route to RN pool    **For any patients with platelet count <100, must be forwarded to provider**    Is patient diabetic? No If YES, have them bring their glucometer.    Does patient have an active infection or treated for one within the past week? No   If YES, do NOT schedule and route to RN nurse pool     Is patient currently taking any antibiotics?  No    For patients on chronic, preventative, or prophylactic antibiotics, procedures may be scheduled.     For patients on antibiotics for active or recent infection:    Maria Elena Tracey Burton, Snitzer-antibiotic course must have been  completed for 4 days    Is patient currently taking any steroid medications? (i.e. Prednisone, Medrol)  No     For patients on steroid medications:    Phylicia Ledesma, Maria Elena, Inocencia Wolf-steroid course must have been completed for 4 days    Reviewed with patient:  If you are started on any steroids or antibiotics between now and your appointment, you must contact us because it may affect our ability to perform your procedure.  Yes    Is patient actively being treated for cancer or immunocompromised, including the spleen having been removed? No  If YES, do NOT schedule and route to RN pool     Any history of complications with sedation medications?  NO   If YES, OK to schedule AND route to RN pool     Any history of sleep apnea?  YES   If YES, OK to schedule AND route to RN pool     Any cardiac history?  YES   If YES, OK to schedule AND route to RN pool     Do you have an implanted pacemaker, ICD (implanted cardiac device) or AICD (automatic implanted cardiac device)?  NO    If YES, do NOT schedule AND route to RN pool.     Obtain name of device :       Obtain name of cardiologist:       Do you have an implanted stimulator?  NO    If YES, OK to schedule AND route to nursing.     Instruct patient to bring in the remote to the appointment and it will need to be turned off.  reviewed      Does patient have an allergy to contrast dye, iodine or shellfish?  No   If YES, OK to schedule. Route to RN pool AND add allergy information to appointment notes    Are you able to get on and off an exam table with minimal or no assistance? Yes   If NO, do NOT schedule and route to RN pool    Are you able to roll over and lay on your stomach with minimal or no assistance? Yes   If NO, do NOT schedule and route to RN pool    Informed patient that s/he needs to be NPO for 6 hours before procedure?  YES   Informed patient that it is OK to take normal medications with sips of water, especially blood pressure medications,  before the procedure and must hold blood thinners as instructed.  Yes  Informed patient to arrive 30 minutes before procedure time to have an IV inserted.  reviewed   Do NOT schedule at 0745, 0815 or 1245.  reviewed   All radiofrequency ablations are in a 90 minute time slot except genicular radiofrequency ablation those are 60 minute time slot.  reviewed

## 2019-10-22 NOTE — TELEPHONE ENCOUNTER
Below information given to patient via detailed VM. Asked that she call back with any questions or concerns.  Cheryl Krishnan RN  Anticoagulation Nurse - [facility] [place]

## 2019-10-22 NOTE — TELEPHONE ENCOUNTER
Patient called and would like to speak to a nurse with questions about her RFA.      Brittani Choi    [facility]

## 2019-10-22 NOTE — TELEPHONE ENCOUNTER
Called patient. She wanted to clarify that she did not need to hold coumadin as she had in previous RFA's. Advised that we did not need her to hold as policy for RFA anticoagulation holds changed.     Myah PATINO, RN Care Coordinator  [facility]  Pain [facility]

## 2019-10-25 ENCOUNTER — ANCILLARY PROCEDURE (OUTPATIENT)
Dept: GENERAL RADIOLOGY | Facility: CLINIC | Age: 79
End: 2019-10-25
Attending: PHYSICAL MEDICINE & REHABILITATION
Payer: MEDICARE

## 2019-10-25 ENCOUNTER — RADIOLOGY INJECTION OFFICE VISIT (OUTPATIENT)
Dept: PALLIATIVE MEDICINE | Facility: CLINIC | Age: 79
End: 2019-10-25
Attending: PHYSICAL MEDICINE & REHABILITATION
Payer: MEDICARE

## 2019-10-25 VITALS
RESPIRATION RATE: 20 BRPM | OXYGEN SATURATION: 93 % | SYSTOLIC BLOOD PRESSURE: 127 MMHG | HEART RATE: 55 BPM | DIASTOLIC BLOOD PRESSURE: 80 MMHG

## 2019-10-25 DIAGNOSIS — I48.20 CHRONIC ATRIAL FIBRILLATION (H): ICD-10-CM

## 2019-10-25 DIAGNOSIS — M47.816 FACET ARTHROPATHY, LUMBAR: ICD-10-CM

## 2019-10-25 DIAGNOSIS — M47.816 FACET ARTHROPATHY, LUMBAR: Primary | ICD-10-CM

## 2019-10-25 DIAGNOSIS — G89.18 PAIN ASSOCIATED WITH SURGICAL PROCEDURE: ICD-10-CM

## 2019-10-25 DIAGNOSIS — Z79.01 LONG TERM CURRENT USE OF ANTICOAGULANT THERAPY: ICD-10-CM

## 2019-10-25 DIAGNOSIS — G89.18 POST PROCEDURE DISCOMFORT: ICD-10-CM

## 2019-10-25 PROCEDURE — 99152 MOD SED SAME PHYS/QHP 5/>YRS: CPT | Performed by: PHYSICAL MEDICINE & REHABILITATION

## 2019-10-25 PROCEDURE — 64635 DESTROY LUMB/SAC FACET JNT: CPT | Mod: 50 | Performed by: PHYSICAL MEDICINE & REHABILITATION

## 2019-10-25 PROCEDURE — 99153 MOD SED SAME PHYS/QHP EA: CPT | Performed by: PHYSICAL MEDICINE & REHABILITATION

## 2019-10-25 PROCEDURE — 64636 DESTROY L/S FACET JNT ADDL: CPT | Mod: 50 | Performed by: PHYSICAL MEDICINE & REHABILITATION

## 2019-10-25 RX ORDER — FENTANYL CITRATE 50 UG/ML
50 INJECTION, SOLUTION INTRAMUSCULAR; INTRAVENOUS EVERY 5 MIN PRN
Status: DISCONTINUED | OUTPATIENT
Start: 2019-10-25 | End: 2019-12-26

## 2019-10-25 RX ORDER — OXYCODONE AND ACETAMINOPHEN 5; 325 MG/1; MG/1
.5-1 TABLET ORAL EVERY 4 HOURS PRN
Qty: 20 TABLET | Refills: 0 | Status: SHIPPED | OUTPATIENT
Start: 2019-10-25 | End: 2019-12-11

## 2019-10-25 RX ORDER — WARFARIN SODIUM 5 MG/1
TABLET ORAL
Qty: 90 TABLET | Refills: 1 | Status: SHIPPED | OUTPATIENT
Start: 2019-10-25 | End: 2020-04-23

## 2019-10-25 RX ADMIN — FENTANYL CITRATE 50 MCG: 50 INJECTION, SOLUTION INTRAMUSCULAR; INTRAVENOUS at 09:07

## 2019-10-25 NOTE — PROGRESS NOTES
"[facility] - Procedure Note    Date of Visit: 10/25/2019    Pre procedure Diagnosis: facet arthropathy   Post procedure Diagnosis: Same  Procedure performed: Bilateral L3,4,5 medial branch radiofrequency ablation   Anesthesia: moderate sedation with 0.5mg versed & 50mcg fentanyl  Complications: NONE  Operators: Qiana Maldonado MD    Indications:   Cielo Aguillon is a 79 year old female who is seen by me in clinic presents today for repeat lumbar radiofrequency ablation. She previously had this done in 2014 with about 50% improvement for about 6 months and again on the right side in March 2019 with about the same improvement. She has a history of chronic low back pain, right > left.  Exam shows increased discomfort with extension and rotation and they have tried conservative treatment including medications.    MRI of Lumbar Spine was completed on 1/24/2019 which showed:  \"Impression:   1. L1 superior endplate large broad based Schmorl's node versus acute/subacute endplate compression injury with mild adjacent marrow edema. This is new since 2012.  2. Multilevel degenerative disc disease with mild discogenic endplate reactive marrow edema at L2-3.  3. Multilevel foraminal stenosis, greatest on the right L4-5 and L3-4.\"    Allergies:      Allergies   Allergen Reactions     Ace Inhibitors      Cough (Zestril)     Ambien [Zolpidem] Other (See Comments)     Aspirin      hx bleeding ulcers     Hydrochlorothiazide [Hctz] Rash     Ibuprofen      hx bleeding ulcers     Amoxicillin Rash     Losartan Rash     Penicillins Rash        Vitals:  /65   Pulse 63   Resp 20   SpO2 93%     Review of Systems: The patient denies recent fever, chills, illness, use of antibiotics or anticoagulants. All other 10-point review of systems negative.     Physical Exam:   Resp: CTA bilaterally  Airway:  Mallampati Class II    Options/alternatives, benefits and risks were discussed with the patient including " bleeding, infection, no pain relief, tissue trauma, exposure to radiation, reaction to medications including seizure, spinal cord injury,increased pain after the procedure, weakness, numbness or sensory changes and headache.   We also discussed risks of sedation, including reaction to medications and cardiovascular collapse.    Questions were answered to her satisfaction and she agrees to proceed. Voluntary informed consent was obtained and signed.     Medications were reviewed:  Yes   Pre-procedure pain score: 6/10    Procedure:  After getting informed consent, patient was brought into the procedure suite and was placed in a prone position on the procedure table.   A Pause for the Cause was performed.  Patient was prepped and draped in sterile fashion.     Cielo Aguillon had an IV line placed prior the procedure.  The C-arm was positioned in the right oblique view to afford optimal view of the L4-L5 vertebral bodies. Lidocaine 1% was used to anesthetize the skin at each level.  At each level, a 100mm, 20G curved radiofrequency cannula with a 10mm active tip was positioned, overlying the intersection of the transverse process and pedicle at L4 & L5, and was advanced under intermittent fluoroscopy until it contacted the transverse process and notch, and the tip slightly overran that process, just lateral to the mamillary process.  The position of each cannula was verified and optimized in the oblique view and AP views.    In the AP view, another cannula was placed at the sacral alar notch.      Each position was tested for motor and sensory stimulation, and was positioned so that stimulation was negative for stimuli outside the immediate area of the desired lesion.  Sensory stimulation was completed at 50 Hz, with max stimulation up to 0.8V.  Motor stimulation was completed at 2Hz, up to 2.5V.  Combination of Lidocaine 2% and Bupivacaine 0.5% 0.5 ml was injected at each level, and a 90 second, 80 degree Centigrade  lesion was generated.    The needles were then rotated within the pathway of the medial branch, and locations were evaluated with repeat imaging.  Motor testing was again completed, and showed appropriate stimulation.  A second lesion was then generated at each location.    The procedure was then repeated on the left side, using the same technique as described above.    The needles were withdrawn. Hemostasis was achieved, the area was cleaned, and bandaids were placed when appropriate.  The patient tolerated the procedure well, and was taken to the recovery room.    Images were saved to PACS.    Start sedation time: 0855  End sedation time: 0925    Post-procedure pain score: 0/10    Assessment/Plan: Cielo Aguillon is a 79 year old female s/p bilateral lumbar L3,4,5 medial branch RFA for chronic low back pain.     1. Following today's procedure, the patient was advised to contact the [facility] for any of the following:   Fever, chills, or night sweats   New onset of pain, numbness, or weakness   Any questions/concerns regarding the procedure  If unable to contact the Pain Center, the patient was instructed to go to a local Emergency Room for any complications.     2. The patient should follow-up with the referring provider for post-procedure evaluation.    3. We will follow up with a phone call in one week.    4. post-procedure pain medications: Percocet 2.5-5/325mg 0.5-1 tabs q4hrs PRN pain - max 4/day #20 tablets no refills    Qiana Maldonado MD  [facility]    [facility] Procedure Note        Sedation:      Performed by: Qiana Maldonado MD  Authorized by: Qiana Maldonado MD    Pre-Procedure Assessment done at 0830.    Sedation Level:  Moderate Sedation    Indication:  Sedation is required to allow for Radiofrequency Ablation    Consent obtained from patient after discussing the risks, benefits and alternatives.    PO Intake:  Appropriately NPO for  procedure    ASA Class:  Class 3 - SEVERE SYSTEMIC DISEASE, DEFINITE FUNCTIONAL LIMITATIONS.    Mallampati:  Grade 2:  Soft palate, base of uvula, tonsillar pillars, and portion of posterior pharyngeal wall visible    Lungs: Lungs Clear with good breath sounds bilaterally.     Heart: Normal heart sounds and rate    History and physical reviewed and no updates needed. I have reviewed the lab findings, diagnostic data, medications, and the plan for sedation. I have determined this patient to be an appropriate candidate for the planned sedation and procedure and have reassessed the patient IMMEDIATELY PRIOR to sedation and procedure.      Sedation Post Procedure Summary:    Prior to the start of the procedure and with procedural staff participation, I verbally confirmed the patient s identity using two indicators, relevant allergies, that the procedure was appropriate and matched the consent or emergent situation, and that the correct equipment/implants were available. Immediately prior to starting the procedure I conducted the Time Out with the procedural staff and re-confirmed the patient s name, procedure, and site/side. (The Joint Commission universal protocol was followed.)  Yes      Sedatives: Fentanyl and Midazolam (Versed)    Vital signs, airway, and pulse oximetry were monitored and remained stable throughout the procedure and sedation was maintained until the procedure was complete.  The patient was monitored by staff until sedation discharge criteria were met.    Patient tolerance: Patient tolerated the procedure well with no immediate complications.    Time of sedation in minutes:  30 minutes from beginning to end of physician one to one monitoring.

## 2019-10-25 NOTE — TELEPHONE ENCOUNTER
Warfarin dosing is managed by INR Clinic.  Prescription approved per [facility] Refill Protocol.  Mckenna Robison RN

## 2019-10-25 NOTE — NURSING NOTE
MD Time IN: 0850  Sedation start time:  0855  Sedation end time:  0925    Medications given: fentanyl 50 mcg IV; versed 0.5 mg IV  Intravenous fluids were administered, normal saline 150 cc's.  Sedation Level Achieved:  Moderate (conscious) sedation

## 2019-10-25 NOTE — NURSING NOTE
Pre-procedure Intake    Have you been fasting? Yes    If yes, for how long? 6 hr      Are you taking a prescribed blood thinner such as coumadin, Plavix, Xarelto?    Yes warfarin    If yes, when did you take your last dose? today    Do you take aspirin?  No    If cervical procedure, have you held aspirin for 6 days?   NA    Do you have any allergies to contrast dye, iodine, steroid and/or numbing medications?  NO    Are you currently taking antibiotics or have an active infection?  NO    Have you had a fever/elevated temperature within the past week? NO    Are you currently taking oral steroids? NO    Do you have a ? Yes       Are you pregnant or breastfeeding?  Not Applicable    Are the vital signs normal?  Yes

## 2019-10-25 NOTE — PATIENT INSTRUCTIONS
[facility]   Radiofrequency Ablation (RFA) Discharge Instructions     Today you saw:   Dr. Qiana Maldonado      You should anticipate procedural pain for up to 2 weeks.     You may receive a prescription for pain medication and you should take this as directed.     It may take up to 8 weeks to receive relief from the RFA     Follow up with your provider in      weeks.     If you received sedation before, during or after your procedure, for the next 24 hours you shall NOT:    -Drive    -Operate machinery    -Drink alcohol    -Sign any legal documents     You may resume your normal diet     You may resume your regular medications after the procedure     Be cautious with walking. Numbness and/or weakness in the lower extremities may occur for up to 6-8 hours due to effect of local anesthetic    Avoid strenuous activity for the first 24 hours     You may resume your regular activities after 24 hours     You may shower, however no swimming, tub baths or hot tubs for 24 hours following your procedure     You may use ice packs 10-15 minutes three to four times a day at the injection site for comfort     Do not use heat to painful areas for 6 to 8 hours. This will give the local anesthetic time to wear off and prevent you from accidentally burning your skin.     Unless you have been directed to avoid the use of anti-inflammatory medications (NSAIDS), you may use medications such as ibuprofen, Aleve or Tylenol for pain control if needed.     If you experience any of the following, call the Pain Clinic during work hours (Monday through Friday 8 am-4:30 pm) at [phone] or the Provider Line after hours at [phone]:   -Fever over 100 degree F    -Swelling, bleeding, redness, drainage, warmth at the injection site    -Progressive weakness or numbness in your legs or arms    -Loss of bowel or bladder function    -Unusual headache that is not relieved by Tylenol    -Unusual new onset of pain that  is not improving

## 2019-10-25 NOTE — TELEPHONE ENCOUNTER
"Requested Prescriptions   Pending Prescriptions Disp Refills     warfarin ANTICOAGULANT (COUMADIN) 5 MG tablet [Pharmacy Med Name: WARFARIN SODIUM 5 MG TABLET 5 TAB] 90 tablet 1     Sig: TAKE 1 TABLET (5MG) BY MOUTH DAILY OR AS DIRECTED BY INR CLINIC   Last Written Prescription Date:  04/26/2019  Last Fill Quantity: 90,  # refills: 01   Last office visit: 7/18/2019 with prescribing provider:     Future Office Visit:      Vitamin K Antagonists Failed - 10/25/2019 10:06 AM        Failed - INR is within goal in the past 6 weeks     Confirm INR is within goal in the past 6 weeks.     Recent Labs   Lab Test 10/01/19   INR 2.4*                       Failed - Medication is active on med list        Passed - Recent (12 mo) or future (30 days) visit within the authorizing provider's specialty     Patient has had an office visit with the authorizing provider or a provider within the authorizing providers department within the previous 12 mos or has a future within next 30 days. See \"Patient Info\" tab in inbasket, or \"Choose Columns\" in Meds & Orders section of the refill encounter.              Passed - Patient is 18 years of age or older        Passed - Patient is not pregnant        Passed - No positive pregnancy on file in past 12 months          "

## 2019-10-25 NOTE — NURSING NOTE
Discharge Information    IV Discontiued Time:  1020    Amount of Fluid Infused: 150mL    Discharge Criteria = When patient returns to baseline or as per MD order    Consciousness:  Pt is fully awake    Circulation:  BP +/- 20% of pre-procedure level    Respiration:  Patient is able to breathe deeply    O2 Sat:  Patient is able to maintain O2 Sat >92% on room air    Activity:  Moves 4 extremities on command    Ambulation:  Patient is able to stand and walk or stand and pivot into wheelchair    Dressing:  Clean/dry or No Dressing    Notes:   Discharge instructions and AVS given to patient    Patient meets criteria for discharge?  YES    Admitted to PCU?  No    Responsible adult present to accompany patient home?  Yes    Signature/Title:    Myah Crenshaw RN  RN Care Coordinator  [facility] [place]

## 2019-10-29 ENCOUNTER — HOSPITAL ENCOUNTER (OUTPATIENT)
Dept: MAMMOGRAPHY | Facility: CLINIC | Age: 79
Discharge: HOME | End: 2019-10-29
Attending: INTERNAL MEDICINE | Admitting: INTERNAL MEDICINE
Payer: MEDICARE

## 2019-10-29 ENCOUNTER — ANTICOAGULATION THERAPY VISIT (OUTPATIENT)
Dept: ANTICOAGULATION | Facility: CLINIC | Age: 79
End: 2019-10-29
Payer: MEDICARE

## 2019-10-29 DIAGNOSIS — Z79.01 LONG TERM CURRENT USE OF ANTICOAGULANTS WITH INR GOAL OF 2.0-3.0: ICD-10-CM

## 2019-10-29 DIAGNOSIS — Z12.31 SCREENING MAMMOGRAM, ENCOUNTER FOR: ICD-10-CM

## 2019-10-29 DIAGNOSIS — I48.20 CHRONIC ATRIAL FIBRILLATION (H): ICD-10-CM

## 2019-10-29 LAB — INR POINT OF CARE: 1.9 (ref 0.86–1.14)

## 2019-10-29 PROCEDURE — 85610 PROTHROMBIN TIME: CPT | Mod: QW

## 2019-10-29 PROCEDURE — 99207 ZZC NO CHARGE NURSE ONLY: CPT

## 2019-10-29 PROCEDURE — 36416 COLLJ CAPILLARY BLOOD SPEC: CPT

## 2019-10-29 PROCEDURE — 77063 BREAST TOMOSYNTHESIS BI: CPT

## 2019-10-29 NOTE — PROGRESS NOTES
ANTICOAGULATION FOLLOW-UP CLINIC VISIT    Patient Name:  Cielo Aguillon  Date:  10/29/2019  Contact Type:  Face to Face    SUBJECTIVE:  Patient Findings     Comments:   The patient was assessed for diet, medication, and activity level changes, missed or extra doses, bruising or bleeding, with no problem findings.  Patient denies any identifiable changes that caused the subtherapeutic INR.           Clinical Outcomes     Comments:   The patient was assessed for diet, medication, and activity level changes, missed or extra doses, bruising or bleeding, with no problem findings.  Patient denies any identifiable changes that caused the subtherapeutic INR.              OBJECTIVE    INR Protime   Date Value Ref Range Status   10/29/2019 1.9 (A) 0.86 - 1.14 Final       ASSESSMENT / PLAN  INR assessment THER    Recheck INR In: 2 WEEKS    INR Location Clinic      Anticoagulation Summary  As of 10/29/2019    INR goal:   2.0-3.0   TTR:   56.6 % (3.5 y)   INR used for dosin.9! (10/29/2019)   Warfarin maintenance plan:   2.5 mg (5 mg x 0.5) every Sun, Wed; 5 mg (5 mg x 1) all other days   Full warfarin instructions:   2.5 mg every Sun, Wed; 5 mg all other days   Weekly warfarin total:   30 mg   No change documented:   Cindi Preston RN   Plan last modified:   Mckenna Robison RN (5/10/2019)   Next INR check:   2019   Priority:   INR   Target end date:       Indications    Chronic atrial fibrillation [I48.20]  Long term current use of anticoagulants with INR goal of 2.0-3.0 [Z79.01]             Anticoagulation Episode Summary     INR check location:       Preferred lab:       Send INR reminders to:   [facility]    Comments:         Anticoagulation Care Providers     Provider Role Specialty Phone number    Simin Lopez MD [facility] [phone]            See the Encounter Report to view Anticoagulation Flowsheet and Dosing Calendar (Go to Encounters tab in chart review, and find  the Anticoagulation Therapy Visit)    Dosage adjustment made based on physician directed care plan.    Cindi Preston RN

## 2019-11-12 ENCOUNTER — ANTICOAGULATION THERAPY VISIT (OUTPATIENT)
Dept: ANTICOAGULATION | Facility: CLINIC | Age: 79
End: 2019-11-12
Payer: MEDICARE

## 2019-11-12 DIAGNOSIS — Z79.01 LONG TERM CURRENT USE OF ANTICOAGULANTS WITH INR GOAL OF 2.0-3.0: ICD-10-CM

## 2019-11-12 DIAGNOSIS — I48.20 CHRONIC ATRIAL FIBRILLATION (H): ICD-10-CM

## 2019-11-12 LAB — INR POINT OF CARE: 2.7 (ref 0.86–1.14)

## 2019-11-12 PROCEDURE — 99207 ZZC NO CHARGE NURSE ONLY: CPT

## 2019-11-12 PROCEDURE — 36416 COLLJ CAPILLARY BLOOD SPEC: CPT

## 2019-11-12 PROCEDURE — 85610 PROTHROMBIN TIME: CPT | Mod: QW

## 2019-11-12 NOTE — PROGRESS NOTES
ANTICOAGULATION FOLLOW-UP CLINIC VISIT    Patient Name:  Cielo Aguillon  Date:  2019  Contact Type:  Face to Face    SUBJECTIVE:  Patient Findings     Positives:   Signs/symptoms of bleeding (minor bleeding from gums when she is brushing and flossing)    Comments:   The patient was assessed for diet, medication, and activity level changes, missed or extra doses, bruising, with no problem findings.          Clinical Outcomes     Negatives:   Major bleeding event, Thromboembolic event, Anticoagulation-related hospital admission, Anticoagulation-related ED visit, Anticoagulation-related fatality    Comments:   The patient was assessed for diet, medication, and activity level changes, missed or extra doses, bruising, with no problem findings.             OBJECTIVE    INR Protime   Date Value Ref Range Status   2019 2.7 (A) 0.86 - 1.14 Final       ASSESSMENT / PLAN  INR assessment THER    Recheck INR In: 4 WEEKS    INR Location Clinic      Anticoagulation Summary  As of 2019    INR goal:   2.0-3.0   TTR:   57.0 % (3.6 y)   INR used for dosin.7 (2019)   Warfarin maintenance plan:   2.5 mg (5 mg x 0.5) every Sun, Wed; 5 mg (5 mg x 1) all other days   Full warfarin instructions:   2.5 mg every Sun, Wed; 5 mg all other days   Weekly warfarin total:   30 mg   No change documented:   Mckenna Robison RN   Plan last modified:   Mckenna Robison RN (5/10/2019)   Next INR check:   2019   Priority:   INR   Target end date:       Indications    Chronic atrial fibrillation [I48.20]  Long term current use of anticoagulants with INR goal of 2.0-3.0 [Z79.01]             Anticoagulation Episode Summary     INR check location:       Preferred lab:       Send INR reminders to:   [facility]    Comments:         Anticoagulation Care Providers     Provider Role Specialty Phone number    Simin Lopez MD [facility] [phone]            See the Encounter Report to view  Anticoagulation Flowsheet and Dosing Calendar (Go to Encounters tab in chart review, and find the Anticoagulation Therapy Visit)    Dosage adjustment made based on physician directed care plan.    Mckenna Robison RN

## 2019-11-14 ENCOUNTER — HOSPITAL ENCOUNTER (EMERGENCY)
Facility: CLINIC | Age: 79
Discharge: HOME | End: 2019-11-14
Attending: EMERGENCY MEDICINE | Admitting: EMERGENCY MEDICINE
Payer: MEDICARE

## 2019-11-14 ENCOUNTER — APPOINTMENT (OUTPATIENT)
Dept: GENERAL RADIOLOGY | Facility: CLINIC | Age: 79
End: 2019-11-14
Attending: EMERGENCY MEDICINE
Payer: MEDICARE

## 2019-11-14 VITALS
TEMPERATURE: 97.4 F | RESPIRATION RATE: 18 BRPM | HEART RATE: 74 BPM | SYSTOLIC BLOOD PRESSURE: 122 MMHG | OXYGEN SATURATION: 95 % | DIASTOLIC BLOOD PRESSURE: 83 MMHG

## 2019-11-14 DIAGNOSIS — R42 DIZZINESS: ICD-10-CM

## 2019-11-14 DIAGNOSIS — N39.0 URINARY TRACT INFECTION WITHOUT HEMATURIA, SITE UNSPECIFIED: ICD-10-CM

## 2019-11-14 LAB
ALBUMIN UR-MCNC: 30 MG/DL
ANION GAP SERPL CALCULATED.3IONS-SCNC: 6 MMOL/L (ref 3–14)
APPEARANCE UR: ABNORMAL
BACTERIA #/AREA URNS HPF: ABNORMAL /HPF
BASOPHILS # BLD AUTO: 0 10E9/L (ref 0–0.2)
BASOPHILS NFR BLD AUTO: 0.4 %
BILIRUB UR QL STRIP: NEGATIVE
BUN SERPL-MCNC: 24 MG/DL (ref 7–30)
CALCIUM SERPL-MCNC: 9.5 MG/DL (ref 8.5–10.1)
CHLORIDE SERPL-SCNC: 106 MMOL/L (ref 94–109)
CO2 SERPL-SCNC: 27 MMOL/L (ref 20–32)
COLOR UR AUTO: YELLOW
CREAT SERPL-MCNC: 1.17 MG/DL (ref 0.52–1.04)
DIFFERENTIAL METHOD BLD: ABNORMAL
EOSINOPHIL # BLD AUTO: 0 10E9/L (ref 0–0.7)
EOSINOPHIL NFR BLD AUTO: 0.6 %
ERYTHROCYTE [DISTWIDTH] IN BLOOD BY AUTOMATED COUNT: 13.7 % (ref 10–15)
GFR SERPL CREATININE-BSD FRML MDRD: 44 ML/MIN/{1.73_M2}
GLUCOSE SERPL-MCNC: 91 MG/DL (ref 70–99)
GLUCOSE UR STRIP-MCNC: NEGATIVE MG/DL
HCT VFR BLD AUTO: 44.5 % (ref 35–47)
HGB BLD-MCNC: 14.8 G/DL (ref 11.7–15.7)
HGB UR QL STRIP: NEGATIVE
HYALINE CASTS #/AREA URNS LPF: 55 /LPF (ref 0–2)
IMM GRANULOCYTES # BLD: 0 10E9/L (ref 0–0.4)
IMM GRANULOCYTES NFR BLD: 0.4 %
INR PPP: 2.02 (ref 0.86–1.14)
KETONES UR STRIP-MCNC: NEGATIVE MG/DL
LEUKOCYTE ESTERASE UR QL STRIP: ABNORMAL
LYMPHOCYTES # BLD AUTO: 1.8 10E9/L (ref 0.8–5.3)
LYMPHOCYTES NFR BLD AUTO: 26.1 %
MCH RBC QN AUTO: 33.7 PG (ref 26.5–33)
MCHC RBC AUTO-ENTMCNC: 33.3 G/DL (ref 31.5–36.5)
MCV RBC AUTO: 101 FL (ref 78–100)
MONOCYTES # BLD AUTO: 0.5 10E9/L (ref 0–1.3)
MONOCYTES NFR BLD AUTO: 7.9 %
MUCOUS THREADS #/AREA URNS LPF: PRESENT /LPF
NEUTROPHILS # BLD AUTO: 4.4 10E9/L (ref 1.6–8.3)
NEUTROPHILS NFR BLD AUTO: 64.6 %
NITRATE UR QL: NEGATIVE
NRBC # BLD AUTO: 0 10*3/UL
NRBC BLD AUTO-RTO: 0 /100
NT-PROBNP SERPL-MCNC: 3633 PG/ML (ref 0–1800)
PH UR STRIP: 5.5 PH (ref 5–7)
PLATELET # BLD AUTO: 188 10E9/L (ref 150–450)
POTASSIUM SERPL-SCNC: 4.1 MMOL/L (ref 3.4–5.3)
RBC # BLD AUTO: 4.39 10E12/L (ref 3.8–5.2)
RBC #/AREA URNS AUTO: 5 /HPF (ref 0–2)
SODIUM SERPL-SCNC: 139 MMOL/L (ref 133–144)
SOURCE: ABNORMAL
SP GR UR STRIP: 1.02 (ref 1–1.03)
SQUAMOUS #/AREA URNS AUTO: 5 /HPF (ref 0–1)
TROPONIN I SERPL-MCNC: <0.015 UG/L (ref 0–0.04)
UROBILINOGEN UR STRIP-MCNC: NORMAL MG/DL (ref 0–2)
WBC # BLD AUTO: 6.8 10E9/L (ref 4–11)
WBC #/AREA URNS AUTO: 50 /HPF (ref 0–5)

## 2019-11-14 PROCEDURE — 81001 URINALYSIS AUTO W/SCOPE: CPT | Performed by: EMERGENCY MEDICINE

## 2019-11-14 PROCEDURE — 71046 X-RAY EXAM CHEST 2 VIEWS: CPT

## 2019-11-14 PROCEDURE — 84484 ASSAY OF TROPONIN QUANT: CPT | Performed by: EMERGENCY MEDICINE

## 2019-11-14 PROCEDURE — 85610 PROTHROMBIN TIME: CPT | Performed by: EMERGENCY MEDICINE

## 2019-11-14 PROCEDURE — 99285 EMERGENCY DEPT VISIT HI MDM: CPT | Mod: 25

## 2019-11-14 PROCEDURE — 80048 BASIC METABOLIC PNL TOTAL CA: CPT | Performed by: EMERGENCY MEDICINE

## 2019-11-14 PROCEDURE — 87086 URINE CULTURE/COLONY COUNT: CPT | Performed by: EMERGENCY MEDICINE

## 2019-11-14 PROCEDURE — 93005 ELECTROCARDIOGRAM TRACING: CPT

## 2019-11-14 PROCEDURE — 83880 ASSAY OF NATRIURETIC PEPTIDE: CPT | Performed by: EMERGENCY MEDICINE

## 2019-11-14 PROCEDURE — 85025 COMPLETE CBC W/AUTO DIFF WBC: CPT | Performed by: EMERGENCY MEDICINE

## 2019-11-14 RX ORDER — CEPHALEXIN 500 MG/1
500 CAPSULE ORAL 2 TIMES DAILY
Qty: 28 CAPSULE | Refills: 0 | Status: SHIPPED | OUTPATIENT
Start: 2019-11-14 | End: 2019-11-26

## 2019-11-14 ASSESSMENT — ENCOUNTER SYMPTOMS
DYSURIA: 0
RHINORRHEA: 0
FATIGUE: 1
VOMITING: 0
DIARRHEA: 0
DIZZINESS: 1
SORE THROAT: 0
FEVER: 0
NUMBNESS: 1
SHORTNESS OF BREATH: 1
DIFFICULTY URINATING: 0
NAUSEA: 0
WEAKNESS: 1
COUGH: 1
APPETITE CHANGE: 1

## 2019-11-14 NOTE — ED AVS SNAPSHOT
[facility] Emergency Department  [address]  [facility] [address]  Phone:  [phone]  Fax:  [phone]                                    Cielo Aguillon   MRN: [id number]    Department:  [facility] Emergency Department   Date of Visit:  11/14/2019           After Visit Summary Signature Page    I have received my discharge instructions, and my questions have been answered. I have discussed any challenges I see with this plan with the nurse or doctor.    ..........................................................................................................................................  Patient/Patient Representative Signature      ..........................................................................................................................................  Patient Representative Print Name and Relationship to Patient    ..................................................               ................................................  Date                                   Time    ..........................................................................................................................................  Reviewed by Signature/Title    ...................................................              ..............................................  Date                                               Time          22EPIC Rev 08/18

## 2019-11-14 NOTE — ED NOTES
Bed: ED35  Expected date: 11/14/19  Expected time: 5:27 PM  Means of arrival:   Comments:  [facility] [address] - 79 F

## 2019-11-15 LAB
BACTERIA SPEC CULT: NORMAL
INTERPRETATION ECG - MUSE: NORMAL
Lab: NORMAL
SPECIMEN SOURCE: NORMAL

## 2019-11-15 NOTE — ED PROVIDER NOTES
History     Chief Complaint:  Dizziness    HPI   Cielo Aguillon is a 79 year old female with a history of hypertension, hypothyroidism, atrial fibrillation, anticoagulated on Coumadin, who presents to the emergency department for evaluation of dizziness. The patient reports she was went to the [facility] for left ear pain and fullness as well as dizziness that she has been experiencing for a couple months, and was sent to the emergency department for further evaluation after she was hypotensive (range from 93/60 to 83/53). The patient endorses the feeling of constant fatigue, decreased food and water intake secondary to loss of appetite, as well as shortness of breath and weakness secondary to ambulation. She has also been experiencing numbness of her left upper extremity.  She reports she is usually dizzy upon standing in the morning and coughs every morning. The patient reports she is not dizzy here while laying down, but she thinks she would be dizzy if she stood up. The patient denies chest pain, fever, abdominal pain, nausea, vomiting, diarrhea, cold symptoms, or urinary symptoms. The patient has hypertension and takes Norvasc once per day and Lopressor twice per day, but stopped taking Norvasc for a week without a dramatic change in blood pressure. She does not take any diuretics. She denies a history of a myocardial infarction.    Allergies:  Ace Inhibitors  Ambien  Aspirin  Hydrochlorothiazide  Ibuprofen  Amoxicillin  Losartan  Penicillins     Medications:    Norvasc  Celexa  Valium   Levothyroxine  Lopressor  Crestor  Tramadol   Coumadin     Past Medical History:    Anxiety   Arthritis   Arteriosclerotic cardiovascular disease  Atrial fibrillation   Atrial flutter  CAD  Hypertension   Fatty liver  Hypertriglyceridemia  Depression   BETHANY  Peptic ulcer  Ventral hernia   Hypothyroidism   Tubular adenoma  Hyperlipidemia     Past Surgical History:    Appendectomy   Tonsillectomy   Tubal  ligation   Shave bone spurs, left foot  Colonoscopy   Coronary angiography adult order  EGD  Cataract extraction, bilateral   [place] teeth surgery   Heart cath left and heart cath right    Family History:    Father: emphysema  Mother: Alzheimer Disease, congenital anomalies, osteoporosis     Social History:  The patient was accompanied to the ED by her .  Pain & Palli: Qiana Cash  IM: Simin Olson  Smoking Status: Never Smoker  Smokeless Tobacco: Never Used  Alcohol Use: Positive  Drug Use: Negative   Marital Status:   [2]     Review of Systems   Constitutional: Positive for appetite change (loss) and fatigue. Negative for fever.   HENT: Positive for ear pain. Negative for congestion, rhinorrhea and sore throat.    Respiratory: Positive for cough and shortness of breath.    Cardiovascular: Negative for chest pain.   Gastrointestinal: Negative for diarrhea, nausea and vomiting.   Genitourinary: Negative for difficulty urinating and dysuria.   Neurological: Positive for dizziness, weakness and numbness (left arm).   All other systems reviewed and are negative.      Physical Exam     Patient Vitals for the past 24 hrs:   BP Temp Temp src Pulse Heart Rate Resp SpO2   11/14/19 2030 122/83 -- -- 74 -- 18 95 %   11/14/19 1800 (!) 125/94 -- -- 84 83 -- 93 %   11/14/19 1756 (!) 125/94 97.4  F (36.3  C) Oral 83 -- 18 92 %       Physical Exam  General: Patient is awake, alert and interactive when I enter the room  Head: The scalp, face, and head appear normal  Eyes: The pupils are equal, round, and reactive to light. Conjunctivae and sclerae are normal. No nystagmus. Full ROM of both eyes and appears symmetric without obvious palsy.   ENT: External acoustic canals are normal. The oropharynx is normal without erythema. Uvula is in the midline  Neck: Normal range of motion. No anterior cervical lymphadenopathy noted  CV: Regular rate. S1/S2. No murmurs.   Resp: Lungs are clear without wheezes or rales. No  respiratory distress.   GI: Abdomen is soft, no rigidity, guarding, or rebound. No distension. No tenderness to palpation in any quadrant.     MS: Normal tone. Joints grossly normal without effusions. No asymmetric leg swelling, calf or thigh tenderness.    Skin: No rash or lesions noted. Normal capillary refill noted  Neuro:   Speech is normal and fluent.   Face is symmetric without droop. CN's II-XII intact. Negative pronator drift.  Right Arm: Good  strength. 5/5 elbow flexion. 5/5 elbow extension. Sensation intact to light touch.   Left Arm: Good  strength. 5/5 elbow flexion. 5/5 elbow extension. Sensation intact to light touch.   Right Le/5 straight leg raise, 5/5 knee flexion, 5/5 knee extension, 5/5 dorsiflexion, 5/5 plantar flexion. Sensation intact to light touch.   Left Le/5 straight leg raise, 5/5 knee flexion, 5/5 knee extension, 5/5 dorsiflexion, 5/5 plantar flexion. Sensation intact to light touch.    Romberg and gait: normal; steady with walker (pt baseline)  Psych:  Normal affect.  Appropriate interactions.    Emergency Department Course   ECG:  Time: 1756  Vent. Rate 74 bpm. WI interval *. QRS duration 82. QT/QTc 430/477. P-R-T axis * 72 -16.  Atrial fibrillation   Nonspecific ST abnormality   Abnormal QRS-T angle, consider primary T wave abnormality   Abnormal ECG  No significant change compared to EKG dated 2018.  Read time: 1756    Imaging:  Radiographic findings were communicated with the patient who voiced understanding of the findings.    XR Chest 2 Views  IMPRESSION: Normal heart size and mediastinal contour. No pleural effusion or pneumothorax. Mild bronchiectasis suggested. No pulmonary mass or consolidation. As per Radiology.      Laboratory:  CBC: WBC: 6.8, HGB 14.8, PLT: 13.7    BMP: Creatinine 1.17 (H), GFR Estimate 44 (L), o/w WNL    BNP: 3633 (H)    INR: 2.02 (H)    1801 Troponin: <0.015    UA: Protein Albumin 30, Leukocyte Large, RBC 5 (H), WBC 50 (H), Bacteria  Moderate, Squamous 5 (H), Mucous Present, Hyalie Casts 55 (H)     Urine Culture Aerobic Bacterial: pending    Emergency Department Course:   Past medical records, nursing notes, and vitals reviewed.  1823: I performed an exam of the patient and obtained history, as documented above.    EKG obtained in the ED, see results above.     The patient was sent for a XR Chest while in the emergency department, results above.     IV was inserted and blood was drawn for laboratory testing, results above.    2035 I rechecked and updated the patient.    Findings and plan explained to the Patient. Patient discharged home with instructions regarding supportive care, medications, and reasons to return. The importance of close follow-up was reviewed. The patient was prescribed Keflex.    I personally reviewed the laboratory results with the Patient and answered all related questions prior to discharge.      Impression & Plan    Medical Decision Making:  Patient is a 79-year-old female who presents to the emergency department with dizziness and hypotensive blood pressures at her clinic appointment.  Upon initial evaluation here she is hemodynamically stable with normal vital signs.  She is afebrile.  On physical exam the patient does have some orthostatic hypotension and dizziness.  However she does have a reassuring neurologic evaluation.  I do not believe this represents a central cause of dizziness.  Broad work-up was initiated which revealed evidence of a urinary tract infection.  Patient had no additional evidence of significant cardiac ideology on my work-up.  In the emergency department.  Patient rehydrated orally and was able to eat here in the emergency department.  I watched her ambulate with her walker and was able to do so without significant difficulty.  She remained hemodynamically stable throughout her evaluation here.  At this point I feel it is safe for the patient to be discharged home.  I am concerned that the  patient may be taking too much blood pressure medication which led to her hypotension and dizziness here today so I advised her to stop taking her Norvasc until she is evaluated by her primary care physician or cardiologist.  I discussed my plan with the patient and her  are amenable at this time.  All questions were answered and patient be discharged home in stable condition.    Diagnosis:    ICD-10-CM    1. Dizziness R42 Urine Culture Aerobic Bacterial   2. Urinary tract infection without hematuria, site unspecified N39.0        Disposition:  discharged to home    Discharge Medications:  Discharge Medication List as of 11/14/2019  8:46 PM      START taking these medications    Details   cephALEXin (KEFLEX) 500 MG capsule Take 1 capsule (500 mg) by mouth 2 times daily for 14 days, Disp-28 capsule, R-0, Local Print           I, Tamara Mejia, am serving as a scribe on 11/14/2019 at 6:22 PM to personally document services performed by Dillon Baldwin* based on my observations and the provider's statements to me.       Taamra Mejia  11/14/2019   [facility] EMERGENCY DEPARTMENT       Dillon Baldwin MD  11/15/19 [id number]

## 2019-11-15 NOTE — ED NOTES
Tolerated food without nausea or vomiting. Ambulated on walker without difficulty. MD already aware. In no acute distress. Will continue to monitor.

## 2019-11-15 NOTE — RESULT ENCOUNTER NOTE
Emergency Dept/Urgent Care discharge antibiotic (if prescribed): Cephalexin (Keflex) 500 mg capsule, 1 capsule (500 mg) by mouth 2 times daily for 14 days.  Date of Rx (if applicable):  11/14/19  No changes in treatment per Urine culture protocol.

## 2019-11-18 ENCOUNTER — ANTICOAGULATION THERAPY VISIT (OUTPATIENT)
Dept: ANTICOAGULATION | Facility: CLINIC | Age: 79
End: 2019-11-18
Payer: MEDICARE

## 2019-11-18 DIAGNOSIS — Z79.01 LONG TERM CURRENT USE OF ANTICOAGULANTS WITH INR GOAL OF 2.0-3.0: ICD-10-CM

## 2019-11-18 DIAGNOSIS — I48.20 CHRONIC ATRIAL FIBRILLATION (H): ICD-10-CM

## 2019-11-18 LAB — INR POINT OF CARE: 2.2 (ref 0.86–1.14)

## 2019-11-18 PROCEDURE — 99207 ZZC NO CHARGE NURSE ONLY: CPT

## 2019-11-18 PROCEDURE — 85610 PROTHROMBIN TIME: CPT | Mod: QW

## 2019-11-18 PROCEDURE — 36416 COLLJ CAPILLARY BLOOD SPEC: CPT

## 2019-11-18 NOTE — PROGRESS NOTES
ANTICOAGULATION FOLLOW-UP CLINIC VISIT    Patient Name:  Cielo Aguillon  Date:  2019  Contact Type:  Face to Face    SUBJECTIVE:  Patient Findings     Positives:   Emergency department visit (Patient was at ED 19 for dizziness, low BP. Had UTI. ), Change in medications (19, patient given Rx for Keflex for 14 days for UTI, this can increase INR. Patient also reports that she was told to stop Norvasc. )    Comments:   The patient was assessed for diet, medication, and activity level changes, missed or extra doses, bruising or bleeding, with no problem findings.          Clinical Outcomes     Negatives:   Major bleeding event, Thromboembolic event, Anticoagulation-related hospital admission, Anticoagulation-related ED visit, Anticoagulation-related fatality    Comments:   The patient was assessed for diet, medication, and activity level changes, missed or extra doses, bruising or bleeding, with no problem findings.             OBJECTIVE    INR Protime   Date Value Ref Range Status   2019 2.2 (A) 0.86 - 1.14 Final       ASSESSMENT / PLAN  INR assessment THER    Recheck INR In: 3 WEEKS    INR Location Clinic      Anticoagulation Summary  As of 2019    INR goal:   2.0-3.0   TTR:   57.2 % (3.6 y)   INR used for dosin.2 (2019)   Warfarin maintenance plan:   2.5 mg (5 mg x 0.5) every Sun, Wed; 5 mg (5 mg x 1) all other days   Full warfarin instructions:   2.5 mg every Sun, Wed; 5 mg all other days   Weekly warfarin total:   30 mg   No change documented:   Cindi Preston RN   Plan last modified:   Mckenna Robison RN (5/10/2019)   Next INR check:   2019   Priority:   Maintenance   Target end date:       Indications    Chronic atrial fibrillation [I48.20]  Long term current use of anticoagulants with INR goal of 2.0-3.0 [Z79.01]             Anticoagulation Episode Summary     INR check location:       Preferred lab:       Send INR reminders to:   [facility]     Comments:         Anticoagulation Care Providers     Provider Role Specialty Phone number    Simin Lopez MD Responsible Internal Medicine [phone]            See the Encounter Report to view Anticoagulation Flowsheet and Dosing Calendar (Go to Encounters tab in chart review, and find the Anticoagulation Therapy Visit)    Dosage adjustment made based on physician directed care plan.    Cindi Preston RN

## 2019-11-26 ENCOUNTER — OFFICE VISIT (OUTPATIENT)
Dept: INTERNAL MEDICINE | Facility: CLINIC | Age: 79
End: 2019-11-26
Payer: MEDICARE

## 2019-11-26 ENCOUNTER — TELEPHONE (OUTPATIENT)
Dept: ANTICOAGULATION | Facility: CLINIC | Age: 79
End: 2019-11-26

## 2019-11-26 ENCOUNTER — ANTICOAGULATION THERAPY VISIT (OUTPATIENT)
Dept: ANTICOAGULATION | Facility: CLINIC | Age: 79
End: 2019-11-26
Payer: MEDICARE

## 2019-11-26 VITALS
WEIGHT: 163.9 LBS | DIASTOLIC BLOOD PRESSURE: 78 MMHG | HEART RATE: 65 BPM | HEIGHT: 65 IN | TEMPERATURE: 97.4 F | OXYGEN SATURATION: 96 % | RESPIRATION RATE: 14 BRPM | SYSTOLIC BLOOD PRESSURE: 138 MMHG | BODY MASS INDEX: 27.31 KG/M2

## 2019-11-26 DIAGNOSIS — Z79.01 LONG TERM CURRENT USE OF ANTICOAGULANTS WITH INR GOAL OF 2.0-3.0: Primary | ICD-10-CM

## 2019-11-26 DIAGNOSIS — I48.20 CHRONIC ATRIAL FIBRILLATION (H): ICD-10-CM

## 2019-11-26 DIAGNOSIS — I10 ESSENTIAL HYPERTENSION, BENIGN: ICD-10-CM

## 2019-11-26 DIAGNOSIS — Z79.01 LONG TERM CURRENT USE OF ANTICOAGULANTS WITH INR GOAL OF 2.0-3.0: ICD-10-CM

## 2019-11-26 DIAGNOSIS — N39.0 URINARY TRACT INFECTION WITHOUT HEMATURIA, SITE UNSPECIFIED: Primary | ICD-10-CM

## 2019-11-26 LAB — INR POINT OF CARE: 2 (ref 0.86–1.14)

## 2019-11-26 PROCEDURE — 36416 COLLJ CAPILLARY BLOOD SPEC: CPT

## 2019-11-26 PROCEDURE — 99214 OFFICE O/P EST MOD 30 MIN: CPT | Performed by: NURSE PRACTITIONER

## 2019-11-26 PROCEDURE — 99207 ZZC NO CHARGE NURSE ONLY: CPT

## 2019-11-26 PROCEDURE — 85610 PROTHROMBIN TIME: CPT | Mod: QW

## 2019-11-26 ASSESSMENT — MIFFLIN-ST. JEOR: SCORE: 1219.33

## 2019-11-26 NOTE — PROGRESS NOTES
ANTICOAGULATION FOLLOW-UP CLINIC VISIT    Patient Name:  Cielo Aguillon  Date:  2019  Contact Type:  Face to Face    SUBJECTIVE:  Patient Findings     Comments:   The patient was assessed for diet, medication, and activity level changes, missed or extra doses, bruising or bleeding, with no problem findings.          Clinical Outcomes     Negatives:   Major bleeding event, Thromboembolic event, Anticoagulation-related hospital admission, Anticoagulation-related ED visit, Anticoagulation-related fatality    Comments:   The patient was assessed for diet, medication, and activity level changes, missed or extra doses, bruising or bleeding, with no problem findings.             OBJECTIVE    INR Protime   Date Value Ref Range Status   2019 2.0 (A) 0.86 - 1.14 Final       ASSESSMENT / PLAN  INR assessment THER    Recheck INR In: 2 WEEKS    INR Location Clinic      Anticoagulation Summary  As of 2019    INR goal:   2.0-3.0   TTR:   61.4 % (1 y)   INR used for dosin.0 (2019)   Warfarin maintenance plan:   2.5 mg (5 mg x 0.5) every Sun, Wed; 5 mg (5 mg x 1) all other days   Full warfarin instructions:   2.5 mg every Sun, Wed; 5 mg all other days   Weekly warfarin total:   30 mg   No change documented:   Cindi Preston RN   Plan last modified:   Mckenna Robison RN (5/10/2019)   Next INR check:   2019   Priority:   Maintenance   Target end date:       Indications    Chronic atrial fibrillation [I48.20]  Long term current use of anticoagulants with INR goal of 2.0-3.0 [Z79.01]             Anticoagulation Episode Summary     INR check location:       Preferred lab:       Send INR reminders to:   [facility]    Comments:         Anticoagulation Care Providers     Provider Role Specialty Phone number    Simin Lopez MD [facility] Internal Medicine [phone]            See the Encounter Report to view Anticoagulation Flowsheet and Dosing Calendar (Go to Encounters tab in  chart review, and find the Anticoagulation Therapy Visit)    Dosage adjustment made based on physician directed care plan.    Cindi Preston RN

## 2019-11-26 NOTE — TELEPHONE ENCOUNTER
ANTICOAGULATION MANAGEMENT    Cielo Aguillon due for review and annual renewal of referral to anticoagulation monitoring.      Warfarin indication(s) Atrial Fibrillation   INR goal 2.0-3.0   Current duration of therapy Indefinite/long term therapy   Date of last office visit 07/18/2019     Has the patient previously taken warfarin? Pt is currently taking warfarin.  If yes, for what indication? AFib    Does the patient have any of the following indications for a higher range of 2.5-3.5:    Mitral position mechanical valve? no    Sandie-Shiley, Ball and Cage or Monoleaflet valve (regardless of position) no    Other (if yes, please explain) no        Please advise if Cielo Aguillon's anticoagulation management referral can be renewed for next year.     Please confirm renewal approval in new note within this telephone encounter and route back. No further action is necessary at this time (referral orders,etc).     Cindi Preston RN

## 2019-11-26 NOTE — PROGRESS NOTES
Keagan Aguillon is a 79 year old female who presents to clinic today for the following health issues:    HPI   ED/UC Followup:    Facility:  [facility]  Date of visit: 11/14/19  Reason for visit: Dizziness, uti  Current Status: Feeling tired all the time     Follow-up on UTI from Urgent care visit on 11/14/19; currently on Keflex with few more days of dosing to go.  Voices no further issues and doing ok.  Was told to HOLD Norvasc (Amlodopine) 5 mg due to low BP 83/53.        Patient Active Problem List   Diagnosis     Essential hypertension, benign     Chronic rhinitis     Other ventral hernia without mention of obstruction or gangrene     Lichenification and lichen simplex chronicus     Fatty liver     Advanced directives, counseling/discussion     Hyperlipidemia LDL goal <70     Chronic right-sided back pain     Coronary artery disease     Cystocele, midline     Rectocele     BETHANY (obstructive sleep apnea)     Long term current use of anticoagulants with INR goal of 2.0-3.0     MARY (generalized anxiety disorder)     Major depressive disorder, recurrent episode, moderate (H)     Controlled substance agreement signed     Chronic atrial fibrillation     Facet arthropathy, lumbar     Acquired hypothyroidism     Past Surgical History:   Procedure Laterality Date     APPENDECTOMY       C NONSPECIFIC PROCEDURE      Appendectomy     C NONSPECIFIC PROCEDURE      Tonsillectomy     C NONSPECIFIC PROCEDURE      Tubal ligation     C NONSPECIFIC PROCEDURE  11/01    Shave bone spurs from left foot     COLONOSCOPY  9/1/2016    Dr. Camejo [facility]     COLONOSCOPY N/A 9/1/2016    Procedure: COMBINED COLONOSCOPY, SINGLE OR MULTIPLE BIOPSY/POLYPECTOMY BY BIOPSY;  Surgeon: Pillo Camejo MD;  Location: [facility]     CORONARY ANGIOGRAPHY ADULT ORDER  2/13/2012    50% mid Cfx, 60-70% prox OM2, 50-70% D1     ESOPHAGOSCOPY, GASTROSCOPY, DUODENOSCOPY (EGD), COMBINED  9/1/2016    Dr. Nell TAMAYO     ESOPHAGOSCOPY,  GASTROSCOPY, DUODENOSCOPY (EGD), COMBINED N/A 9/1/2016    Procedure: COMBINED ESOPHAGOSCOPY, GASTROSCOPY, DUODENOSCOPY (EGD), BIOPSY SINGLE OR MULTIPLE;  Surgeon: Pillo Camejo MD;  Location:  GI     GYN SURGERY       HEART CATH RIGHT AND LEFT HEART CATH  04/06/2015    Mid LAD 50-60%, 1st Diagonal 70%, 1st OM 30%, 2nd OM 40-50%, review report for right heart cath results.        Social History     Tobacco Use     Smoking status: Never Smoker     Smokeless tobacco: Never Used   Substance Use Topics     Alcohol use: Yes     Alcohol/week: 0.0 standard drinks     Comment: occ - wine     Family History   Problem Relation Age of Onset     Respiratory Father         emphysema--secon. to smoking     Alzheimer Disease Mother      Congenital Anomalies Mother      Osteoporosis Mother      Atrial fibrillation Brother      Diabetes Type 2  Son      Diabetes Maternal Aunt      Diabetes Other         cousins     Diabetes Other         cousin on mothers side of family     Colon Cancer No family hx of          Current Outpatient Medications   Medication Sig Dispense Refill     amLODIPine (NORVASC) 5 MG tablet TAKE 1 TABLET (5MG) BY MOUTH DAILY 90 tablet 0     citalopram (CELEXA) 20 MG tablet TAKE 1&1/2 TABLET (30MG) BY MOUTH EVERY DAY. 135 tablet 0     diazepam (VALIUM) 5 MG tablet TAKE 1/2 TO 1 TABLET BY MOUTH EVERY 8 HOURS AS NEEDED 40 tablet 1     levothyroxine (SYNTHROID/LEVOTHROID) 50 MCG tablet TAKE 1 TABLET (50MCG) BY MOUTH DAILY 90 tablet 0     metoprolol tartrate (LOPRESSOR) 100 MG tablet TAKE 1 TABLET (100MG) BY MOUTH 2 TIMES DAILY 180 tablet 2     oxyCODONE-acetaminophen (PERCOCET) 5-325 MG tablet Take 0.5-1 tablets by mouth every 4 hours as needed for severe pain Max #4 tabs/day 20 tablet 0     rosuvastatin (CRESTOR) 10 MG tablet TAKE 1 TABLET (10MG) BY MOUTH DAILY 90 tablet 0     traMADol (ULTRAM) 50 MG tablet TAKE ONE TABLET BY MOUTH EVERY 8 HOURS AS NEEDED FOR MODERATE PAIN 90 tablet 3     VITAMIN D,  "CHOLECALCIFEROL, PO Take 1,000 Units by mouth At Bedtime        warfarin ANTICOAGULANT (COUMADIN) 5 MG tablet TAKE 1 TABLET (5MG) BY MOUTH DAILY EXCEPT TAKE A HALF TABLET (2.5 MG) SUN, WED OR AS DIRECTED BY INR CLINIC 90 tablet 1     Allergies   Allergen Reactions     Ace Inhibitors      Cough (Zestril)     Ambien [Zolpidem] Other (See Comments)     Aspirin      hx bleeding ulcers     Hydrochlorothiazide [Hctz] Rash     Ibuprofen      hx bleeding ulcers     Amoxicillin Rash     Losartan Rash     Penicillins Rash     BP Readings from Last 3 Encounters:   11/26/19 138/78   11/14/19 122/83   10/25/19 127/80    Wt Readings from Last 3 Encounters:   11/26/19 74.3 kg (163 lb 14.4 oz)   10/11/19 74.4 kg (164 lb 1.6 oz)   07/18/19 74.4 kg (164 lb)                      Reviewed and updated as needed this visit by Provider  Med Hx  Surg Hx  Fam Hx         Review of Systems   ROS COMP: Constitutional, HEENT, cardiovascular, pulmonary, gi and gu systems are negative, except as otherwise noted.      Objective    /78 (BP Location: Right arm, Patient Position: Sitting, Cuff Size: Adult Large)   Pulse 65   Temp 97.4  F (36.3  C) (Oral)   Resp 14   Ht 1.651 m (5' 5\")   Wt 74.3 kg (163 lb 14.4 oz)   SpO2 96%   BMI 27.27 kg/m    Body mass index is 27.27 kg/m .     Physical Exam   GENERAL: alert and no distress  NECK: no adenopathy, no asymmetry, masses, or scars and thyroid normal to palpation  RESP: lungs clear to auscultation - no rales, rhonchi or wheezes  CV: irregular regular rate and rhythm, normal S1 S2, no S3 or S4, no murmur, click or rub, no peripheral edema and peripheral pulses strong  ABDOMEN: soft, nontender, no hepatosplenomegaly, no masses and bowel sounds normal; No CVA or pelvic tenderness with palpitation   MS: no edema; walks with walker  SKIN: no suspicious lesions or rashes    Diagnostic Test Results:  Labs reviewed in Epic        Assessment & Plan     1. Urinary tract infection without hematuria, " "site unspecified  - complete Keflex as directed  - continue with fluids    2. Essential hypertension, benign  - /78; much improved from 83/53.  - Continue to HOLD Norvasc (Amlodopine) 5 mg for now until she sees her cardiologist on 12/11/19    3. Chronic atrial fibrillation  - on Warfarin; recent INR 2.0         BMI:   Estimated body mass index is 27.27 kg/m  as calculated from the following:    Height as of this encounter: 1.651 m (5' 5\").    Weight as of this encounter: 74.3 kg (163 lb 14.4 oz).           Patient Instructions   Continue to hold Norvasc (Amlodopine) 5 mg since BP is back to normal (138/83).  Plans to see cardiology on 12/11/19 for follow-up.  Patient to monitor BP daily with goal of less than 140/90. If BP > 160/90, report to provider/cardiologist.    Bladder infection - still taking antibiotic until upcoming Thursday and so good. No further issues.  Continue to take in fluids.    Continue all other medications as directed.      Return in about 7 weeks (around 1/14/2020) for Routine Visit with PCP.    Dora Campos, [facility]        "

## 2019-11-26 NOTE — PATIENT INSTRUCTIONS
Continue to hold Norvasc (Amlodopine) 5 mg since BP is back to normal (138/83).  Plans to see cardiology on 12/11/19 for follow-up.  Patient to monitor BP daily with goal of less than 140/90. If BP > 160/90, report to provider/cardiologist.    Bladder infection - still taking antibiotic until upcoming Thursday and so good. No further issues.  Continue to take in fluids.    Continue all other medications as directed.

## 2019-12-11 ENCOUNTER — ANTICOAGULATION THERAPY VISIT (OUTPATIENT)
Dept: ANTICOAGULATION | Facility: CLINIC | Age: 79
End: 2019-12-11
Payer: MEDICARE

## 2019-12-11 ENCOUNTER — OFFICE VISIT (OUTPATIENT)
Dept: CARDIOLOGY | Facility: CLINIC | Age: 79
End: 2019-12-11
Attending: INTERNAL MEDICINE
Payer: MEDICARE

## 2019-12-11 VITALS
SYSTOLIC BLOOD PRESSURE: 120 MMHG | HEART RATE: 80 BPM | WEIGHT: 164 LBS | BODY MASS INDEX: 27.32 KG/M2 | HEIGHT: 65 IN | DIASTOLIC BLOOD PRESSURE: 64 MMHG

## 2019-12-11 DIAGNOSIS — I48.20 CHRONIC ATRIAL FIBRILLATION (H): ICD-10-CM

## 2019-12-11 DIAGNOSIS — R55 SYNCOPE, UNSPECIFIED SYNCOPE TYPE: Primary | ICD-10-CM

## 2019-12-11 DIAGNOSIS — I48.21 PERMANENT ATRIAL FIBRILLATION (H): ICD-10-CM

## 2019-12-11 DIAGNOSIS — Z79.01 LONG TERM CURRENT USE OF ANTICOAGULANTS WITH INR GOAL OF 2.0-3.0: ICD-10-CM

## 2019-12-11 DIAGNOSIS — I10 ESSENTIAL HYPERTENSION, BENIGN: ICD-10-CM

## 2019-12-11 LAB — INR POINT OF CARE: 1.7 (ref 0.86–1.14)

## 2019-12-11 PROCEDURE — 85610 PROTHROMBIN TIME: CPT | Mod: QW

## 2019-12-11 PROCEDURE — 99207 ZZC NO CHARGE NURSE ONLY: CPT

## 2019-12-11 PROCEDURE — 36416 COLLJ CAPILLARY BLOOD SPEC: CPT

## 2019-12-11 PROCEDURE — 93000 ELECTROCARDIOGRAM COMPLETE: CPT | Performed by: INTERNAL MEDICINE

## 2019-12-11 PROCEDURE — 99214 OFFICE O/P EST MOD 30 MIN: CPT | Performed by: INTERNAL MEDICINE

## 2019-12-11 RX ORDER — AMLODIPINE BESYLATE 5 MG/1
2.5 TABLET ORAL DAILY
Qty: 90 TABLET | Refills: 0 | COMMUNITY
Start: 2019-12-11

## 2019-12-11 ASSESSMENT — MIFFLIN-ST. JEOR: SCORE: 1219.78

## 2019-12-11 NOTE — PROGRESS NOTES
ANTICOAGULATION FOLLOW-UP CLINIC VISIT    Patient Name:  Cielo Aguillon  Date:  2019  Contact Type:  Face to Face    SUBJECTIVE:  Patient Findings     Positives:   Change in health (Patient reports B/P continues to go up and down, will see cardiologist today. ), Change in medications (Patient reports has not been taking Melatonin, this can increase INR.)    Comments:   The patient was assessed for diet, medication, and activity level changes, missed or extra doses, bruising or bleeding, with no problem findings.             Clinical Outcomes     Comments:   The patient was assessed for diet, medication, and activity level changes, missed or extra doses, bruising or bleeding, with no problem findings.                OBJECTIVE    INR Protime   Date Value Ref Range Status   2019 1.7 (A) 0.86 - 1.14 Final       ASSESSMENT / PLAN  INR assessment SUB    Recheck INR In: 2 WEEKS    INR Location Clinic      Anticoagulation Summary  As of 2019    INR goal:   2.0-3.0   TTR:   61.4 % (1 y)   INR used for dosin.7! (2019)   Warfarin maintenance plan:   2.5 mg (5 mg x 0.5) every Sun, Wed; 5 mg (5 mg x 1) all other days   Full warfarin instructions:   : 5 mg; : 5 mg; Otherwise 2.5 mg every Sun, Wed; 5 mg all other days   Weekly warfarin total:   30 mg   Plan last modified:   Mckenna Robison RN (5/10/2019)   Next INR check:   2019   Priority:   Maintenance   Target end date:       Indications    Chronic atrial fibrillation [I48.20]  Long term current use of anticoagulants with INR goal of 2.0-3.0 [Z79.01]             Anticoagulation Episode Summary     INR check location:       Preferred lab:       Send INR reminders to:   [facility]    Comments:   referral done 19      Anticoagulation Care Providers     Provider Role Specialty Phone number    Simin Lopez MD Responsible Internal Medicine [phone]            See the Encounter Report to view Anticoagulation  Flowsheet and Dosing Calendar (Go to Encounters tab in chart review, and find the Anticoagulation Therapy Visit)    Dosage adjustment made based on physician directed care plan.    Cindi Preston RN

## 2019-12-11 NOTE — LETTER
12/11/2019      Simin Lopez MD  [address] [facility] [address]  [facility] [address]      RE: Cielo Aguillon       Dear Colleague,    I had the pleasure of seeing Cielo Aguillon in the [facility].    Service Date: 12/11/2019      HISTORY OF PRESENT ILLNESS:  I had the pleasure of seeing Ms. Cielo Aguillon, a delightful 79-year-old female with the following medical issues:   A.  Permanent atrial fibrillation treated with rate control (metoprolol 100 mg b.i.d.) and warfarin.   B.  Chronic dyspnea on exertion, multifactorial.   C.  Mild to moderate CAD.  Most recent cardiac catheterization 03/2018.  Tightest lesion was a 50% mid LAD stenosis with normal FFR.   D.  Normal LV function with mild to moderate pulmonary hypertension.   E.  Sleep apnea, combination of obstructive and central.   F.  Hypothyroidism.      Cielo has had some difficulties lately.  On 11/14 she came to the emergency room because of dizziness.  She initially went to [facility], where she was hypotensive with systolic blood pressure between 80 and 90.  In the emergency room at [facility], her blood pressure was 122/83.  The ER physician suggested that she stop the amlodipine.  However, after doing that, the patient's blood pressure increased and she decided to go back on it.      About 2-3 months ago, the patient had a syncopal episode.  She tells me she was at home and was walking toward the kitchen when she abruptly fainted.  She fell down and hit her face on the floor, breaking her nose.  Thankfully, she avoided worse injury.  She told me that the nose fracture was not displaced and no further treatment was needed.  She has never had a fainting spell before.      She has no chest pain, orthopnea, PND.  She does have her chronic dyspnea on exertion, but it is not worse than usual.      PHYSICAL EXAMINATION:   VITAL SIGNS:  Blood pressure 120/64, pulse 80 and irregular, weight 74 kg, height 165 cm.    GENERAL:  She is a pleasant, elderly lady in no distress.   HEAD:  Normocephalic.   NECK:  Supple, no carotid bruits, 1+ carotid pulses.   LUNGS:  Clear.   CARDIOVASCULAR:  Normal JVP, irregular rhythm, no gallop or murmur.   ABDOMEN:  Soft, nontender.   EXTREMITIES:  No edema.      DIAGNOSTIC STUDIES:     Her 12-lead ECG today showed atrial fibrillation with controlled ventricular rate and mild nonspecific ST abnormality.      Recent laboratory tests:  Sodium 139, potassium 4.1, creatinine 1.17, proBNP 3600, hematocrit 44%.      IMPRESSION:   1.  Syncope.  This occurred in the upright position, and it is, of course, concerning in this elderly woman who is anticoagulated.  Because of the subsequent documentation of low blood pressure, the most likely scenario for this event was hypotension.  However, she has permanent atrial fibrillation and is on fairly high-dose metoprolol.  Bradycardia is also a concern.  The plan is to decrease the amlodipine and order an event monitor.   2.  Permanent atrial fibrillation.  It has been well rate controlled over the past few years.  The patient is on warfarin, which she has tolerated well.   3.  Chronic coronary artery disease.  No angina currently.  Her blood pressure is under good control.  She is on a statin.  She is not on aspirin because she is on warfarin.      RECOMMENDATIONS:   A.  Decrease amlodipine from 5 mg daily to 2.5 mg daily.   B.  A 14-day Zio Patch monitor for 14-day.   C.  I have requested a followup visit next year, but we would see her sooner if we document abnormalities on her monitor or if she has recurrent syncope.      I do appreciate the opportunity to be involved in this very pleasant woman's care.      cc:   Simin Lopez MD    [facility]    [address]    [place]  [address]         FARNAZ RICHARDSON MD             D: 12/11/2019   T: 12/11/2019   MT: JAS      Name:     FELICITAS LITTLEJOHN   MRN:      [id number]         Account:      [id number]   :      1940           Service Date: 2019      Document: [id number]         Outpatient Encounter Medications as of 2019   Medication Sig Dispense Refill     amLODIPine (NORVASC) 5 MG tablet Take 0.5 tablets (2.5 mg) by mouth daily 90 tablet 0     citalopram (CELEXA) 20 MG tablet TAKE 1&1/2 TABLET (30MG) BY MOUTH EVERY DAY. 135 tablet 0     diazepam (VALIUM) 5 MG tablet TAKE 1/2 TO 1 TABLET BY MOUTH EVERY 8 HOURS AS NEEDED 40 tablet 1     levothyroxine (SYNTHROID/LEVOTHROID) 50 MCG tablet TAKE 1 TABLET (50MCG) BY MOUTH DAILY 90 tablet 0     metoprolol tartrate (LOPRESSOR) 100 MG tablet TAKE 1 TABLET (100MG) BY MOUTH 2 TIMES DAILY 180 tablet 2     rosuvastatin (CRESTOR) 10 MG tablet TAKE 1 TABLET (10MG) BY MOUTH DAILY 90 tablet 0     traMADol (ULTRAM) 50 MG tablet TAKE ONE TABLET BY MOUTH EVERY 8 HOURS AS NEEDED FOR MODERATE PAIN 90 tablet 3     VITAMIN D, CHOLECALCIFEROL, PO Take 1,000 Units by mouth At Bedtime        warfarin ANTICOAGULANT (COUMADIN) 5 MG tablet TAKE 1 TABLET (5MG) BY MOUTH DAILY EXCEPT TAKE A HALF TABLET (2.5 MG) SUN, WED OR AS DIRECTED BY INR CLINIC 90 tablet 1     [DISCONTINUED] amLODIPine (NORVASC) 5 MG tablet TAKE 1 TABLET (5MG) BY MOUTH DAILY 90 tablet 0     [DISCONTINUED] oxyCODONE-acetaminophen (PERCOCET) 5-325 MG tablet Take 0.5-1 tablets by mouth every 4 hours as needed for severe pain Max #4 tabs/day 20 tablet 0     Facility-Administered Encounter Medications as of 2019   Medication Dose Route Frequency Provider Last Rate Last Dose     fentaNYL (PF) (SUBLIMAZE) injection 50 mcg  50 mcg Intravenous Q5 Min PRN Qiana Maldonado MD   50 mcg at 10/25/19 0907     midazolam (VERSED) injection 1 mg  1 mg Intravenous Q5 Min PRN Qiana Maldonado MD   0.5 mg at 10/25/19 0855       Again, thank you for allowing me to participate in the care of your patient.      Sincerely,    Vinnie Bateman MD     [facility]

## 2019-12-11 NOTE — PROGRESS NOTES
HPI and Plan:   See dictation    Orders Placed This Encounter   Procedures     Follow-Up with Cardiac Advanced Practice Provider     EKG 12-lead complete w/read - Clinics (performed today)     [place] Patch Holter Adult Pediatric Greater than 48 hrs       Orders Placed This Encounter   Medications     amLODIPine (NORVASC) 5 MG tablet     Sig: Take 0.5 tablets (2.5 mg) by mouth daily     Dispense:  90 tablet     Refill:  0       Medications Discontinued During This Encounter   Medication Reason     amLODIPine (NORVASC) 5 MG tablet Reorder         Encounter Diagnoses   Name Primary?     Permanent atrial fibrillation      Essential hypertension, benign        CURRENT MEDICATIONS:  Current Outpatient Medications   Medication Sig Dispense Refill     amLODIPine (NORVASC) 5 MG tablet Take 0.5 tablets (2.5 mg) by mouth daily 90 tablet 0     citalopram (CELEXA) 20 MG tablet TAKE 1&1/2 TABLET (30MG) BY MOUTH EVERY DAY. 135 tablet 0     diazepam (VALIUM) 5 MG tablet TAKE 1/2 TO 1 TABLET BY MOUTH EVERY 8 HOURS AS NEEDED 40 tablet 1     levothyroxine (SYNTHROID/LEVOTHROID) 50 MCG tablet TAKE 1 TABLET (50MCG) BY MOUTH DAILY 90 tablet 0     metoprolol tartrate (LOPRESSOR) 100 MG tablet TAKE 1 TABLET (100MG) BY MOUTH 2 TIMES DAILY 180 tablet 2     rosuvastatin (CRESTOR) 10 MG tablet TAKE 1 TABLET (10MG) BY MOUTH DAILY 90 tablet 0     traMADol (ULTRAM) 50 MG tablet TAKE ONE TABLET BY MOUTH EVERY 8 HOURS AS NEEDED FOR MODERATE PAIN 90 tablet 3     VITAMIN D, CHOLECALCIFEROL, PO Take 1,000 Units by mouth At Bedtime        warfarin ANTICOAGULANT (COUMADIN) 5 MG tablet TAKE 1 TABLET (5MG) BY MOUTH DAILY EXCEPT TAKE A HALF TABLET (2.5 MG) SUN, WED OR AS DIRECTED BY INR CLINIC 90 tablet 1       ALLERGIES     Allergies   Allergen Reactions     Ace Inhibitors      Cough (Zestril)     Ambien [Zolpidem] Other (See Comments)     Aspirin      hx bleeding ulcers     Hydrochlorothiazide [Hctz] Rash     Ibuprofen      hx bleeding ulcers      Amoxicillin Rash     Losartan Rash     Penicillins Rash       PAST MEDICAL HISTORY:  Past Medical History:   Diagnosis Date     Anxiety     related to travel     Arthritis      ASCVD (arteriosclerotic cardiovascular disease) 2/12    60-70% stenoses of OM2, D1, medical management     Atrial fibrillation (H)      Atrial flutter (H)      Coronary artery disease     2/2012- 50% mid Cfx, 60-70% prox OM2, 50-70% D1     Essential hypertension, benign      Fatty liver 5/10    mild fibrosis on biopsy     Hypertriglyceridemia      Major depression      BETHANY (obstructive sleep apnea)      Other ventral hernia without mention of obstruction or gangrene      Peptic ulcer 2002     Peptic ulcer, unspecified site, unspecified as acute or chronic, without mention of hemorrhage, perforation, or obstruction 1983     Shortness of breath      Thyroid disease      Tubular adenoma 6/08       PAST SURGICAL HISTORY:  Past Surgical History:   Procedure Laterality Date     APPENDECTOMY       C NONSPECIFIC PROCEDURE      Appendectomy     C NONSPECIFIC PROCEDURE      Tonsillectomy     C NONSPECIFIC PROCEDURE      Tubal ligation     C NONSPECIFIC PROCEDURE  11/01    Shave bone spurs from left foot     COLONOSCOPY  9/1/2016    Dr. Camejo [facility]     COLONOSCOPY N/A 9/1/2016    Procedure: COMBINED COLONOSCOPY, SINGLE OR MULTIPLE BIOPSY/POLYPECTOMY BY BIOPSY;  Surgeon: Pillo Camejo MD;  Location:  GI     CORONARY ANGIOGRAPHY ADULT ORDER  2/13/2012    50% mid Cfx, 60-70% prox OM2, 50-70% D1     ESOPHAGOSCOPY, GASTROSCOPY, DUODENOSCOPY (EGD), COMBINED  9/1/2016    Dr. Camejo [facility]     ESOPHAGOSCOPY, GASTROSCOPY, DUODENOSCOPY (EGD), COMBINED N/A 9/1/2016    Procedure: COMBINED ESOPHAGOSCOPY, GASTROSCOPY, DUODENOSCOPY (EGD), BIOPSY SINGLE OR MULTIPLE;  Surgeon: Pillo Camejo MD;  Location:  GI     GYN SURGERY       HEART CATH RIGHT AND LEFT HEART CATH  04/06/2015    Mid LAD 50-60%, 1st Diagonal 70%, 1st OM 30%, 2nd OM 40-50%, review report  for right heart cath results.        FAMILY HISTORY:  Family History   Problem Relation Age of Onset     Respiratory Father         emphysema--secon. to smoking     Alzheimer Disease Mother      Congenital Anomalies Mother      Osteoporosis Mother      Atrial fibrillation Brother      Diabetes Type 2  Son      Diabetes Maternal Aunt      Diabetes Other         cousins     Diabetes Other         cousin on mothers side of family     Colon Cancer No family hx of        SOCIAL HISTORY:  Social History     Socioeconomic History     Marital status:      Spouse name: None     Number of children: 6     Years of education: None     Highest education level: None   Occupational History     Employer: [employer]   Social Needs     Financial resource strain: None     Food insecurity:     Worry: None     Inability: None     Transportation needs:     Medical: None     Non-medical: None   Tobacco Use     Smoking status: Never Smoker     Smokeless tobacco: Never Used   Substance and Sexual Activity     Alcohol use: Yes     Alcohol/week: 0.0 standard drinks     Comment: occ - wine     Drug use: No     Sexual activity: Yes     Partners: Male     Birth control/protection: Surgical     Comment: tubal   Lifestyle     Physical activity:     Days per week: None     Minutes per session: None     Stress: None   Relationships     Social connections:     Talks on phone: None     Gets together: None     Attends Mu-ism service: None     Active member of club or organization: None     Attends meetings of clubs or organizations: None     Relationship status: None     Intimate partner violence:     Fear of current or ex partner: None     Emotionally abused: None     Physically abused: None     Forced sexual activity: None   Other Topics Concern      Service No     Blood Transfusions Yes     Comment: 1983     Caffeine Concern No     Comment: none     Occupational 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     Comment: working in a pharmacy     Hobby Hazards  No     Sleep Concern Yes     Stress Concern Yes     Weight Concern No     Special Diet Yes     Comment: pt on warfarin     Back Care No     Exercise No     Comment: some exercise at night, walking      Bike Helmet No     Seat Belt Yes     Self-Exams No     Parent/sibling w/ CABG, MI or angioplasty before 65F 55M? Not Asked   Social History Narrative     None       Review of Systems:  Skin:  Negative for       Eyes:  Positive for visual blurring;glasses    ENT:  Positive for hearing loss hearing aids in both ears,   Respiratory:  Positive for dyspnea on exertion;cough;sleep apnea     Cardiovascular:    palpitations;Positive for;edema;fatigue;lightheadedness;dizziness    Gastroenterology: Negative for   hx of ulcers  Genitourinary:  Negative for      Musculoskeletal:  Positive for back pain    Neurologic:  Positive for numbness or tingling of feet    Psychiatric:  Positive for anxiety    Heme/Lymph/Imm:  Negative for      Endocrine:  Positive for thyroid disorder      [id number]      Vinnie Bateman MD  [id number]  [place] [address]

## 2019-12-11 NOTE — LETTER
12/11/2019    Simin Lopez MD  [address]  [facility] [address]    RE: Cielo Aguillon       Dear Colleague,    I had the pleasure of seeing Cielo Aguillon in the [facility].    HPI and Plan:   See dictation    Orders Placed This Encounter   Procedures     Follow-Up with Cardiac Advanced Practice Provider     EKG 12-lead complete w/read - Clinics (performed today)     Zio Patch Holter Adult Pediatric Greater than 48 hrs       Orders Placed This Encounter   Medications     amLODIPine (NORVASC) 5 MG tablet     Sig: Take 0.5 tablets (2.5 mg) by mouth daily     Dispense:  90 tablet     Refill:  0       Medications Discontinued During This Encounter   Medication Reason     amLODIPine (NORVASC) 5 MG tablet Reorder         Encounter Diagnoses   Name Primary?     Permanent atrial fibrillation      Essential hypertension, benign        CURRENT MEDICATIONS:  Current Outpatient Medications   Medication Sig Dispense Refill     amLODIPine (NORVASC) 5 MG tablet Take 0.5 tablets (2.5 mg) by mouth daily 90 tablet 0     citalopram (CELEXA) 20 MG tablet TAKE 1&1/2 TABLET (30MG) BY MOUTH EVERY DAY. 135 tablet 0     diazepam (VALIUM) 5 MG tablet TAKE 1/2 TO 1 TABLET BY MOUTH EVERY 8 HOURS AS NEEDED 40 tablet 1     levothyroxine (SYNTHROID/LEVOTHROID) 50 MCG tablet TAKE 1 TABLET (50MCG) BY MOUTH DAILY 90 tablet 0     metoprolol tartrate (LOPRESSOR) 100 MG tablet TAKE 1 TABLET (100MG) BY MOUTH 2 TIMES DAILY 180 tablet 2     rosuvastatin (CRESTOR) 10 MG tablet TAKE 1 TABLET (10MG) BY MOUTH DAILY 90 tablet 0     traMADol (ULTRAM) 50 MG tablet TAKE ONE TABLET BY MOUTH EVERY 8 HOURS AS NEEDED FOR MODERATE PAIN 90 tablet 3     VITAMIN D, CHOLECALCIFEROL, PO Take 1,000 Units by mouth At Bedtime        warfarin ANTICOAGULANT (COUMADIN) 5 MG tablet TAKE 1 TABLET (5MG) BY MOUTH DAILY EXCEPT TAKE A HALF TABLET (2.5 MG) SUN, WED OR AS DIRECTED BY INR CLINIC 90 tablet 1       ALLERGIES     Allergies    Allergen Reactions     Ace Inhibitors      Cough (Zestril)     Ambien [Zolpidem] Other (See Comments)     Aspirin      hx bleeding ulcers     Hydrochlorothiazide [Hctz] Rash     Ibuprofen      hx bleeding ulcers     Amoxicillin Rash     Losartan Rash     Penicillins Rash       PAST MEDICAL HISTORY:  Past Medical History:   Diagnosis Date     Anxiety     related to travel     Arthritis      ASCVD (arteriosclerotic cardiovascular disease) 2/12    60-70% stenoses of OM2, D1, medical management     Atrial fibrillation (H)      Atrial flutter (H)      Coronary artery disease     2/2012- 50% mid Cfx, 60-70% prox OM2, 50-70% D1     Essential hypertension, benign      Fatty liver 5/10    mild fibrosis on biopsy     Hypertriglyceridemia      Major depression      BETHANY (obstructive sleep apnea)      Other ventral hernia without mention of obstruction or gangrene      Peptic ulcer 2002     Peptic ulcer, unspecified site, unspecified as acute or chronic, without mention of hemorrhage, perforation, or obstruction 1983     Shortness of breath      Thyroid disease      Tubular adenoma 6/08       PAST SURGICAL HISTORY:  Past Surgical History:   Procedure Laterality Date     APPENDECTOMY       C NONSPECIFIC PROCEDURE      Appendectomy     C NONSPECIFIC PROCEDURE      Tonsillectomy     C NONSPECIFIC PROCEDURE      Tubal ligation     C NONSPECIFIC PROCEDURE  11/01    Shave bone spurs from left foot     COLONOSCOPY  9/1/2016    Dr. Camejo [facility]     COLONOSCOPY N/A 9/1/2016    Procedure: COMBINED COLONOSCOPY, SINGLE OR MULTIPLE BIOPSY/POLYPECTOMY BY BIOPSY;  Surgeon: Pillo Camejo MD;  Location:  GI     CORONARY ANGIOGRAPHY ADULT ORDER  2/13/2012    50% mid Cfx, 60-70% prox OM2, 50-70% D1     ESOPHAGOSCOPY, GASTROSCOPY, DUODENOSCOPY (EGD), COMBINED  9/1/2016    Dr. Camejo [facility]     ESOPHAGOSCOPY, GASTROSCOPY, DUODENOSCOPY (EGD), COMBINED N/A 9/1/2016    Procedure: COMBINED ESOPHAGOSCOPY, GASTROSCOPY, DUODENOSCOPY (EGD), BIOPSY  SINGLE OR MULTIPLE;  Surgeon: Pillo Camejo MD;  Location: [facility]     GYN SURGERY       HEART CATH RIGHT AND LEFT HEART CATH  04/06/2015    Mid LAD 50-60%, 1st Diagonal 70%, 1st OM 30%, 2nd OM 40-50%, review report for right heart cath results.        FAMILY HISTORY:  Family History   Problem Relation Age of Onset     Respiratory Father         emphysema--secon. to smoking     Alzheimer Disease Mother      Congenital Anomalies Mother      Osteoporosis Mother      Atrial fibrillation Brother      Diabetes Type 2  Son      Diabetes Maternal Aunt      Diabetes Other         cousins     Diabetes Other         cousin on mothers side of family     Colon Cancer No family hx of        SOCIAL HISTORY:  Social History     Socioeconomic History     Marital status:      Spouse name: None     Number of children: 6     Years of education: None     Highest education level: None   Occupational History     Employer: [employer]   Social Needs     Financial resource strain: None     Food insecurity:     Worry: None     Inability: None     Transportation needs:     Medical: None     Non-medical: None   Tobacco Use     Smoking status: Never Smoker     Smokeless tobacco: Never Used   Substance and Sexual Activity     Alcohol use: Yes     Alcohol/week: 0.0 standard drinks     Comment: occ - wine     Drug use: No     Sexual activity: Yes     Partners: Male     Birth control/protection: Surgical     Comment: tubal   Lifestyle     Physical activity:     Days per week: None     Minutes per session: None     Stress: None   Relationships     Social connections:     Talks on phone: None     Gets together: None     Attends Cheondoism service: None     Active member of club or organization: None     Attends meetings of clubs or organizations: None     Relationship status: None     Intimate partner violence:     Fear of current or ex partner: None     Emotionally abused: None     Physically abused: None     Forced sexual activity:  None   Other Topics Concern      Service No     Blood Transfusions Yes     Comment: 1983     Caffeine Concern No     Comment: none     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 Not Asked   Social History Narrative     None       Review of Systems:  Skin:  Negative for       Eyes:  Positive for visual blurring;glasses    ENT:  Positive for hearing loss hearing aids in both ears,   Respiratory:  Positive for dyspnea on exertion;cough;sleep apnea     Cardiovascular:    palpitations;Positive for;edema;fatigue;lightheadedness;dizziness    Gastroenterology: Negative for   hx of ulcers  Genitourinary:  Negative for      Musculoskeletal:  Positive for back pain    Neurologic:  Positive for numbness or tingling of feet    Psychiatric:  Positive for anxiety    Heme/Lymph/Imm:  Negative for      Endocrine:  Positive for thyroid disorder      [id number]    CC  Vinnie Bateman MD  [id number]  [place] [address]                Thank you for allowing me to participate in the care of your patient.      Sincerely,     Vinnie Bateman MD     [facility]    cc:   Vinnie Bateman MD  [id number]  [place] [address]

## 2019-12-11 NOTE — PATIENT INSTRUCTIONS
1.  Decrease amlodipine (Norvasc) from 5 mg daily to 2.5 mg daily.    2.  Wear a heart monitor for 14 days.

## 2019-12-12 NOTE — PROGRESS NOTES
Service Date: 12/11/2019      HISTORY OF PRESENT ILLNESS:    I had the pleasure of seeing Ms. Cielo Aguillon, a delightful 79-year-old female with the following medical issues:   A.  Permanent atrial fibrillation treated with rate control (metoprolol 100 mg b.i.d.) and warfarin. [id number] score 3 or 4.    B.  Chronic dyspnea on exertion, etiology multifactorial.   C.  Mild to moderate CAD.  Most recent cardiac catheterization 03/2018.  Tightest lesion was a 50% mid LAD stenosis with normal FFR.   D.  Normal LV function with mild to moderate pulmonary hypertension (due to sleep apnea).   E.  Sleep apnea, combination of obstructive and central.   F.  Hypothyroidism.      Cielo has had difficulties lately.  On 11/14/19 she went to the emergency room because of dizziness.  She initially went to [facility], where she was found to be hypotensive with systolic blood pressure in the 80s.  Later in the day, in the emergency room at [facility], her blood pressure was normal at 122/83.  The ER physician suggested that she stop amlodipine.  After doing that her blood pressure increased so she restarted it.      About 3 months ago, the patient had a syncopal episode.  She was at home and was walking toward the kitchen when she abruptly fainted.  She fell down and hit her face on the floor, breaking her nose.  She said that the nose fracture was not displaced and no further treatment was needed.  She has never had a fainting spell before.      Cielo has no chest pain, orthopnea, PND.  She does have her chronic dyspnea on exertion, but it is not worse than usual.      PHYSICAL EXAMINATION:   VITAL SIGNS:  Blood pressure 120/64, pulse 80 and irregular, weight 74 kg, height 165 cm.   GENERAL:  She is a pleasant, elderly lady in no distress.   HEAD:  Normocephalic.   NECK:  Supple, no carotid bruits, 1+ carotid pulses.   LUNGS:  Clear.   CARDIOVASCULAR:  Normal JVP, irregular rhythm, no gallop or murmur.    ABDOMEN:  Soft, nontender.   EXTREMITIES:  No edema.      DIAGNOSTIC STUDIES:     Her 12-lead ECG today showed atrial fibrillation with controlled ventricular rate and mild nonspecific ST abnormality.    Recent laboratory tests:  Sodium 139, potassium 4.1, creatinine 1.17, proBNP 3600, hematocrit 44%.        IMPRESSION:   1.  Syncope.  This occurred in the upright position and is a concerning event in this elderly woman who is anticoagulated.  Because of the subsequent documentation of low blood pressure, the most likely cause was hypotension.  However, she has permanent atrial fibrillation and is on fairly high-dose metoprolol.  Bradycardia is also a concern.  I will decrease amlodipine and order an event monitor.   2.  Permanent atrial fibrillation.  It has been well rate controlled over the past few years.  The patient is on warfarin, which she has tolerated well.   3.  Chronic coronary artery disease.  No angina currently.  HTN is under control.  She is on a statin.  She is not on aspirin because she is on warfarin.      RECOMMENDATIONS:   A.  Decrease amlodipine from 5 mg daily to 2.5 mg daily.   B.  14-day cardiac monitor.   C.  Follow-up visit in 1 year.  We will see her sooner if we document abnormalities on her cardiac monitor or if she has recurrent syncope.      I appreciate the opportunity to be involved in this very pleasant woman's care.   Time spent was 25 minutes.  More than 50% of time was discussion and counseling.         FARNAZ RICHARDSON MD, [facility]           cc:   Simin Lopez MD    [facility]    [address], [address]    [place]  [address]             D: 2019   T: 2019   MT: JAS      Name:     FELICITAS LITTLEJOHN   MRN:      [id number]        Account:      [id number]   :      1940           Service Date: 2019      Document: [id number]

## 2019-12-19 ENCOUNTER — HOSPITAL ENCOUNTER (OUTPATIENT)
Dept: CARDIOLOGY | Facility: CLINIC | Age: 79
Discharge: HOME | End: 2019-12-19
Attending: INTERNAL MEDICINE | Admitting: INTERNAL MEDICINE
Payer: MEDICARE

## 2019-12-19 DIAGNOSIS — I48.21 PERMANENT ATRIAL FIBRILLATION (H): ICD-10-CM

## 2019-12-19 DIAGNOSIS — I10 ESSENTIAL HYPERTENSION, BENIGN: ICD-10-CM

## 2019-12-19 PROCEDURE — 0296T ZIO PATCH HOLTER ADULT PEDIATRIC GREATER THAN 48 HRS: CPT

## 2019-12-19 PROCEDURE — 0298T ZIO PATCH HOLTER ADULT PEDIATRIC GREATER THAN 48 HRS: CPT | Performed by: INTERNAL MEDICINE

## 2019-12-23 ENCOUNTER — ANTICOAGULATION THERAPY VISIT (OUTPATIENT)
Dept: ANTICOAGULATION | Facility: CLINIC | Age: 79
End: 2019-12-23
Payer: MEDICARE

## 2019-12-23 DIAGNOSIS — M54.42 CHRONIC LEFT-SIDED LOW BACK PAIN WITH LEFT-SIDED SCIATICA: ICD-10-CM

## 2019-12-23 DIAGNOSIS — I48.20 CHRONIC ATRIAL FIBRILLATION (H): ICD-10-CM

## 2019-12-23 DIAGNOSIS — F41.1 GAD (GENERALIZED ANXIETY DISORDER): ICD-10-CM

## 2019-12-23 DIAGNOSIS — G89.29 CHRONIC LEFT-SIDED LOW BACK PAIN WITH LEFT-SIDED SCIATICA: ICD-10-CM

## 2019-12-23 DIAGNOSIS — Z79.01 LONG TERM CURRENT USE OF ANTICOAGULANTS WITH INR GOAL OF 2.0-3.0: ICD-10-CM

## 2019-12-23 LAB — INR POINT OF CARE: 2.9 (ref 0.86–1.14)

## 2019-12-23 PROCEDURE — 99207 ZZC NO CHARGE NURSE ONLY: CPT

## 2019-12-23 PROCEDURE — 85610 PROTHROMBIN TIME: CPT | Mod: QW

## 2019-12-23 PROCEDURE — 36416 COLLJ CAPILLARY BLOOD SPEC: CPT

## 2019-12-23 NOTE — PROGRESS NOTES
ANTICOAGULATION FOLLOW-UP CLINIC VISIT    Patient Name:  Cielo Aguillon  Date:  2019  Contact Type:  Face to Face    SUBJECTIVE:  Patient Findings     Positives:   Bruising (Patient reports having a bruise on her right foot, noticed this a couple days ago, thinks she dropped something on it. )    Comments:   The patient was assessed for diet, medication, and activity level changes, missed or extra doses, bleeding, with no problem findings.          Clinical Outcomes     Comments:   The patient was assessed for diet, medication, and activity level changes, missed or extra doses, bleeding, with no problem findings.             OBJECTIVE    INR Protime   Date Value Ref Range Status   2019 2.9 (A) 0.86 - 1.14 Final       ASSESSMENT / PLAN  INR assessment THER    Recheck INR In: 3 WEEKS    INR Location Clinic      Anticoagulation Summary  As of 2019    INR goal:   2.0-3.0   TTR:   63.1 % (1 y)   INR used for dosin.9 (2019)   Warfarin maintenance plan:   2.5 mg (5 mg x 0.5) every Sun, Wed; 5 mg (5 mg x 1) all other days   Full warfarin instructions:   2.5 mg every Sun, Wed; 5 mg all other days   Weekly warfarin total:   30 mg   No change documented:   Cindi Preston RN   Plan last modified:   Mckenna Robison RN (5/10/2019)   Next INR check:   2020   Priority:   Maintenance   Target end date:       Indications    Chronic atrial fibrillation [I48.20]  Long term current use of anticoagulants with INR goal of 2.0-3.0 [Z79.01]             Anticoagulation Episode Summary     INR check location:       Preferred lab:       Send INR reminders to:   [facility]    Comments:   referral done 19      Anticoagulation Care Providers     Provider Role Specialty Phone number    Simin Lopez MD Responsible Internal Medicine [phone]            See the Encounter Report to view Anticoagulation Flowsheet and Dosing Calendar (Go to Encounters tab in chart review, and find the  Anticoagulation Therapy Visit)    Dosage adjustment made based on physician directed care plan.    Cindi Preston RN

## 2019-12-23 NOTE — TELEPHONE ENCOUNTER
Requested Prescriptions   Pending Prescriptions Disp Refills     diazepam (VALIUM) 5 MG tablet [Pharmacy Med Name: DIAZEPAM 5MG TABLET 5 TAB] 40 tablet 1     Sig: TAKE 1/2 TO 1 TABLET BY MOUTH EVERY 8 HOURS AS NEEDED        Last Written Prescription Date:  7/18/19  Last Fill Quantity: 40 tab,   # refills: 1  Last Office Visit: 12/11/19  Future Office visit:       Routing refill request to provider for review/approval because:  Drug not on the [facility], [facility] or [facility] refill protocol or controlled substance         There is no refill protocol information for this order        traMADol (ULTRAM) 50 MG tablet [Pharmacy Med Name: TRAMADOL HCL 50 MG TABLET 50 TAB] 90 tablet 3     Sig: TAKE ONE TABLET BY MOUTH EVERY 8 HOURS AS NEEDED FOR MODERATE PAIN        Last Written Prescription Date:  5/10/19  Last Fill Quantity: 90 tab,   # refills: 3  Last Office Visit: 12/11/19  Future Office visit:       Routing refill request to provider for review/approval because:  Drug not on the [facility], [facility] or [facility] refill protocol or controlled substance         There is no refill protocol information for this order

## 2019-12-26 RX ORDER — TRAMADOL HYDROCHLORIDE 50 MG/1
TABLET ORAL
Qty: 90 TABLET | Refills: 3 | Status: SHIPPED | OUTPATIENT
Start: 2019-12-26 | End: 2020-03-31

## 2019-12-26 RX ORDER — DIAZEPAM 5 MG
TABLET ORAL
Qty: 40 TABLET | Refills: 1 | Status: SHIPPED | OUTPATIENT
Start: 2019-12-26 | End: 2020-03-31

## 2019-12-27 DIAGNOSIS — I10 ESSENTIAL HYPERTENSION, BENIGN: ICD-10-CM

## 2019-12-27 NOTE — TELEPHONE ENCOUNTER
"Requested Prescriptions   Pending Prescriptions Disp Refills     amLODIPine (NORVASC) 5 MG tablet [Pharmacy Med Name: AMLODIPINE  Last Written Prescription Date:  12/11/2019  Last Fill Quantity: 90,  # refills: 0  Last office visit: 11/26/2019 with prescribing provider:     Future Office Visit:   BESYLATE 5 MG TA 5 TAB] 90 tablet 0     Sig: TAKE 1 TABLET (5MG) BY MOUTH DAILY       Calcium Channel Blockers Protocol  Failed - 12/27/2019  1:55 PM        Failed - Normal serum creatinine on file in past 12 months     Recent Labs   Lab Test 11/14/19  1801  08/18/16  1407   CR 1.17*   < >  --    CREAT  --   --  0.9    < > = values in this interval not displayed.             Passed - Blood pressure under 140/90 in past 12 months     BP Readings from Last 3 Encounters:   12/11/19 120/64   11/26/19 138/78   11/14/19 122/83                 Passed - Recent (12 mo) or future (30 days) visit within the authorizing provider's specialty     Patient has had an office visit with the authorizing provider or a provider within the authorizing providers department within the previous 12 mos or has a future within next 30 days. See \"Patient Info\" tab in inbasket, or \"Choose Columns\" in Meds & Orders section of the refill encounter.              Passed - Medication is active on med list        Passed - Patient is age 18 or older        Passed - No active pregnancy on record        Passed - No positive pregnancy test in past 12 months        "

## 2019-12-31 RX ORDER — AMLODIPINE BESYLATE 5 MG/1
TABLET ORAL
Qty: 90 TABLET | Refills: 0 | OUTPATIENT
Start: 2019-12-31

## 2019-12-31 RX ORDER — AMLODIPINE BESYLATE 2.5 MG/1
2.5 TABLET ORAL DAILY
Qty: 90 TABLET | Refills: 3 | Status: SHIPPED | OUTPATIENT
Start: 2019-12-31

## 2019-12-31 NOTE — TELEPHONE ENCOUNTER
Routing refill request to provider for review/approval because:  RECOMMENDATIONS: Per cardiology on 12/11/19  A.  Decrease amlodipine from 5 mg daily to 2.5 mg daily.   B.  14-day cardiac monitor.   C.  Follow-up visit in 1 year.  We will see her sooner if we document abnormalities on her cardiac monitor or if she has recurrent syncope.

## 2020-01-13 ENCOUNTER — ANTICOAGULATION THERAPY VISIT (OUTPATIENT)
Dept: ANTICOAGULATION | Facility: CLINIC | Age: 80
End: 2020-01-13
Payer: MEDICARE

## 2020-01-13 DIAGNOSIS — Z79.01 LONG TERM CURRENT USE OF ANTICOAGULANTS WITH INR GOAL OF 2.0-3.0: ICD-10-CM

## 2020-01-13 DIAGNOSIS — I48.20 CHRONIC ATRIAL FIBRILLATION (H): ICD-10-CM

## 2020-01-13 LAB — INR POINT OF CARE: 2 (ref 0.86–1.14)

## 2020-01-13 PROCEDURE — 85610 PROTHROMBIN TIME: CPT | Mod: QW

## 2020-01-13 PROCEDURE — 36416 COLLJ CAPILLARY BLOOD SPEC: CPT

## 2020-01-13 PROCEDURE — 99207 ZZC NO CHARGE NURSE ONLY: CPT

## 2020-01-13 NOTE — PROGRESS NOTES
ANTICOAGULATION FOLLOW-UP CLINIC VISIT    Patient Name:  Cielo Aguillon  Date:  2020  Contact Type:  Face to Face    SUBJECTIVE:  Patient Findings     Positives:   Change in diet/appetite (Patient reports may have had more green veggies than usual. )    Comments:   The patient was assessed for medication and activity level changes, missed or extra doses, bruising or bleeding, with no problem findings.          Clinical Outcomes     Comments:   The patient was assessed for medication and activity level changes, missed or extra doses, bruising or bleeding, with no problem findings.             OBJECTIVE    INR Protime   Date Value Ref Range Status   2020 2.0 (A) 0.86 - 1.14 Final       ASSESSMENT / PLAN  INR assessment THER    Recheck INR In: 4 WEEKS    INR Location Clinic      Anticoagulation Summary  As of 2020    INR goal:   2.0-3.0   TTR:   66.0 % (1 y)   INR used for dosin.0 (2020)   Warfarin maintenance plan:   2.5 mg (5 mg x 0.5) every Sun, Wed; 5 mg (5 mg x 1) all other days   Full warfarin instructions:   2.5 mg every Sun, Wed; 5 mg all other days   Weekly warfarin total:   30 mg   No change documented:   Cindi Preston RN   Plan last modified:   Mckenna Robison RN (5/10/2019)   Next INR check:   2/10/2020   Priority:   Maintenance   Target end date:       Indications    Chronic atrial fibrillation [I48.20]  Long term current use of anticoagulants with INR goal of 2.0-3.0 [Z79.01]             Anticoagulation Episode Summary     INR check location:       Preferred lab:       Send INR reminders to:   [facility]    Comments:   referral done 19      Anticoagulation Care Providers     Provider Role Specialty Phone number    Simin Lopez MD Responsible Internal Medicine [phone]            See the Encounter Report to view Anticoagulation Flowsheet and Dosing Calendar (Go to Encounters tab in chart review, and find the Anticoagulation Therapy Visit)    Dosage  adjustment made based on physician directed care plan.    Cindi Preston RN

## 2020-01-15 ENCOUNTER — TELEPHONE (OUTPATIENT)
Dept: CARDIOLOGY | Facility: CLINIC | Age: 80
End: 2020-01-15

## 2020-01-15 NOTE — TELEPHONE ENCOUNTER
Called patients and reviewed results of Zio Patch monitor as outlined by Dr. Bateman.  Pt verbalized understanding and will call the clinic with any other questions.     RALEIGH Mckinley

## 2020-01-15 NOTE — TELEPHONE ENCOUNTER
Left a voicemail asking patient to call back to review results.     ----- Message from Vinnie Bateman MD sent at 1/14/2020  6:42 PM CST -----  Persistent AF, no surprise here.  No severe kaylee- or anything else to explain syncope, though.  Most likely her fainting event was d/t low BP.   Please call Cielo and let her know.  DI

## 2020-01-23 DIAGNOSIS — E03.9 ACQUIRED HYPOTHYROIDISM: ICD-10-CM

## 2020-01-23 DIAGNOSIS — I10 ESSENTIAL HYPERTENSION, BENIGN: ICD-10-CM

## 2020-01-23 DIAGNOSIS — I25.10 CORONARY ARTERY DISEASE INVOLVING NATIVE CORONARY ARTERY OF NATIVE HEART WITHOUT ANGINA PECTORIS: ICD-10-CM

## 2020-01-23 DIAGNOSIS — I25.10 ASCVD (ARTERIOSCLEROTIC CARDIOVASCULAR DISEASE): ICD-10-CM

## 2020-01-23 DIAGNOSIS — E78.5 HYPERLIPIDEMIA LDL GOAL <70: ICD-10-CM

## 2020-01-26 RX ORDER — ROSUVASTATIN CALCIUM 10 MG/1
TABLET, COATED ORAL
Qty: 30 TABLET | Refills: 0 | Status: SHIPPED | OUTPATIENT
Start: 2020-01-26 | End: 2020-04-23

## 2020-01-26 RX ORDER — METOPROLOL TARTRATE 100 MG
TABLET ORAL
Qty: 180 TABLET | Refills: 0 | Status: SHIPPED | OUTPATIENT
Start: 2020-01-26 | End: 2020-04-23

## 2020-01-26 NOTE — TELEPHONE ENCOUNTER
Routing refill request to provider for review/approval because:  Labs out of range:    TSH   Date Value Ref Range Status   07/02/2019 5.89 (H) 0.40 - 4.00 mU/L Final

## 2020-01-27 RX ORDER — LEVOTHYROXINE SODIUM 50 UG/1
TABLET ORAL
Qty: 90 TABLET | Refills: 0 | Status: SHIPPED | OUTPATIENT
Start: 2020-01-27 | End: 2020-04-23

## 2020-02-10 ENCOUNTER — ANTICOAGULATION THERAPY VISIT (OUTPATIENT)
Dept: ANTICOAGULATION | Facility: CLINIC | Age: 80
End: 2020-02-10
Payer: MEDICARE

## 2020-02-10 DIAGNOSIS — Z79.01 LONG TERM CURRENT USE OF ANTICOAGULANTS WITH INR GOAL OF 2.0-3.0: ICD-10-CM

## 2020-02-10 DIAGNOSIS — E78.5 HYPERLIPIDEMIA LDL GOAL <70: ICD-10-CM

## 2020-02-10 DIAGNOSIS — I48.20 CHRONIC ATRIAL FIBRILLATION (H): ICD-10-CM

## 2020-02-10 LAB — INR POINT OF CARE: 2.5 (ref 0.86–1.14)

## 2020-02-10 PROCEDURE — 80061 LIPID PANEL: CPT | Performed by: INTERNAL MEDICINE

## 2020-02-10 PROCEDURE — 85610 PROTHROMBIN TIME: CPT | Mod: QW

## 2020-02-10 PROCEDURE — 99207 ZZC NO CHARGE NURSE ONLY: CPT

## 2020-02-10 PROCEDURE — 36416 COLLJ CAPILLARY BLOOD SPEC: CPT

## 2020-02-10 NOTE — PROGRESS NOTES
ANTICOAGULATION FOLLOW-UP CLINIC VISIT    Patient Name:  Cielo Aguillon  Date:  2/10/2020  Contact Type:  Face to Face    SUBJECTIVE:  Patient Findings     Comments:   The patient was assessed for diet, medication, and activity level changes, missed or extra doses, bruising or bleeding, with no problem findings.          Clinical Outcomes     Negatives:   Major bleeding event, Thromboembolic event, Anticoagulation-related hospital admission, Anticoagulation-related ED visit, Anticoagulation-related fatality    Comments:   The patient was assessed for diet, medication, and activity level changes, missed or extra doses, bruising or bleeding, with no problem findings.             OBJECTIVE    INR Protime   Date Value Ref Range Status   02/10/2020 2.5 (A) 0.86 - 1.14 Final       ASSESSMENT / PLAN  INR assessment THER    Recheck INR In: 4 WEEKS    INR Location Clinic      Anticoagulation Summary  As of 2/10/2020    INR goal:   2.0-3.0   TTR:   72.0 % (1 y)   INR used for dosin.5 (2/10/2020)   Warfarin maintenance plan:   2.5 mg (5 mg x 0.5) every Sun, Wed; 5 mg (5 mg x 1) all other days   Full warfarin instructions:   2.5 mg every Sun, Wed; 5 mg all other days   Weekly warfarin total:   30 mg   No change documented:   Samantha Whittington RN   Plan last modified:   Mckenna Robison RN (5/10/2019)   Next INR check:   3/9/2020   Priority:   Maintenance   Target end date:   Indefinite    Indications    Chronic atrial fibrillation [I48.20]  Long term current use of anticoagulants with INR goal of 2.0-3.0 [Z79.01]             Anticoagulation Episode Summary     INR check location:       Preferred lab:       Send INR reminders to:   [facility]    Comments:    renewal completed      Anticoagulation Care Providers     Provider Role Specialty Phone number    Simin Lopez MD Referring Internal Medicine [phone]            See the Encounter Report to view Anticoagulation Flowsheet and Dosing Calendar  (Go to Encounters tab in chart review, and find the Anticoagulation Therapy Visit)        Samantha Whittington RN

## 2020-02-11 LAB
CHOLEST SERPL-MCNC: 122 MG/DL
HDLC SERPL-MCNC: 41 MG/DL
LDLC SERPL CALC-MCNC: 57 MG/DL
NONHDLC SERPL-MCNC: 81 MG/DL
TRIGL SERPL-MCNC: 119 MG/DL

## 2020-03-06 DIAGNOSIS — F41.1 GAD (GENERALIZED ANXIETY DISORDER): ICD-10-CM

## 2020-03-06 DIAGNOSIS — F33.1 MAJOR DEPRESSIVE DISORDER, RECURRENT EPISODE, MODERATE (H): ICD-10-CM

## 2020-03-06 NOTE — TELEPHONE ENCOUNTER
"Requested Prescriptions   Pending Prescriptions Disp Refills     citalopram (CELEXA) 20 MG tablet [Pharmacy Med Name: CITALOPRAM 20MG TAB 20 TAB] 135 tablet 0     Sig: TAKE 1&1/2 TABLET (30MG) BY MOUTH EVERY DAY.   Last Written Prescription Date:  10/26/2019  Last Fill Quantity: 135,  # refills: 0   Last office visit: 2/10/2020 with prescribing provider:     Future Office Visit:      SSRIs Protocol Failed - 3/6/2020 12:26 PM        Failed - PHQ-9 score less than 5 in past 6 months     Please review last PHQ-9 score.           Passed - Medication is active on med list        Passed - Patient is age 18 or older        Passed - No active pregnancy on record        Passed - No positive pregnancy test in last 12 months        Passed - Recent (6 mo) or future (30 days) visit within the authorizing provider's specialty     Patient had office visit in the last 6 months or has a visit in the next 30 days with authorizing provider or within the authorizing provider's specialty.  See \"Patient Info\" tab in inbasket, or \"Choose Columns\" in Meds & Orders section of the refill encounter.            "

## 2020-03-09 ENCOUNTER — ANTICOAGULATION THERAPY VISIT (OUTPATIENT)
Dept: ANTICOAGULATION | Facility: CLINIC | Age: 80
End: 2020-03-09
Payer: MEDICARE

## 2020-03-09 DIAGNOSIS — Z79.01 LONG TERM CURRENT USE OF ANTICOAGULANTS WITH INR GOAL OF 2.0-3.0: ICD-10-CM

## 2020-03-09 DIAGNOSIS — I48.20 CHRONIC ATRIAL FIBRILLATION (H): ICD-10-CM

## 2020-03-09 LAB — INR POINT OF CARE: 1.9 (ref 0.86–1.14)

## 2020-03-09 PROCEDURE — 99207 ZZC NO CHARGE NURSE ONLY: CPT

## 2020-03-09 PROCEDURE — 36416 COLLJ CAPILLARY BLOOD SPEC: CPT

## 2020-03-09 PROCEDURE — 85610 PROTHROMBIN TIME: CPT | Mod: QW

## 2020-03-09 NOTE — PROGRESS NOTES
ANTICOAGULATION FOLLOW-UP CLINIC VISIT    Patient Name:  Cielo Aguillon  Date:  3/9/2020  Contact Type:  Face to Face    SUBJECTIVE:  Patient Findings     Comments:   The patient was assessed for diet, medication, and activity level changes, missed or extra doses, bruising or bleeding, with no problem findings.          Clinical Outcomes     Negatives:   Major bleeding event, Thromboembolic event, Anticoagulation-related hospital admission, Anticoagulation-related ED visit, Anticoagulation-related fatality    Comments:   The patient was assessed for diet, medication, and activity level changes, missed or extra doses, bruising or bleeding, with no problem findings.             OBJECTIVE    INR Protime   Date Value Ref Range Status   2020 1.9 (A) 0.86 - 1.14 Final       ASSESSMENT / PLAN  INR assessment SUB    Recheck INR In: 2 WEEKS    INR Location Clinic      Anticoagulation Summary  As of 3/9/2020    INR goal:   2.0-3.0   TTR:   74.4 % (1 y)   INR used for dosin.9! (3/9/2020)   Warfarin maintenance plan:   2.5 mg (5 mg x 0.5) every Sun, Wed; 5 mg (5 mg x 1) all other days   Full warfarin instructions:   2.5 mg every Sun, Wed; 5 mg all other days   Weekly warfarin total:   30 mg   No change documented:   Cindi Preston RN   Plan last modified:   Mckenna Robison RN (5/10/2019)   Next INR check:   3/23/2020   Priority:   Maintenance   Target end date:   Indefinite    Indications    Chronic atrial fibrillation [I48.20]  Long term current use of anticoagulants with INR goal of 2.0-3.0 [Z79.01]             Anticoagulation Episode Summary     INR check location:       Preferred lab:       Send INR reminders to:   [facility]    Comments:    renewal completed      Anticoagulation Care Providers     Provider Role Specialty Phone number    Simin Lopez MD Responsible Internal Medicine [phone]            See the Encounter Report to view Anticoagulation Flowsheet and Dosing Calendar (Go  to Encounters tab in chart review, and find the Anticoagulation Therapy Visit)    Dosage adjustment made based on physician directed care plan.    Cindi Preston RN

## 2020-03-10 RX ORDER — CITALOPRAM HYDROBROMIDE 20 MG/1
TABLET ORAL
Qty: 135 TABLET | Refills: 0 | Status: SHIPPED | OUTPATIENT
Start: 2020-03-10 | End: 2020-04-23

## 2020-03-10 ASSESSMENT — PATIENT HEALTH QUESTIONNAIRE - PHQ9: SUM OF ALL RESPONSES TO PHQ QUESTIONS 1-9: 13

## 2020-03-10 NOTE — TELEPHONE ENCOUNTER
Last office visit: 11/26/19  Contacted patient and updated PHQ-9.  PHQ 2/16/2018 3/4/2019 3/10/2020   PHQ-9 Total Score 5 10 13   Q9: Thoughts of better off dead/self-harm past 2 weeks Not at all Not at all Not at all     She states that she received a letter from the recipient of one of her son's heart, initially this made her happy, but it also brought everything back up - her first , 2 adult sons, a grandson and a nephew have all committed suicide.     She had also tried started weaning off Citalopram about a month ago thinking she may not need this anymore. She had not consulted with Dr. Lopez about this, but when she had weaned down to 1/2 tab daily she noticed symptoms returning and resumed her previous dose 1 week ago. Patient states she would like to speak to Dr. Lopez about depression and if there may be another medication that would work better for her. Patient also states she is due for Wellness exam. Future appointment scheduled for Wellness Exam, patient aware that she will be filled for any concerns discussed outside of the preventative exam.     Next 5 appointments (look out 90 days)    Mar 23, 2020  2:40 PM CDT  PHYSICAL with Simin Lopez MD  [facility] [address]  [facility] [address] [phone]        Medication is being filled for 1 time refill only due to:  Future appointment scheduled

## 2020-03-23 ENCOUNTER — ANTICOAGULATION THERAPY VISIT (OUTPATIENT)
Dept: ANTICOAGULATION | Facility: CLINIC | Age: 80
End: 2020-03-23
Payer: MEDICARE

## 2020-03-23 DIAGNOSIS — I48.20 CHRONIC ATRIAL FIBRILLATION (H): ICD-10-CM

## 2020-03-23 DIAGNOSIS — Z79.01 LONG TERM CURRENT USE OF ANTICOAGULANTS WITH INR GOAL OF 2.0-3.0: Primary | ICD-10-CM

## 2020-03-23 LAB — INR POINT OF CARE: 2.2 (ref 0.86–1.14)

## 2020-03-23 PROCEDURE — 36416 COLLJ CAPILLARY BLOOD SPEC: CPT

## 2020-03-23 PROCEDURE — 99207 ZZC NO CHARGE NURSE ONLY: CPT

## 2020-03-23 PROCEDURE — 85610 PROTHROMBIN TIME: CPT | Mod: QW

## 2020-03-23 NOTE — PROGRESS NOTES
ANTICOAGULATION FOLLOW-UP CLINIC VISIT    Patient Name:  Cielo Aguillon  Date:  3/23/2020  Contact Type:  Face to Face    SUBJECTIVE:  Patient Findings     Comments:   The patient was assessed for diet, medication, and activity level changes, missed or extra doses, bruising or bleeding, with no problem findings.  Best number to call patient [phone], ok to leave a detailed message.        Clinical Outcomes     Negatives:   Major bleeding event, Thromboembolic event, Anticoagulation-related hospital admission, Anticoagulation-related ED visit, Anticoagulation-related fatality    Comments:   The patient was assessed for diet, medication, and activity level changes, missed or extra doses, bruising or bleeding, with no problem findings.  Best number to call patient [phone], ok to leave a detailed message.           OBJECTIVE    INR Protime   Date Value Ref Range Status   2020 2.2 (A) 0.86 - 1.14 Final       ASSESSMENT / PLAN  INR assessment THER    Recheck INR In: 4 WEEKS    INR Location Clinic      Anticoagulation Summary  As of 3/23/2020    INR goal:   2.0-3.0   TTR:   77.0 % (1 y)   INR used for dosin.2 (3/23/2020)   Warfarin maintenance plan:   2.5 mg (5 mg x 0.5) every Sun, Wed; 5 mg (5 mg x 1) all other days   Full warfarin instructions:   2.5 mg every Sun, Wed; 5 mg all other days   Weekly warfarin total:   30 mg   No change documented:   Cindi Preston RN   Plan last modified:   Mckenna Robison RN (5/10/2019)   Next INR check:   2020   Priority:   Maintenance   Target end date:   Indefinite    Indications    Chronic atrial fibrillation [I48.20]  Long term current use of anticoagulants with INR goal of 2.0-3.0 [Z79.01]             Anticoagulation Episode Summary     INR check location:       Preferred lab:       Send INR reminders to:   [facility]    Comments:    renewal completed      Anticoagulation Care Providers     Provider Role Specialty Phone number    Jessica,  Simin GRULLON MD [facility] Internal Medicine [phone]            See the Encounter Report to view Anticoagulation Flowsheet and Dosing Calendar (Go to Encounters tab in chart review, and find the Anticoagulation Therapy Visit)    Dosage adjustment made based on physician directed care plan.    Cindi Preston RN

## 2020-03-24 ENCOUNTER — TELEPHONE (OUTPATIENT)
Dept: PALLIATIVE MEDICINE | Facility: CLINIC | Age: 80
End: 2020-03-24

## 2020-03-24 NOTE — TELEPHONE ENCOUNTER
Pt scheduled for a telephone visit 3/25 with Dr. Chidi FELICIANO    [facility]

## 2020-03-24 NOTE — TELEPHONE ENCOUNTER
Chart review, Patient has not been seen since 10/2019.  We do not prescribe oxycodone.     LM with no patient identifiers. Advised that as she has not been seen for some time she should make a telehealth appointment so she can discuss if injection would be appropriate once we open up again for scheduling. Advised that we would not be providing oxycodone at this time either and pain medication options would need to be discussed at visit.       Myah PATINO, RN Care Coordinator  [facility]  Pain Management

## 2020-03-24 NOTE — TELEPHONE ENCOUNTER
Reason for call:  Other   Patient called regarding (reason for call): call back  Additional comments: Pt called stating that her back pain is back and wants another injection. Pt also states her pharmacy requested a refill of oxycodone to help with her pain in the mean time.    Phone number to reach patient:  Home number on file [phone] (home)    Best Time:  anytime    Can we leave a detailed message on this number?  YES    Travel screening: Not Applicable     Nae FELICIANO    [facility] Management

## 2020-03-25 ENCOUNTER — VIRTUAL VISIT (OUTPATIENT)
Dept: PALLIATIVE MEDICINE | Facility: CLINIC | Age: 80
End: 2020-03-25
Payer: MEDICARE

## 2020-03-25 DIAGNOSIS — M47.816 FACET ARTHROPATHY, LUMBAR: Primary | ICD-10-CM

## 2020-03-25 PROCEDURE — G2012 BRIEF CHECK IN BY MD/QHP: HCPCS | Performed by: PHYSICAL MEDICINE & REHABILITATION

## 2020-03-25 ASSESSMENT — PAIN SCALES - GENERAL: PAINLEVEL: MILD PAIN (2)

## 2020-03-25 NOTE — PROGRESS NOTES
Cielo Aguillon is a 80 year old female who is being evaluated via a billable telephone visit.      Chief complaint:   Chief Complaint   Patient presents with     Pain     Telephone Visit due to COVID-19.     Interval history:  Cielo Aguillon is a 78 year old female last seen by me on 10/11/2019.      Recommendations/plan at the last visit included:  1. Therapy: She is currently not doing any therapy. Will discuss again after pain becomes more manageable.   2. Clinical Health Pain Psychologist: Discussed pain psychology at last visit but she was not interested.   3. Diagnostic Studies: Order placed for a lumbar x-ray given hx of fall x 2.   4. Medication Management:   1. Medication options are limited. She cannot use NSIADs. Am hesitant to use muscle relaxants due to risk of sedation, impairment with balance and dizziness which could contribute to a fall. Cymbalta could be an option for chronic musculoskeletal back pain but this would need to replace Celexa.   2. Can continue with Tramadol 50 mg as she is using now. She uses daily prn.   3. Can use tylenol up to 3,000 mg daily.  5. Further procedures recommended: Order placed for repeat L3,4,5 bilateral lumbar RFA.   6. Complementary Treatments: Discussed acupuncture but her insurance will not cover it.  7. Other: Can consider TENS unit at next visit to see if it will provide benefit.   8. Follow up: Will call Cielo with result of lumbar x-ray.     Since her last visit, Cielo Aguillon reports:  - She had a bilateral lumbar L3,4,5 medial branch RFA on 10/25/2019. She states that this was helpful, about 75%. Helpful, for about 3 months and then pain gradually worsened and mostly back to baseline by 4 months after the RFA  - No change in location of the pain. No new symptoms reported.    - She is wondering if repeating the procedure could be a possibility.   - She is trying to walk and does some stretches in bed in the mornings.     Current pain treatments:     Current pain medications:              Tramadol 50 mg q8 hours prn - most she uses is once per day, sometimes uses none - H              Tylenol 650 mg prn - [facility]. Typically only using this once a day                Celexa - for anxiety and mood  Valium - only uses when she travels due to anxiety  Warfarin  Melatonin for sleep    Current MME: up to 15    Review of [place] Prescription Monitoring Program (): No concern for abuse or misuse of controlled medications based on this report.     Assessment:   Cielo Aguillon is a 78 year old female with a past medical history significant for BETHANY, HLD, hypothyroidism, CAD, chronic atrial fibrillation on anticoagulation, MARY, depression,  who presents with complaints of chronic low back pain.      1. Chronic low back pain: Etiology most likely secondary to lumbar spondylosis and facet arthropathy with overlying myofascial pain. MRI of lumbar spine shows no evidence of nerve root impingement. There is some mild to moderate right foraminal stenosis at L3-4 and L4-5 and mild left foraminal stenosis at L4-5. No significant stenosis of the central canal. She has had good relief of back pain per her report with lumbar RFA but relief lasted only for ~ 3 months.     2. Chronic opioid use: Has been on tramadol for number of years. Uses ~1 tab/day at most. It is helpful for her pain.     3.  Mental Health - the patient's mental health concerns, specifically anxiety and depression, affect her experience of pain and contribute to her clinically significant distress.    Plan:  I am recommending a multidisciplinary treatment plan to help this patient better manage her pain.  This includes:      1. Therapy: She is currently not doing any therapy. Will discuss again after pain becomes more manageable.   2. Clinical Health Pain Psychologist: can consider in the future. She is coping well at this time.    3. Diagnostic Studies: No new imaging needed.   4. Medication Management:    1. Medication options are limited. She cannot use NSIADs. Am hesitant to use muscle relaxants due to risk of sedation, impairment with balance and dizziness which could contribute to a fall. Cymbalta could be an option for chronic musculoskeletal back pain but this would need to replace Celexa.   2. Can continue with Tramadol 50 mg as she is using now. She uses daily prn.   3. Can use tylenol up to 3,000 mg daily.  4. Try using topical Lidocaine 4% patches since they have previously provided benefit.   5. She is asking for oxycodone. Discussed that the prior prescription was after her RFA and was only for post-procedure pain. It was not intended to be an ongoing medication. She verbalized understanding of this.   5. Further procedures recommended: Order placed for bilateral L4-5 and L5-S1 facet joint injections. Would not repeat the RFA at this time because it did not provide longer duration of benefit beyond about 3 months. Ideally would want to have significant benefit for > 6 months to repeat the procedure.    6. Complementary Treatments: Discussed acupuncture can be an option. Will re-visit at next appointment.   7. Follow up: Will see Cielo for injections when able to. Will have her f/u in clinic 2-3 weeks after the injection.     Qiana Maldonado MD  [facility]    Phone call duration: 12 minutes

## 2020-03-25 NOTE — PROGRESS NOTES
The patient has been notified of following:     This telephone visit will be conducted via a call between you and your provider. We have found that certain health care needs can be provided without the need for a physical exam.  This service lets us provide the care you need with a phone conversation.  If a prescription is necessary we can send it directly to your pharmacy.  If lab work is needed we can place an order for that and you can then stop by our lab to have the test done at a later time. This is a billable service but we do not know the cost at this time.     Latrice Santana MA on 3/25/2020 at 11:09 AM

## 2020-03-26 DIAGNOSIS — M54.42 CHRONIC LEFT-SIDED LOW BACK PAIN WITH LEFT-SIDED SCIATICA: ICD-10-CM

## 2020-03-26 DIAGNOSIS — G89.29 CHRONIC LEFT-SIDED LOW BACK PAIN WITH LEFT-SIDED SCIATICA: ICD-10-CM

## 2020-03-26 NOTE — TELEPHONE ENCOUNTER
She is overdue for her 6-month follow-up, advised patient that she needs to do a visit, schedule telephone visit then I will do the refill.

## 2020-03-26 NOTE — TELEPHONE ENCOUNTER
Patient due for medication check, please advise if e-visit needed.     Controlled Substance Refill Request for Tramadol 50 mg  Problem List Complete:  No     PROVIDER TO CONSIDER COMPLETION OF PROBLEM LIST AND OVERVIEW/CONTROLLED SUBSTANCE AGREEMENT    Last Written Prescription Date:  12/26/19  Last Fill Quantity: 90,   # refills: 3    [place] [facility] [place]: 7/18/19    Future Office visit:     Controlled substance agreement:   Encounter-Level CSA - 03/31/2016:    Controlled Substance Agreement - [facility] on 4/4/2016 12:15 PM[facility]     Patient-Level CSA:    There are no patient-level csa.         Last Urine Drug Screen: No results found for: CDAUT, No results found for: COMDAT, No results found for: THC13, PCP13, COC13, MAMP13, OPI13, AMP13, BZO13, TCA13, MTD13, BAR13, OXY13, PPX13, BUP13     Processing:  Rx to be electronically transmitted to pharmacy by provider      [email or web address]      [place] reviewed:  reviewed- no concerns

## 2020-03-26 NOTE — TELEPHONE ENCOUNTER
Requested Prescriptions   Pending Prescriptions Disp Refills     traMADol (ULTRAM) 50 MG tablet [Pharmacy Med Name: TRAMADOL HCL 50 MG TABS 50 TAB] 90 tablet 3     Sig: TAKE ONE TABLET BY MOUTH EVERY 8 HOURS AS NEEDED FOR MODERATE PAIN       There is no refill protocol information for this order      Last Written Prescription Date:  12/26/2019  Last Fill Quantity: 90,  # refills: 03   Last office visit: 3/23/2020 with prescribing provider:     Future Office Visit:

## 2020-03-30 NOTE — TELEPHONE ENCOUNTER
Assisted in scheduling, will route to primary care provider to fill during tomorrow's visit.    Next 5 appointments (look out 90 days)    Mar 31, 2020  1:00 PM CDT  Telephone Visit with Simin Lopez MD  [facility] ([facility] [address]  [facility] [id number] [phone]

## 2020-03-31 ENCOUNTER — VIRTUAL VISIT (OUTPATIENT)
Dept: INTERNAL MEDICINE | Facility: CLINIC | Age: 80
End: 2020-03-31
Payer: MEDICARE

## 2020-03-31 DIAGNOSIS — F33.1 MAJOR DEPRESSIVE DISORDER, RECURRENT EPISODE, MODERATE (H): ICD-10-CM

## 2020-03-31 DIAGNOSIS — G89.29 CHRONIC RIGHT-SIDED LOW BACK PAIN WITHOUT SCIATICA: Primary | ICD-10-CM

## 2020-03-31 DIAGNOSIS — I27.20 PULMONARY HYPERTENSION (H): ICD-10-CM

## 2020-03-31 DIAGNOSIS — M54.50 CHRONIC RIGHT-SIDED LOW BACK PAIN WITHOUT SCIATICA: Primary | ICD-10-CM

## 2020-03-31 DIAGNOSIS — F41.1 GAD (GENERALIZED ANXIETY DISORDER): ICD-10-CM

## 2020-03-31 DIAGNOSIS — R06.09 DOE (DYSPNEA ON EXERTION): ICD-10-CM

## 2020-03-31 PROCEDURE — G2012 BRIEF CHECK IN BY MD/QHP: HCPCS | Performed by: INTERNAL MEDICINE

## 2020-03-31 RX ORDER — TRAMADOL HYDROCHLORIDE 50 MG/1
TABLET ORAL
Qty: 90 TABLET | Refills: 3 | Status: SHIPPED | OUTPATIENT
Start: 2020-03-31

## 2020-03-31 RX ORDER — DIAZEPAM 5 MG
TABLET ORAL
Qty: 40 TABLET | Refills: 3 | Status: SHIPPED | OUTPATIENT
Start: 2020-03-31

## 2020-03-31 NOTE — LETTER
[facility]  03/31/20    Patient: Cielo Aguillon  YOB: 1940  Medical Record Number: [id number]                                                                  Opioid / Opioid Plus Controlled Substance Agreement    I understand that my care provider has prescribed an opioid (narcotic) controlled substance to help manage my condition(s). I am taking this medicine to help me function or work. I know this is strong medicine, and that it can cause serious side effects. Opioid medicine can be sedating, addicting and may cause a dependency on the drug. They can affect my ability to drive or think, and cause depression. They need to be taken exactly as prescribed. Combining opioids with certain medicines or chemicals (such as cocaine, sedatives and tranquilizers, sleeping pills, meth) can be dangerous or even fatal. Also, if I stop opioids suddenly, I may have severe withdrawal symptoms. Last, I understand that opioids do not work for all types of pain nor for all patients. If not helpful, I may be asked to stop them.      The risks, benefits, and side effects of these medicine(s) were explained to me. I agree that:    1. I will take part in other treatments as advised by my care team. This may be psychiatry or counseling, physical therapy, behavioral therapy, group treatment or a referral to a pain clinic. I will reduce or stop my medicine when my care team tells me to do so.  2. I will take my medicines as prescribed. I will not change the dose or schedule unless my care team tells me to. There will be no refills if I  run out early.   I may be contactedwithout warning and asked to complete a urine drug test or pill count at any time.   3. I will keep all my appointments, and understand this is part of the monitoring of opioids. My care team may require an office visit for EVERY opioid/controlled substance refill. If I miss appointments or don t follow instructions, my care team may stop  my medicine.  4. I will not ask other providers to prescribe controlled substances, and I will not accept controlled substances from other people. If I need another prescribed controlled substance for a new reason, I will tell my care team within 1 business day.  5. I will use one pharmacy to fill all of my controlled substance prescriptions, and it is up to me to make sure that I do not run out of my medicines on weekends or holidays. If my care team is willing to refill my opioid prescription without a visit, I must request refills only during office hours, refills may take up to 3 days to process, and it may take up to 5 to 7 days for my medicine to be mailed and ready at my pharmacy. Prescriptions will not be mailed anywhere except my pharmacy.        [id number]  Rev 12/18         Registration to scan to EHR                             Page 1 of 2               Controlled Substance Agreement Opioid        [facility]  03/31/20  Patient: Cielo Aguillon  YOB: 1940  Medical Record Number: [id number]                                                                  6. I am responsible for my prescriptions. If the medicine/prescription is lost or stolen, it will not be replaced. I also agree not to share controlled substance medicines with anyone.  7. I agree to not use ANY illegal or recreational drugs. This includes marijuana, cocaine, bath salts or other drugs. I agree not to use alcohol unless my care team says I may.          I agree to give urine samples whenever asked. If I don t give a urine sample, the care team may stop my medicine.    8. If I enroll in the [place] Medical Marijuana program, I will tell my care team. I will also sign an agreement to share my medical records with my care team.   9. I will bring in my list of medicines (or my medicine bottles) each time I come to the clinic.   10. I will tell my care team right away if I become pregnant or have a new medical problem  treated outside of my regular clinic.  11. I understand that this medicine can affect my thinking and judgment. It may be unsafe for me to drive, use machinery and do dangerous tasks. I will not do any of these things until I know how the medicine affects me. If my dose changes, I will wait to see how it affects me. I will contact my care team if I have concerns about medicine side effects.    I understand that if I do not follow any of the conditions above, my prescriptions or treatment may be stopped.      I agree that my provider, clinic care team, and pharmacy may work with any city, state or federal law enforcement agency that investigates the misuse, sale, or other diversion of my controlled medicine. I will allow my provider to discuss my care with or share a copy of this agreement with any other treating provider, pharmacy or emergency room where I receive care. I agree to give up (waive) any right of privacy or confidentiality with respect to these consents.     I have read this agreement and have asked questions about anything I did not understand.      ________________________________________________________________________  Patient signature - Date/Time -  Cielo Aguillon                                      ________________________________________________________________________  Witness signature                                                            ________________________________________________________________________  Provider signature - Simin Lopze MD      [id number]  Rev 12/18         Registration to scan to EHR                         Page 2 of 2                   Controlled Substance Agreement Opioid           Page 1 of 2  Opioid Pain Medicines (also known as Narcotics)  What You Need to Know    What are opioids?   Opioids are pain medicines that must be prescribed by a doctor.  They are also known as narcotics.    Examples are:     morphine (MS Contin, Roxana)    oxycodone  (Oxycontin)    oxycodone and acetaminophen (Percocet)    hydrocodone and acetaminophen (Vicodin, Norco)     fentanyl patch (Duragesic)     hydromorphone (Dilaudid)     methadone     What do opioids do well?   Opioids are best for short-term pain after a surgery or injury. They also work well for cancer pain. Unlike other pain medicines, they do not cause liver or kidney failure or ulcers. They may help some people with long-lasting (chronic) pain.     What do opioids NOT do well?   Opioids never get rid of pain entirely, and they do not work well for most patients with chronic pain. Opioids do not reduce swelling, one of the causes of pain. They also don t work well for nerve pain.                           For informational purposes only.  Not to replace the advice of your care provider.  Copyright 201 [facility]. All right reserved. [employer] 459466-Lss 02/18.      Page 2 of 2    Risks and side effects   Talk to your doctor before you start or decide to keep taking one of these medicines. Side effects include:    Lowering your breathing rate enough to cause death    Overdose, including death, especially if taking higher than prescribed doses    Long-term opioid use    Worse depression symptoms; less pleasure in things you usually enjoy    Feeling tired or sluggish    Slower thoughts or cloudy thinking    Being more sensitive to pain over time; pain is harder to control    Trouble sleeping or restless sleep    Changes in hormone levels (for example, less testosterone)    Changes in sex drive or ability to have sex    Constipation    Unsafe driving    Itching and sweating    Feeling dizzy    Nausea, vomiting and dry mouth    What else should I know about opioids?  When someone takes opioids for too long or too often, they become dependent. This means that if you stop or reduce the medicine too quickly, you will have withdrawal symptoms.    Dependence is not the same as addiction. Addiction is when  people keep using a substance that harms their body, their mind or their relations with others. If you have a history of drug or alcohol abuse, taking opioids can cause a relapse.    Over time, opioids don t work as well. Most people will need higher and higher doses. The higher the dose, the more serious the side effects. We don t know the long-term effects of opioids.      Prescribed opioids aren't the best way to manage chronic pain    Other ways to manage pain include:      Ibuprofen or acetaminophen.  You should always try this first.      Treat health problems that may be causing pain.      acupuncture or massage, deep breathing, meditation, visual imagery, aromatherapy.      Use heat or ice at the pain site      Physical therapy and exercise      Stop smoking      See a counselor or therapist                                                  People who have used opioids for a long time may have a lower quality of life, worse depression, higher levels of pain and more visits to doctors.    Never share your opioids with others. Be sure to store opioids in a secure place, locked if possible.Young children can easily swallow them and overdose.     You can overdose on opioids.  Signs of overdose include decrease or loss of consciousness, slowed breathing, trouble waking and blue lips.  If someone is worried about overdose, they should call 911.    If you are at risk for overdose, you may get naloxone (Narcan, a medicine that reverses the effects of opioids.  If you overdose, a friend or family member can give you Narcan while waiting for the ambulance.  They need to know the signs of overdose and how to give Narcan.    While you're taking opioids:    Don't use alcohol or street drugs. Taking them together can cause death.    Don't take any of these medicines unless your doctor says its okay.  Taking these with opioids can cause death.    Benzodiazepines (such as lorazepam         or diazepam)    Muscle relaxers  (such as cyclobenzaprine)    sleeping pills    other opioids    Safe disposal of opioids  Find your area drug take-back program, your pharmacy mail-back program, buy a special disposal bag (such as Deterra) from your pharmacy or flush them down the toilet.  Use the guidelines at:  www.fda.gov/drugs/resourcesforyou

## 2020-03-31 NOTE — PROGRESS NOTES
"Subjective     Cielo Aguillon is a 80 year old female who is being evaluated via a billable telephone visit.      The patient has been notified of following:     \"This telephone visit will be conducted via a call between you and your physician/provider. We have found that certain health care needs can be provided without the need for a physical exam.  This service lets us provide the care you need with a short phone conversation.  If a prescription is necessary we can send it directly to your pharmacy.  If lab work is needed we can place an order for that and you can then stop by our lab to have the test done at a later time.    If during the course of the call the physician/provider feels a telephone visit is not appropriate, you will not be charged for this service.\"     Patient has given verbal consent for Telephone visit?  Yes    Cielo Aguillon complains of   Chief Complaint   Patient presents with     Recheck Medication     Medicaiton recheck       ALLERGIES  Ace inhibitors; Ambien [zolpidem]; Aspirin; Hydrochlorothiazide [hctz]; Ibuprofen; Amoxicillin; Losartan; and Penicillins    Her primary concern today is follow-up of pain: She had been getting ablations done through the pain clinic and the most recent one wore off.  She touch base with the pain clinic and they felt they wanted to try steroid injection next but wanted to delay it because of the pandemic.  They recommend she continue her current dose of tramadol but recommended she contact me to provide refills of the medication.  She feels that the current dose is stable.  Her last controlled substance agreement was done in 2017.      Hyperlipidemia Follow-Up      Are you regularly taking any medication or supplement to lower your cholesterol?   Yes- rosuvastatin    Are you having muscle aches or other side effects that you think could be caused by your cholesterol lowering medication?  Yes- back     Hypertension Follow-up  She reports her blood " pressures have been running 1 10-1 30, she had had low blood pressures in December and her cardiologist decreased her amlodipine and she is doing well with that.    Do you check your blood pressure regularly outside of the clinic? Yes     Are you following a low salt diet? No    Are your blood pressures ever more than 140 on the top number (systolic) OR more   than 90 on the bottom number (diastolic), for example 140/90? No    Depression and Anxiety Follow-Up    How are you doing with your depression since your last visit? No change    How are you doing with your anxiety since your last visit?   she reports a little more anxiety recently, her  is having some memory issues and she feels that he may have some dementia.  This is made her a little more stressed and she would like a refill of the Valium.  She does use it fairly infrequently.    Are you having other symptoms that might be associated with depression or anxiety? No    Have you had a significant life event? No     Do you have any concerns with your use of alcohol or other drugs? No    Social History     Tobacco Use     Smoking status: Never Smoker     Smokeless tobacco: Never Used   Substance Use Topics     Alcohol use: Yes     Alcohol/week: 0.0 standard drinks     Comment: occ - wine     Drug use: No     PHQ 2/16/2018 3/4/2019 3/10/2020   PHQ-9 Total Score 5 10 13   Q9: Thoughts of better off dead/self-harm past 2 weeks Not at all Not at all Not at all     MARY-7 SCORE 8/30/2017 11/19/2017 3/4/2019   Total Score - - -   Total Score 10 9 8         Suicide Assessment Five-step Evaluation and Treatment (SAFE-T)    Hypothyroidism Follow-up      Since last visit, patient describes the following symptoms: Weight stable, no hair loss, no skin changes, no constipation, no loose stools      How many servings of fruits and vegetables do you eat daily?  0-1    On average, how many sweetened beverages do you drink each day (Examples: soda, juice, sweet tea, etc.   Do NOT count diet or artificially sweetened beverages)?   0    How many days per week do you exercise enough to make your heart beat faster? 3 or less    How many minutes a day do you exercise enough to make your heart beat faster? 10 - 19    How many days per week do you miss taking your medication? 0    Other problems:  Chronic dyspnea on exertion: She still did not quite understand what the thinking is as far as the cause of the symptoms.  She has seen pulmonary and cardiology.    Patient Active Problem List   Diagnosis     Essential hypertension, benign     Chronic rhinitis     Other ventral hernia without mention of obstruction or gangrene     Lichenification and lichen simplex chronicus     Fatty liver     Advanced directives, counseling/discussion     Hyperlipidemia LDL goal <70     Chronic right-sided back pain     GREEN (dyspnea on exertion)     Coronary artery disease     Cystocele, midline     Rectocele     BETHANY (obstructive sleep apnea)     Long term current use of anticoagulants with INR goal of 2.0-3.0     MARY (generalized anxiety disorder)     Major depressive disorder, recurrent episode, moderate (H)     Controlled substance agreement signed- ok 3/26/2020     Chronic atrial fibrillation     Facet arthropathy, lumbar     Acquired hypothyroidism     Pulmonary hypertension (H)     Current Outpatient Medications   Medication Sig Dispense Refill     amLODIPine (NORVASC) 2.5 MG tablet Take 1 tablet (2.5 mg) by mouth daily 90 tablet 3     citalopram (CELEXA) 20 MG tablet TAKE 1&1/2 TABLET (30MG) BY MOUTH EVERY DAY. 135 tablet 0     diazepam (VALIUM) 5 MG tablet TAKE 1/2 TO 1 TABLET BY MOUTH EVERY 8 HOURS AS NEEDED 40 tablet 3     levothyroxine (SYNTHROID/LEVOTHROID) 50 MCG tablet TAKE 1 TABLET (50MCG) BY MOUTH DAILY 90 tablet 0     metoprolol tartrate (LOPRESSOR) 100 MG tablet TAKE 1 TABLET (100MG) BY MOUTH 2 TIMES DAILY 180 tablet 0     rosuvastatin (CRESTOR) 10 MG tablet TAKE 1 TABLET (10MG) BY MOUTH  DAILY 30 tablet 0     traMADol (ULTRAM) 50 MG tablet TAKE ONE TABLET BY MOUTH EVERY 8 HOURS AS NEEDED FOR MODERATE PAIN 90 tablet 3     VITAMIN D, CHOLECALCIFEROL, PO Take 1,000 Units by mouth At Bedtime        warfarin ANTICOAGULANT (COUMADIN) 5 MG tablet TAKE 1 TABLET (5MG) BY MOUTH DAILY EXCEPT TAKE A HALF TABLET (2.5 MG) SUN, WED OR AS DIRECTED BY INR CLINIC 90 tablet 1     amLODIPine (NORVASC) 5 MG tablet Take 0.5 tablets (2.5 mg) by mouth daily 90 tablet 0      Social History     Tobacco Use     Smoking status: Never Smoker     Smokeless tobacco: Never Used   Substance Use Topics     Alcohol use: Yes     Alcohol/week: 0.0 standard drinks     Comment: occ - wine     Drug use: No            Reviewed and updated as needed this visit by Provider         Review of Systems   No chest pain, palpitations, lightheadedness or syncope, edema, stable dyspnea on exertion       Objective   Reported vitals:  There were no vitals taken for this visit.           Assessment/Plan:  1. Chronic right-sided low back pain without sciatica  Overall pain is stable, she is working with pain clinic but they have to delay steroid injection.  We will refill her tramadol at the current dose and put a few extra refills on it.  I reviewed the updated controlled substance agreement we will mail that to her for her to return and then scan it.  We will follow-up in 6 months unless there are other changes sooner, hopefully she will be able to manage with pain clinic before that time.    2. Major depressive disorder, recurrent episode, moderate (H)  Overall stable, continue medication    3. Pulmonary hypertension (H)  Advised about this condition, overall probably not a candidate for other medication    4. GREEN (dyspnea on exertion)  Advised this is probably due to multiple reasons including her atrial fibrillation, pulmonary hypertension.  Her spirometry is shown some decreased lung volumes and restrictive disease which is probably a component.   Overall probably this is going to stay the same.    5. MARY (generalized anxiety disorder)  Refill med  - diazepam (VALIUM) 5 MG tablet; TAKE 1/2 TO 1 TABLET BY MOUTH EVERY 8 HOURS AS NEEDED  Dispense: 40 tablet; Refill: 3    Return in about 6 months (around 9/30/2020).      Phone call duration:  16:50 minutes    Simin Lopez MD

## 2020-04-03 ENCOUNTER — TRANSFERRED RECORDS (OUTPATIENT)
Dept: HEALTH INFORMATION MANAGEMENT | Facility: CLINIC | Age: 80
End: 2020-04-03

## 2020-04-04 ENCOUNTER — TRANSFERRED RECORDS (OUTPATIENT)
Dept: HEALTH INFORMATION MANAGEMENT | Facility: CLINIC | Age: 80
End: 2020-04-04

## 2020-04-07 ENCOUNTER — ANTICOAGULATION THERAPY VISIT (OUTPATIENT)
Dept: INTERNAL MEDICINE | Facility: CLINIC | Age: 80
End: 2020-04-07

## 2020-04-07 ENCOUNTER — TELEPHONE (OUTPATIENT)
Dept: INTERNAL MEDICINE | Facility: CLINIC | Age: 80
End: 2020-04-07

## 2020-04-07 LAB
CREAT SERPL-MCNC: 0.71 MG/DL (ref 0.57–1.11)
GFR SERPL CREATININE-BSD FRML MDRD: >60 ML/MIN/1.73M2
GLUCOSE SERPL-MCNC: 93 MG/DL (ref 65–100)
INR PPP: 2.7
POTASSIUM SERPL-SCNC: 3.8 MMOL/L (ref 3.5–5)

## 2020-04-07 NOTE — TELEPHONE ENCOUNTER
Received note from [facility] in [facility] that patient will now have care and ACC management at [facility] in [facility].  Roster and ACC encounter updated.    Samantha Whittington RN  [facility]

## 2020-04-22 DIAGNOSIS — E03.9 ACQUIRED HYPOTHYROIDISM: ICD-10-CM

## 2020-04-22 DIAGNOSIS — I48.20 CHRONIC ATRIAL FIBRILLATION (H): ICD-10-CM

## 2020-04-22 DIAGNOSIS — I25.10 ASCVD (ARTERIOSCLEROTIC CARDIOVASCULAR DISEASE): ICD-10-CM

## 2020-04-22 DIAGNOSIS — E78.5 HYPERLIPIDEMIA LDL GOAL <70: ICD-10-CM

## 2020-04-22 DIAGNOSIS — F41.1 GAD (GENERALIZED ANXIETY DISORDER): ICD-10-CM

## 2020-04-22 DIAGNOSIS — F33.1 MAJOR DEPRESSIVE DISORDER, RECURRENT EPISODE, MODERATE (H): ICD-10-CM

## 2020-04-22 DIAGNOSIS — I10 ESSENTIAL HYPERTENSION, BENIGN: ICD-10-CM

## 2020-04-22 DIAGNOSIS — I25.10 CORONARY ARTERY DISEASE INVOLVING NATIVE CORONARY ARTERY OF NATIVE HEART WITHOUT ANGINA PECTORIS: ICD-10-CM

## 2020-04-22 DIAGNOSIS — Z79.01 LONG TERM CURRENT USE OF ANTICOAGULANT THERAPY: ICD-10-CM

## 2020-04-23 RX ORDER — METOPROLOL TARTRATE 100 MG
TABLET ORAL
Qty: 180 TABLET | Refills: 1 | Status: SHIPPED | OUTPATIENT
Start: 2020-04-23

## 2020-04-23 RX ORDER — LEVOTHYROXINE SODIUM 50 UG/1
TABLET ORAL
Qty: 90 TABLET | Refills: 0 | Status: SHIPPED | OUTPATIENT
Start: 2020-04-23

## 2020-04-23 RX ORDER — WARFARIN SODIUM 5 MG/1
TABLET ORAL
Qty: 90 TABLET | Refills: 1 | Status: SHIPPED | OUTPATIENT
Start: 2020-04-23

## 2020-04-23 RX ORDER — CITALOPRAM HYDROBROMIDE 20 MG/1
TABLET ORAL
Qty: 135 TABLET | Refills: 0 | Status: SHIPPED | OUTPATIENT
Start: 2020-04-23

## 2020-04-23 RX ORDER — ROSUVASTATIN CALCIUM 10 MG/1
TABLET, COATED ORAL
Qty: 30 TABLET | Refills: 11 | Status: SHIPPED | OUTPATIENT
Start: 2020-04-23

## 2020-04-23 NOTE — TELEPHONE ENCOUNTER
Warfarin:  Routing refill request to provider for review/approval because:  Labs out of range:  INR    Levothyroxine:  Routing refill request to provider for review/approval because:  Labs out of range:  TSH    Celexa:  Routing refill request to provider for review/approval because:  Labs out of range:  PHQ-9  PHQ-9 score:    PHQ 3/10/2020   PHQ-9 Total Score 13   Q9: Thoughts of better off dead/self-harm past 2 weeks Not at all       Kadi Lindo, PAZN, RN

## 2020-04-23 NOTE — TELEPHONE ENCOUNTER
Crestor:   Prescription approved per [facility] Refill Protocol.    Lopressor:  Prescription approved per [facility] Refill Protocol.  Will be due for BP in December 2020.     PAZ GouldN, RN

## 2020-05-15 ENCOUNTER — TELEPHONE (OUTPATIENT)
Dept: PALLIATIVE MEDICINE | Facility: CLINIC | Age: 80
End: 2020-05-15

## 2020-05-15 NOTE — TELEPHONE ENCOUNTER
SHANTI to schedule Bilateral L4-5 and L5-S1 facet joint injections.    Nae FELICIANO    [facility]

## 2020-09-21 ENCOUNTER — NURSE TRIAGE (OUTPATIENT)
Dept: NURSING | Facility: CLINIC | Age: 80
End: 2020-09-21

## 2020-09-21 NOTE — TELEPHONE ENCOUNTER
Patient's  calling, requesting contact information for the [facility]. Transferred to the Sleep Center .    Liss Flores RN  [facility]    Reason for Disposition    [1] Caller requesting NON-URGENT health information AND [2] PCP's office is the best resource    Protocols used: INFORMATION ONLY CALL-A-AH

## 2020-10-30 ENCOUNTER — TELEPHONE (OUTPATIENT)
Dept: SLEEP MEDICINE | Facility: CLINIC | Age: 80
End: 2020-10-30

## 2020-10-30 NOTE — TELEPHONE ENCOUNTER
CALLED SP[employer]SE AFTER A REQUEST FROM [facility] TO RELEASE S/N FOR CPAP MACHINE TO MAKE SURE THEY ARE SWITCHING FROM [facility] AND HE STATED THAT THEY ARE MAKING THE CHANGE. REMOVED THE PT S/N FROM [employer] PER REQUEST.

## 2021-10-03 NOTE — MR AVS SNAPSHOT
After Visit Summary   11/14/2017    Cielo Aguillon    MRN: [id number]           Patient Information     Date Of Birth          1940        Visit Information        Provider Department      11/14/2017 2:00 PM Simin Lopez MD [facility]        Today's Diagnoses     Essential hypertension, benign    -  1    Major depressive disorder, recurrent episode, moderate (H)        Chronic atrial fibrillation (H)        Coronary artery disease involving native coronary artery of native heart without angina pectoris        Hyperlipidemia LDL goal <70        Chronic left-sided low back pain with left-sided sciatica        MARY (generalized anxiety disorder)        Acquired hypothyroidism           Follow-ups after your visit        Your next 10 appointments already scheduled     Dec 05, 2017  1:30 PM CST   Anticoagulation Visit with [facility]   [facility]    [address]  [facility] [id number] [phone]            Dec 14, 2017  1:30 PM CST   Oximetry  with  SLEEP LAB   [facility]    [address]  [facility] [id number] [phone]            Dec 19, 2017  1:00 PM CST   Return Sleep Patient with Dennys Johnson MD   [facility]    [address]  [facility] [address] [phone]              Who to contact     If you have questions or need follow up information about today's clinic visit or your schedule please contact [facility] directly at [phone].  Normal or non-critical lab and imaging results will be communicated to you by MyChart, letter or phone within 4 business days after the clinic has received the results. If you do not hear from us within 7 days, please contact the clinic through MyChart or phone. If  "you have a critical or abnormal lab result, we will notify you by phone as soon as possible.  Submit refill requests through [employer] or call your pharmacy and they will forward the refill request to us. Please allow 3 business days for your refill to be completed.          Additional Information About Your Visit        upurskillhart Information     [facility] lets you send messages to your doctor, view your test results, renew your prescriptions, schedule appointments and more. To sign up, go to [email or web address] . Click on \"Log in\" on the left side of the screen, which will take you to the Welcome page. Then click on \"Sign up Now\" on the right side of the page.     You will be asked to enter the access code listed below, as well as some personal information. Please follow the directions to create your username and password.     Your access code is: [email or web address]  Expires: 2018 12:07 PM     Your access code will  in 90 days. If you need help or a new code, please call your [facility] clinic or [phone].        Care EveryWhere ID     This is your Care EveryWhere ID. This could be used by other organizations to access your [facility] medical records  [id number]        Your Vitals Were     Pulse Temperature Respirations Height Pulse Oximetry BMI (Body Mass Index)    60 96.8  F (36  C) (Oral) 16 5' 5\" (1.651 m) 93% 29.3 kg/m2       Blood Pressure from Last 3 Encounters:   17 122/78   17 135/82   17 124/80    Weight from Last 3 Encounters:   17 176 lb 1.6 oz (79.9 kg)   17 174 lb 9.6 oz (79.2 kg)   17 171 lb (77.6 kg)              We Performed the Following     TSH with free T4 reflex          Today's Medication Changes          These changes are accurate as of: 17 11:59 PM.  If you have any questions, ask your nurse or doctor.               These medicines have changed or have updated prescriptions.        Dose/Directions    citalopram 10 MG tablet   Commonly known as:  " celeXA   This may have changed:  medication strength   Used for:  Major depressive disorder, recurrent episode, moderate (H), MARY (generalized anxiety disorder)   Changed by:  Simin Lopez MD        Dose:  10 mg   Take 1 tablet (10 mg) by mouth daily   Quantity:  90 tablet   Refills:  3         Stop taking these medicines if you haven't already. Please contact your care team if you have questions.     venlafaxine 150 MG 24 hr capsule   Commonly known as:  EFFEXOR-XR   Stopped by:  Simin Lopez MD           venlafaxine 37.5 MG 24 hr capsule   Commonly known as:  EFFEXOR-XR   Stopped by:  Simin Lopez MD                Where to get your medicines      These medications were sent to [id number] - [place], [place] - [address]  [address], [place] [address]     Phone:  [phone]     citalopram 10 MG tablet         Some of these will need a paper prescription and others can be bought over the counter.  Ask your nurse if you have questions.     Bring a paper prescription for each of these medications     traMADol 50 MG tablet                Primary Care Provider Office Phone # Fax #    Simin Lopez [phone]       [address] [facility] [address]  [facility] [address]        Equal Access to Services     ANNELISE CHAVEZ AH: Hadii jamari salazar hadasho Soomaali, waaxda luqadaha, qaybta kaalmada adeegyada, yaneth martin. So [facility] [phone].    ATENCIÓN: Si habla español, tiene a duran disposición servicios gratuitos de asistencia lingüística. Llame al [phone].    We comply with applicable federal civil rights laws and [place] laws. We do not discriminate on the basis of race, color, national origin, age, disability, sex, sexual orientation, or gender identity.            Thank you!     Thank you for choosing [facility]  for your care. Our goal is always to provide you with excellent care. Hearing back from our patients is one way we can  continue to improve our services. Please take a few minutes to complete the written survey that you may receive in the mail after your visit with us. Thank you!             Your Updated Medication List - Protect others around you: Learn how to safely use, store and throw away your medicines at www.disposemymeds.org.          This list is accurate as of: 11/14/17 11:59 PM.  Always use your most recent med list.                   Brand Name Dispense Instructions for use Diagnosis    amLODIPine 5 MG tablet    NORVASC    90 tablet    Take 1 tablet (5 mg) by mouth daily    Essential hypertension, benign       citalopram 10 MG tablet    celeXA    90 tablet    Take 1 tablet (10 mg) by mouth daily    Major depressive disorder, recurrent episode, moderate (H), MARY (generalized anxiety disorder)       diazepam 5 MG tablet    VALIUM    40 tablet    1/2 to 1 po q 8 hours prn    MARY (generalized anxiety disorder)       levothyroxine 50 MCG tablet    SYNTHROID/LEVOTHROID    90 tablet    Take 1 tablet (50 mcg) by mouth daily    Acquired hypothyroidism       metoprolol 100 MG tablet    LOPRESSOR    180 tablet    Take 1 tablet (100 mg) by mouth 2 times daily    Essential hypertension, benign, ASCVD (arteriosclerotic cardiovascular disease)       traMADol 50 MG tablet    ULTRAM    50 tablet    Take 1 tablet (50 mg) by mouth every 8 hours as needed for moderate pain    Chronic left-sided low back pain with left-sided sciatica       VITAMIN D (CHOLECALCIFEROL) PO      Take 1,000 Units by mouth daily        warfarin 5 MG tablet    COUMADIN    108 tablet    Take 1 tablet (5 mg) daily, except take 1 and 1/2 tablet (7.5 mg) on Tuesday, and Saturday  or as instructed.    Chronic atrial fibrillation (H), Long term (current) use of anticoagulants          04-Oct-2021

## 2022-11-20 NOTE — TELEPHONE ENCOUNTER
Incoming order for repeat LRFA    Per order:  She had this done at [facility] in 2014. She thinks it provided about 50% benefit but does not remember how long it helped for.        Per Medicare policy:  Per Medicare medical policy-No PA required    o REPEAT RFA  Must wait 6 months and experience significant pain relief following previous RFA. Prior Auth is NOT required.          Routing for review, if okay to schedule patient is on blood thinner        Kay LAUREN    [facility]         Never smoker

## 2024-11-18 NOTE — PROGRESS NOTES
Sleep Center [facility]  Outpatient Sleep Medicine Followed Up Visit  March 20, 2018    Name: Cielo Aguillon MRN# [id number]   Age: 78 year old YOB: 1940     Date of Follow Up Visit: March 20, 2018  Consultation is requested by: Simin Lopez MD  [address]  [place] [address]  Primary care provider: Simin Lopez    Patient is accompanied by: Patient presents with , Brayden, today.       Reason for Sleep Consult:     Cielo Aguillon is a 78 year old female patient that presents here for a complex sleep apnea follow up evaluation.         Assessment and Plan:     Summary Sleep Diagnoses:    (1) Permanent Atrial Fibrillation  (2) Severe BETHANY / Complex Sleep Apnea (AHI of 39 events per hour)  (3) Pulmonary Hypertension  (4) GREEN    Summary Recommendations / Discussion:    During the initial visit, this patient was diagnosed with severe BETHANY with an AHI of 39 events per hour. The patient was successfully treated with a CPAP at fixed pressure of 10 cmH2O. During the last visit, the PAP's download showed that the device was effective when used with a residual AHI of only 3.6 events per hour. Since the patient's health was stable and recent echocardiogram showed preserved LVEF, a new PSG was not needed. Instead, the patient had an overnight oximetry with the PAP in place. This showed prolonged sleep associated hypoxemia with 230.7 minutes below the SpO2 of 88%. As such, this patient underwent an in-lab PSG titration study with TCM and ABG. During the study, the patient was adequately titrated to an ASV at 4/0/20 cmH2O. Some Cheyne-Stoke breathing was noted during the PSG sleep study while on CPAP therapy. Given the preserved LVEF at 55 to 60%, ASV may be utilized. Sleep associated hypoxemia improved with ASV therapy. The patient was started on auto-ASV with EPAP min of 6 cmH2O, EPAP max of 11 cmH2O, PS min of 0 cmH2O, PS max of 19 cmH2O. Unfortunately, the patient has not been complaint  The patient's goals for the shift include      The clinical goals for the shift include hemodynamically stability    Over the shift, the patient did not make progress toward the following goals. Barriers to progression include  Recommendations to address these barriers include   Problem: Chronic Conditions and Co-morbidities  Goal: Patient's chronic conditions and co-morbidity symptoms are monitored and maintained or improved  Outcome: Progressing     Problem: Fall/Injury  Goal: Verbalize understanding of personal risk factors for fall in the hospital  Outcome: Progressing  Goal: Verbalize understanding of risk factor reduction measures to prevent injury from fall in the home  Outcome: Progressing  Goal: Use assistive devices by end of the shift  Outcome: Progressing  Goal: Pace activities to prevent fatigue by end of the shift  Outcome: Progressing     Problem: General Stroke  Goal: Establish a mutual long term goal with patient by discharge  Outcome: Progressing  Goal: Demonstrate improvement in neurological exam throughout the shift  Outcome: Progressing  Goal: Maintain BP within ordered limits throughout shift  Outcome: Progressing  Goal: Participate in treatment (ie., meds, therapy) throughout shift  Outcome: Progressing  Goal: No symptoms of aspiration throughout shift  Outcome: Progressing  Goal: Out of bed three times today  Outcome: Progressing     Problem: Diabetes  Goal: Vital signs within normal range for age by end of shift  Outcome: Progressing   .     with PAP therapy due to removing the interface while sleeping. The device is effective at treating the condition with a residual AHI of 3.6 events per hour. Mild nocturnal hypoxemia was noted during the overnight oximetry. However, it is difficult to assess if the PAP was in place or not. At this point, current therapy will be continued. Techniques to minimize removal of the mask discussed and encouraged. PAP compliance was discussed, explained, and encouraged once gain. Given the significant shortness of breath during exertion, a complete PFT and 6 minute walk were ordered. Recent ABG showed only mild hypoxemia. Today, the nature and pathophysiology of BETHANY were discussed once again. Lifestyle recommendations including healthy dietary and exercising habits were discussed. Pt will follow up with me after completing 4 weeks of ASV therapy and after getting all the ordered studies completed. Before the follow up visit, the patient will complete also an overnight oximetry with the PAP device in place.    Coding:  (G47.31) Complex sleep apnea syndrome  (primary encounter diagnosis)  (R06.09) GREEN (dyspnea on exertion)  (I48.2) Permanent atrial fibrillation (H)  (I27.20) Pulmonary hypertension    Counseling included a comprehensive review of diagnostic and therapeutic strategies as well as risks of inadequate therapy.    Educational materials provided in instructions. The patient was instructed to avoid driving or operating any heavy machinery when experiencing drowsiness.    All questions and concerns were addressed today. Pt agrees and understands the assessment and plan.         History of Present Illness:   Cielo Aguillon is a 77 year old  RHD female pt with PMH of cystocele, rectocele, hypertension, peptic ulcer disease, chornic rhinitis, Lichenification and Lichen Simplex Chronicus, fatty liver, hyperlipidemia, chronic back pain, non-obstructive coronary artery disease, mild to moderate pulmonary  hypertension, generalized anxiety disorder, major depressive disorder, chronic atrial fibrillation (daignosed 5 yrs ago), hypothyroidism, and previously diagnosed BETHANY presents for a complex sleep apnea follow up evaluation.     As previously explained, this medically complex patient underwent a PSG sleep stud in 2015 at [facility]. The study showed severe BETHANY with an AHI of 54 events per hour with a RDI of 58 events per hour scored at 4% for Medicare criteria, the AHI was 39 events per hour). Most events were obstructive in nature. The PAP download during the initial visit showed that the patient was using a CPAP at fixed pressure of 10 cmH2O. This was effective at treating the condition with a residual AHI of 3.6 events per hour. No significant leakage noted.    During the initial visit, the patient explained to me that she needed new CPAP supplies. The patient explained that she was not able to use the PAP device because the mask needed to be replaced and she was not able to get new supplies without repeating the PSG sleep study. She was using a CPAP with a nasal mask. The  explained that with the PAP device the patient would not snore, have witnessed apneas, or gasping / choking for air. Without the PAP device, the patient reports snoring and witnessed apneas. Some non-restorative sleep and sleep inertia reported. Pt denied EDS, but admits having physical fatigue. No inadvertent naps reported. Pt is not currently driving due to vision problems.    The patient was continue on CPAP at fixed pressure of 10 cmH2O and an overnight oximetry test was performed with the PAP therapy in place. This showed prolonged sleep associated hypoxemia with 230.7 minutes spent under the SpO2 of 88%. The study also demonstrated a sawtooth pattern suggestive of poorly controlled BETHANY. Therefore, the patient underwent a PSG titration study on 02/08/2018. During the study the patient had some central hypopneas with a Cheyne-Stoke pattern.  The patient was adequately titrated to an ASV at 4/0/20 with a residual AHI of 6.9 events per hour. The patient had atrial fibrillation during the PSG sleep study.    During the last visit, the patient was started on auto-ASV with EPAP min of 6 cmH2O, EPAP max of 11 cmH2O, PS min of 0 cmH2O, and PS max of 19 cmH2O. The PAP download today shows a non-compliant patient who is using the device 73% of the time with 43% over 4 hours. The device is effective at treating the condition with a residual AHI of 3.6 events per hour. Some mild leaks noted with a 95th percentile at 37.3 L/min.    ASV Compliance:   Dates: March 20, 2018       43 % >4hour use    Average Use: 4 hours 5 minutes   Leak: 37.3 L/min    Residual AHI: 3.6 events per hour    The patient recently underwent a coronary angiogram on 03/14/2018. The study showed small caliber vessels with left dominant circulation. Some disease was noted. The LV end-diastolic pressure was mildly elevated at 15 mmHg and the LVEF was preserved at 65%.    Pt reports GREEN and SOB. She explains that she is unable to walk much due to significant GREEN.    The patient explains that she is happy with the ASV, but she removes the interface throughout the night without knowing. She also reports some leaks. When using the PAP device, the patient reports improved EDS and sleep quality. The patient denies any aerophagia, bloating, snoring, gasping / choking for air, or witnessed apneas. The patient is using a full face interface with some leaks. No integumentary discomfort reported.     PREVIOUS IN- LAB or HOME SLEEP STUDIES: The patient reports the study was conducted in [facility]   Date: 2015   TYPE: PSG   AHI: 58 events per hour   BMI: 28.7 kg/m2   Intervention: CPAP    SLEEP-WAKE SCHEDULE:     Cielo KARSON Aguillon      -Describes herself as a night person; prefers to go to sleep at 10:30 PM and wakes up at 9:00 AM.      -The patient takes no naps during the week; takes no inadvertent naps.      -ON  WEEKDAYS, goes to sleep at 10:30 PM during the week; awakens 9:00 AM with an alarm; falls asleep in 10 minutes; denies difficulty falling asleep.      -ON WEEKENDS, goes to sleep at 10:30 PM and wakes up at 9:00 AM with an alarm; falls asleep in 10 minutes.        -Awakens 1-2 times a night for 5 minutes before falling back to sleep; awakens to go to the bathroom and external stimuli.      BEDTIME ACTIVITIES AND SHIFT WORK:    Cielo Aguillon     -Bedtime Activities and Other Sleeping Information: Pt lives . Pt sleeps alone on a twin size bed ( sleeps in a different room due to snoring). Pt sleeps on her sides. Pt does not use any electronics in bed and uses bedroom only for sleeping.      -Occupation: Retired (Pharmacy Tech)     SCALES        -SLEEPINESS: Carlisle sleepiness scale (ESS):  2 / 24 (initial visit)    Drowsy driving / near accidents: Denies any near accidents    Consequences: Non-restorative sleep    SLEEP COMPLAINTS:  Cardio-respiratory     -Snoring: Significant snoring reported    -Dyspnea: Pt admits having witnessed apneas   -Morning headaches or confusion: Denies any morning cephalgia   -Coexisting Lung disease: Pulmonary HTN     -Coexisting Heart disease: Atria fibrillation with non-obstructive CAD     -Does patient have a bed partner: Patient sleeps alone   -Has bed partner been sleeping separately because of snoring:  No            RLS Screen:    -When you try to relax in the evening or sleep at night, do you ever have unpleasant, restless feelings in your legs that can be relieved by walking or movement? None Reported     -Periodic limb movement: None Reported    Narcolepsy:     - Denies sudden urges of sleep attacks   - Denies cataplexy   - Denies sleep paralysis    - Admits hallucinations. Some hypnagogic hallucinations a few times a month consistent of little kids. This is not scary to her.     - Denies feeling refreshed after a nap.    Sleep Behaviors:   - Denies leg  symptoms/movements   - Denies motor restlessness   - Denies night terrors   - Denies bruxism   - Denies automatic behaviors    Other Subjective Complaints:   - Denies anxiety or rumination    - Denies pain and discomfort at night   - Denies waking up with heart pounding or racing   - Denies GERD or aspiration         Parasomnia:    -NREM - Denies recurrent persistent confusional arousal, night eating, sleep walking or sleep terrors.      -REM - Denies dream enactment or injuries.     -Driving Accident or Near Accidents: Pt does not drive         Medications:     Current Outpatient Prescriptions   Medication Sig     isosorbide mononitrate (IMDUR) 30 MG 24 hr tablet Take 0.5 tablets (15 mg) by mouth daily     amLODIPine (NORVASC) 10 MG tablet Take 1 tablet (10 mg) by mouth daily     MAGNESIUM OXIDE PO Take 200 mg by mouth daily (with lunch)     traMADol (ULTRAM) 50 MG tablet TAKE ONE TABLET BY MOUTH EVERY 8 HOURS AS NEEDED FOR MODERATE PAIN     warfarin (COUMADIN) 5 MG tablet TAKE 1&1/2 TABLETS (7.5MG) BY MOUTH ONCE DAILY ON TUESDAY & SATURDAY THEN 1 TABLET (5MG) ALL THE OTHER DAYS OF THE WEEK     metoprolol tartrate (LOPRESSOR) 100 MG tablet TAKE 1 TABLET (100MG) BY MOUTH 2 TIMES DAILY     levothyroxine (SYNTHROID/LEVOTHROID) 50 MCG tablet TAKE 1 TABLET (50 MCG) BY MOUTH DAILY     citalopram (CELEXA) 10 MG tablet Take 2 tablets (20 mg) by mouth daily (Patient taking differently: Take 10 mg by mouth daily )     diazepam (VALIUM) 5 MG tablet 1/2 to 1 po q 8 hours prn (Patient taking differently: Take 2.5-5 mg by mouth every 8 hours as needed for anxiety (WITH TRAVELING) )     rosuvastatin (CRESTOR) 10 MG tablet Take 1 tablet (10 mg) by mouth daily (Patient taking differently: Take 10 mg by mouth At Bedtime )     VITAMIN D, CHOLECALCIFEROL, PO Take 1,000 Units by mouth At Bedtime      No current facility-administered medications for this visit.       Allergies   Allergen Reactions     Ace Inhibitors      Cough (Zestril)      Aspirin      hx bleeding ulcers     Hydrochlorothiazide [Hctz] Rash     Ibuprofen      hx bleeding ulcers     Amoxicillin Rash     Losartan Rash     Penicillins Rash          Past Medical History:     Denies needing any 02 supplement at night.    Past Medical History:   Diagnosis Date     Anxiety     related to travel     Arthritis      ASCVD (arteriosclerotic cardiovascular disease) 2/12    60-70% stenoses of OM2, D1, medical management     Atrial fibrillation (H)      Atrial flutter (H)      Coronary artery disease     2/2012- 50% mid Cfx, 60-70% prox OM2, 50-70% D1     Essential hypertension, benign      Fatty liver 5/10    mild fibrosis on biopsy     Hypertriglyceridemia      Major depression      BETHANY (obstructive sleep apnea)      Other ventral hernia without mention of obstruction or gangrene      Peptic ulcer 2002     Peptic ulcer, unspecified site, unspecified as acute or chronic, without mention of hemorrhage, perforation, or obstruction 1983     Shortness of breath      Thyroid disease      Tubular adenoma 6/08             Past Surgical History:    Admits previous upper airway surgery.     Past Surgical History:   Procedure Laterality Date     APPENDECTOMY       C NONSPECIFIC PROCEDURE      Appendectomy     C NONSPECIFIC PROCEDURE      Tonsillectomy     C NONSPECIFIC PROCEDURE      Tubal ligation     C NONSPECIFIC PROCEDURE  11/01    Shave bone spurs from left foot     COLONOSCOPY  9/1/2016    Dr. Camejo [facility]     COLONOSCOPY N/A 9/1/2016    Procedure: COMBINED COLONOSCOPY, SINGLE OR MULTIPLE BIOPSY/POLYPECTOMY BY BIOPSY;  Surgeon: Pillo Camejo MD;  Location:  GI     CORONARY ANGIOGRAPHY ADULT ORDER  2/13/2012    50% mid Cfx, 60-70% prox OM2, 50-70% D1     ESOPHAGOSCOPY, GASTROSCOPY, DUODENOSCOPY (EGD), COMBINED  9/1/2016    Dr. Camejo [facility]     ESOPHAGOSCOPY, GASTROSCOPY, DUODENOSCOPY (EGD), COMBINED N/A 9/1/2016    Procedure: COMBINED ESOPHAGOSCOPY, GASTROSCOPY, DUODENOSCOPY (EGD), BIOPSY  SINGLE OR MULTIPLE;  Surgeon: Pillo Camejo MD;  Location: [facility]     GYN SURGERY       HEART CATH RIGHT AND LEFT HEART CATH  04/06/2015    Mid LAD 50-60%, 1st Diagonal 70%, 1st OM 30%, 2nd OM 40-50%, review report for right heart cath results.           Social History:     Social History   Substance Use Topics     Smoking status: Never Smoker     Smokeless tobacco: Never Used     Alcohol use 0.0 oz/week     0 Standard drinks or equivalent per week      Comment: occ - wine     Chemical History:     Tobacco: Never smoked     Uses no caffeine.   Supplements for wakefulness: Patient does not use any supplements to stay awake    EtOH: 1 to 2 drinks a week  Recreational Drugs: Patient denies using any recreational drugs     Psych Hx:   Current dangers to self or others: No. Pt denies any SI / HI, hallucinations, or delusions         Family History:     Family History   Problem Relation Age of Onset     Respiratory Father      emphysema--secon. to smoking     Alzheimer Disease Mother      Congenital Anomalies Mother      OSTEOPOROSIS Mother      DIABETES Maternal Aunt      DIABETES Other      cousins     DIABETES Other      cousin on mothers side of family     Depression Son      Depression Son      Colon Cancer No family hx of       Sleep Family Hx:       RLS - None reported   BETHANY - None reported  Insomnia - None reported  Parasomnia - None reported         Review of Systems:     A complete 10 point review of systems was negative other than HPI or as commented below:     CONSTITUTIONAL: NEGATIVE for weight gain/loss. Pt admits some chills.  EYES: NEGATIVE for changes in vision, blind spots, double vision.  ENT: NEGATIVE for ear pain. Pt admits having some sore throat, sinus pain, post-nasal drip, runny nose, and bloody nose.  CARDIAC: Positive for fast heartbeats or fluttering in chest, chest pain or pressure, breathlessness when lying flat, swollen legs / swollen feet.  NEUROLOGIC: Pt admits having headaches and leg  "weakness.  DERMATOLOGIC: NEGATIVE for rashes, new moles or change in mole(s)  PULMONARY: NEGATIVE coughing up blood, wheezing or whistling when breathing. Pt reports SOB at rest, SOB with activity, dry cough, and productive cough.     GASTROINTESTINAL: NEGATIVE for nausea or vomitting, loose or watery stools, fat or grease in stools, constipation, abdominal pain, bowel movements black in color or blood noted.  GENITOURINARY: NEGATIVE for pain during urination, blood in urine, urinating more frequently than usual, irregular menstrual periods.  MUSCULOSKELETAL: Positive for muscle pain, bone or joint pain, and swollen joints.  ENDOCRINE: NEGATIVE for increased thirst or urination, diabetes.  LYMPHATIC: NEGATIVE for swollen lymph nodes, lumps or bumps in the breasts or nipple discharge.         Physical Examination:   /63  Pulse 67  Resp 18  Ht 1.651 m (5' 5\")  Wt 78 kg (172 lb)  SpO2 95%  BMI 28.62 kg/m2     VS: Reviewed and mildly decreased SpO2.   General: Alert, oriented, not in distress. Dressed casually; Good eye contact; Comfortably sitting in a chair; in no apparent distress  HEENT: Normocephalic and atraumatic; NL TM x 2; pupils are isocoric and equally responsive to the light. PERRLA. EOMI. Normal fundoscopic examination; Nasal turbinates are normal with a normal septal alignment;  Mallampati score: Grade IV; Tonsillar hypertrophy: 0  surgically removed; Pharynx with no erythema or exudates. Small and crowded oropharynx with low-lying soft palate.  NECK: Neck supple; symmetrical; no lymphadenopathy; no thyromegaly, bruit, JVD noted. Neck circumference of 15.75 inches (40 cm).  Lungs: Both hemithoraces are symmetrical, normal to palpation, no dullness to percussion, auscultation of lungs revealed normal breath sounds with no expirium prolongation, wheezing, rhonci and crackles.  CVS: Normal S1 and S2 heart sounds with no extra heart sounds. No murmur, rubs, or clicks. Normal peripheral pulses " throughout with no obvious peripheral edema. Irregularly irregular rhythm noted.  Psychiatry: Mood and affect are appropriate. Euthymic with affect congruent with full range and intensity. No SI/HI with adequate insight and judgement.          Data: All pertinent previous laboratory data reviewed     Lab Results   Component Value Date    PH 7.41 02/08/2018    PH 7.34 (L) 04/06/2015    PO2 76 (L) 02/08/2018    PO2 62 (L) 04/06/2015    PCO2 39 02/08/2018    PCO2 40 04/06/2015    HCO3 25 02/08/2018    HCO3 22 04/06/2015    SRIDHAR 0.1 02/08/2018     Lab Results   Component Value Date    TSH 3.32 11/14/2017    TSH 4.42 (H) 08/30/2017     Lab Results   Component Value Date    GLC 91 03/14/2018    GLC 96 08/30/2017     Lab Results   Component Value Date    HGB 12.8 03/14/2018    HGB 13.8 08/30/2017     Lab Results   Component Value Date    BUN 20 03/14/2018    BUN 20 08/30/2017    CR 0.90 03/14/2018    CR 1.03 08/30/2017     Echocardiology:    -Most recent echocardiogram obtained on 07/10/2017 showed LV or normal size with moderate concentric left ventricular hypertrophy. LV systolic function was normal with LVEF estimated at 55 - 60%. The RV was normal in side, structure, and function. There was mod-severe biatrial enlargement noted. No obvious atrial shunt observed. Mild to moderate mitral annular calcification was noted. There was also mild mitral regurgitation present. Mild pulmonic valve regurgitation noted. The rhythm was atrial fibrillation during the study.    Chest x-ray: None Recent Studies Available    PFT: None Recent Studies Available    Laboratory Studies:   Component Value Flag Ref Range Units Status Collected Lab   Sodium 140  133 - 144 mmol/L Final 08/30/2017 12:39 PM 65   Potassium 4.3  3.4 - 5.3 mmol/L Final 08/30/2017 12:39 PM 65   Chloride 106  94 - 109 mmol/L Final 08/30/2017 12:39 PM 65   Carbon Dioxide 26  20 - 32 mmol/L Final 08/30/2017 12:39 PM 65   Anion Gap 8  3 - 14 mmol/L Final 08/30/2017 12:39  PM 65   Glucose 96  70 - 99 mg/dL Final 08/30/2017 12:39 PM 65   Urea Nitrogen 20  7 - 30 mg/dL Final 08/30/2017 12:39 PM 65   Creatinine 1.03  0.52 - 1.04 mg/dL Final 08/30/2017 12:39 PM 65   GFR Estimate 52 (L) >60 mL/min/1.7m2 Final 08/30/2017 12:39 PM 65   Comment:   Non  GFR Calc   GFR Estimate If Black 63  >60 mL/min/1.7m2 Final 08/30/2017 12:39 PM 65   Comment:   African American GFR Calc   Calcium 9.3  8.5 - 10.1 mg/dL Final 08/30/2017 12:39 PM 65   Bilirubin Total 0.6  0.2 - 1.3 mg/dL Final 08/30/2017 12:39 PM 65   Albumin 3.6  3.4 - 5.0 g/dL Final 08/30/2017 12:39 PM 65   Protein Total 7.6  6.8 - 8.8 g/dL Final 08/30/2017 12:39 PM 65   Alkaline Phosphatase 68  40 - 150 U/L Final 08/30/2017 12:39 PM 65   ALT 32  0 - 50 U/L Final 08/30/2017 12:39 PM 65   AST 44  0 - 45 U/L Final 08/30/2017 12:39 PM 65     Component Value Flag Ref Range Units Status Collected Lab   WBC 7.4  4.0 - 11.0 10e9/L Final 08/30/2017 12:39 PM 79   RBC Count 4.01  3.8 - 5.2 10e12/L Final 08/30/2017 12:39 PM 79   Hemoglobin 13.8  11.7 - 15.7 g/dL Final 08/30/2017 12:39 PM 79   Hematocrit 41.3  35.0 - 47.0 % Final 08/30/2017 12:39 PM 79    (H) 78 - 100 fl Final 08/30/2017 12:39 PM 79   MCH 34.4 (H) 26.5 - 33.0 pg Final 08/30/2017 12:39 PM 79   Comment:   Reviewed: OK with previous   MCHC 33.4  31.5 - 36.5 g/dL Final 08/30/2017 12:39 PM 79   RDW 13.3  10.0 - 15.0 % Final 08/30/2017 12:39 PM 79   Platelet Count 161  150 - 450 10e9/L Final 08/30/2017 12:39 PM 79   Diff Method     Final 08/30/2017 12:39 PM 79   Automated Method   % Neutrophils 67.4   % Final 08/30/2017 12:39 PM 79   % Lymphocytes 22.6   % Final 08/30/2017 12:39 PM 79   % Monocytes 8.8   % Final 08/30/2017 12:39 PM 79   % Eosinophils 1.1   % Final 08/30/2017 12:39 PM 79   % Basophils 0.1   % Final 08/30/2017 12:39 PM 79   Absolute Neutrophil 5.0  1.6 - 8.3 10e9/L Final 08/30/2017 12:39 PM 79   Absolute Lymphocytes 1.7  0.8 - 5.3 10e9/L Final 08/30/2017  12:39 PM 79   Absolute Monocytes 0.7  0.0 - 1.3 10e9/L Final 08/30/2017 12:39 PM 79   Absolute Eosinophils 0.1  0.0 - 0.7 10e9/L Final 08/30/2017 12:39 PM 79   Absolute Basophils 0.0  0.0 - 0.2 10e9/L Final 08/30/2017 12:39 PM 79     Component Value Flag Ref Range Units Status Collected Lab   TSH 4.42 (H) 0.40 - 4.00 mU/L Final 08/30/2017 12:39 PM 65     Component Value Flag Ref Range Units Status Collected Lab   T4 Free 1.10  0.76 - 1.46 ng/dL Final 08/30/2017 12:39 PM 65     Dennys Johnson MD, MPH  Clinical Sleep Medicine    Total time spent with patient: 40 min. Over >50% of the time was spent for face to face counseling, education, and evaluation.